# Patient Record
Sex: FEMALE | Race: WHITE | Employment: UNEMPLOYED | URBAN - METROPOLITAN AREA
[De-identification: names, ages, dates, MRNs, and addresses within clinical notes are randomized per-mention and may not be internally consistent; named-entity substitution may affect disease eponyms.]

---

## 2020-05-13 ENCOUNTER — APPOINTMENT (OUTPATIENT)
Dept: GENERAL RADIOLOGY | Age: 60
DRG: 139 | End: 2020-05-13
Payer: MEDICARE

## 2020-05-13 ENCOUNTER — HOSPITAL ENCOUNTER (INPATIENT)
Age: 60
LOS: 1 days | Discharge: ANOTHER HOSPITAL | DRG: 139 | End: 2020-05-14
Attending: EMERGENCY MEDICINE | Admitting: INTERNAL MEDICINE
Payer: MEDICARE

## 2020-05-13 ENCOUNTER — APPOINTMENT (OUTPATIENT)
Dept: CT IMAGING | Age: 60
DRG: 139 | End: 2020-05-13
Payer: MEDICARE

## 2020-05-13 PROBLEM — J18.9 PNEUMONIA: Status: ACTIVE | Noted: 2020-05-13

## 2020-05-13 LAB
ABSOLUTE EOS #: 0 K/UL (ref 0–0.4)
ABSOLUTE IMMATURE GRANULOCYTE: 0 K/UL (ref 0–0.3)
ABSOLUTE LYMPH #: 0.48 K/UL (ref 1–4.8)
ABSOLUTE MONO #: 0.24 K/UL (ref 0.2–0.8)
ALBUMIN SERPL-MCNC: 3.2 G/DL (ref 3.5–5.2)
ALBUMIN/GLOBULIN RATIO: ABNORMAL (ref 1–2.5)
ALP BLD-CCNC: 59 U/L (ref 35–104)
ALT SERPL-CCNC: 43 U/L (ref 5–33)
AMPHETAMINE SCREEN URINE: NEGATIVE
ANION GAP SERPL CALCULATED.3IONS-SCNC: 18 MMOL/L (ref 9–17)
AST SERPL-CCNC: 106 U/L
BARBITURATE SCREEN URINE: NEGATIVE
BASOPHILS # BLD: 0 %
BASOPHILS ABSOLUTE: 0 K/UL (ref 0–0.2)
BENZODIAZEPINE SCREEN, URINE: NEGATIVE
BILIRUB SERPL-MCNC: 0.36 MG/DL (ref 0.3–1.2)
BILIRUBIN DIRECT: 0.15 MG/DL
BILIRUBIN, INDIRECT: 0.21 MG/DL (ref 0–1)
BUN BLDV-MCNC: 45 MG/DL (ref 6–20)
BUN/CREAT BLD: 34 (ref 9–20)
BUPRENORPHINE URINE: NORMAL
CALCIUM SERPL-MCNC: 9.1 MG/DL (ref 8.6–10.4)
CANNABINOID SCREEN URINE: NEGATIVE
CHLORIDE BLD-SCNC: 87 MMOL/L (ref 98–107)
CO2: 18 MMOL/L (ref 20–31)
COCAINE METABOLITE, URINE: NEGATIVE
CREAT SERPL-MCNC: 1.34 MG/DL (ref 0.5–0.9)
DIFFERENTIAL TYPE: ABNORMAL
EOSINOPHILS RELATIVE PERCENT: 0 % (ref 1–4)
ETHANOL PERCENT: <0.01 %
ETHANOL: <10 MG/DL
FERRITIN: 9357 UG/L (ref 13–150)
GFR AFRICAN AMERICAN: 49 ML/MIN
GFR NON-AFRICAN AMERICAN: 40 ML/MIN
GFR SERPL CREATININE-BSD FRML MDRD: ABNORMAL ML/MIN/{1.73_M2}
GFR SERPL CREATININE-BSD FRML MDRD: ABNORMAL ML/MIN/{1.73_M2}
GLOBULIN: ABNORMAL G/DL (ref 1.5–3.8)
GLUCOSE BLD-MCNC: 133 MG/DL (ref 70–99)
HCT VFR BLD CALC: 47.2 % (ref 36.3–47.1)
HEMOGLOBIN: 16.8 G/DL (ref 11.9–15.1)
IMMATURE GRANULOCYTES: 0 %
INR BLD: 1
LACTATE DEHYDROGENASE: 394 U/L (ref 135–214)
LYMPHOCYTES # BLD: 4 % (ref 24–44)
MAGNESIUM: 2.2 MG/DL (ref 1.6–2.6)
MCH RBC QN AUTO: 30.9 PG (ref 25.2–33.5)
MCHC RBC AUTO-ENTMCNC: 35.6 G/DL (ref 28.4–34.8)
MCV RBC AUTO: 86.8 FL (ref 82.6–102.9)
MDMA URINE: NORMAL
METHADONE SCREEN, URINE: NEGATIVE
METHAMPHETAMINE, URINE: NORMAL
MONOCYTES # BLD: 2 % (ref 1–7)
MORPHOLOGY: ABNORMAL
NRBC AUTOMATED: 0 PER 100 WBC
OPIATES, URINE: NEGATIVE
OXYCODONE SCREEN URINE: NEGATIVE
PARTIAL THROMBOPLASTIN TIME: 29.9 SEC (ref 23.9–33.8)
PDW BLD-RTO: 12.7 % (ref 11.8–14.4)
PHENCYCLIDINE, URINE: NEGATIVE
PLATELET # BLD: 77 K/UL (ref 138–453)
PLATELET ESTIMATE: ABNORMAL
PMV BLD AUTO: 12.3 FL (ref 8.1–13.5)
POTASSIUM SERPL-SCNC: 3.8 MMOL/L (ref 3.7–5.3)
PROPOXYPHENE, URINE: NORMAL
PROTHROMBIN TIME: 13.5 SEC (ref 11.5–14.2)
RBC # BLD: 5.44 M/UL (ref 3.95–5.11)
RBC # BLD: ABNORMAL 10*6/UL
SARS-COV-2, PCR: NORMAL
SARS-COV-2, RAPID: NOT DETECTED
SARS-COV-2: NORMAL
SEG NEUTROPHILS: 94 % (ref 36–66)
SEGMENTED NEUTROPHILS ABSOLUTE COUNT: 11.18 K/UL (ref 1.8–7.7)
SODIUM BLD-SCNC: 123 MMOL/L (ref 135–144)
SOURCE: NORMAL
TEST INFORMATION: NORMAL
TOTAL PROTEIN: 6 G/DL (ref 6.4–8.3)
TRICYCLIC ANTIDEPRESSANTS, UR: NORMAL
WBC # BLD: 11.9 K/UL (ref 3.5–11.3)
WBC # BLD: ABNORMAL 10*3/UL

## 2020-05-13 PROCEDURE — 80076 HEPATIC FUNCTION PANEL: CPT

## 2020-05-13 PROCEDURE — 2500000003 HC RX 250 WO HCPCS: Performed by: NURSE PRACTITIONER

## 2020-05-13 PROCEDURE — 80048 BASIC METABOLIC PNL TOTAL CA: CPT

## 2020-05-13 PROCEDURE — 2500000003 HC RX 250 WO HCPCS: Performed by: EMERGENCY MEDICINE

## 2020-05-13 PROCEDURE — 71260 CT THORAX DX C+: CPT

## 2020-05-13 PROCEDURE — 85610 PROTHROMBIN TIME: CPT

## 2020-05-13 PROCEDURE — 2580000003 HC RX 258: Performed by: EMERGENCY MEDICINE

## 2020-05-13 PROCEDURE — 85025 COMPLETE CBC W/AUTO DIFF WBC: CPT

## 2020-05-13 PROCEDURE — 83615 LACTATE (LD) (LDH) ENZYME: CPT

## 2020-05-13 PROCEDURE — 80307 DRUG TEST PRSMV CHEM ANLYZR: CPT

## 2020-05-13 PROCEDURE — 96367 TX/PROPH/DG ADDL SEQ IV INF: CPT

## 2020-05-13 PROCEDURE — 6370000000 HC RX 637 (ALT 250 FOR IP): Performed by: EMERGENCY MEDICINE

## 2020-05-13 PROCEDURE — 2000000000 HC ICU R&B

## 2020-05-13 PROCEDURE — 86140 C-REACTIVE PROTEIN: CPT

## 2020-05-13 PROCEDURE — 96365 THER/PROPH/DIAG IV INF INIT: CPT

## 2020-05-13 PROCEDURE — 84145 PROCALCITONIN (PCT): CPT

## 2020-05-13 PROCEDURE — 83735 ASSAY OF MAGNESIUM: CPT

## 2020-05-13 PROCEDURE — U0002 COVID-19 LAB TEST NON-CDC: HCPCS

## 2020-05-13 PROCEDURE — 6360000002 HC RX W HCPCS: Performed by: EMERGENCY MEDICINE

## 2020-05-13 PROCEDURE — 02HV33Z INSERTION OF INFUSION DEVICE INTO SUPERIOR VENA CAVA, PERCUTANEOUS APPROACH: ICD-10-PCS | Performed by: EMERGENCY MEDICINE

## 2020-05-13 PROCEDURE — 6360000004 HC RX CONTRAST MEDICATION: Performed by: EMERGENCY MEDICINE

## 2020-05-13 PROCEDURE — 99285 EMERGENCY DEPT VISIT HI MDM: CPT

## 2020-05-13 PROCEDURE — G0480 DRUG TEST DEF 1-7 CLASSES: HCPCS

## 2020-05-13 PROCEDURE — 87040 BLOOD CULTURE FOR BACTERIA: CPT

## 2020-05-13 PROCEDURE — 71045 X-RAY EXAM CHEST 1 VIEW: CPT

## 2020-05-13 PROCEDURE — 85730 THROMBOPLASTIN TIME PARTIAL: CPT

## 2020-05-13 PROCEDURE — 96366 THER/PROPH/DIAG IV INF ADDON: CPT

## 2020-05-13 PROCEDURE — 2580000003 HC RX 258: Performed by: NURSE PRACTITIONER

## 2020-05-13 PROCEDURE — 82728 ASSAY OF FERRITIN: CPT

## 2020-05-13 RX ORDER — SODIUM CHLORIDE 9 MG/ML
INJECTION, SOLUTION INTRAVENOUS CONTINUOUS
Status: CANCELLED | OUTPATIENT
Start: 2020-05-13

## 2020-05-13 RX ORDER — MAGNESIUM SULFATE 1 G/100ML
1 INJECTION INTRAVENOUS ONCE
Status: COMPLETED | OUTPATIENT
Start: 2020-05-13 | End: 2020-05-13

## 2020-05-13 RX ORDER — 0.9 % SODIUM CHLORIDE 0.9 %
80 INTRAVENOUS SOLUTION INTRAVENOUS ONCE
Status: COMPLETED | OUTPATIENT
Start: 2020-05-13 | End: 2020-05-13

## 2020-05-13 RX ORDER — SODIUM CHLORIDE 0.9 % (FLUSH) 0.9 %
10 SYRINGE (ML) INJECTION PRN
Status: DISCONTINUED | OUTPATIENT
Start: 2020-05-13 | End: 2020-05-14 | Stop reason: HOSPADM

## 2020-05-13 RX ORDER — ACETAMINOPHEN 325 MG/1
650 TABLET ORAL ONCE
Status: COMPLETED | OUTPATIENT
Start: 2020-05-13 | End: 2020-05-13

## 2020-05-13 RX ORDER — 0.9 % SODIUM CHLORIDE 0.9 %
1000 INTRAVENOUS SOLUTION INTRAVENOUS ONCE
Status: COMPLETED | OUTPATIENT
Start: 2020-05-13 | End: 2020-05-13

## 2020-05-13 RX ORDER — SODIUM CHLORIDE 9 MG/ML
INJECTION, SOLUTION INTRAVENOUS CONTINUOUS
Status: DISCONTINUED | OUTPATIENT
Start: 2020-05-13 | End: 2020-05-14 | Stop reason: SDUPTHER

## 2020-05-13 RX ORDER — ACETAMINOPHEN 650 MG/1
650 SUPPOSITORY RECTAL EVERY 6 HOURS PRN
Status: CANCELLED | OUTPATIENT
Start: 2020-05-13

## 2020-05-13 RX ORDER — ACETAMINOPHEN 325 MG/1
650 TABLET ORAL EVERY 6 HOURS PRN
Status: CANCELLED | OUTPATIENT
Start: 2020-05-13

## 2020-05-13 RX ADMIN — SODIUM CHLORIDE, PRESERVATIVE FREE 10 ML: 5 INJECTION INTRAVENOUS at 21:48

## 2020-05-13 RX ADMIN — CEFTRIAXONE SODIUM 1 G: 1 INJECTION, POWDER, FOR SOLUTION INTRAMUSCULAR; INTRAVENOUS at 21:28

## 2020-05-13 RX ADMIN — FOLIC ACID: 5 INJECTION, SOLUTION INTRAMUSCULAR; INTRAVENOUS; SUBCUTANEOUS at 19:24

## 2020-05-13 RX ADMIN — SODIUM CHLORIDE 1000 ML: 9 INJECTION, SOLUTION INTRAVENOUS at 19:36

## 2020-05-13 RX ADMIN — SODIUM CHLORIDE 1000 ML: 9 INJECTION, SOLUTION INTRAVENOUS at 22:30

## 2020-05-13 RX ADMIN — IOPAMIDOL 75 ML: 755 INJECTION, SOLUTION INTRAVENOUS at 21:48

## 2020-05-13 RX ADMIN — SODIUM CHLORIDE: 9 INJECTION, SOLUTION INTRAVENOUS at 19:24

## 2020-05-13 RX ADMIN — SODIUM CHLORIDE 1000 ML: 9 INJECTION, SOLUTION INTRAVENOUS at 18:41

## 2020-05-13 RX ADMIN — ACETAMINOPHEN 650 MG: 325 TABLET ORAL at 21:28

## 2020-05-13 RX ADMIN — SODIUM CHLORIDE 80 ML: 0.9 INJECTION, SOLUTION INTRAVENOUS at 21:48

## 2020-05-13 RX ADMIN — MAGNESIUM SULFATE HEPTAHYDRATE 1 G: 1 INJECTION, SOLUTION INTRAVENOUS at 22:01

## 2020-05-13 RX ADMIN — DEXTROSE MONOHYDRATE 2 MCG/MIN: 50 INJECTION, SOLUTION INTRAVENOUS at 23:28

## 2020-05-13 ASSESSMENT — ENCOUNTER SYMPTOMS
NAUSEA: 1
FACIAL SWELLING: 0
SHORTNESS OF BREATH: 0
CONSTIPATION: 0
EYE DISCHARGE: 0
EYE REDNESS: 0
ABDOMINAL PAIN: 0
DIARRHEA: 1
COLOR CHANGE: 0
VOMITING: 1
COUGH: 0

## 2020-05-13 ASSESSMENT — PAIN SCALES - GENERAL: PAINLEVEL_OUTOF10: 0

## 2020-05-13 NOTE — ED PROVIDER NOTES
constipation. Genitourinary: Negative for dysuria and hematuria. Musculoskeletal: Negative for arthralgias. Skin: Negative for color change and rash. Neurological: Positive for weakness. Negative for syncope, numbness and headaches. Hematological: Negative for adenopathy. Psychiatric/Behavioral: Negative for confusion. The patient is not nervous/anxious. Except as noted above the remainder of the review of systems was reviewed and negative. PHYSICAL EXAM    (up to 7 for level 4, 8 or more for level 5)     Vitals:    05/13/20 1836   BP: 100/80   Pulse: 142   Resp: 21   Temp: 99.6 °F (37.6 °C)   TempSrc: Oral   SpO2: 96%   Weight: 120 lb (54.4 kg)   Height: 5' 7\" (1.702 m)       Physical Exam  Constitutional:       General: She is not in acute distress. Appearance: She is well-developed. She is not diaphoretic. HENT:      Head: Normocephalic and atraumatic. Eyes:      General: No scleral icterus. Right eye: No discharge. Left eye: No discharge. Neck:      Musculoskeletal: Neck supple. Cardiovascular:      Rate and Rhythm: Regular rhythm. Comments: Tachycardic  Pulmonary:      Effort: Pulmonary effort is normal. No respiratory distress. Breath sounds: Normal breath sounds. No stridor. No wheezing or rales. Abdominal:      General: There is no distension. Palpations: Abdomen is soft. Tenderness: There is no abdominal tenderness. Musculoskeletal: Normal range of motion. Lymphadenopathy:      Cervical: No cervical adenopathy. Skin:     General: Skin is warm and dry. Findings: No erythema or rash. Neurological:      Mental Status: She is alert and oriented to person, place, and time.    Psychiatric:         Behavior: Behavior normal.             DIAGNOSTIC RESULTS     EKG: All EKG's are interpreted by the Emergency Department Physician who either signs or Co-signs this chart in the absence of a cardiologist.    EKG on my interpretation Alb 3.2 (*)     ALT 43 (*)      (*)     Total Protein 6.0 (*)     All other components within normal limits   ETHANOL   APTT   PROTIME-INR   URINE DRUG SCREEN   COVID-19   POCT URINALYSIS DIPSTICK       All other labs were within normal range or not returned as of this dictation. EMERGENCY DEPARTMENT COURSE and DIFFERENTIAL DIAGNOSIS/MDM:   Vitals:    Vitals:    05/13/20 1836   BP: 100/80   Pulse: 142   Resp: 21   Temp: 99.6 °F (37.6 °C)   TempSrc: Oral   SpO2: 96%   Weight: 120 lb (54.4 kg)   Height: 5' 7\" (1.702 m)       Orders Placed This Encounter   Medications    0.9 % sodium chloride bolus    0.9 % sodium chloride infusion    folic acid 1 mg, thiamine (B-1) 100 mg in dextrose 5 % 50 mL IVPB    0.9 % sodium chloride bolus       Medical Decision Making: The patient has questionable patchy infiltrates and a coronavirus test is ordered. She is also found to be hyponatremic and was given IV fluids. Patient is signed out to Dr Uri Torres.       (Please note that portions of this note were completed with a voice recognition program.  Efforts were made to edit the dictations but occasionally words are mis-transcribed.)    Diana Mayberry MD  Attending Emergency Physician           Diana Mayberry MD  05/13/20 2035

## 2020-05-13 NOTE — ED NOTES
Pt presents to ED c/o weakness and \"feeling sick\" for twelve days. She does drink daily but she reports that she has not drank in the whole twelve days. She is febrile at 99.6 F. She is tachy on monitor at 147. She denies CP or shortness of breath. Her PCP was concerned for covid.        Jory Marquez RN  05/13/20 4947

## 2020-05-14 ENCOUNTER — APPOINTMENT (OUTPATIENT)
Dept: ULTRASOUND IMAGING | Age: 60
DRG: 720 | End: 2020-05-14
Attending: INTERNAL MEDICINE
Payer: MEDICARE

## 2020-05-14 ENCOUNTER — APPOINTMENT (OUTPATIENT)
Dept: MRI IMAGING | Age: 60
DRG: 720 | End: 2020-05-14
Attending: INTERNAL MEDICINE
Payer: MEDICARE

## 2020-05-14 ENCOUNTER — APPOINTMENT (OUTPATIENT)
Dept: GENERAL RADIOLOGY | Age: 60
DRG: 139 | End: 2020-05-14
Payer: MEDICARE

## 2020-05-14 ENCOUNTER — HOSPITAL ENCOUNTER (INPATIENT)
Age: 60
LOS: 25 days | Discharge: NURSING, REHAB OR LONG-TERM CARE | DRG: 720 | End: 2020-06-08
Attending: INTERNAL MEDICINE | Admitting: INTERNAL MEDICINE
Payer: MEDICARE

## 2020-05-14 VITALS
OXYGEN SATURATION: 95 % | SYSTOLIC BLOOD PRESSURE: 107 MMHG | HEART RATE: 101 BPM | TEMPERATURE: 100.5 F | HEIGHT: 67 IN | RESPIRATION RATE: 26 BRPM | WEIGHT: 120.6 LBS | BODY MASS INDEX: 18.93 KG/M2 | DIASTOLIC BLOOD PRESSURE: 76 MMHG

## 2020-05-14 PROBLEM — N17.9 AKI (ACUTE KIDNEY INJURY) (HCC): Status: ACTIVE | Noted: 2020-05-14

## 2020-05-14 PROBLEM — F17.200 SMOKER: Status: ACTIVE | Noted: 2020-05-14

## 2020-05-14 PROBLEM — R63.4 WEIGHT LOSS: Status: ACTIVE | Noted: 2018-07-25

## 2020-05-14 PROBLEM — Z78.9 ALCOHOL USE: Status: ACTIVE | Noted: 2020-05-14

## 2020-05-14 PROBLEM — R10.9 CHRONIC ABDOMINAL PAIN: Status: ACTIVE | Noted: 2018-10-30

## 2020-05-14 PROBLEM — F10.20 ALCOHOLISM (HCC): Status: ACTIVE | Noted: 2020-05-14

## 2020-05-14 PROBLEM — U07.1 PNEUMONIA DUE TO COVID-19 VIRUS: Status: ACTIVE | Noted: 2020-05-14

## 2020-05-14 PROBLEM — R51.9 ACUTE INTRACTABLE HEADACHE: Status: ACTIVE | Noted: 2020-05-11

## 2020-05-14 PROBLEM — Z90.49 HX OF CHOLECYSTECTOMY: Status: ACTIVE | Noted: 2020-05-14

## 2020-05-14 PROBLEM — J12.82 PNEUMONIA DUE TO COVID-19 VIRUS: Status: ACTIVE | Noted: 2020-05-14

## 2020-05-14 PROBLEM — E87.1 HYPONATREMIA: Status: ACTIVE | Noted: 2020-05-14

## 2020-05-14 PROBLEM — J41.1 MUCOPURULENT CHRONIC BRONCHITIS (HCC): Status: ACTIVE | Noted: 2018-07-25

## 2020-05-14 PROBLEM — G89.29 CHRONIC ABDOMINAL PAIN: Status: ACTIVE | Noted: 2018-10-30

## 2020-05-14 LAB
-: ABNORMAL
ABSOLUTE EOS #: 0 K/UL (ref 0–0.4)
ABSOLUTE IMMATURE GRANULOCYTE: 0 K/UL (ref 0–0.3)
ABSOLUTE LYMPH #: 0.41 K/UL (ref 1–4.8)
ABSOLUTE MONO #: 0.1 K/UL (ref 0.1–0.8)
ADENOVIRUS PCR: NOT DETECTED
ALBUMIN SERPL-MCNC: 2.6 G/DL (ref 3.5–5.2)
ALBUMIN/GLOBULIN RATIO: ABNORMAL (ref 1–2.5)
ALP BLD-CCNC: 49 U/L (ref 35–104)
ALT SERPL-CCNC: 32 U/L (ref 5–33)
AMORPHOUS: ABNORMAL
ANION GAP SERPL CALCULATED.3IONS-SCNC: 10 MMOL/L (ref 9–17)
ANION GAP SERPL CALCULATED.3IONS-SCNC: 10 MMOL/L (ref 9–17)
AST SERPL-CCNC: 86 U/L
BACTERIA: ABNORMAL
BASOPHILS # BLD: 0 % (ref 0–2)
BASOPHILS ABSOLUTE: 0 K/UL (ref 0–0.2)
BILIRUB SERPL-MCNC: 0.29 MG/DL (ref 0.3–1.2)
BILIRUBIN URINE: NEGATIVE
BNP INTERPRETATION: ABNORMAL
BORDETELLA PARAPERTUSSIS: NOT DETECTED
BORDETELLA PERTUSSIS PCR: NOT DETECTED
BUN BLDV-MCNC: 22 MG/DL (ref 6–20)
BUN BLDV-MCNC: 30 MG/DL (ref 6–20)
BUN/CREAT BLD: 34 (ref 9–20)
BUN/CREAT BLD: ABNORMAL (ref 9–20)
C-REACTIVE PROTEIN: 196.4 MG/L (ref 0–5)
CALCIUM IONIZED: 1.15 MMOL/L (ref 1.13–1.33)
CALCIUM SERPL-MCNC: 7.6 MG/DL (ref 8.6–10.4)
CALCIUM SERPL-MCNC: 8.1 MG/DL (ref 8.6–10.4)
CASTS UA: ABNORMAL /LPF (ref 0–2)
CASTS UA: ABNORMAL /LPF (ref 0–2)
CHLAMYDIA PNEUMONIAE BY PCR: NOT DETECTED
CHLORIDE BLD-SCNC: 100 MMOL/L (ref 98–107)
CHLORIDE BLD-SCNC: 103 MMOL/L (ref 98–107)
CO2: 19 MMOL/L (ref 20–31)
CO2: 20 MMOL/L (ref 20–31)
COLOR: YELLOW
CORONAVIRUS 229E PCR: NOT DETECTED
CORONAVIRUS HKU1 PCR: NOT DETECTED
CORONAVIRUS NL63 PCR: NOT DETECTED
CORONAVIRUS OC43 PCR: NOT DETECTED
CREAT SERPL-MCNC: 0.74 MG/DL (ref 0.5–0.9)
CREAT SERPL-MCNC: 0.88 MG/DL (ref 0.5–0.9)
CRYSTALS, UA: ABNORMAL /HPF
DIFFERENTIAL TYPE: ABNORMAL
DIRECT EXAM: NORMAL
EOSINOPHILS RELATIVE PERCENT: 0 % (ref 1–4)
EPITHELIAL CELLS UA: ABNORMAL /HPF (ref 0–5)
GFR AFRICAN AMERICAN: >60 ML/MIN
GFR AFRICAN AMERICAN: >60 ML/MIN
GFR NON-AFRICAN AMERICAN: >60 ML/MIN
GFR NON-AFRICAN AMERICAN: >60 ML/MIN
GFR SERPL CREATININE-BSD FRML MDRD: ABNORMAL ML/MIN/{1.73_M2}
GLUCOSE BLD-MCNC: 114 MG/DL (ref 70–99)
GLUCOSE BLD-MCNC: 131 MG/DL (ref 70–99)
GLUCOSE URINE: NEGATIVE
HAV IGM SER IA-ACNC: NONREACTIVE
HCT VFR BLD CALC: 38.1 % (ref 36.3–47.1)
HCT VFR BLD CALC: 40 % (ref 36.3–47.1)
HEMOGLOBIN: 13.4 G/DL (ref 11.9–15.1)
HEMOGLOBIN: 13.7 G/DL (ref 11.9–15.1)
HEPATITIS B CORE IGM ANTIBODY: NONREACTIVE
HEPATITIS B SURFACE ANTIGEN: NONREACTIVE
HEPATITIS C ANTIBODY: NONREACTIVE
HUMAN METAPNEUMOVIRUS PCR: NOT DETECTED
IMMATURE GRANULOCYTES: 0 %
INFLUENZA A BY PCR: NOT DETECTED
INFLUENZA A H1 (2009) PCR: NORMAL
INFLUENZA A H1 PCR: NORMAL
INFLUENZA A H3 PCR: NORMAL
INFLUENZA B BY PCR: NOT DETECTED
INR BLD: 1
INR BLD: 1.1
KETONES, URINE: NEGATIVE
LACTIC ACID, SEPSIS WHOLE BLOOD: NORMAL MMOL/L (ref 0.5–1.9)
LACTIC ACID, SEPSIS WHOLE BLOOD: NORMAL MMOL/L (ref 0.5–1.9)
LACTIC ACID, SEPSIS: 1.4 MMOL/L (ref 0.5–1.9)
LACTIC ACID, SEPSIS: 1.5 MMOL/L (ref 0.5–1.9)
LACTIC ACID, WHOLE BLOOD: 1.4 MMOL/L (ref 0.7–2.1)
LACTIC ACID: NORMAL MMOL/L
LACTOFERRIN, QUAL: NORMAL
LEGIONELLA PNEUMOPHILIA AG, URINE: NEGATIVE
LEUKOCYTE ESTERASE, URINE: NEGATIVE
LYMPHOCYTES # BLD: 4 % (ref 24–44)
Lab: NORMAL
MAGNESIUM: 1.9 MG/DL (ref 1.6–2.6)
MAGNESIUM: 2.1 MG/DL (ref 1.6–2.6)
MCH RBC QN AUTO: 30.5 PG (ref 25.2–33.5)
MCH RBC QN AUTO: 31.2 PG (ref 25.2–33.5)
MCHC RBC AUTO-ENTMCNC: 34.3 G/DL (ref 28.4–34.8)
MCHC RBC AUTO-ENTMCNC: 35.2 G/DL (ref 28.4–34.8)
MCV RBC AUTO: 88.6 FL (ref 82.6–102.9)
MCV RBC AUTO: 89.1 FL (ref 82.6–102.9)
MONOCYTES # BLD: 1 % (ref 1–7)
MORPHOLOGY: NORMAL
MUCUS: ABNORMAL
MYCOPLASMA PNEUMONIAE PCR: NOT DETECTED
NITRITE, URINE: NEGATIVE
NRBC AUTOMATED: 0 PER 100 WBC
NRBC AUTOMATED: 0 PER 100 WBC
OSMOLALITY URINE: 388 MOSM/KG (ref 80–1300)
OTHER OBSERVATIONS UA: ABNORMAL
PARAINFLUENZA 1 PCR: NOT DETECTED
PARAINFLUENZA 2 PCR: NOT DETECTED
PARAINFLUENZA 3 PCR: NOT DETECTED
PARAINFLUENZA 4 PCR: NOT DETECTED
PARTIAL THROMBOPLASTIN TIME: 28.5 SEC (ref 20.5–30.5)
PDW BLD-RTO: 13 % (ref 11.8–14.4)
PDW BLD-RTO: 13.2 % (ref 11.8–14.4)
PH UA: 5 (ref 5–8)
PHOSPHORUS: 2.4 MG/DL (ref 2.6–4.5)
PLATELET # BLD: 73 K/UL (ref 138–453)
PLATELET # BLD: 73 K/UL (ref 138–453)
PLATELET ESTIMATE: ABNORMAL
PMV BLD AUTO: 12 FL (ref 8.1–13.5)
PMV BLD AUTO: 12.5 FL (ref 8.1–13.5)
POTASSIUM SERPL-SCNC: 3.2 MMOL/L (ref 3.7–5.3)
POTASSIUM SERPL-SCNC: 3.9 MMOL/L (ref 3.7–5.3)
PRO-BNP: 838 PG/ML
PROCALCITONIN: 13.13 NG/ML
PROTEIN UA: ABNORMAL
PROTHROMBIN TIME: 10.7 SEC (ref 9–12)
PROTHROMBIN TIME: 13.6 SEC (ref 11.5–14.2)
RBC # BLD: 4.3 M/UL (ref 3.95–5.11)
RBC # BLD: 4.49 M/UL (ref 3.95–5.11)
RBC # BLD: ABNORMAL 10*6/UL
RBC UA: ABNORMAL /HPF (ref 0–2)
RENAL EPITHELIAL, UA: ABNORMAL /HPF
RESP SYNCYTIAL VIRUS PCR: NOT DETECTED
RHINO/ENTEROVIRUS PCR: NOT DETECTED
SARS-COV-2, PCR: NORMAL
SARS-COV-2, RAPID: NORMAL
SARS-COV-2: NOT DETECTED
SEG NEUTROPHILS: 95 % (ref 36–66)
SEGMENTED NEUTROPHILS ABSOLUTE COUNT: 9.69 K/UL (ref 1.8–7.7)
SERUM OSMOLALITY: 278 MOSM/KG (ref 275–295)
SODIUM BLD-SCNC: 129 MMOL/L (ref 135–144)
SODIUM BLD-SCNC: 130 MMOL/L (ref 135–144)
SODIUM BLD-SCNC: 132 MMOL/L (ref 135–144)
SODIUM,UR: 33 MMOL/L
SOURCE: NORMAL
SPECIFIC GRAVITY UA: 1.01 (ref 1–1.03)
SPECIMEN DESCRIPTION: NORMAL
SPECIMEN DESCRIPTION: NORMAL
TOTAL PROTEIN: 4.6 G/DL (ref 6.4–8.3)
TRICHOMONAS: ABNORMAL
TROPONIN INTERP: ABNORMAL
TROPONIN T: ABNORMAL NG/ML
TROPONIN, HIGH SENSITIVITY: 15 NG/L (ref 0–14)
TROPONIN, HIGH SENSITIVITY: 16 NG/L (ref 0–14)
TROPONIN, HIGH SENSITIVITY: 17 NG/L (ref 0–14)
TSH SERPL DL<=0.05 MIU/L-ACNC: 0.7 MIU/L (ref 0.3–5)
TURBIDITY: ABNORMAL
URINE HGB: ABNORMAL
UROBILINOGEN, URINE: NORMAL
WBC # BLD: 10 K/UL (ref 3.5–11.3)
WBC # BLD: 10.2 K/UL (ref 3.5–11.3)
WBC # BLD: ABNORMAL 10*3/UL
WBC UA: ABNORMAL /HPF (ref 0–5)
YEAST: ABNORMAL

## 2020-05-14 PROCEDURE — 6370000000 HC RX 637 (ALT 250 FOR IP): Performed by: NURSE PRACTITIONER

## 2020-05-14 PROCEDURE — 84443 ASSAY THYROID STIM HORMONE: CPT

## 2020-05-14 PROCEDURE — 2580000003 HC RX 258: Performed by: EMERGENCY MEDICINE

## 2020-05-14 PROCEDURE — 83880 ASSAY OF NATRIURETIC PEPTIDE: CPT

## 2020-05-14 PROCEDURE — 6360000002 HC RX W HCPCS: Performed by: FAMILY MEDICINE

## 2020-05-14 PROCEDURE — 86038 ANTINUCLEAR ANTIBODIES: CPT

## 2020-05-14 PROCEDURE — 93005 ELECTROCARDIOGRAM TRACING: CPT | Performed by: STUDENT IN AN ORGANIZED HEALTH CARE EDUCATION/TRAINING PROGRAM

## 2020-05-14 PROCEDURE — 2000000000 HC ICU R&B

## 2020-05-14 PROCEDURE — 84295 ASSAY OF SERUM SODIUM: CPT

## 2020-05-14 PROCEDURE — 70553 MRI BRAIN STEM W/O & W/DYE: CPT

## 2020-05-14 PROCEDURE — 2580000003 HC RX 258: Performed by: FAMILY MEDICINE

## 2020-05-14 PROCEDURE — 83605 ASSAY OF LACTIC ACID: CPT

## 2020-05-14 PROCEDURE — 99222 1ST HOSP IP/OBS MODERATE 55: CPT | Performed by: NEUROLOGICAL SURGERY

## 2020-05-14 PROCEDURE — 85025 COMPLETE CBC W/AUTO DIFF WBC: CPT

## 2020-05-14 PROCEDURE — 80048 BASIC METABOLIC PNL TOTAL CA: CPT

## 2020-05-14 PROCEDURE — 99291 CRITICAL CARE FIRST HOUR: CPT | Performed by: INTERNAL MEDICINE

## 2020-05-14 PROCEDURE — 76705 ECHO EXAM OF ABDOMEN: CPT

## 2020-05-14 PROCEDURE — 2580000003 HC RX 258: Performed by: NEUROLOGICAL SURGERY

## 2020-05-14 PROCEDURE — 80074 ACUTE HEPATITIS PANEL: CPT

## 2020-05-14 PROCEDURE — 99254 IP/OBS CNSLTJ NEW/EST MOD 60: CPT | Performed by: INTERNAL MEDICINE

## 2020-05-14 PROCEDURE — 2580000003 HC RX 258: Performed by: INTERNAL MEDICINE

## 2020-05-14 PROCEDURE — 80053 COMPREHEN METABOLIC PANEL: CPT

## 2020-05-14 PROCEDURE — 0100U HC RESPIRPTHGN MULT REV TRANS & AMP PRB TECH 21 TRGT: CPT

## 2020-05-14 PROCEDURE — 87324 CLOSTRIDIUM AG IA: CPT

## 2020-05-14 PROCEDURE — 6360000002 HC RX W HCPCS: Performed by: NURSE PRACTITIONER

## 2020-05-14 PROCEDURE — 6360000002 HC RX W HCPCS: Performed by: STUDENT IN AN ORGANIZED HEALTH CARE EDUCATION/TRAINING PROGRAM

## 2020-05-14 PROCEDURE — 36415 COLL VENOUS BLD VENIPUNCTURE: CPT

## 2020-05-14 PROCEDURE — 87449 NOS EACH ORGANISM AG IA: CPT

## 2020-05-14 PROCEDURE — 2580000003 HC RX 258: Performed by: STUDENT IN AN ORGANIZED HEALTH CARE EDUCATION/TRAINING PROGRAM

## 2020-05-14 PROCEDURE — 2580000003 HC RX 258: Performed by: NURSE PRACTITIONER

## 2020-05-14 PROCEDURE — 71045 X-RAY EXAM CHEST 1 VIEW: CPT

## 2020-05-14 PROCEDURE — 82330 ASSAY OF CALCIUM: CPT

## 2020-05-14 PROCEDURE — 83630 LACTOFERRIN FECAL (QUAL): CPT

## 2020-05-14 PROCEDURE — 85027 COMPLETE CBC AUTOMATED: CPT

## 2020-05-14 PROCEDURE — 2500000003 HC RX 250 WO HCPCS: Performed by: NURSE PRACTITIONER

## 2020-05-14 PROCEDURE — 83520 IMMUNOASSAY QUANT NOS NONAB: CPT

## 2020-05-14 PROCEDURE — U0003 INFECTIOUS AGENT DETECTION BY NUCLEIC ACID (DNA OR RNA); SEVERE ACUTE RESPIRATORY SYNDROME CORONAVIRUS 2 (SARS-COV-2) (CORONAVIRUS DISEASE [COVID-19]), AMPLIFIED PROBE TECHNIQUE, MAKING USE OF HIGH THROUGHPUT TECHNOLOGIES AS DESCRIBED BY CMS-2020-01-R: HCPCS

## 2020-05-14 PROCEDURE — 83735 ASSAY OF MAGNESIUM: CPT

## 2020-05-14 PROCEDURE — 85610 PROTHROMBIN TIME: CPT

## 2020-05-14 PROCEDURE — A9576 INJ PROHANCE MULTIPACK: HCPCS | Performed by: NEUROLOGICAL SURGERY

## 2020-05-14 PROCEDURE — 85730 THROMBOPLASTIN TIME PARTIAL: CPT

## 2020-05-14 PROCEDURE — 72158 MRI LUMBAR SPINE W/O & W/DYE: CPT

## 2020-05-14 PROCEDURE — 6360000002 HC RX W HCPCS: Performed by: INTERNAL MEDICINE

## 2020-05-14 PROCEDURE — APPSS180 APP SPLIT SHARED TIME > 60 MINUTES: Performed by: NURSE PRACTITIONER

## 2020-05-14 PROCEDURE — 81001 URINALYSIS AUTO W/SCOPE: CPT

## 2020-05-14 PROCEDURE — 6370000000 HC RX 637 (ALT 250 FOR IP): Performed by: STUDENT IN AN ORGANIZED HEALTH CARE EDUCATION/TRAINING PROGRAM

## 2020-05-14 PROCEDURE — 84100 ASSAY OF PHOSPHORUS: CPT

## 2020-05-14 PROCEDURE — 84484 ASSAY OF TROPONIN QUANT: CPT

## 2020-05-14 PROCEDURE — 6360000004 HC RX CONTRAST MEDICATION: Performed by: NEUROLOGICAL SURGERY

## 2020-05-14 PROCEDURE — 6360000002 HC RX W HCPCS: Performed by: EMERGENCY MEDICINE

## 2020-05-14 PROCEDURE — 83935 ASSAY OF URINE OSMOLALITY: CPT

## 2020-05-14 PROCEDURE — 87506 IADNA-DNA/RNA PROBE TQ 6-11: CPT

## 2020-05-14 PROCEDURE — 87205 SMEAR GRAM STAIN: CPT

## 2020-05-14 PROCEDURE — 83930 ASSAY OF BLOOD OSMOLALITY: CPT

## 2020-05-14 PROCEDURE — 84300 ASSAY OF URINE SODIUM: CPT

## 2020-05-14 RX ORDER — FOLIC ACID 1 MG/1
1 TABLET ORAL DAILY
Status: DISCONTINUED | OUTPATIENT
Start: 2020-05-14 | End: 2020-05-14 | Stop reason: HOSPADM

## 2020-05-14 RX ORDER — SODIUM CHLORIDE 0.9 % (FLUSH) 0.9 %
10 SYRINGE (ML) INJECTION EVERY 12 HOURS SCHEDULED
Status: DISCONTINUED | OUTPATIENT
Start: 2020-05-14 | End: 2020-05-20

## 2020-05-14 RX ORDER — SODIUM CHLORIDE 9 MG/ML
INJECTION, SOLUTION INTRAVENOUS CONTINUOUS
Status: DISCONTINUED | OUTPATIENT
Start: 2020-05-14 | End: 2020-05-14 | Stop reason: HOSPADM

## 2020-05-14 RX ORDER — PROMETHAZINE HYDROCHLORIDE 25 MG/1
12.5 TABLET ORAL EVERY 6 HOURS PRN
Status: DISCONTINUED | OUTPATIENT
Start: 2020-05-14 | End: 2020-06-08 | Stop reason: HOSPADM

## 2020-05-14 RX ORDER — LORAZEPAM 2 MG/ML
1 INJECTION INTRAMUSCULAR
Status: DISCONTINUED | OUTPATIENT
Start: 2020-05-14 | End: 2020-05-14 | Stop reason: HOSPADM

## 2020-05-14 RX ORDER — ACETAMINOPHEN 325 MG/1
650 TABLET ORAL EVERY 6 HOURS PRN
Status: DISCONTINUED | OUTPATIENT
Start: 2020-05-14 | End: 2020-05-14 | Stop reason: HOSPADM

## 2020-05-14 RX ORDER — NICOTINE 21 MG/24HR
1 PATCH, TRANSDERMAL 24 HOURS TRANSDERMAL DAILY
Status: DISCONTINUED | OUTPATIENT
Start: 2020-05-14 | End: 2020-05-14

## 2020-05-14 RX ORDER — NICOTINE 21 MG/24HR
1 PATCH, TRANSDERMAL 24 HOURS TRANSDERMAL DAILY PRN
Status: DISCONTINUED | OUTPATIENT
Start: 2020-05-14 | End: 2020-05-14 | Stop reason: HOSPADM

## 2020-05-14 RX ORDER — LORAZEPAM 1 MG/1
1 TABLET ORAL
Status: DISCONTINUED | OUTPATIENT
Start: 2020-05-14 | End: 2020-05-14 | Stop reason: HOSPADM

## 2020-05-14 RX ORDER — SODIUM CHLORIDE 0.9 % (FLUSH) 0.9 %
10 SYRINGE (ML) INJECTION PRN
Status: DISCONTINUED | OUTPATIENT
Start: 2020-05-14 | End: 2020-05-14 | Stop reason: HOSPADM

## 2020-05-14 RX ORDER — MAGNESIUM SULFATE 1 G/100ML
1 INJECTION INTRAVENOUS ONCE
Status: COMPLETED | OUTPATIENT
Start: 2020-05-14 | End: 2020-05-15

## 2020-05-14 RX ORDER — MIDODRINE HYDROCHLORIDE 5 MG/1
5 TABLET ORAL 3 TIMES DAILY PRN
Status: DISCONTINUED | OUTPATIENT
Start: 2020-05-14 | End: 2020-06-06

## 2020-05-14 RX ORDER — POTASSIUM CHLORIDE 20 MEQ/1
40 TABLET, EXTENDED RELEASE ORAL PRN
Status: DISCONTINUED | OUTPATIENT
Start: 2020-05-14 | End: 2020-05-14 | Stop reason: HOSPADM

## 2020-05-14 RX ORDER — POTASSIUM CHLORIDE 7.45 MG/ML
10 INJECTION INTRAVENOUS PRN
Status: DISCONTINUED | OUTPATIENT
Start: 2020-05-14 | End: 2020-06-08 | Stop reason: HOSPADM

## 2020-05-14 RX ORDER — PROMETHAZINE HYDROCHLORIDE 12.5 MG/1
12.5 TABLET ORAL EVERY 6 HOURS PRN
Status: DISCONTINUED | OUTPATIENT
Start: 2020-05-14 | End: 2020-05-14 | Stop reason: HOSPADM

## 2020-05-14 RX ORDER — SODIUM CHLORIDE, SODIUM LACTATE, POTASSIUM CHLORIDE, CALCIUM CHLORIDE 600; 310; 30; 20 MG/100ML; MG/100ML; MG/100ML; MG/100ML
INJECTION, SOLUTION INTRAVENOUS CONTINUOUS
Status: DISCONTINUED | OUTPATIENT
Start: 2020-05-14 | End: 2020-05-14

## 2020-05-14 RX ORDER — ACETAMINOPHEN 325 MG/1
650 TABLET ORAL EVERY 6 HOURS PRN
Status: DISCONTINUED | OUTPATIENT
Start: 2020-05-14 | End: 2020-06-08 | Stop reason: HOSPADM

## 2020-05-14 RX ORDER — IPRATROPIUM BROMIDE AND ALBUTEROL SULFATE 2.5; .5 MG/3ML; MG/3ML
1 SOLUTION RESPIRATORY (INHALATION)
Status: DISCONTINUED | OUTPATIENT
Start: 2020-05-14 | End: 2020-05-14 | Stop reason: HOSPADM

## 2020-05-14 RX ORDER — MULTIVITAMIN WITH IRON
1 TABLET ORAL DAILY
Status: DISCONTINUED | OUTPATIENT
Start: 2020-05-14 | End: 2020-05-14 | Stop reason: HOSPADM

## 2020-05-14 RX ORDER — POTASSIUM CHLORIDE 7.45 MG/ML
10 INJECTION INTRAVENOUS PRN
Status: DISCONTINUED | OUTPATIENT
Start: 2020-05-14 | End: 2020-05-14 | Stop reason: HOSPADM

## 2020-05-14 RX ORDER — THIAMINE MONONITRATE (VIT B1) 100 MG
100 TABLET ORAL DAILY
Status: DISCONTINUED | OUTPATIENT
Start: 2020-05-14 | End: 2020-05-14 | Stop reason: HOSPADM

## 2020-05-14 RX ORDER — HEPARIN SODIUM 5000 [USP'U]/ML
5000 INJECTION, SOLUTION INTRAVENOUS; SUBCUTANEOUS EVERY 8 HOURS SCHEDULED
Status: DISCONTINUED | OUTPATIENT
Start: 2020-05-14 | End: 2020-05-18

## 2020-05-14 RX ORDER — POLYETHYLENE GLYCOL 3350 17 G/17G
17 POWDER, FOR SOLUTION ORAL DAILY PRN
Status: DISCONTINUED | OUTPATIENT
Start: 2020-05-14 | End: 2020-06-08 | Stop reason: HOSPADM

## 2020-05-14 RX ORDER — LORAZEPAM 1 MG/1
4 TABLET ORAL
Status: DISCONTINUED | OUTPATIENT
Start: 2020-05-14 | End: 2020-05-14 | Stop reason: HOSPADM

## 2020-05-14 RX ORDER — POTASSIUM CHLORIDE 20 MEQ/1
40 TABLET, EXTENDED RELEASE ORAL PRN
Status: DISCONTINUED | OUTPATIENT
Start: 2020-05-14 | End: 2020-06-08 | Stop reason: HOSPADM

## 2020-05-14 RX ORDER — THIAMINE MONONITRATE (VIT B1) 100 MG
100 TABLET ORAL DAILY
Status: DISCONTINUED | OUTPATIENT
Start: 2020-05-14 | End: 2020-05-17

## 2020-05-14 RX ORDER — SODIUM CHLORIDE 0.9 % (FLUSH) 0.9 %
10 SYRINGE (ML) INJECTION EVERY 12 HOURS SCHEDULED
Status: DISCONTINUED | OUTPATIENT
Start: 2020-05-14 | End: 2020-05-14 | Stop reason: HOSPADM

## 2020-05-14 RX ORDER — LORAZEPAM 2 MG/ML
3 INJECTION INTRAMUSCULAR
Status: DISCONTINUED | OUTPATIENT
Start: 2020-05-14 | End: 2020-05-14 | Stop reason: HOSPADM

## 2020-05-14 RX ORDER — ALBUTEROL SULFATE 2.5 MG/3ML
2.5 SOLUTION RESPIRATORY (INHALATION)
Status: DISCONTINUED | OUTPATIENT
Start: 2020-05-14 | End: 2020-05-14 | Stop reason: HOSPADM

## 2020-05-14 RX ORDER — ONDANSETRON 2 MG/ML
4 INJECTION INTRAMUSCULAR; INTRAVENOUS EVERY 6 HOURS PRN
Status: DISCONTINUED | OUTPATIENT
Start: 2020-05-14 | End: 2020-05-14 | Stop reason: HOSPADM

## 2020-05-14 RX ORDER — LORAZEPAM 2 MG/ML
4 INJECTION INTRAMUSCULAR
Status: DISCONTINUED | OUTPATIENT
Start: 2020-05-14 | End: 2020-05-14 | Stop reason: HOSPADM

## 2020-05-14 RX ORDER — ACETAMINOPHEN 650 MG/1
650 SUPPOSITORY RECTAL EVERY 6 HOURS PRN
Status: DISCONTINUED | OUTPATIENT
Start: 2020-05-14 | End: 2020-05-14 | Stop reason: HOSPADM

## 2020-05-14 RX ORDER — MULTIVITAMIN WITH IRON
1 TABLET ORAL DAILY
Status: DISCONTINUED | OUTPATIENT
Start: 2020-05-14 | End: 2020-05-17

## 2020-05-14 RX ORDER — METHYLPREDNISOLONE SODIUM SUCCINATE 125 MG/2ML
250 INJECTION, POWDER, LYOPHILIZED, FOR SOLUTION INTRAMUSCULAR; INTRAVENOUS ONCE
Status: DISCONTINUED | OUTPATIENT
Start: 2020-05-14 | End: 2020-05-14

## 2020-05-14 RX ORDER — SODIUM CHLORIDE 0.9 % (FLUSH) 0.9 %
10 SYRINGE (ML) INJECTION 2 TIMES DAILY
Status: DISCONTINUED | OUTPATIENT
Start: 2020-05-14 | End: 2020-05-20

## 2020-05-14 RX ORDER — SODIUM CHLORIDE 0.9 % (FLUSH) 0.9 %
10 SYRINGE (ML) INJECTION PRN
Status: DISCONTINUED | OUTPATIENT
Start: 2020-05-14 | End: 2020-05-19

## 2020-05-14 RX ORDER — FOLIC ACID 1 MG/1
1 TABLET ORAL DAILY
Status: DISCONTINUED | OUTPATIENT
Start: 2020-05-14 | End: 2020-05-17

## 2020-05-14 RX ORDER — LORAZEPAM 1 MG/1
2 TABLET ORAL
Status: DISCONTINUED | OUTPATIENT
Start: 2020-05-14 | End: 2020-05-14 | Stop reason: HOSPADM

## 2020-05-14 RX ORDER — MORPHINE SULFATE 2 MG/ML
2 INJECTION, SOLUTION INTRAMUSCULAR; INTRAVENOUS ONCE
Status: COMPLETED | OUTPATIENT
Start: 2020-05-14 | End: 2020-05-14

## 2020-05-14 RX ORDER — ONDANSETRON 2 MG/ML
4 INJECTION INTRAMUSCULAR; INTRAVENOUS EVERY 6 HOURS PRN
Status: DISCONTINUED | OUTPATIENT
Start: 2020-05-14 | End: 2020-06-08 | Stop reason: HOSPADM

## 2020-05-14 RX ORDER — LORAZEPAM 1 MG/1
3 TABLET ORAL
Status: DISCONTINUED | OUTPATIENT
Start: 2020-05-14 | End: 2020-05-14 | Stop reason: HOSPADM

## 2020-05-14 RX ORDER — LORAZEPAM 2 MG/ML
2 INJECTION INTRAMUSCULAR
Status: DISCONTINUED | OUTPATIENT
Start: 2020-05-14 | End: 2020-05-14 | Stop reason: HOSPADM

## 2020-05-14 RX ORDER — ACETAMINOPHEN 650 MG/1
650 SUPPOSITORY RECTAL EVERY 6 HOURS PRN
Status: DISCONTINUED | OUTPATIENT
Start: 2020-05-14 | End: 2020-06-08 | Stop reason: HOSPADM

## 2020-05-14 RX ORDER — DEXTROSE MONOHYDRATE 50 MG/ML
INJECTION, SOLUTION INTRAVENOUS CONTINUOUS
Status: DISCONTINUED | OUTPATIENT
Start: 2020-05-14 | End: 2020-05-15

## 2020-05-14 RX ADMIN — SODIUM CHLORIDE 250 MG: 9 INJECTION, SOLUTION INTRAVENOUS at 21:33

## 2020-05-14 RX ADMIN — Medication 100 MG: at 09:24

## 2020-05-14 RX ADMIN — THERA TABS 1 TABLET: TAB at 15:56

## 2020-05-14 RX ADMIN — MAGNESIUM SULFATE HEPTAHYDRATE 1 G: 1 INJECTION, SOLUTION INTRAVENOUS at 20:24

## 2020-05-14 RX ADMIN — PIPERACILLIN AND TAZOBACTAM 3.38 G: 3; .375 INJECTION, POWDER, LYOPHILIZED, FOR SOLUTION INTRAVENOUS at 09:24

## 2020-05-14 RX ADMIN — CEFTRIAXONE SODIUM 2 G: 2 INJECTION, POWDER, FOR SOLUTION INTRAMUSCULAR; INTRAVENOUS at 21:32

## 2020-05-14 RX ADMIN — SODIUM CHLORIDE, PRESERVATIVE FREE 10 ML: 5 INJECTION INTRAVENOUS at 21:35

## 2020-05-14 RX ADMIN — MULTIVITAMIN TABLET 1 TABLET: TABLET at 09:24

## 2020-05-14 RX ADMIN — PIPERACILLIN SODIUM AND TAZOBACTAM SODIUM 4.5 G: 4; .5 INJECTION, POWDER, LYOPHILIZED, FOR SOLUTION INTRAVENOUS at 02:59

## 2020-05-14 RX ADMIN — POTASSIUM CHLORIDE 40 MEQ: 20 TABLET, EXTENDED RELEASE ORAL at 09:24

## 2020-05-14 RX ADMIN — FOLIC ACID 1 MG: 1 TABLET ORAL at 17:16

## 2020-05-14 RX ADMIN — MORPHINE SULFATE 2 MG: 2 INJECTION, SOLUTION INTRAMUSCULAR; INTRAVENOUS at 20:30

## 2020-05-14 RX ADMIN — Medication 10 ML: at 21:34

## 2020-05-14 RX ADMIN — FOLIC ACID 1 MG: 1 TABLET ORAL at 09:24

## 2020-05-14 RX ADMIN — DEXTROSE MONOHYDRATE: 50 INJECTION, SOLUTION INTRAVENOUS at 15:54

## 2020-05-14 RX ADMIN — ACETAMINOPHEN 650 MG: 325 TABLET ORAL at 05:00

## 2020-05-14 RX ADMIN — GADOTERIDOL 12 ML: 279.3 INJECTION, SOLUTION INTRAVENOUS at 19:38

## 2020-05-14 RX ADMIN — POTASSIUM CHLORIDE 40 MEQ: 20 TABLET, EXTENDED RELEASE ORAL at 06:03

## 2020-05-14 RX ADMIN — DEXTROSE MONOHYDRATE 3 MCG/MIN: 50 INJECTION, SOLUTION INTRAVENOUS at 00:40

## 2020-05-14 RX ADMIN — SODIUM CHLORIDE: 9 INJECTION, SOLUTION INTRAVENOUS at 01:48

## 2020-05-14 RX ADMIN — AZITHROMYCIN MONOHYDRATE 500 MG: 500 INJECTION, POWDER, LYOPHILIZED, FOR SOLUTION INTRAVENOUS at 00:00

## 2020-05-14 RX ADMIN — SODIUM CHLORIDE, POTASSIUM CHLORIDE, SODIUM LACTATE AND CALCIUM CHLORIDE: 600; 310; 30; 20 INJECTION, SOLUTION INTRAVENOUS at 12:34

## 2020-05-14 RX ADMIN — Medication 100 MG: at 15:56

## 2020-05-14 RX ADMIN — VANCOMYCIN HYDROCHLORIDE 1500 MG: 1.5 INJECTION, POWDER, LYOPHILIZED, FOR SOLUTION INTRAVENOUS at 04:03

## 2020-05-14 RX ADMIN — PIPERACILLIN AND TAZOBACTAM 3.38 G: 3; .375 INJECTION, POWDER, FOR SOLUTION INTRAVENOUS at 16:44

## 2020-05-14 SDOH — HEALTH STABILITY: PHYSICAL HEALTH: ON AVERAGE, HOW MANY MINUTES DO YOU ENGAGE IN EXERCISE AT THIS LEVEL?: PATIENT DECLINED

## 2020-05-14 SDOH — SOCIAL STABILITY: SOCIAL INSECURITY: WITHIN THE LAST YEAR, HAVE YOU BEEN HUMILIATED OR EMOTIONALLY ABUSED IN OTHER WAYS BY YOUR PARTNER OR EX-PARTNER?: NO

## 2020-05-14 SDOH — SOCIAL STABILITY: SOCIAL INSECURITY: WITHIN THE LAST YEAR, HAVE YOU BEEN AFRAID OF YOUR PARTNER OR EX-PARTNER?: NO

## 2020-05-14 SDOH — HEALTH STABILITY: PHYSICAL HEALTH
ON AVERAGE, HOW MANY DAYS PER WEEK DO YOU ENGAGE IN MODERATE TO STRENUOUS EXERCISE (LIKE A BRISK WALK)?: PATIENT DECLINED

## 2020-05-14 SDOH — SOCIAL STABILITY: SOCIAL INSECURITY
WITHIN THE LAST YEAR, HAVE TO BEEN RAPED OR FORCED TO HAVE ANY KIND OF SEXUAL ACTIVITY BY YOUR PARTNER OR EX-PARTNER?: NO

## 2020-05-14 SDOH — HEALTH STABILITY: MENTAL HEALTH: HOW MANY STANDARD DRINKS CONTAINING ALCOHOL DO YOU HAVE ON A TYPICAL DAY?: 3 OR 4

## 2020-05-14 SDOH — SOCIAL STABILITY: SOCIAL INSECURITY
WITHIN THE LAST YEAR, HAVE YOU BEEN KICKED, HIT, SLAPPED, OR OTHERWISE PHYSICALLY HURT BY YOUR PARTNER OR EX-PARTNER?: NO

## 2020-05-14 SDOH — HEALTH STABILITY: MENTAL HEALTH: HOW OFTEN DO YOU HAVE A DRINK CONTAINING ALCOHOL?: 4 OR MORE TIMES A WEEK

## 2020-05-14 ASSESSMENT — ENCOUNTER SYMPTOMS
ABDOMINAL PAIN: 0
DIARRHEA: 0
TROUBLE SWALLOWING: 0
BACK PAIN: 1
CHOKING: 0
APNEA: 0
COLOR CHANGE: 0
SHORTNESS OF BREATH: 1
WHEEZING: 0
ANAL BLEEDING: 0
ABDOMINAL DISTENTION: 0
ABDOMINAL DISTENTION: 0
APNEA: 0
SORE THROAT: 0
SINUS PRESSURE: 0
VOICE CHANGE: 0
BLOOD IN STOOL: 0
VOMITING: 0
CONSTIPATION: 0
EYES NEGATIVE: 1
STRIDOR: 0
COUGH: 1
PHOTOPHOBIA: 1
CHEST TIGHTNESS: 0
NAUSEA: 1
RECTAL PAIN: 0
RHINORRHEA: 0
FACIAL SWELLING: 0
SINUS PAIN: 0
ALLERGIC/IMMUNOLOGIC NEGATIVE: 1

## 2020-05-14 ASSESSMENT — PAIN SCALES - GENERAL
PAINLEVEL_OUTOF10: 8
PAINLEVEL_OUTOF10: 10
PAINLEVEL_OUTOF10: 0

## 2020-05-14 ASSESSMENT — PAIN DESCRIPTION - DESCRIPTORS
DESCRIPTORS: SHOOTING;SHARP
DESCRIPTORS: SHARP;SHOOTING

## 2020-05-14 ASSESSMENT — PAIN DESCRIPTION - ONSET
ONSET: ON-GOING
ONSET: ON-GOING

## 2020-05-14 ASSESSMENT — PAIN DESCRIPTION - FREQUENCY
FREQUENCY: CONTINUOUS
FREQUENCY: INTERMITTENT

## 2020-05-14 ASSESSMENT — PAIN DESCRIPTION - PROGRESSION
CLINICAL_PROGRESSION: NOT CHANGED

## 2020-05-14 ASSESSMENT — PAIN DESCRIPTION - PAIN TYPE
TYPE: ACUTE PAIN
TYPE: ACUTE PAIN

## 2020-05-14 ASSESSMENT — PAIN DESCRIPTION - ORIENTATION: ORIENTATION: OTHER (COMMENT)

## 2020-05-14 ASSESSMENT — PAIN DESCRIPTION - LOCATION
LOCATION: HEAD
LOCATION: HEAD

## 2020-05-14 ASSESSMENT — PAIN - FUNCTIONAL ASSESSMENT: PAIN_FUNCTIONAL_ASSESSMENT: PREVENTS OR INTERFERES SOME ACTIVE ACTIVITIES AND ADLS

## 2020-05-14 NOTE — ED PROVIDER NOTES
[address] [place] [address] ED  eMERGENCY dEPARTMENT eNCOUnter      Pt Name: Darinel Fabian  MRN: [id number]  [place] 1960  Date of evaluation: 5/13/2020  Provider: Brooks Carrel, MD    CHIEF COMPLAINT       Chief Complaint   Patient presents with    Extremity Weakness     Care is turned over to me at shift change by Dr. Leandro Alvarenga. I am asked to follow-up on results of remaining investigations, additional care and ultimate disposition      DIAGNOSTIC RESULTS         RADIOLOGY:   Non-plain film images such as CT, Ultrasound and MRI are read by the radiologist. Plain radiographic images are visualized and preliminarily interpreted by the emergency physician with the below findings:      Interpretation per the Radiologist below, if available at the time of this note:    XR CHEST PORTABLE   Final Result   1. Minimal patchy opacities at the medial aspect of the right lower lung   field which may reflect atelectasis versus infiltrate.          CT CHEST W CONTRAST    (Results Pending)       LABS:  Labs Reviewed   CBC WITH AUTO DIFFERENTIAL - Abnormal; Notable for the following components:       Result Value    WBC 11.9 (*)     RBC 5.44 (*)     Hemoglobin 16.8 (*)     Hematocrit 47.2 (*)     MCHC 35.6 (*)     Platelets 77 (*)     Seg Neutrophils 94 (*)     Lymphocytes 4 (*)     Eosinophils % 0 (*)     Segs Absolute 11.18 (*)     Absolute Lymph # 0.48 (*)     All other components within normal limits   BASIC METABOLIC PANEL - Abnormal; Notable for the following components:    Glucose 133 (*)     BUN 45 (*)     CREATININE 1.34 (*)     Bun/Cre Ratio 34 (*)     Sodium 123 (*)     Chloride 87 (*)     CO2 18 (*)     Anion Gap 18 (*)     GFR Non- 40 (*)     GFR  49 (*)     All other components within normal limits   HEPATIC FUNCTION PANEL - Abnormal; Notable for the following components:    Alb 3.2 (*)     ALT 43 (*)      (*)     Total Protein 6.0 (*)     All other components within normal limits LACTATE DEHYDROGENASE - Abnormal; Notable for the following components:     (*)     All other components within normal limits   CULTURE, BLOOD 1   CULTURE, BLOOD 1   ETHANOL   APTT   PROTIME-INR   COVID-19   MAGNESIUM   URINE DRUG SCREEN   C-REACTIVE PROTEIN   FERRITIN   PROCALCITONIN   POCT URINALYSIS DIPSTICK       All other labs were within normal range or not returned as of this dictation. EMERGENCY DEPARTMENT COURSE and DIFFERENTIAL DIAGNOSIS/MDM:   Vitals:    Vitals:    05/13/20 1836 05/13/20 2023 05/13/20 2035 05/13/20 2127   BP: 100/80 97/68  98/64   Pulse: 142 119  121   Resp: 21 16 16 16   Temp: 99.6 °F (37.6 °C) 100.9 °F (38.3 °C)     TempSrc: Oral      SpO2: 96% 90% 97% 95%   Weight: 120 lb (54.4 kg)      Height: 5' 7\" (1.702 m)        Investigations are reviewed. CT chest is also reviewed for further evaluation of infiltrate noted on x-ray blood cultures are obtained patient is covered with Rocephin and azithromycin pending reevaluation. Patient has a history of chronic alcoholism. She is in mild withdrawal.  She does demonstrate significant hyponatremia. No evidence of seizure activity. Mild renal insufficiency also noted. Mild thrombocytopenia also noted. Patient did have elevated LDH and lymphopenia. COVID swab had been obtained by Dr. Mahendra Acosta. The swab does return negative. Care is discussed with internal medicine to facilitate admission for further care of patient's hyponatremia mild withdrawal and pneumonia symptoms    Addendum following patient's call for admission and her transfer to the floor patient had sustained drop in blood pressures to systolics in the 33G. She did receive fluid bolusing: Without return to baseline. Blood cultures had been obtained and initial antibiotics dosed. Central line is placed and she is started on Levophed. Admitting service notified with regard to change in care            CONSULTS:  IP CONSULT TO INTERNAL MEDICINE    PROCEDURES:  1.

## 2020-05-14 NOTE — CONSULTS
file     Inability: Not on file    Transportation needs     Medical: Not on file     Non-medical: Not on file   Tobacco Use    Smoking status: Current Every Day Smoker     Packs/day: 2.00     Types: Cigarettes    Smokeless tobacco: Never Used   Substance and Sexual Activity    Alcohol use: Yes     Frequency: 4 or more times a week     Drinks per session: 3 or 4     Binge frequency: Patient refused     Comment: hasn't drank in 12 days, usually daily drinker (vodka)    Drug use: Never    Sexual activity: Not on file   Lifestyle    Physical activity     Days per week: Patient refused     Minutes per session: Patient refused    Stress: Not on file   Relationships    Social connections     Talks on phone: Not on file     Gets together: Not on file     Attends Yazidi service: Not on file     Active member of club or organization: Not on file     Attends meetings of clubs or organizations: Not on file     Relationship status: Not on file    Intimate partner violence     Fear of current or ex partner: No     Emotionally abused: No     Physically abused: No     Forced sexual activity: No   Other Topics Concern    Not on file   Social History Narrative    Not on file       Family History:     Family History   Problem Relation Age of Onset    Stroke Mother     Cancer Father         Allergies:   Sulfa antibiotics     Review of Systems:     Review of Systems   Constitutional: Positive for appetite change and fatigue. HENT: Negative for congestion. Eyes: Positive for photophobia. Respiratory: Negative for apnea. Cardiovascular: Negative for chest pain. Gastrointestinal: Negative for abdominal distention. Endocrine: Negative for polydipsia. Genitourinary: Negative for dysuria and urgency. Musculoskeletal: Positive for back pain. Skin: Negative for color change. Allergic/Immunologic: Negative for immunocompromised state. Neurological: Positive for weakness and headaches.    Hematological: Negative for adenopathy. Psychiatric/Behavioral: Negative for agitation. Physical Examination :     Patient Vitals for the past 8 hrs:   BP Temp Temp src Pulse Resp SpO2 Height Weight   05/14/20 1230 113/73 -- -- 114 24 93 % 5' 7\" (1.702 m) 132 lb 15 oz (60.3 kg)   05/14/20 1215 -- -- -- 114 26 93 % -- --   05/14/20 1200 -- -- -- 108 23 95 % -- --   05/14/20 1145 -- -- -- 109 26 95 % -- --   05/14/20 1130 -- -- -- 108 26 94 % -- --   05/14/20 1115 -- -- -- 110 20 96 % -- --   05/14/20 1100 -- -- -- 113 17 94 % -- --   05/14/20 1042 115/72 99.1 °F (37.3 °C) Oral 109 24 -- 5' 7\" (1.702 m) 132 lb 15 oz (60.3 kg)       Physical Exam  Constitutional:       Appearance: Normal appearance. She is normal weight. HENT:      Head: Normocephalic and atraumatic. Nose: No congestion or rhinorrhea. Eyes:      General: No scleral icterus. Conjunctiva/sclera: Conjunctivae normal.   Neck:      Musculoskeletal: Neck supple. No neck rigidity. Cardiovascular:      Rate and Rhythm: Normal rate and regular rhythm. Pulmonary:      Effort: Pulmonary effort is normal. No respiratory distress. Abdominal:      General: There is no distension. Tenderness: There is no abdominal tenderness. Genitourinary:     Comments: No redd  Musculoskeletal:         General: No swelling or deformity. Skin:     General: Skin is warm. Coloration: Skin is not jaundiced. Neurological:      Mental Status: She is alert and oriented to person, place, and time. Cranial Nerves: No cranial nerve deficit. Psychiatric:         Mood and Affect: Mood normal.         Thought Content:  Thought content normal.         Judgment: Judgment normal.           Medical Decision Making:   I have independently reviewed/ordered the following labs:    CBC with Differential:   Recent Labs     05/13/20  1840 05/14/20  0410 05/14/20  1250   WBC 11.9* 10.0 10.2   HGB 16.8* 13.4 13.7   HCT 47.2* 38.1 40.0   PLT 77* 73* 73*   LYMPHOPCT 4*  --

## 2020-05-14 NOTE — CONSULTS
mg, 650 mg, Oral, Q6H PRN **OR** acetaminophen (TYLENOL) suppository 650 mg, 650 mg, Rectal, Q6H PRN  polyethylene glycol (GLYCOLAX) packet 17 g, 17 g, Oral, Daily PRN  promethazine (PHENERGAN) tablet 12.5 mg, 12.5 mg, Oral, Q6H PRN **OR** ondansetron (ZOFRAN) injection 4 mg, 4 mg, Intravenous, Q6H PRN  potassium chloride (KLOR-CON M) extended release tablet 40 mEq, 40 mEq, Oral, PRN **OR** potassium bicarb-citric acid (EFFER-K) effervescent tablet 40 mEq, 40 mEq, Oral, PRN **OR** potassium chloride 10 mEq/100 mL IVPB (Peripheral Line), 10 mEq, Intravenous, PRN  magnesium sulfate 2 g in dextrose 5 % 100 mL IVPB, 2 g, Intravenous, PRN  multivitamin 1 tablet, 1 tablet, Oral, Daily  vitamin B-1 (THIAMINE) tablet 100 mg, 100 mg, Oral, Daily  folic acid (FOLVITE) tablet 1 mg, 1 mg, Oral, Daily  dextrose 5 % solution, , Intravenous, Continuous  midodrine (PROAMATINE) tablet 5 mg, 5 mg, Oral, TID PRN  cefTRIAXone (ROCEPHIN) 2 g IVPB in D5W 50ml minibag, 2 g, Intravenous, Q12H  magnesium sulfate 1 g in dextrose 5% 100 mL IVPB, 1 g, Intravenous, Once  methylPREDNISolone sodium (SOLU-MEDROL) 250 mg in sodium chloride 0.9 % 100 mL IVPB, 250 mg, Intravenous, Once    REVIEW OF SYSTEMS:       CONSTITUTIONAL: negative for fatigue and malaise   EYES: negative for double vision and photophobia    HEENT: negative for tinnitus and sore throat   RESPIRATORY: negative for cough, shortness of breath   CARDIOVASCULAR: negative for chest pain, palpitations   GASTROINTESTINAL: negative for nausea, vomiting   GENITOURINARY: negative for incontinence   MUSCULOSKELETAL: negative for neck or back pain   NEUROLOGICAL: negative for seizures   PSYCHIATRIC: negative for fatigue     Review of systems otherwise negative.     PHYSICAL EXAM:       BP 99/68   Pulse 115   Temp 99.1 °F (37.3 °C) (Oral)   Resp 30   Ht 5' 7\" (1.702 m)   Wt 132 lb 15 oz (60.3 kg)   SpO2 92%   BMI 20.82 kg/m²     For careful stewardship of limited PPE during COVID-19

## 2020-05-14 NOTE — PROGRESS NOTES
Pt admitted to unit. All belongings with pt. Pt vitals obtained and charted. All questions answered and pt oriented to room and call light.

## 2020-05-14 NOTE — CONSULTS
telemetry   ABDOMEN: Soft, non-tender, non-distended    NEUROLOGIC:  EYE OPENING     Spontaneous - 4 []       To voice - 3 []       To pain - 2 []       None - 1 []    VERBAL RESPONSE     Appropriate, oriented - 5 []       Dazed or confused - 4 []       Syllables, expletives - 3 []       Grunts - 2 []       None - 1 []    MOTOR RESPONSE     Spontaneous, command - 6 []       Localizes pain - 5 []       Withdraws pain - 4 []       Abnormal flexion - 3 []       Abnormal extension - 2 []       None - 1 []            Total GCS: 15    Mental Status: Oriented x3               Cranial Nerves:    Pupils equal and reactive to light at or millimeters  Extraocular motion intact  No nystagmus  Face symmetric  Shrug symmetric  Tongue and uvula midline   tone symmetric, V1-3 sensation intact/symmetric to light touch  Hearing symmetric    Motor Exam:    Drift:  absent  Tone:  normal    Motor exam is symmetrical 5 out of 5 all extremities bilaterally    Sensory:    Right Upper Extremity:  normal  Left Upper Extremity:  normal  Right Lower Extremity:  normal  Left Lower Extremity:  normal    Deep Tendon Reflexes:    Right Bicep:  2+  Left Bicep:  2+  Right Knee:  2+  Left Knee:  2+    Plantar Response:    Right: downgoing  Left:  downgoing    Clonus:  absent  Londono's:  absent    Gait: Not tested bedbound. No midline tenderness to palpation or percussion from C1 all the way down to sacrum to midline or paraspinal region.    SKIN: no rash       LABS AND IMAGING:     CBC with Differential:    Lab Results   Component Value Date    WBC 10.2 05/14/2020    RBC 4.49 05/14/2020    HGB 13.7 05/14/2020    HCT 40.0 05/14/2020    PLT 73 05/14/2020    MCV 89.1 05/14/2020    MCH 30.5 05/14/2020    MCHC 34.3 05/14/2020    RDW 13.2 05/14/2020    LYMPHOPCT 4 05/14/2020    MONOPCT 1 05/14/2020    BASOPCT 0 05/14/2020    MONOSABS 0.10 05/14/2020    LYMPHSABS 0.41 05/14/2020    EOSABS 0.00 05/14/2020    BASOSABS 0.00 05/14/2020    DIFFTYPE

## 2020-05-14 NOTE — PROGRESS NOTES
Critical care team - Resident sign-out to medicine service      Date and time: 5/14/2020 5:16 PM  Patient's name:  Allison Pool Record Number: [id number]  Patient's account/billing number: [de-identified]  Patient's YOB: 1960  Age: 62 y.o. Date of Admission: 5/14/2020 10:46 AM  Length of stay during current admission: 0    Primary Care Physician: Patsy Lebron    Code Status: Full Code    Mode of physician to physician communication:        [x] Via telephone   [] In person     Date and time of sign-out: 5/14/2020 5:16 PM    Accepting Internal Medicine resident: Dr. Ellie Lui    Accepting Medicine team: MICU    Accepting team's attending: Dr. Joaquina Iraheta    Patient's current ICU Bed:  [phone]     Patient's assigned bed on floor:  117        [] Med-Surg Monitored [] Step-down       [x] MICU       [] Psych floor     Reason for ICU admission:     Suspect COVID PNA with concern for Meningitis    ICU course summary:     Keon Conte is a 62 y.o. with PMH of alcohol use disorder, chronic smoker was transferred from [facility] for evaluation of possible COVID-19.     patient is c/o fever, dry cough, feeling weak from past 2 days. Also c/o loose stools 2-3 episodes/day x 5 days, foul smelling, no abdominal pain, no blood/pus in stools, no outside food. Also c/o headaches since 12 days. Worsened form past 5 days, describes as sharp, all over. Not associated with nausea, blurry vision. States she is chronic smoker 1-2 packs/day x 40 years & chronic alcoholic 3-4 beers/day x 40 years.   Last drink was 12 days ago.      Patient was hypotensive at outlying facility with systolic's in 88'V, received 3 L of IV fluid boluses, R IJ central line placed,  started on levophed, COVID was sent and transferred to [facility].      Significant labs Na 123, WBC 11.9, platelets, lymphopenia present, DIXON 1.34, bicarb 18, anion gap 18, Chloride 87, LFT's AST/ALT ratio  > 2 ( alcoholic liver pattern), was given 3 L NS bolus, thiamine at

## 2020-05-14 NOTE — H&P
133* 131*        PT/INR:    Lab Results   Component Value Date    PROTIME 10.7 05/14/2020    INR 1.0 05/14/2020     PTT:    Lab Results   Component Value Date    APTT 28.5 05/14/2020       Comprehensive Metabolic Profile:   Recent Labs     05/13/20  1840 05/14/20  0410   * 130*   K 3.8 3.2*   CL 87* 100   CO2 18* 20   BUN 45* 30*   CREATININE 1.34* 0.88   GLUCOSE 133* 131*   CALCIUM 9.1 7.6*   PROT 6.0* 4.6*   LABALBU 3.2* 2.6*   BILITOT 0.36 0.29*   ALKPHOS 59 49   * 86*   ALT 43* 32      Magnesium:   Lab Results   Component Value Date    MG 1.9 05/14/2020    MG 2.2 05/13/2020     Phosphorus:   Lab Results   Component Value Date    PHOS 2.4 05/14/2020     Ionized Calcium:   Lab Results   Component Value Date    CAION 1.15 05/14/2020        Urinalysis: No results found for: NITRU, COLORU, PHUR, LABCAST, WBCUA, RBCUA, MUCUS, TRICHOMONAS, YEAST, BACTERIA, CLARITYU, SPECGRAV, LEUKOCYTESUR, UROBILINOGEN, BILIRUBINUR, BLOODU, GLUCOSEU, KETUA, AMORPHOUS    HgBA1c:  No results found for: LABA1C  TSH:  No results found for: TSH    Lactic Acid:   Lab Results   Component Value Date    LACTA NOT REPORTED 05/14/2020      Troponin: No results for input(s): TROPONINI in the last 72 hours. Radiological imaging  Ct Chest W Contrast    Result Date: 5/13/2020  EXAMINATION: CT OF THE CHEST WITH CONTRAST 5/13/2020 9:13 pm TECHNIQUE: CT of the chest was performed with the administration of intravenous contrast. Multiplanar reformatted images are provided for review. Dose modulation, iterative reconstruction, and/or weight based adjustment of the mA/kV was utilized to reduce the radiation dose to as low as reasonably achievable. COMPARISON: None. HISTORY: ORDERING SYSTEM PROVIDED HISTORY: infiltrate, sob, cp TECHNOLOGIST PROVIDED HISTORY: infiltrate, sob, cp Reason for Exam: Pt c/o weakness and \"feeling sick\" for twelve days. She does drink daily but she reports that she has not drank in the whole twelve days. the region of the superior vena cava. There is no evidence of a pneumothorax. Chest x-ray is otherwise unchanged from the earlier study. A right internal jugular central venous catheter has been placed in good position. No other change. Xr Chest Portable    Result Date: 5/13/2020  EXAMINATION: ONE XRAY VIEW OF THE CHEST 5/13/2020 6:40 pm COMPARISON: None. HISTORY: ORDERING SYSTEM PROVIDED HISTORY: Weakness TECHNOLOGIST PROVIDED HISTORY: Weakness Reason for Exam: weakness Acuity: Acute Type of Exam: Initial FINDINGS: Cardiac silhouette is enlarged. Mild opacities identified along the medial aspect of the right lower lung field along the right heart border which may reflect atelectasis versus infiltrate. Chronic interstitial changes of the lungs. No pleural effusion or pneumothorax. Osseous structures appear intact. Postoperative changes of the cervical spine. 1. Minimal patchy opacities at the medial aspect of the right lower lung field which may reflect atelectasis versus infiltrate. ASSESSMENT:     Active Problems:    Pneumonia due to COVID-19 virus  Resolved Problems:    * No resolved hospital problems.  *      PLAN:     ICU PROPHYLAXIS:  Stress ulcer:  None    VTE:   [x] Enoxaparin  [x] EPC Cuffs    NUTRITION:    [x] PO    CONSULTATION NEEDED:  [x] Yes     HOME MEDICATIONS RECONCILED: [x] No      FAMILY UPDATED:    [x] No     ADDITIONAL PLAN:    septic shock/hypovolemic shock  from possible acute diarrhea/dehydration  COVID X 2 negative  Received 3 L IV crystalloids  On levophed  F/U blood cultures x 2, bacterial stool cultures, C diff stool Ag, fecal leucocyte, legionella urine Ag, respiratory virus PCR  Continue zosyn   Hypovolemic hyponatremia    F/U urine sodium, urine osmolality   Will correct to 129  till tomorrow morning ( 0700 AM)  Overcorrected  Will stop LR and Start D5 @ 50 ml/hr  Serum sodium q6h  Nephrology was consulted   DIXON resolved with IV hydration   Possible alcoholic

## 2020-05-14 NOTE — CONSULTS
F    Culture Date / Matt Rule  /  Results  5/13/20   blood x2   in process    Actual Weight:    Wt Readings from Last 1 Encounters:   05/13/20 120 lb (54.4 kg)     CrCl based on IBW\"  39 ml/min        PLAN   Initial loading dose of 25mg/kg (max of 2500 mg) = 1500  mg   2. Vancomycin 1000 mg IV every 24 hours. 3.   Ensured BUN/sCr ordered at baseline and at least every 3rd day. 4.   ONLY for suspected pneumonia or COPD: MRSA nasal swab  not on file, order placed. 5. Trough ordered for: 5/16/20 @0300 . Trough Goals (Non-dialysis patient) Peaks are not routinely recommended. 10-20 mcg/mL for mild skin/soft tissue infections or UTI.  15-20 mcg/mL is the target trough for all other indications that MRSA infection is suspected. TROUGH TIMING: (Additional levels drawn based on renal function and/or clinical response). Dosing interval Timing of Trough   Every 8 hr, 12 hr, or 18 hr regimen Prior to the 4th dose  Twice weekly troughs for every 8 hour dosing   Every 24 hr regimen Prior to the 3rd or 4th dose   Every 36 hr regimen Prior to the 3rd dose           Thank you for the consult. Pharmacy will continue to follow.   Yanna Luque RPh/PharmD  5/14/2020  2:55 AM

## 2020-05-14 NOTE — FLOWSHEET NOTE
05/14/20 1027   Emergency Contacts   Contact 1: Name [address] [place] [address] 1: Number [phone]   Contact 1: Relationship Sister   Healthcare Agent Appointed Adult siblings   Healthcare Agent's Name Hailey Ruth   Patient's sister contacted at this time and given update. Phone: [phone]  Aware we are still awaiting the results of COVID #2 results. Aware patient was transferred to [facility]. V's at this time to Room 3023.

## 2020-05-14 NOTE — PROGRESS NOTES
Discharge --> Trans to [facility]. V's:  Discharge Note:      All discharge instructions given at this time as well as all patient belongings returned to patient and double bagged and sent with EMS (patient's purse enclosed). Pt denies any further questions regarding discharge at this time. Leandra Palacio Pt wheeled out to Squad discharge doors with EMS at this time with mask on her face. No distress noted at time of discharge.

## 2020-05-14 NOTE — PROGRESS NOTES
1201 [facility] called the unit. Patient will be going to Room 3023 and report to be called to [phone]. TIM Del Cid with [employer] set up transport with [facility]. Arrival estimated @ [phone]. Aware we are still awaiting #2 COVID results.

## 2020-05-14 NOTE — H&P
[facility]    HISTORY AND PHYSICAL EXAMINATION            Date:   5/14/2020  Patient name:  Jani Ortiz  Date of admission:  5/13/2020  6:30 PM  MRN:   [id number]  Account:  [de-identified]  YOB: 1960  PCP:    Francee Breeze  Room:   2031/2031-01  Code Status:    Full Code    Chief Complaint:     Chief Complaint   Patient presents with    Extremity Weakness       History Obtained From:     patient, electronic medical record    History of Present Illness:     Jani Ortiz is a 62 y.o. Non-/non  female who presents with Extremity Weakness   and is admitted to the hospital for the management of Pneumonia. Patient presented to the emergency department with a chief complaint of a headache that has persisted for 12 days along with nausea and weakness. She states that she is stayed in bed for the past 5 days. She describes her headache as an allover skin ache on her head. She also endorses sharp pain with any movement. She denies a history of migraine or chronic headaches. She is also a daily drinker and has not had a drink in 12 days while she has had a headache. She denied having a cough or any shortness of breath, but this appeared evident upon assessment. She was also febrile upon assessment with a temperature of 101 °F.  Laboratory findings include sodium 123, creatinine 1.34, .4, , WBC 11.9, ferritin 9357, pro calcitonin 13.13. Chest x-ray shows minimal patchy opacities at the medial aspect of the right lower lung field. She is being admitted for further evaluation and management of pneumonia. Past Medical History:     History reviewed. No pertinent past medical history.      Past Surgical History:     Past Surgical History:   Procedure Laterality Date    CHOLECYSTECTOMY      THYROIDECTOMY      TONSILLECTOMY          Medications Prior to Admission:     Prior to Admission medications    Not on File Alcohol use 5/14/2020 Yes          Plan:     Patient status inpatient in the  Cardiovascular ICU    1. Admit as inpatient to CVICU  2. Consult critical care  3. Antimicrobials: Vancomycin, Zosyn, Zithromax, Rocephin  4. Labs: CMP, phosphorus, magnesium, ionized calcium, PT/INR, CBC and replete electrolytes as needed  5. Continue Levophed drip for hypotension  6. Multivitamin, thiamine and folic acid for alcohol use  7. IV fluids at 150 mL/h  8. DVT prophylaxis: Lovenox  9. CIWA scale and Ativan for alcohol abuse  10. Nausea management: Phenergan or Zofran  11. Obtain blood cultures  12. Repeat chest x-ray in morning  13. MRSA swab  14. Repeat COVID-19 swab  15. C. difficile screening  16. Obtain respiratory cultures  17. Vancomycin trough on Saturday  18. Maintain seizure and fall precautions    Consultations:   IP CONSULT TO INTERNAL MEDICINE  IP CONSULT TO INTERNAL MEDICINE  PHARMACY TO DOSE VANCOMYCIN  IP CONSULT TO CRITICAL CARE    Patient is admitted as inpatient status because of co-morbidities listed above, severity of signs and symptoms as outlined, requirement for current medical therapies and most importantly because of direct risk to patient if care not provided in a hospital setting.     JAVY Don CNP  5/14/2020  6:36 AM    Copy sent to Dr. Renu Newman

## 2020-05-15 ENCOUNTER — APPOINTMENT (OUTPATIENT)
Dept: INTERVENTIONAL RADIOLOGY/VASCULAR | Age: 60
DRG: 720 | End: 2020-05-15
Attending: INTERNAL MEDICINE
Payer: MEDICARE

## 2020-05-15 LAB
ABSOLUTE EOS #: <0.03 K/UL (ref 0–0.44)
ABSOLUTE IMMATURE GRANULOCYTE: 0.06 K/UL (ref 0–0.3)
ABSOLUTE LYMPH #: 1.35 K/UL (ref 1.1–3.7)
ABSOLUTE MONO #: 0.3 K/UL (ref 0.1–1.2)
ABSOLUTE RETIC #: 0.03 M/UL (ref 0.03–0.08)
ALBUMIN CSF: 429 MG/L (ref 70–350)
ALBUMIN INDEX: 171.6
ALBUMIN SERPL-MCNC: 2.5 G/DL (ref 3.5–5.2)
ANION GAP SERPL CALCULATED.3IONS-SCNC: 12 MMOL/L (ref 9–17)
ANTI-NUCLEAR ANTIBODY (ANA): NEGATIVE
APPEARANCE CSF: CLEAR
BASOPHILS # BLD: 0 % (ref 0–2)
BASOPHILS ABSOLUTE: 0.04 K/UL (ref 0–0.2)
BUN BLDV-MCNC: 17 MG/DL (ref 6–20)
BUN/CREAT BLD: ABNORMAL (ref 9–20)
C DIFF AG + TOXIN: NEGATIVE
CALCIUM SERPL-MCNC: 8 MG/DL (ref 8.6–10.4)
CAMPYLOBACTER PCR: NORMAL
CHLORIDE BLD-SCNC: 100 MMOL/L (ref 98–107)
CO2: 20 MMOL/L (ref 20–31)
CREAT SERPL-MCNC: 0.53 MG/DL (ref 0.5–0.9)
CRYPTOCOCCUS NEOFORMANS/GATTI CSF FILM ARR.: NOT DETECTED
CYTOMEGALOVIRUS (CMV) CSF FILM ARRAY: NOT DETECTED
DIFFERENTIAL TYPE: ABNORMAL
E COLI ENTEROTOXIGENIC PCR: NORMAL
EKG ATRIAL RATE: 115 BPM
EKG P AXIS: 40 DEGREES
EKG P-R INTERVAL: 182 MS
EKG Q-T INTERVAL: 306 MS
EKG QRS DURATION: 84 MS
EKG QTC CALCULATION (BAZETT): 423 MS
EKG R AXIS: -33 DEGREES
EKG T AXIS: 25 DEGREES
EKG VENTRICULAR RATE: 115 BPM
ENTEROVIRUS CSF FILM ARRAY: NOT DETECTED
EOSINOPHILS RELATIVE PERCENT: 0 % (ref 1–4)
ESCHERICHIA COLI K1 CSF FILM ARRAY: NOT DETECTED
GFR AFRICAN AMERICAN: >60 ML/MIN
GFR NON-AFRICAN AMERICAN: >60 ML/MIN
GFR SERPL CREATININE-BSD FRML MDRD: ABNORMAL ML/MIN/{1.73_M2}
GFR SERPL CREATININE-BSD FRML MDRD: ABNORMAL ML/MIN/{1.73_M2}
GLUCOSE BLD-MCNC: 160 MG/DL (ref 70–99)
GLUCOSE, CSF: 74 MG/DL (ref 40–70)
HAEMOPHILUS INFLUENZA CSF FILM ARRAY: NOT DETECTED
HCT VFR BLD CALC: 39.6 % (ref 36.3–47.1)
HEMOGLOBIN: 13.5 G/DL (ref 11.9–15.1)
HHV-6 (HERPESVIRUS 6) CSF FILM ARRAY: NOT DETECTED
HIV AG/AB: NONREACTIVE
HSV-1 CSF FILM ARRAY: NOT DETECTED
HSV-2 CSF FILM ARRAY: NOT DETECTED
IGG CSF: 6.8 MG/DL (ref 0.5–7)
IGG INDEX CSF: 0.66
IGG SYNTHESIS RATE CSF: 9.2 MG/24 H
IGG: 604 MG/DL (ref 700–1600)
IMMATURE GRANULOCYTES: 1 %
IMMATURE RETIC FRACT: 5.2 % (ref 2.7–18.3)
INTERVENTION: NORMAL
LISTERIA MONOCYTOGENES CSF FILM ARRAY: NOT DETECTED
LYMPHOCYTES # BLD: 13 % (ref 24–43)
MAGNESIUM: 2.1 MG/DL (ref 1.6–2.6)
MCH RBC QN AUTO: 30.6 PG (ref 25.2–33.5)
MCHC RBC AUTO-ENTMCNC: 34.1 G/DL (ref 28.4–34.8)
MCV RBC AUTO: 89.8 FL (ref 82.6–102.9)
MONOCYTES # BLD: 3 % (ref 3–12)
NEISSERIA MENIGITIDIS CSF FILM ARRAY: NOT DETECTED
NRBC AUTOMATED: 0 PER 100 WBC
OLIGOCLONAL BANDS: ABNORMAL
PARECHOVIRUS CSF FILM ARRAY: NOT DETECTED
PDW BLD-RTO: 13.6 % (ref 11.8–14.4)
PLATELET # BLD: ABNORMAL K/UL (ref 138–453)
PLATELET ESTIMATE: ABNORMAL
PLATELET, FLUORESCENCE: 68 K/UL (ref 138–453)
PLATELET, IMMATURE FRACTION: 9.3 % (ref 1.1–10.3)
PLESIOMONAS SHIGELLOIDES PCR: NORMAL
PMV BLD AUTO: ABNORMAL FL (ref 8.1–13.5)
POTASSIUM SERPL-SCNC: 3.4 MMOL/L (ref 3.7–5.3)
PROTEIN CSF: 77.8 MG/DL (ref 15–45)
RBC # BLD: 4.41 M/UL (ref 3.95–5.11)
RBC # BLD: ABNORMAL 10*6/UL
RBC CSF: 1 /MM3
RETIC %: 0.6 % (ref 0.5–1.9)
RETIC HEMOGLOBIN: 33.6 PG (ref 28.2–35.7)
SALMONELLA PCR: NORMAL
SEG NEUTROPHILS: 83 % (ref 36–65)
SEGMENTED NEUTROPHILS ABSOLUTE COUNT: 8.76 K/UL (ref 1.5–8.1)
SHIGATOXIN GENE PCR: NORMAL
SHIGELLA SP PCR: NORMAL
SODIUM BLD-SCNC: 132 MMOL/L (ref 135–144)
SODIUM BLD-SCNC: 133 MMOL/L (ref 135–144)
SPECIMEN DESCRIPTION: NORMAL
STREPTOCOCCUS AGALACTIAE CSF FILM ARRAY: NOT DETECTED
STREPTOCOCCUS PNEUMONIAE CSF FILM ARRAY: NOT DETECTED
SUPERNAT COLOR CSF: ABNORMAL
TROPONIN INTERP: NORMAL
TROPONIN T: NORMAL NG/ML
TROPONIN, HIGH SENSITIVITY: 10 NG/L (ref 0–14)
TROPONIN, HIGH SENSITIVITY: 11 NG/L (ref 0–14)
TROPONIN, HIGH SENSITIVITY: 9 NG/L (ref 0–14)
TROPONIN, HIGH SENSITIVITY: 9 NG/L (ref 0–14)
TUBE NUMBER CSF: 3
VARICELLA-ZOSTER CSF FILM ARRAY: NOT DETECTED
VDRL CSF SCREEN: NONREACTIVE
VIBRIO PCR: NORMAL
VOLUME CSF: 9
WBC # BLD: 10.5 K/UL (ref 3.5–11.3)
WBC # BLD: ABNORMAL 10*3/UL
WBC CSF: 8 /MM3
XANTHOCHROMIA: ABNORMAL
YERSINIA ENTEROCOLITICA PCR: NORMAL

## 2020-05-15 PROCEDURE — 009U3ZX DRAINAGE OF SPINAL CANAL, PERCUTANEOUS APPROACH, DIAGNOSTIC: ICD-10-PCS | Performed by: RADIOLOGY

## 2020-05-15 PROCEDURE — 2580000003 HC RX 258: Performed by: NEUROLOGICAL SURGERY

## 2020-05-15 PROCEDURE — 2580000003 HC RX 258: Performed by: INTERNAL MEDICINE

## 2020-05-15 PROCEDURE — 86738 MYCOPLASMA ANTIBODY: CPT

## 2020-05-15 PROCEDURE — 93010 ELECTROCARDIOGRAM REPORT: CPT | Performed by: INTERNAL MEDICINE

## 2020-05-15 PROCEDURE — 6360000002 HC RX W HCPCS: Performed by: INTERNAL MEDICINE

## 2020-05-15 PROCEDURE — 82784 ASSAY IGA/IGD/IGG/IGM EACH: CPT

## 2020-05-15 PROCEDURE — 2000000000 HC ICU R&B

## 2020-05-15 PROCEDURE — 87389 HIV-1 AG W/HIV-1&-2 AB AG IA: CPT

## 2020-05-15 PROCEDURE — 84484 ASSAY OF TROPONIN QUANT: CPT

## 2020-05-15 PROCEDURE — 87015 SPECIMEN INFECT AGNT CONCNTJ: CPT

## 2020-05-15 PROCEDURE — 2580000003 HC RX 258: Performed by: FAMILY MEDICINE

## 2020-05-15 PROCEDURE — 86592 SYPHILIS TEST NON-TREP QUAL: CPT

## 2020-05-15 PROCEDURE — 85045 AUTOMATED RETICULOCYTE COUNT: CPT

## 2020-05-15 PROCEDURE — 87040 BLOOD CULTURE FOR BACTERIA: CPT

## 2020-05-15 PROCEDURE — 84157 ASSAY OF PROTEIN OTHER: CPT

## 2020-05-15 PROCEDURE — 6370000000 HC RX 637 (ALT 250 FOR IP): Performed by: STUDENT IN AN ORGANIZED HEALTH CARE EDUCATION/TRAINING PROGRAM

## 2020-05-15 PROCEDURE — 87483 CNS DNA AMP PROBE TYPE 12-25: CPT

## 2020-05-15 PROCEDURE — 87205 SMEAR GRAM STAIN: CPT

## 2020-05-15 PROCEDURE — 6360000002 HC RX W HCPCS: Performed by: FAMILY MEDICINE

## 2020-05-15 PROCEDURE — 36415 COLL VENOUS BLD VENIPUNCTURE: CPT

## 2020-05-15 PROCEDURE — 87798 DETECT AGENT NOS DNA AMP: CPT

## 2020-05-15 PROCEDURE — 2580000003 HC RX 258: Performed by: STUDENT IN AN ORGANIZED HEALTH CARE EDUCATION/TRAINING PROGRAM

## 2020-05-15 PROCEDURE — 87070 CULTURE OTHR SPECIMN AEROBIC: CPT

## 2020-05-15 PROCEDURE — 2709999900 HC NON-CHARGEABLE SUPPLY

## 2020-05-15 PROCEDURE — 99291 CRITICAL CARE FIRST HOUR: CPT | Performed by: INTERNAL MEDICINE

## 2020-05-15 PROCEDURE — 82040 ASSAY OF SERUM ALBUMIN: CPT

## 2020-05-15 PROCEDURE — 84295 ASSAY OF SERUM SODIUM: CPT

## 2020-05-15 PROCEDURE — 85025 COMPLETE CBC W/AUTO DIFF WBC: CPT

## 2020-05-15 PROCEDURE — 99223 1ST HOSP IP/OBS HIGH 75: CPT | Performed by: PSYCHIATRY & NEUROLOGY

## 2020-05-15 PROCEDURE — 82042 OTHER SOURCE ALBUMIN QUAN EA: CPT

## 2020-05-15 PROCEDURE — 89051 BODY FLUID CELL COUNT: CPT

## 2020-05-15 PROCEDURE — 83916 OLIGOCLONAL BANDS: CPT

## 2020-05-15 PROCEDURE — 83735 ASSAY OF MAGNESIUM: CPT

## 2020-05-15 PROCEDURE — 99232 SBSQ HOSP IP/OBS MODERATE 35: CPT | Performed by: INTERNAL MEDICINE

## 2020-05-15 PROCEDURE — 62328 DX LMBR SPI PNXR W/FLUOR/CT: CPT

## 2020-05-15 PROCEDURE — 82945 GLUCOSE OTHER FLUID: CPT

## 2020-05-15 PROCEDURE — 80048 BASIC METABOLIC PNL TOTAL CA: CPT

## 2020-05-15 PROCEDURE — 85055 RETICULATED PLATELET ASSAY: CPT

## 2020-05-15 PROCEDURE — 86618 LYME DISEASE ANTIBODY: CPT

## 2020-05-15 RX ORDER — POTASSIUM CHLORIDE 20 MEQ/1
40 TABLET, EXTENDED RELEASE ORAL 2 TIMES DAILY
Status: COMPLETED | OUTPATIENT
Start: 2020-05-15 | End: 2020-05-15

## 2020-05-15 RX ORDER — METHYLPREDNISOLONE SODIUM SUCCINATE 125 MG/2ML
250 INJECTION, POWDER, LYOPHILIZED, FOR SOLUTION INTRAMUSCULAR; INTRAVENOUS EVERY 8 HOURS
Status: DISCONTINUED | OUTPATIENT
Start: 2020-05-15 | End: 2020-05-15

## 2020-05-15 RX ADMIN — SODIUM CHLORIDE 250 MG: 9 INJECTION, SOLUTION INTRAVENOUS at 07:00

## 2020-05-15 RX ADMIN — FOLIC ACID 1 MG: 1 TABLET ORAL at 08:28

## 2020-05-15 RX ADMIN — SODIUM CHLORIDE 250 MG: 9 INJECTION, SOLUTION INTRAVENOUS at 14:13

## 2020-05-15 RX ADMIN — SODIUM CHLORIDE, PRESERVATIVE FREE 10 ML: 5 INJECTION INTRAVENOUS at 19:59

## 2020-05-15 RX ADMIN — MIDODRINE HYDROCHLORIDE 5 MG: 5 TABLET ORAL at 10:43

## 2020-05-15 RX ADMIN — Medication 10 ML: at 08:27

## 2020-05-15 RX ADMIN — POTASSIUM CHLORIDE 40 MEQ: 1500 TABLET, EXTENDED RELEASE ORAL at 10:43

## 2020-05-15 RX ADMIN — POTASSIUM CHLORIDE 40 MEQ: 1500 TABLET, EXTENDED RELEASE ORAL at 20:00

## 2020-05-15 RX ADMIN — CEFTRIAXONE SODIUM 2 G: 2 INJECTION, POWDER, FOR SOLUTION INTRAMUSCULAR; INTRAVENOUS at 17:02

## 2020-05-15 RX ADMIN — CEFTRIAXONE SODIUM 2 G: 2 INJECTION, POWDER, FOR SOLUTION INTRAMUSCULAR; INTRAVENOUS at 06:25

## 2020-05-15 RX ADMIN — Medication 100 MG: at 08:28

## 2020-05-15 RX ADMIN — SODIUM CHLORIDE, PRESERVATIVE FREE 10 ML: 5 INJECTION INTRAVENOUS at 08:27

## 2020-05-15 RX ADMIN — THERA TABS 1 TABLET: TAB at 08:28

## 2020-05-15 RX ADMIN — MIDODRINE HYDROCHLORIDE 5 MG: 5 TABLET ORAL at 04:00

## 2020-05-15 ASSESSMENT — PAIN SCALES - GENERAL
PAINLEVEL_OUTOF10: 9
PAINLEVEL_OUTOF10: 0
PAINLEVEL_OUTOF10: 5
PAINLEVEL_OUTOF10: 8

## 2020-05-15 ASSESSMENT — PAIN DESCRIPTION - DESCRIPTORS: DESCRIPTORS: HEADACHE;DULL

## 2020-05-15 ASSESSMENT — ENCOUNTER SYMPTOMS
PHOTOPHOBIA: 0
APNEA: 0
ABDOMINAL DISTENTION: 0
COLOR CHANGE: 0
BACK PAIN: 1

## 2020-05-15 ASSESSMENT — PAIN DESCRIPTION - LOCATION: LOCATION: HEAD

## 2020-05-15 ASSESSMENT — PAIN DESCRIPTION - PROGRESSION
CLINICAL_PROGRESSION: NOT CHANGED

## 2020-05-15 ASSESSMENT — PAIN - FUNCTIONAL ASSESSMENT: PAIN_FUNCTIONAL_ASSESSMENT: PREVENTS OR INTERFERES SOME ACTIVE ACTIVITIES AND ADLS

## 2020-05-15 ASSESSMENT — PAIN DESCRIPTION - FREQUENCY: FREQUENCY: CONTINUOUS

## 2020-05-15 ASSESSMENT — PAIN DESCRIPTION - PAIN TYPE: TYPE: ACUTE PAIN

## 2020-05-15 ASSESSMENT — PAIN DESCRIPTION - ONSET: ONSET: ON-GOING

## 2020-05-15 NOTE — CONSULTS
congestion, moist mucosa. Eyes:   Pupils equal, round and reactive to light, EOMI. Neck:   No JVD, no thyromegaly, no lymphadenopathy. Chest:  Bilateral vesicular breath sounds, no rales or wheezes. Cardiac:  S1 S2 RR, no murmurs, gallops or rubs, JVP not raised. Abdomen: Soft, non-tender, no masses or organomegaly, BS audible. :   No suprapubic or flank tenderness. Neuro:  AAO x 3, No FND. SKIN:  No rashes, good skin turgor. Extremities:  No edema, palpable peripheral pulses, no calf tenderness.     Labs:       Recent Labs     05/14/20  0410 05/14/20  1250 05/15/20  0746   WBC 10.0 10.2 10.5   RBC 4.30 4.49 4.41   HGB 13.4 13.7 13.5   HCT 38.1 40.0 39.6   MCV 88.6 89.1 89.8   MCH 31.2 30.5 30.6   MCHC 35.2* 34.3 34.1   RDW 13.0 13.2 13.6   PLT 73* 73* See Reflexed IPF Result   MPV 12.0 12.5 NOT REPORTED      BMP:   Recent Labs     05/14/20  0410 05/14/20  1250 05/14/20  2052 05/15/20  0257 05/15/20  0746   * 132* 129* 133* 132*   K 3.2* 3.9  --   --  3.4*    103  --   --  100   CO2 20 19*  --   --  20   BUN 30* 22*  --   --  17   CREATININE 0.88 0.74  --   --  0.53   GLUCOSE 131* 114*  --   --  160*   CALCIUM 7.6* 8.1*  --   --  8.0*      Phosphorus:     Recent Labs     05/14/20  0410   PHOS 2.4*     Magnesium:    Recent Labs     05/14/20  0410 05/14/20  1250 05/15/20  0746   MG 1.9 2.1 2.1     Albumin:    Recent Labs     05/13/20  1840 05/14/20  0410   LABALBU 3.2* 2.6*     BNP:    No results found for: BNP  QUYEN:    No results found for: QUYEN  SPEP:  Lab Results   Component Value Date    PROT 4.6 05/14/2020     UPEP:   No results found for: LABPE  C3:   No results found for: C3  C4:   No results found for: C4  MPO ANCA:   No results found for: MPO  PR3 ANCA:   No results found for: PR3  Anti-GBM:   No results found for: GBMABIGG  Hep BsAg:         Lab Results   Component Value Date    HEPBSAG NONREACTIVE 05/14/2020     Hep C AB:          Lab Results   Component Value Date    HEPCAB NONREACTIVE

## 2020-05-15 NOTE — PROGRESS NOTES
venous catheter terminates overlying the expected location of the SVC. Lungs are hyperinflated, suggestive of COPD. No focal airspace consolidation, sizeable pleural effusion, or pneumothorax. Osteopenia.      No evidence for acute cardiopulmonary pathology. COPD.      Xr Chest Portable     Result Date: 5/14/2020  EXAMINATION: ONE XRAY VIEW OF THE CHEST 5/13/2020 11:39 pm COMPARISON: 05/13/2020 at 1832 hours HISTORY: ORDERING SYSTEM PROVIDED HISTORY: line placement TECHNOLOGIST PROVIDED HISTORY: line placement Reason for Exam: post central line placement FINDINGS: A right internal jugular central venous catheter has been placed with the tip in good position in the region of the superior vena cava. There is no evidence of a pneumothorax. Chest x-ray is otherwise unchanged from the earlier study.      A right internal jugular central venous catheter has been placed in good position. No other change.      Xr Chest Portable     Result Date: 5/13/2020  EXAMINATION: ONE XRAY VIEW OF THE CHEST 5/13/2020 6:40 pm COMPARISON: None. HISTORY: ORDERING SYSTEM PROVIDED HISTORY: Weakness TECHNOLOGIST PROVIDED HISTORY: Weakness Reason for Exam: weakness Acuity: Acute Type of Exam: Initial FINDINGS: Cardiac silhouette is enlarged. Mild opacities identified along the medial aspect of the right lower lung field along the right heart border which may reflect atelectasis versus infiltrate. Chronic interstitial changes of the lungs. No pleural effusion or pneumothorax. Osseous structures appear intact. Postoperative changes of the cervical spine.      1.  Minimal patchy opacities at the medial aspect of the right lower lung field which may reflect atelectasis versus infiltrate.        Labs:  AST/ALT 2:1 Hepatitis panel negative  C diff negative  Legionella negative  Respiratory viral panel negative   WBC 11.9, lymphopenia  SCr 1.32  Na 129 initial   LP: 8WBC, 1RBC, 74G, 77P, negative meningitis panel, VDRL

## 2020-05-15 NOTE — BRIEF OP NOTE
Brief Postoperative Note Lumbar Puncture    Pete Mcdowell  YOB: 1960  [id number]    Pre-operative Diagnosis: HA    Post-operative Diagnosis: Same    Procedure: Fluoro guided Lumbar Puncture    Anesthesia: 1% Lidocaine    Surgeons/Assistants: Chanel Schmid PA-C    Complications: None    Specimens: Obtained and sent to lab    Fluoro guided lumbar puncture. 20 gauge spinal needle. L2-L3. 9 ml clear CSF obtained. Dressing applied. Instructions given.     Electronically signed by SHARRON Wilson on 5/15/2020 at 9:19 AM

## 2020-05-16 ENCOUNTER — APPOINTMENT (OUTPATIENT)
Dept: MRI IMAGING | Age: 60
DRG: 720 | End: 2020-05-16
Attending: INTERNAL MEDICINE
Payer: MEDICARE

## 2020-05-16 LAB
ABSOLUTE EOS #: <0.03 K/UL (ref 0–0.44)
ABSOLUTE IMMATURE GRANULOCYTE: 0.19 K/UL (ref 0–0.3)
ABSOLUTE LYMPH #: 1.91 K/UL (ref 1.1–3.7)
ABSOLUTE MONO #: 0.91 K/UL (ref 0.1–1.2)
ANION GAP SERPL CALCULATED.3IONS-SCNC: 10 MMOL/L (ref 9–17)
BASOPHILS # BLD: 0 % (ref 0–2)
BASOPHILS ABSOLUTE: 0.06 K/UL (ref 0–0.2)
BUN BLDV-MCNC: 22 MG/DL (ref 6–20)
BUN/CREAT BLD: ABNORMAL (ref 9–20)
C-REACTIVE PROTEIN: 43.9 MG/L (ref 0–5)
CALCIUM SERPL-MCNC: 8.7 MG/DL (ref 8.6–10.4)
CHLORIDE BLD-SCNC: 103 MMOL/L (ref 98–107)
CO2: 22 MMOL/L (ref 20–31)
CREAT SERPL-MCNC: 0.48 MG/DL (ref 0.5–0.9)
DIFFERENTIAL TYPE: ABNORMAL
EOSINOPHILS RELATIVE PERCENT: 0 % (ref 1–4)
GFR AFRICAN AMERICAN: >60 ML/MIN
GFR NON-AFRICAN AMERICAN: >60 ML/MIN
GFR SERPL CREATININE-BSD FRML MDRD: ABNORMAL ML/MIN/{1.73_M2}
GFR SERPL CREATININE-BSD FRML MDRD: ABNORMAL ML/MIN/{1.73_M2}
GLUCOSE BLD-MCNC: 173 MG/DL (ref 70–99)
HCT VFR BLD CALC: 40.1 % (ref 36.3–47.1)
HEMOGLOBIN: 13.5 G/DL (ref 11.9–15.1)
IMMATURE GRANULOCYTES: 1 %
LV EF: 56 %
LVEF MODALITY: NORMAL
LYMPHOCYTES # BLD: 10 % (ref 24–43)
MCH RBC QN AUTO: 29.9 PG (ref 25.2–33.5)
MCHC RBC AUTO-ENTMCNC: 33.7 G/DL (ref 28.4–34.8)
MCV RBC AUTO: 88.9 FL (ref 82.6–102.9)
MONOCYTES # BLD: 5 % (ref 3–12)
NRBC AUTOMATED: 0 PER 100 WBC
PDW BLD-RTO: 13.7 % (ref 11.8–14.4)
PLATELET # BLD: 94 K/UL (ref 138–453)
PLATELET ESTIMATE: ABNORMAL
PMV BLD AUTO: 13 FL (ref 8.1–13.5)
POTASSIUM SERPL-SCNC: 4.1 MMOL/L (ref 3.7–5.3)
RBC # BLD: 4.51 M/UL (ref 3.95–5.11)
RBC # BLD: ABNORMAL 10*6/UL
SEG NEUTROPHILS: 84 % (ref 36–65)
SEGMENTED NEUTROPHILS ABSOLUTE COUNT: 15.87 K/UL (ref 1.5–8.1)
SODIUM BLD-SCNC: 133 MMOL/L (ref 135–144)
SODIUM BLD-SCNC: 135 MMOL/L (ref 135–144)
SODIUM BLD-SCNC: 136 MMOL/L (ref 135–144)
TOTAL CK: 65 U/L (ref 26–192)
TROPONIN INTERP: NORMAL
TROPONIN INTERP: NORMAL
TROPONIN T: NORMAL NG/ML
TROPONIN T: NORMAL NG/ML
TROPONIN, HIGH SENSITIVITY: 10 NG/L (ref 0–14)
TROPONIN, HIGH SENSITIVITY: 10 NG/L (ref 0–14)
WBC # BLD: 18.9 K/UL (ref 3.5–11.3)
WBC # BLD: ABNORMAL 10*3/UL

## 2020-05-16 PROCEDURE — 36415 COLL VENOUS BLD VENIPUNCTURE: CPT

## 2020-05-16 PROCEDURE — 99232 SBSQ HOSP IP/OBS MODERATE 35: CPT | Performed by: PSYCHIATRY & NEUROLOGY

## 2020-05-16 PROCEDURE — 2580000003 HC RX 258: Performed by: NEUROLOGICAL SURGERY

## 2020-05-16 PROCEDURE — 84295 ASSAY OF SERUM SODIUM: CPT

## 2020-05-16 PROCEDURE — 80048 BASIC METABOLIC PNL TOTAL CA: CPT

## 2020-05-16 PROCEDURE — A9579 GAD-BASE MR CONTRAST NOS,1ML: HCPCS | Performed by: STUDENT IN AN ORGANIZED HEALTH CARE EDUCATION/TRAINING PROGRAM

## 2020-05-16 PROCEDURE — 85025 COMPLETE CBC W/AUTO DIFF WBC: CPT

## 2020-05-16 PROCEDURE — 87086 URINE CULTURE/COLONY COUNT: CPT

## 2020-05-16 PROCEDURE — 99232 SBSQ HOSP IP/OBS MODERATE 35: CPT | Performed by: INTERNAL MEDICINE

## 2020-05-16 PROCEDURE — 82550 ASSAY OF CK (CPK): CPT

## 2020-05-16 PROCEDURE — 2060000000 HC ICU INTERMEDIATE R&B

## 2020-05-16 PROCEDURE — 99291 CRITICAL CARE FIRST HOUR: CPT | Performed by: INTERNAL MEDICINE

## 2020-05-16 PROCEDURE — 72157 MRI CHEST SPINE W/O & W/DYE: CPT

## 2020-05-16 PROCEDURE — 86140 C-REACTIVE PROTEIN: CPT

## 2020-05-16 PROCEDURE — 93306 TTE W/DOPPLER COMPLETE: CPT

## 2020-05-16 PROCEDURE — 6360000004 HC RX CONTRAST MEDICATION: Performed by: STUDENT IN AN ORGANIZED HEALTH CARE EDUCATION/TRAINING PROGRAM

## 2020-05-16 PROCEDURE — 2580000003 HC RX 258: Performed by: STUDENT IN AN ORGANIZED HEALTH CARE EDUCATION/TRAINING PROGRAM

## 2020-05-16 PROCEDURE — 72156 MRI NECK SPINE W/O & W/DYE: CPT

## 2020-05-16 PROCEDURE — 84484 ASSAY OF TROPONIN QUANT: CPT

## 2020-05-16 PROCEDURE — 6370000000 HC RX 637 (ALT 250 FOR IP): Performed by: STUDENT IN AN ORGANIZED HEALTH CARE EDUCATION/TRAINING PROGRAM

## 2020-05-16 RX ORDER — SODIUM CHLORIDE 0.9 % (FLUSH) 0.9 %
10 SYRINGE (ML) INJECTION 2 TIMES DAILY
Status: DISCONTINUED | OUTPATIENT
Start: 2020-05-16 | End: 2020-05-20

## 2020-05-16 RX ADMIN — GADOTERIDOL 10 ML: 279.3 INJECTION, SOLUTION INTRAVENOUS at 17:03

## 2020-05-16 RX ADMIN — ACETAMINOPHEN 650 MG: 325 TABLET ORAL at 10:53

## 2020-05-16 RX ADMIN — THERA TABS 1 TABLET: TAB at 10:36

## 2020-05-16 RX ADMIN — Medication 10 ML: at 20:10

## 2020-05-16 RX ADMIN — Medication 10 ML: at 10:36

## 2020-05-16 RX ADMIN — ACETAMINOPHEN 650 MG: 325 TABLET ORAL at 00:09

## 2020-05-16 RX ADMIN — SODIUM CHLORIDE, PRESERVATIVE FREE 10 ML: 5 INJECTION INTRAVENOUS at 10:36

## 2020-05-16 RX ADMIN — FOLIC ACID 1 MG: 1 TABLET ORAL at 10:36

## 2020-05-16 RX ADMIN — Medication 100 MG: at 10:36

## 2020-05-16 ASSESSMENT — PAIN DESCRIPTION - FREQUENCY: FREQUENCY: CONTINUOUS

## 2020-05-16 ASSESSMENT — PAIN SCALES - GENERAL
PAINLEVEL_OUTOF10: 0
PAINLEVEL_OUTOF10: 6
PAINLEVEL_OUTOF10: 0
PAINLEVEL_OUTOF10: 8
PAINLEVEL_OUTOF10: 0

## 2020-05-16 ASSESSMENT — PAIN DESCRIPTION - PROGRESSION
CLINICAL_PROGRESSION: NOT CHANGED

## 2020-05-16 ASSESSMENT — ENCOUNTER SYMPTOMS
ABDOMINAL DISTENTION: 0
BACK PAIN: 1
PHOTOPHOBIA: 0
APNEA: 0
COLOR CHANGE: 0

## 2020-05-16 ASSESSMENT — PAIN DESCRIPTION - PAIN TYPE: TYPE: INTRACTABLE PAIN

## 2020-05-16 ASSESSMENT — PAIN DESCRIPTION - ONSET: ONSET: ON-GOING

## 2020-05-16 ASSESSMENT — PAIN DESCRIPTION - LOCATION: LOCATION: HEAD

## 2020-05-16 ASSESSMENT — PAIN DESCRIPTION - DESCRIPTORS: DESCRIPTORS: ACHING

## 2020-05-16 NOTE — PROGRESS NOTES
Critical Care Team - Daily Progress Note      Date and time: 5/15/2020 8:48 PM  Patient's name:  Chris Summers  Medical Record Number: [id number]  Patient's account/billing number: [de-identified]  Patient's YOB: 1960  Age: 62 y.o. Date of Admission: 5/14/2020 10:46 AM  Length of stay during current admission: 1      Primary Care Physician: Diego Rahman  ICU Attending Physician: Dr. Jose Roberto Evans Status: Full Code    Reason for ICU admission: No chief complaint on file. 71-year-old female with history of alcohol use disorder, chronic smoker came as a transfer from [facility] for evaluation for possible COVID. Patient had history of fever, dry cough, feeling weak for past 2 days. She also had loose stools 2-3 episodes, foul-smelling, no abdominal pain no blood in the stools. She also reported severe headache since last 12 days, which worsened in the last 5 days. She described it as sharp all over the head. No nausea no blurry vision.     Smoker 1 to 2 packs a day for 40 years  Chronic alcoholism 3-4 beers a day for 40 years, last drink was 12 days ago.     At the admission time patient was noted to be febrile, 101 °F hypotensive with systolic blood pressure of 70s, received 2 L of IV fluid bolus, central line right IJ was placed and patient was started on Levophed. Initial labs included sodium of 123, creatinine 1.34, CRP elevated at 196.4, LDH of 394, WBC 11.9, ferritin 9357, procal 13.13  proBNP-838    Subjective:     OVERNIGHT EVENTS/TODAY'S EVENTS:         No acute events overnight. Remained afebrile throughout the day. BP stable with SBP in high 90s. MAP in 70s. Remained off levophed. UOP 2.28L in 24 hours  Creatinine improved to normal limits.        AWAKE & FOLLOWING COMMANDS:  [] No   [x] Yes    CURRENT VENTILATION STATUS:     [] Ventilator  [] BIPAP  [x] Nasal Cannula [] Room Air      SECRETIONS Amount:  [] Small [] Moderate  [] Large  [x] None  Color:     [] White [] Colored  [] Bloody    SEDATION:  RAAS Score:  [] Propofol gtt  [] Versed gtt  [] Ativan gtt   [x] No Sedation    PARALYZED:  [x] No    [] Yes    DIARRHEA:                [x] No                [] Yes  (C. Difficile status: [] positive                                                                                                                       [] negative                                                                                                                     [] pending)    VASOPRESSORS:  [x] No    [] Yes    If yes -   [] Levophed       [] Dopamine     [] Vasopressin       [] Dobutamine  [] Phenylephrine         [] Epinephrine    CENTRAL LINES:     [x] No   [] Yes   (Date of Insertion:   )           If yes -     [] Right IJ     [] Left IJ [] Right Femoral [] Left Femoral                   [] Right Subclavian [] Left Subclavian       MEZA'S CATHETER:   [x] No   [] Yes  (Date of Insertion:   )     URINE OUTPUT:            [x] Good   [] Low              [] Anuric    Review of Systems:  · Constitutional: Negative for Fever, chills  · Eyes: Negative for visual changes, diplopia  · ENT: Negative for mouth sores, sore throat. · Cardiovascular: Negative for lightheadedness ,chest pain, palpitations   · Respiratory:Negative for Shortness of breath,cough or wheezing. · Gastrointestinal: Negative for nausea/vomiting, change in bowel habits, abdominal pain  · Genitourinary:Negative for change in bladder habits, dysuria, hematuria.   · Musculoskeletal: Negative for joint pain   · Neurological: Negative for headache, change in muscle strength numbness/tingling    OBJECTIVE:     VITAL SIGNS:  BP 98/72   Pulse 87   Temp 97.3 °F (36.3 °C) (Axillary)   Resp 19   Ht 5' 7\" (1.702 m)   Wt 128 lb 1.4 oz (58.1 kg)   SpO2 91%   BMI 20.06 kg/m²   Tmax over 24 hours:  Temp (24hrs), Av °F (36.7 °C), Min:97.3 °F (36.3 °C), Max:98.6 °F (37 °C)      Patient Vitals for the past 8 hrs:   BP Temp Temp src Pulse Resp SpO2   05/15/20 []  Positive (Details:  )   CSF culture:           [] None drawn       [x] Negative             []  Positive (Details:  )        ASSESSMENT:     Principal Problem:    Acute intractable headache  Active Problems:    Chronic abdominal pain    Hyponatremia    Mucopurulent chronic bronchitis (HCC)    Hx of cholecystectomy    DIXON (acute kidney injury) ([facility] [address]    Smoker    Suspected COVID-19 virus infection    Chronic obstructive pulmonary disease (HCC)    Pericardial effusion    Thrombocytopenia (HCC)    Hypotension due to hypovolemia  Resolved Problems:    * No resolved hospital problems. *        PLAN:     1. Severe intractable Headache. Improving. Neurology on board. Continue Solu-Medrol to 250 mg every 8 hours for 3 doses. LP negative for meningitis, viral, bacterial or otherwise. F.u CSF culture  2. Septic shock. Resolved. Continued off pressors. Midodrine 10 mg PRN to maintain MAP > 65. Blood cultures negative x 2. Urine with few bacteria. Stool negative. F/u CRP tomorrow. 3. UTI. Continue Rocephin. F/u culture  4. Hyponatremia. Euvolemic hyponatremia 2/2 SIADH, excessive free water intake. Nephrology signed off. Discontinued IV fluids. Encourage oral intake. 5. Alcohol abuse. Continue folic acid, thiamine, MVI. Monitor for withdrawal.  6. Thrombocytopenia 2/2 alcohol abuse. Will monitor. 7. Pericardial effusion on CT chest. No EKG changes. Will follow up on ECHO  8. Acute kidney injury. Resolved. 9. Alcoholic fatty liver disease- stable  10. Copd- not on any home inhalers or oxygen at home. Stable   11. DVT prophylaxis- heparin  12. GI ppx. Not indicated  13. Disposition.  Stable to transfer out of ICU       Mel Baez MD  PGY-2, Internal medicine resident  [place], [place]  5/15/2020 8:48 PM

## 2020-05-16 NOTE — PROGRESS NOTES
LYMPHSABS 1.91 05/16/2020    EOSABS <0.03 05/16/2020    BASOSABS 0.06 05/16/2020    DIFFTYPE NOT REPORTED 05/16/2020     CMP:    Lab Results   Component Value Date     05/16/2020    K 4.1 05/16/2020     05/16/2020    CO2 22 05/16/2020    BUN 22 05/16/2020    CREATININE 0.48 05/16/2020    GFRAA >60 05/16/2020    LABGLOM >60 05/16/2020    GLUCOSE 173 05/16/2020    PROT 4.6 05/14/2020    LABALBU 2.5 05/15/2020    CALCIUM 8.7 05/16/2020    BILITOT 0.29 05/14/2020    ALKPHOS 49 05/14/2020    AST 86 05/14/2020    ALT 32 05/14/2020         Radiology/Imaging:  [email or web address]@    ASSESSMENT:     Patient Active Problem List    Diagnosis Date Noted    Chronic obstructive pulmonary disease (HCC)     Pericardial effusion     Thrombocytopenia (HCC)     Hypotension due to hypovolemia     Alcohol use 05/14/2020    Pneumonia due to COVID-19 virus 05/14/2020    Alcoholism ([facility] [address] 05/14/2020    Hyponatremia 05/14/2020    Hx of cholecystectomy 05/14/2020    DIXON (acute kidney injury) ([facility] [address] 05/14/2020    Smoker 05/14/2020    Suspected COVID-19 virus infection     Pneumonia 05/13/2020    Acute intractable headache 05/11/2020    Chronic abdominal pain 10/30/2018    Mucopurulent chronic bronchitis ([facility] [address] 07/25/2018    Weight loss 07/25/2018          PLAN:       NEURO:   1. Severe intractable headache   a. Evaluated by neurology  b. MRI brain negative  c. LP does not show xanthochromia  2. Meningitis  a. MRI brain negative  b. CSF cultures negative  c. Glucose 74, protein 78, white blood cell count 8  d. Solu-Medrol  250 every 8 hours for 3 doses complete  e. Patient currently on Rocephin  3. New onset lumbar back pain   a. Neurosurgery evaluated patient  b. MRI lumbar spine negative for acute pathology  c. Neurosurgery signed off    CARDIO:   1.  Pericardial effusion  a. CT showing pericardial effusion  b. Echo pending to confirm pericardial effusion  c. Cardiology will be consulted pending echo results    PULM: vomiting she is tolerating oral.  Her WBC count is increased although she is afebrile increasing WBC count is secondary to Solu-Medrol given for headache by neurology. Urine output is not accurate. Sodium is 135 CRP is coming down to 43 and she still having diarrhea her stool studies so far is negative. She has weakness of the lower extremities and MRI brain cervical and thoracic spine ordered by neurology. Her macular rash on the extremities and trunk is the same without much change. We will continue to follow the cultures data. Continue with Rocephin and follow-up with infectious disease. She will need follow-up with encephalitis panel West Nile virus panel from CSF and HSV from CSF. She will need follow-up with neurology. Echocardiogram results are pending done this morning will need follow-up by cardiology for pericardial effusion. She is stable to transfer out of ICU and will transfer to medicine team and critical care team will sign off once on the floor. Discussed with nursing staff, treatment plan discussed with    Total critical care time caring for this patient with life threatening, unstable organ failure, including direct patient contact, management of life support systems, review of data including imaging and labs, discussions with other team members and physicians at least 27  Min so far today, excluding procedures. Please note that this chart was generated using voice recognition Dragon dictation software. Although every effort was made to ensure the accuracy of this automated transcription, some errors in transcription may have occurred.       Erica Winters MD  5/16/2020 12:17 PM

## 2020-05-16 NOTE — PROGRESS NOTES
Critical care team - Resident sign-out to medicine service      Date and time: 5/15/2020 10:14 PM  Patient's name:  Carlos Mcburney Record Number: [id number]  Patient's account/billing number: [de-identified]  Patient's YOB: 1960  Age: 62 y.o. Date of Admission: 5/14/2020 10:46 AM  Length of stay during current admission: 1    Primary Care Physician: Kristen Perez    Code Status: Full Code    Mode of physician to physician communication:        [x] Via telephone   [] In person     Date and time of sign-out: 5/15/2020 10:14 PM    Accepting Medicine team: IM Team Intermed    Accepting team's attending: Dr. Masha Arredondo    Patient's current ICU Bed:  103     Patient's assigned bed on floor:  426        [] Med-Surg Monitored [x] Step-down       [] Psychiatry ICU       [] Psych floor     Reason for ICU admission:     Hypotension requiring Pressor support in Covid ICU    ICU course summary:     67-year-old female with history of alcohol use disorder, chronic smoker came as a transfer from [facility] for evaluation for possible COVID. Patient had history of fever, dry cough, feeling weak for past 2 days. She also had loose stools 2-3 episodes, foul-smelling, no abdominal pain no blood in the stools. She also reported severe headache since last 12 days, which worsened in the last 5 days. She described it as sharp all over the head. No nausea no blurry vision. Neurology consulted. Underwent lumbar puncture. Meningitis panel, Lyme, VRDL negative. Stool studies negative thus far. CSF culture pending. Remained afebrile since transfer from [facility]. Patient was moved out initially from ICU. However patient on the floor got altered again, 5/19. She was obtunded. Blood gases showed pH of 7.4, CO2 41.8, O2 49.4, bicarb 29.8. Decision was taken to intubate the patient. Patient has had severe neurological deficit. She is only alert and wakes up and tracks in the room but does not move any extremities.   He was  Suspected COVID-19 virus infection     Pneumonia 05/13/2020    Acute intractable headache 05/11/2020    Chronic abdominal pain 10/30/2018    Mucopurulent chronic bronchitis ([facility] [address] 07/25/2018    Weight loss 07/25/2018       Recommended Follow-up:     Cryptococcal left-sided pneumonia-improving  Cryptococcal antigen-4 in the blood, repeat level is 2  Continue amphotericin B-day 6  Chest x-ray shows improvement in the left-sided effusion. 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 Test for whipple disease in spinal fluid  Continue amphotericin B likely 6-week treatment  Continue to monitor LFTs and kidney function being on amphotericin  MRI shows possible micro infarct involving the splenic corpus callosum  EEG shows diffuse slowing     Hypotension- off pressors  Continues to be on normal saline at 75 ml per hour     Acute kidney injury-resolved since admission  Good urine output  On maintenance fluids     Hypokalemia- replace per protocol  Patient on amphotericin, continue to replace potassium     Right gastrocnemius DVT  Switch to eliquis from today     Lansoprazole for GI prophylaxis  Tube feeds for nutrition at 45       Above mentioned assessment and plan was discussed by me with the admitting medicine resident. The medicine team assigned to the patient by medicine admitting resident will be following up the patient from now onwards on the floor.         Bobo Yeboah MD  PGY-2, Internal medicine resident  [address] [place] [address]  5/28/2020 2:25 PM

## 2020-05-16 NOTE — PROGRESS NOTES
Neurology Resident Progress Note      SUBJECTIVE:  This is a 62 y.o.  female admitted 5/14/2020 for Pneumonia due to COVID-19 virus [U07.1, J12.89]  This is a follow-up neurology progress note. The patient was seen and examined and the chart was reviewed. There were no acute events overnight. CC: fever, cough, weakness/generalized, headache  Dx: possible  Meningitis, hyponatremia/DIXON, febrile illness with cough    Today, she is oriented x 3, able to spell world backwards, and following commands. Rash on her arms, abdomen, and legs appears to have worsened. Is off steroid. Will evaluate mri cervical and thoracic w/w/o due to her persisting weakness    LP: 8WBC, 1RBC, 74G, 77P, negative meningitis panel, VDRL nonresponsive   Pending culture, bands, Lyme, cytology. Pending mycoplasma. ROS  Constitutional: + fever, fatigue  HENT: No change in vision or hearing   Respiratory: +cough, SOB,  Cardiovascular:  No chest pain, palpitations, leg swelling. Gastrointestinal: No nausea, vomiting, diarrhea. Genitourinary: No increased frequency, urgency. Musculoskeletal: diffusely feels weak  Skin: No rashes or scarring or bruises. Neurological: + headache. No paresthesia, or focal weakness. Psychiatric/Behavioral: No anxiety or depressed mood. HPI  72 f h/o tobacco and alcohol use, HTN, chronic bronchitis, pancreatitis, C5-6 cervical spinal fusion, presented with headache since 5/1  Fever, dry cough, and fatigue  And loose stools for the past 2 days. Not eating well past 2 days, says she can't taste food well. She presented to [facility] and tx here for possible COVID infection. Covid negative x2. On presentation, unable to walk to bathroom 2/2 leg weakness. Upon arriving here, hypotensive requiring IVF and norepinephrine. She was found to have dehydration and DIXON and hyponatremia. Started IV rocephin 2g q12 and azithromycin nand solumedrol  mg q8.  MRI B and L w/w/o completed, no areas concerning

## 2020-05-16 NOTE — CONSULTS
[facility]    Consult / H&P               Today's Date: 5/16/2020  Patient Name: Aga Dang  Date of admission: 5/14/2020 10:46 AM  Patient's age: 62 y.o., 1960  Admission Dx: Pneumonia due to COVID-19 virus [U07.1, J12.89]    Reason for Admission / Consult:      Requesting Physician: Sourav Velazquez MD    CHIEF COMPLAINT: Pericardial effusion seen on CT    History Obtained From:  patient, electronic medical record    HISTORY OF PRESENT ILLNESS:      The patient is a 62 y.o.  female with alcohol use disorder and chronic smoking who was initially transferred from [facility] for evaluation for possible cord. Patient presented [facility] with the chief complaint of fever, dry cough and weakness. She was hypotensive on presentation and required several liters of IV fluid boluses and pressor support with Levophed. On presentation, her sodium was low at 123. BMP was suggestive of metabolic acidosis. LFTs were elevated in a pattern suggestive of alcoholic liver disease. CXR was unremarkable, except for possible COPD. Patient had severe intractable headache which required neurology evaluation and LP which was unremarkable. CT chest showed moderate pericardial effusion. Cardiology was consulted due to the pericardial effusion seen on CT. Upon evaluation, patient has been weaned off Levophed and has had stable blood pressure. Sodium has improved since admission and is currently at 135. Troponins x5 were negative. EKG done on admission showed sinus tachycardia. Past Medical History:   has no past medical history on file. Past Surgical History:   has a past surgical history that includes Cholecystectomy; Thyroidectomy; and Tonsillectomy.      Home Medications:    Prior to Admission medications    Not on File        Current Facility-Administered Medications: cefTRIAXone (ROCEPHIN) 1 g IVPB in 50 mL D5W minibag, 1 g, Intravenous, Q24H  sodium chloride flush 0.9 % injection 10 mL, 10 mL, Intravenous, 2 times per day  sodium chloride flush 0.9 % injection 10 mL, 10 mL, Intravenous, PRN  acetaminophen (TYLENOL) tablet 650 mg, 650 mg, Oral, Q6H PRN **OR** acetaminophen (TYLENOL) suppository 650 mg, 650 mg, Rectal, Q6H PRN  polyethylene glycol (GLYCOLAX) packet 17 g, 17 g, Oral, Daily PRN  promethazine (PHENERGAN) tablet 12.5 mg, 12.5 mg, Oral, Q6H PRN **OR** ondansetron (ZOFRAN) injection 4 mg, 4 mg, Intravenous, Q6H PRN  potassium chloride (KLOR-CON M) extended release tablet 40 mEq, 40 mEq, Oral, PRN **OR** potassium bicarb-citric acid (EFFER-K) effervescent tablet 40 mEq, 40 mEq, Oral, PRN **OR** potassium chloride 10 mEq/100 mL IVPB (Peripheral Line), 10 mEq, Intravenous, PRN  magnesium sulfate 2 g in dextrose 5 % 100 mL IVPB, 2 g, Intravenous, PRN  multivitamin 1 tablet, 1 tablet, Oral, Daily  vitamin B-1 (THIAMINE) tablet 100 mg, 100 mg, Oral, Daily  folic acid (FOLVITE) tablet 1 mg, 1 mg, Oral, Daily  midodrine (PROAMATINE) tablet 5 mg, 5 mg, Oral, TID PRN  sodium chloride flush 0.9 % injection 10 mL, 10 mL, Intravenous, BID  [Held by provider] heparin (porcine) injection 5,000 Units, 5,000 Units, Subcutaneous, 3 times per day    Allergies:  Sulfa antibiotics    Social History:   reports that she has been smoking cigarettes. She has been smoking about 2.00 packs per day. She has never used smokeless tobacco. She reports current alcohol use. She reports that she does not use drugs. Family History: family history includes Cancer in her father; Stroke in her mother. No h/o sudden cardiac death. No for premature CAD    REVIEW OF SYSTEMS:    · Constitutional: there has been no unanticipated weight loss. There's been No change in energy level, No change in activity level. · Eyes: No visual changes or diplopia. No scleral icterus.   · ENT: No Headaches  · Cardiovascular: Remaining as above  · Respiratory: No previous pulmonary problems, No cough  · Gastrointestinal: No

## 2020-05-16 NOTE — PROGRESS NOTES
spine unremarkable, neurosurgery signed off. Evaluated by neurology meningitis rule out. Lumbar puncture negative for bacterial causes of meningitis, patient remains on Rocephin, ID following. On onset, followed by neurology, sodium 135-resolved. Tomlin out, creatinine stable. IV fluids decreased discontinued, encouraged oral intake. Patient also has a pericardial effusion seen on CT, echo pending. Cardiology consulted    Patient does have a macular rash and generalized weakness. Also has diarrhea. Likely viral cause, CSF protein and glucose ratio does not account for viral aseptic meningitis. Patient stable, was originally on Levophed, BP stable at this time. Patient does have a small UTI on urinalysis, urine culture pending. Neurology ordering MRI of the thoracic and cervical spine, patient continues to have generalized weakness this is the primary cause of disorder. Neurology will continue to follow. Procedures during patient's ICU stay:     Lumbar puncture, successful no immediate complications. Current Vitals:     /69   Pulse 79   Temp 97.3 °F (36.3 °C) (Oral)   Resp 13   Ht 5' 7\" (1.702 m)   Wt 127 lb 13.9 oz (58 kg)   SpO2 96%   BMI 20.03 kg/m²       Cultures:     Blood cultures:                 [] None drawn      [x] Negative             []  Positive (Details:  )  Urine Culture:                   [] None drawn      [] Negative             []  Positive (Details:  )  Sputum Culture:               [x] None drawn       [] Negative             []  Positive (Details:  )   Endotracheal aspirate:     [x] None drawn       [] Negative             []  Positive (Details:  )       Consults:     1. Neurology   2. Nephrology  3. Infectious disease  4.  Cardiology      Assessment:     Patient Active Problem List    Diagnosis Date Noted    [facility] obstructive [facility] disease ([facility]     Pericardial effusion     Thrombocytopenia (HCC)     Hypotension due to hypovolemia     Alcohol use 05/14/2020    Pneumonia due to COVID-19 virus 05/14/2020    Alcoholism ([facility] [address] 05/14/2020    Hyponatremia 05/14/2020    Hx of cholecystectomy 05/14/2020    DIXON (acute kidney injury) ([facility] 05/14/2020    Smoker 05/14/2020    Suspected COVID-19 virus infection     Pneumonia 05/13/2020    Acute intractable headache 05/11/2020    Chronic abdominal pain 10/30/2018    Mucopurulent chronic bronchitis ([facility] [address] 07/25/2018    Weight loss 07/25/2018       Additional assessment:    1. Aseptic meningitis versus systemic viral response  2. Sepsis  3. Hyponatremia  4. Urinary tract infection  5. Pericardial effusion    Recommended Follow-up:     1. Echo pending/follow-up  1. Resulted  - Pericardial Effusion  1. Small circumferential pericardial effusion. The effusion is larger  2. anteriorly. No obvious echocardiographic tamponade. 2. MRI thoracic and cervical spine pending  3. Follow-up neurology recs  4. Follow-up urine culture        Above mentioned assessment and plan was discussed by me with the admitting medicine resident. The medicine team assigned to the patient by medicine admitting resident will be following up the patient from now onwards on the floor. Rolando Moscoso M.D.   Critical care resident  Department of Internal Medicine/ Critical care  [facility])             5/16/2020, 3:47 PM

## 2020-05-16 NOTE — PROGRESS NOTES
Negative for abdominal distention. Endocrine: Negative for polydipsia. Genitourinary: Negative for dysuria and urgency. Musculoskeletal: Positive for back pain. Skin: Negative for color change. Allergic/Immunologic: Negative for food allergies and immunocompromised state. Neurological: Positive for weakness. Negative for headaches. Hematological: Negative for adenopathy. Psychiatric/Behavioral: Negative for agitation. Physical Examination :     Patient Vitals for the past 8 hrs:   BP Temp Temp src Pulse Resp SpO2   05/16/20 1500 116/69 -- -- 79 13 96 %   05/16/20 1400 101/70 -- -- 78 24 93 %   05/16/20 1300 126/85 -- -- 85 23 98 %   05/16/20 1200 104/77 -- -- 78 19 97 %   05/16/20 1100 107/72 97.3 °F (36.3 °C) Oral 75 16 98 %   05/16/20 1000 -- -- -- 76 23 98 %       Physical Exam  Constitutional:       Appearance: Normal appearance. She is normal weight. HENT:      Head: Normocephalic and atraumatic. Nose: No congestion or rhinorrhea. Eyes:      General: No scleral icterus. Conjunctiva/sclera: Conjunctivae normal.   Neck:      Musculoskeletal: Neck supple. No neck rigidity. Cardiovascular:      Rate and Rhythm: Normal rate and regular rhythm. Pulmonary:      Effort: Pulmonary effort is normal. No respiratory distress. Abdominal:      General: There is no distension. Tenderness: There is no abdominal tenderness. Genitourinary:     Comments: No redd  Musculoskeletal:         General: No swelling or deformity. Skin:     General: Skin is warm. Coloration: Skin is not jaundiced or pale. Findings: No bruising or erythema. Neurological:      Mental Status: She is alert and oriented to person, place, and time. Cranial Nerves: No cranial nerve deficit. Psychiatric:         Mood and Affect: Mood normal.         Thought Content:  Thought content normal.         Judgment: Judgment normal.           Medical Decision Making:   I have independently reviewed/ordered the following labs:    CBC with Differential:   Recent Labs     05/15/20  0746 05/16/20  0453   WBC 10.5 18.9*   HGB 13.5 13.5   HCT 39.6 40.1   PLT See Reflexed IPF Result 94*   LYMPHOPCT 13* 10*   MONOPCT 3 5     BMP:  Recent Labs     05/14/20  1250  05/15/20  0746  05/16/20  0201 05/16/20  0453   *   < > 132*   < > 133* 135   K 3.9  --  3.4*  --   --  4.1     --  100  --   --  103   CO2 19*  --  20  --   --  22   BUN 22*  --  17  --   --  22*   CREATININE 0.74  --  0.53  --   --  0.48*   MG 2.1  --  2.1  --   --   --     < > = values in this interval not displayed. Hepatic Function Panel:   Recent Labs     05/13/20  1840 05/14/20  0410 05/15/20  0935   PROT 6.0* 4.6*  --    LABALBU 3.2* 2.6* 2.5*   BILIDIR 0.15  --   --    IBILI 0.21  --   --    BILITOT 0.36 0.29*  --    ALKPHOS 59 49  --    ALT 43* 32  --    * 86*  --      No results for input(s): RPR in the last 72 hours. No results for input(s): HIV in the last 72 hours. No results for input(s): BC in the last 72 hours. Lab Results   Component Value Date    CREATININE 0.48 05/16/2020    GLUCOSE 173 05/16/2020       Detailed results: Thank you for allowing us to participate in the care of this patient. Please call with questions. This note is created with the assistance of a speech recognition program.  While intending to generate adocument that actually reflects the content of the visit, the document can still have some errors including those of syntax and sound a like substitutions which may escape proof reading. It such instances, actual meaningcan be extrapolated by contextual diversion.     Isidro Hernandze MD  Office: [phone]  Perfect serve / office [phone]

## 2020-05-17 ENCOUNTER — APPOINTMENT (OUTPATIENT)
Dept: CT IMAGING | Age: 60
DRG: 720 | End: 2020-05-17
Attending: INTERNAL MEDICINE
Payer: MEDICARE

## 2020-05-17 ENCOUNTER — APPOINTMENT (OUTPATIENT)
Dept: GENERAL RADIOLOGY | Age: 60
DRG: 720 | End: 2020-05-17
Attending: INTERNAL MEDICINE
Payer: MEDICARE

## 2020-05-17 ENCOUNTER — APPOINTMENT (OUTPATIENT)
Dept: MRI IMAGING | Age: 60
DRG: 720 | End: 2020-05-17
Attending: INTERNAL MEDICINE
Payer: MEDICARE

## 2020-05-17 PROBLEM — R21 SKIN RASH: Status: ACTIVE | Noted: 2020-05-17

## 2020-05-17 PROBLEM — R50.9 FEVER: Status: ACTIVE | Noted: 2020-05-17

## 2020-05-17 LAB
ABSOLUTE EOS #: 0 K/UL (ref 0–0.44)
ABSOLUTE IMMATURE GRANULOCYTE: 0.13 K/UL (ref 0–0.3)
ABSOLUTE LYMPH #: 1.58 K/UL (ref 1.1–3.7)
ABSOLUTE MONO #: 1.06 K/UL (ref 0.1–1.2)
ALLEN TEST: POSITIVE
ANION GAP SERPL CALCULATED.3IONS-SCNC: 11 MMOL/L (ref 9–17)
BASOPHILS # BLD: 0 % (ref 0–2)
BASOPHILS ABSOLUTE: 0 K/UL (ref 0–0.2)
BNP INTERPRETATION: ABNORMAL
BUN BLDV-MCNC: 24 MG/DL (ref 6–20)
BUN/CREAT BLD: ABNORMAL (ref 9–20)
C-REACTIVE PROTEIN: 23.5 MG/L (ref 0–5)
CALCIUM SERPL-MCNC: 9.3 MG/DL (ref 8.6–10.4)
CHLORIDE BLD-SCNC: 108 MMOL/L (ref 98–107)
CO2: 21 MMOL/L (ref 20–31)
CREAT SERPL-MCNC: 0.66 MG/DL (ref 0.5–0.9)
DIFFERENTIAL TYPE: ABNORMAL
EOSINOPHILS RELATIVE PERCENT: 0 % (ref 1–4)
FIO2: 5
FOLATE: 19 NG/ML
GFR AFRICAN AMERICAN: >60 ML/MIN
GFR NON-AFRICAN AMERICAN: >60 ML/MIN
GFR SERPL CREATININE-BSD FRML MDRD: ABNORMAL ML/MIN/{1.73_M2}
GFR SERPL CREATININE-BSD FRML MDRD: ABNORMAL ML/MIN/{1.73_M2}
GLUCOSE BLD-MCNC: 114 MG/DL (ref 70–99)
HCT VFR BLD CALC: 41.7 % (ref 36.3–47.1)
HEMOGLOBIN: 13.7 G/DL (ref 11.9–15.1)
IMMATURE GRANULOCYTES: 1 %
LACTIC ACID, SEPSIS WHOLE BLOOD: 1.7 MMOL/L (ref 0.5–1.9)
LACTIC ACID, SEPSIS: NORMAL MMOL/L (ref 0.5–1.9)
LYMPHOCYTES # BLD: 12 % (ref 24–43)
MCH RBC QN AUTO: 30.4 PG (ref 25.2–33.5)
MCHC RBC AUTO-ENTMCNC: 32.9 G/DL (ref 28.4–34.8)
MCV RBC AUTO: 92.5 FL (ref 82.6–102.9)
MODE: ABNORMAL
MONOCYTES # BLD: 8 % (ref 3–12)
MORPHOLOGY: NORMAL
NEGATIVE BASE EXCESS, ART: ABNORMAL (ref 0–2)
NRBC AUTOMATED: 0 PER 100 WBC
O2 DEVICE/FLOW/%: ABNORMAL
PATIENT TEMP: ABNORMAL
PDW BLD-RTO: 14.1 % (ref 11.8–14.4)
PLATELET # BLD: ABNORMAL K/UL (ref 138–453)
PLATELET ESTIMATE: ABNORMAL
PLATELET, FLUORESCENCE: 127 K/UL (ref 138–453)
PLATELET, IMMATURE FRACTION: 5.1 % (ref 1.1–10.3)
PMV BLD AUTO: ABNORMAL FL (ref 8.1–13.5)
POC HCO3: 26.6 MMOL/L (ref 21–28)
POC O2 SATURATION: 96 % (ref 94–98)
POC PCO2 TEMP: ABNORMAL MM HG
POC PCO2: 32.8 MM HG (ref 35–48)
POC PH TEMP: ABNORMAL
POC PH: 7.52 (ref 7.35–7.45)
POC PO2 TEMP: ABNORMAL MM HG
POC PO2: 70.1 MM HG (ref 83–108)
POSITIVE BASE EXCESS, ART: 4 (ref 0–3)
POTASSIUM SERPL-SCNC: 3.8 MMOL/L (ref 3.7–5.3)
PRO-BNP: 2740 PG/ML
RBC # BLD: 4.51 M/UL (ref 3.95–5.11)
RBC # BLD: ABNORMAL 10*6/UL
SAMPLE SITE: ABNORMAL
SEG NEUTROPHILS: 79 % (ref 36–65)
SEGMENTED NEUTROPHILS ABSOLUTE COUNT: 10.43 K/UL (ref 1.5–8.1)
SODIUM BLD-SCNC: 137 MMOL/L (ref 135–144)
SODIUM BLD-SCNC: 138 MMOL/L (ref 135–144)
SODIUM BLD-SCNC: 140 MMOL/L (ref 135–144)
SODIUM BLD-SCNC: 140 MMOL/L (ref 135–144)
SODIUM BLD-SCNC: 141 MMOL/L (ref 135–144)
TCO2 (CALC), ART: 28 MMOL/L (ref 22–29)
TROPONIN INTERP: ABNORMAL
TROPONIN T: ABNORMAL NG/ML
TROPONIN, HIGH SENSITIVITY: 19 NG/L (ref 0–14)
VITAMIN B-12: 1358 PG/ML (ref 232–1245)
WBC # BLD: 13.2 K/UL (ref 3.5–11.3)
WBC # BLD: ABNORMAL 10*3/UL

## 2020-05-17 PROCEDURE — 6360000004 HC RX CONTRAST MEDICATION: Performed by: STUDENT IN AN ORGANIZED HEALTH CARE EDUCATION/TRAINING PROGRAM

## 2020-05-17 PROCEDURE — 6360000002 HC RX W HCPCS: Performed by: PSYCHIATRY & NEUROLOGY

## 2020-05-17 PROCEDURE — 87040 BLOOD CULTURE FOR BACTERIA: CPT

## 2020-05-17 PROCEDURE — 82803 BLOOD GASES ANY COMBINATION: CPT

## 2020-05-17 PROCEDURE — 99232 SBSQ HOSP IP/OBS MODERATE 35: CPT | Performed by: INTERNAL MEDICINE

## 2020-05-17 PROCEDURE — 71045 X-RAY EXAM CHEST 1 VIEW: CPT

## 2020-05-17 PROCEDURE — 83605 ASSAY OF LACTIC ACID: CPT

## 2020-05-17 PROCEDURE — 84484 ASSAY OF TROPONIN QUANT: CPT

## 2020-05-17 PROCEDURE — 6370000000 HC RX 637 (ALT 250 FOR IP): Performed by: STUDENT IN AN ORGANIZED HEALTH CARE EDUCATION/TRAINING PROGRAM

## 2020-05-17 PROCEDURE — 71260 CT THORAX DX C+: CPT

## 2020-05-17 PROCEDURE — 2580000003 HC RX 258: Performed by: STUDENT IN AN ORGANIZED HEALTH CARE EDUCATION/TRAINING PROGRAM

## 2020-05-17 PROCEDURE — 86140 C-REACTIVE PROTEIN: CPT

## 2020-05-17 PROCEDURE — 70544 MR ANGIOGRAPHY HEAD W/O DYE: CPT

## 2020-05-17 PROCEDURE — 82607 VITAMIN B-12: CPT

## 2020-05-17 PROCEDURE — 2580000003 HC RX 258: Performed by: NEUROLOGICAL SURGERY

## 2020-05-17 PROCEDURE — 6360000002 HC RX W HCPCS: Performed by: INTERNAL MEDICINE

## 2020-05-17 PROCEDURE — 36415 COLL VENOUS BLD VENIPUNCTURE: CPT

## 2020-05-17 PROCEDURE — 83880 ASSAY OF NATRIURETIC PEPTIDE: CPT

## 2020-05-17 PROCEDURE — 99233 SBSQ HOSP IP/OBS HIGH 50: CPT | Performed by: PSYCHIATRY & NEUROLOGY

## 2020-05-17 PROCEDURE — 85055 RETICULATED PLATELET ASSAY: CPT

## 2020-05-17 PROCEDURE — 2580000003 HC RX 258: Performed by: PSYCHIATRY & NEUROLOGY

## 2020-05-17 PROCEDURE — 80048 BASIC METABOLIC PNL TOTAL CA: CPT

## 2020-05-17 PROCEDURE — 70553 MRI BRAIN STEM W/O & W/DYE: CPT

## 2020-05-17 PROCEDURE — 2700000000 HC OXYGEN THERAPY PER DAY

## 2020-05-17 PROCEDURE — 2060000000 HC ICU INTERMEDIATE R&B

## 2020-05-17 PROCEDURE — 6360000002 HC RX W HCPCS: Performed by: NURSE PRACTITIONER

## 2020-05-17 PROCEDURE — 6360000002 HC RX W HCPCS: Performed by: STUDENT IN AN ORGANIZED HEALTH CARE EDUCATION/TRAINING PROGRAM

## 2020-05-17 PROCEDURE — 70546 MR ANGIOGRAPH HEAD W/O&W/DYE: CPT

## 2020-05-17 PROCEDURE — 36600 WITHDRAWAL OF ARTERIAL BLOOD: CPT

## 2020-05-17 PROCEDURE — A9579 GAD-BASE MR CONTRAST NOS,1ML: HCPCS | Performed by: STUDENT IN AN ORGANIZED HEALTH CARE EDUCATION/TRAINING PROGRAM

## 2020-05-17 PROCEDURE — 82746 ASSAY OF FOLIC ACID SERUM: CPT

## 2020-05-17 PROCEDURE — 6360000004 HC RX CONTRAST MEDICATION: Performed by: INTERNAL MEDICINE

## 2020-05-17 PROCEDURE — 85025 COMPLETE CBC W/AUTO DIFF WBC: CPT

## 2020-05-17 PROCEDURE — 2580000003 HC RX 258: Performed by: INTERNAL MEDICINE

## 2020-05-17 PROCEDURE — 84295 ASSAY OF SERUM SODIUM: CPT

## 2020-05-17 PROCEDURE — 93005 ELECTROCARDIOGRAM TRACING: CPT | Performed by: NURSE PRACTITIONER

## 2020-05-17 PROCEDURE — 99223 1ST HOSP IP/OBS HIGH 75: CPT | Performed by: INTERNAL MEDICINE

## 2020-05-17 PROCEDURE — 2500000003 HC RX 250 WO HCPCS: Performed by: INTERNAL MEDICINE

## 2020-05-17 RX ORDER — UBIDECARENONE 75 MG
50 CAPSULE ORAL DAILY
Status: DISCONTINUED | OUTPATIENT
Start: 2020-05-17 | End: 2020-05-27

## 2020-05-17 RX ORDER — LORAZEPAM 2 MG/1
4 TABLET ORAL
Status: DISCONTINUED | OUTPATIENT
Start: 2020-05-17 | End: 2020-05-19

## 2020-05-17 RX ORDER — SODIUM CHLORIDE 0.9 % (FLUSH) 0.9 %
10 SYRINGE (ML) INJECTION PRN
Status: DISCONTINUED | OUTPATIENT
Start: 2020-05-17 | End: 2020-05-19

## 2020-05-17 RX ORDER — SODIUM CHLORIDE 0.9 % (FLUSH) 0.9 %
10 SYRINGE (ML) INJECTION PRN
Status: DISCONTINUED | OUTPATIENT
Start: 2020-05-17 | End: 2020-06-08 | Stop reason: HOSPADM

## 2020-05-17 RX ORDER — LORAZEPAM 1 MG/1
1 TABLET ORAL
Status: DISCONTINUED | OUTPATIENT
Start: 2020-05-17 | End: 2020-05-19

## 2020-05-17 RX ORDER — MORPHINE SULFATE 2 MG/ML
0.5 INJECTION, SOLUTION INTRAMUSCULAR; INTRAVENOUS ONCE
Status: COMPLETED | OUTPATIENT
Start: 2020-05-17 | End: 2020-05-17

## 2020-05-17 RX ORDER — THIAMINE HYDROCHLORIDE 100 MG/ML
250 INJECTION, SOLUTION INTRAMUSCULAR; INTRAVENOUS DAILY
Status: DISCONTINUED | OUTPATIENT
Start: 2020-05-18 | End: 2020-05-17

## 2020-05-17 RX ORDER — FUROSEMIDE 10 MG/ML
20 INJECTION INTRAMUSCULAR; INTRAVENOUS ONCE
Status: COMPLETED | OUTPATIENT
Start: 2020-05-17 | End: 2020-05-17

## 2020-05-17 RX ORDER — LORAZEPAM 2 MG/ML
4 INJECTION INTRAMUSCULAR
Status: DISCONTINUED | OUTPATIENT
Start: 2020-05-17 | End: 2020-05-19

## 2020-05-17 RX ORDER — LORAZEPAM 2 MG/ML
1 INJECTION INTRAMUSCULAR
Status: DISCONTINUED | OUTPATIENT
Start: 2020-05-17 | End: 2020-05-19

## 2020-05-17 RX ORDER — SODIUM CHLORIDE 0.9 % (FLUSH) 0.9 %
10 SYRINGE (ML) INJECTION EVERY 12 HOURS SCHEDULED
Status: DISCONTINUED | OUTPATIENT
Start: 2020-05-17 | End: 2020-06-08 | Stop reason: HOSPADM

## 2020-05-17 RX ORDER — LORAZEPAM 2 MG/ML
2 INJECTION INTRAMUSCULAR
Status: DISCONTINUED | OUTPATIENT
Start: 2020-05-17 | End: 2020-05-19

## 2020-05-17 RX ORDER — LORAZEPAM 2 MG/ML
3 INJECTION INTRAMUSCULAR
Status: DISCONTINUED | OUTPATIENT
Start: 2020-05-17 | End: 2020-05-19

## 2020-05-17 RX ORDER — FOLIC ACID 1 MG/1
1 TABLET ORAL DAILY
Status: DISCONTINUED | OUTPATIENT
Start: 2020-05-18 | End: 2020-06-08 | Stop reason: HOSPADM

## 2020-05-17 RX ORDER — LORAZEPAM 2 MG/1
2 TABLET ORAL
Status: DISCONTINUED | OUTPATIENT
Start: 2020-05-17 | End: 2020-05-19

## 2020-05-17 RX ORDER — SODIUM CHLORIDE 0.9 % (FLUSH) 0.9 %
10 SYRINGE (ML) INJECTION EVERY 12 HOURS SCHEDULED
Status: DISCONTINUED | OUTPATIENT
Start: 2020-05-17 | End: 2020-05-20

## 2020-05-17 RX ORDER — METHYLPREDNISOLONE SODIUM SUCCINATE 125 MG/2ML
250 INJECTION, POWDER, LYOPHILIZED, FOR SOLUTION INTRAMUSCULAR; INTRAVENOUS EVERY 6 HOURS
Status: DISCONTINUED | OUTPATIENT
Start: 2020-05-17 | End: 2020-05-18

## 2020-05-17 RX ADMIN — HEPARIN SODIUM 5000 UNITS: 5000 INJECTION INTRAVENOUS; SUBCUTANEOUS at 20:32

## 2020-05-17 RX ADMIN — THERA TABS 1 TABLET: TAB at 09:12

## 2020-05-17 RX ADMIN — FOLIC ACID 1 MG: 1 TABLET ORAL at 09:12

## 2020-05-17 RX ADMIN — GADOTERIDOL 12 ML: 279.3 INJECTION, SOLUTION INTRAVENOUS at 16:12

## 2020-05-17 RX ADMIN — METHYLPREDNISOLONE SODIUM SUCCINATE 250 MG: 125 INJECTION, POWDER, FOR SOLUTION INTRAMUSCULAR; INTRAVENOUS at 19:53

## 2020-05-17 RX ADMIN — FUROSEMIDE 20 MG: 10 INJECTION, SOLUTION INTRAVENOUS at 05:51

## 2020-05-17 RX ADMIN — THIAMINE HYDROCHLORIDE 250 MG: 100 INJECTION, SOLUTION INTRAMUSCULAR; INTRAVENOUS at 18:07

## 2020-05-17 RX ADMIN — METHYLPREDNISOLONE SODIUM SUCCINATE 250 MG: 125 INJECTION, POWDER, FOR SOLUTION INTRAMUSCULAR; INTRAVENOUS at 15:05

## 2020-05-17 RX ADMIN — ACETAMINOPHEN 650 MG: 325 TABLET ORAL at 03:26

## 2020-05-17 RX ADMIN — LORAZEPAM 4 MG: 2 INJECTION INTRAMUSCULAR; INTRAVENOUS at 13:28

## 2020-05-17 RX ADMIN — HEPARIN SODIUM 5000 UNITS: 5000 INJECTION INTRAVENOUS; SUBCUTANEOUS at 13:35

## 2020-05-17 RX ADMIN — IOHEXOL 75 ML: 350 INJECTION, SOLUTION INTRAVENOUS at 03:56

## 2020-05-17 RX ADMIN — SODIUM CHLORIDE, PRESERVATIVE FREE 10 ML: 5 INJECTION INTRAVENOUS at 10:30

## 2020-05-17 RX ADMIN — SODIUM CHLORIDE, PRESERVATIVE FREE 10 ML: 5 INJECTION INTRAVENOUS at 19:54

## 2020-05-17 RX ADMIN — Medication 100 MG: at 09:12

## 2020-05-17 RX ADMIN — MORPHINE SULFATE 0.5 MG: 2 INJECTION, SOLUTION INTRAMUSCULAR; INTRAVENOUS at 04:43

## 2020-05-17 RX ADMIN — THIAMINE HYDROCHLORIDE: 100 INJECTION, SOLUTION INTRAMUSCULAR; INTRAVENOUS at 11:24

## 2020-05-17 RX ADMIN — SODIUM CHLORIDE, PRESERVATIVE FREE 10 ML: 5 INJECTION INTRAVENOUS at 09:13

## 2020-05-17 RX ADMIN — Medication 10 ML: at 09:13

## 2020-05-17 ASSESSMENT — PAIN DESCRIPTION - PROGRESSION

## 2020-05-17 ASSESSMENT — PAIN SCALES - GENERAL
PAINLEVEL_OUTOF10: 6
PAINLEVEL_OUTOF10: 4

## 2020-05-17 ASSESSMENT — ENCOUNTER SYMPTOMS
PHOTOPHOBIA: 0
APNEA: 0
BACK PAIN: 1
ABDOMINAL DISTENTION: 0
COLOR CHANGE: 0

## 2020-05-17 NOTE — PROGRESS NOTES
NEUROLOGY INPATIENT PROGRESS NOTE    2020         Subjective: Katie Engel is a  62 y.o. female admitted on 2020 with Pneumonia due to COVID-19 virus [U07.1, J12.89]    Briefly, this is a  62 y.o. female admitted on 2020 with new onset headache and generalized weakness has been treated for septic shock, possible meningeal encephalitis that is post lumbar puncture with negative meningitis and encephalitis panel. No current facility-administered medications on file prior to encounter. No current outpatient medications on file prior to encounter. Allergies: Katie Engel is allergic to sulfa antibiotics.     Past Medical History:   Diagnosis Date    COPD (chronic obstructive pulmonary disease) ([facility] [address] [facility]     ETOH abuse        Past Surgical History:   Procedure Laterality Date    CHOLECYSTECTOMY      THYROIDECTOMY      TONSILLECTOMY         Medications:     sodium chloride flush  10 mL Intravenous 2 times per day    folic acid, thiamine, multi-vitamin with vitamin K infusion   Intravenous Daily    sodium chloride flush  10 mL Intravenous BID    sodium chloride flush  10 mL Intravenous 2 times per day    sodium chloride flush  10 mL Intravenous BID    heparin (porcine)  5,000 Units Subcutaneous 3 times per day     PRN Meds include: sodium chloride flush, LORazepam **OR** LORazepam **OR** LORazepam **OR** LORazepam **OR** LORazepam **OR** LORazepam **OR** LORazepam **OR** LORazepam, sodium chloride flush, acetaminophen **OR** acetaminophen, polyethylene glycol, promethazine **OR** ondansetron, potassium chloride **OR** potassium alternative oral replacement **OR** potassium chloride, magnesium sulfate, midodrine    Objective:   /64   Pulse 84   Temp 98.5 °F (36.9 °C) (Oral)   Resp 20   Ht 5' 7\" (1.702 m)   Wt 125 lb 6.4 oz (56.9 kg)   SpO2 96%   BMI 19.64 kg/m²     Blood pressure range: Systolic (78KOL), YO , Min:101 , XVR:507   ; Diastolic (59VCC), WRL:72, Min:64, Max:85      ROS:  Review of Systems   Unable to perform ROS: Patient nonverbal         NEUROLOGIC EXAMINATION  Physical Exam  HENT:      Head: Normocephalic and atraumatic. Cardiovascular:      Rate and Rhythm: Normal rate and regular rhythm. Pulmonary:      Effort: Pulmonary effort is normal.   Abdominal:      General: Abdomen is flat. Skin:     General: Skin is warm. Findings: Rash (Generalized skin rash, reticular) present. Neurological:      GCS: GCS eye subscore is 3. GCS verbal subscore is 1. GCS motor subscore is 5. Cranial Nerves: No cranial nerve deficit or facial asymmetry. Deep Tendon Reflexes: Babinski sign absent on the right side. Babinski sign absent on the left side. Comments:   Brain Stem Assessment  Normal pupillary response  Normal corneal Reflex  Normal blink Reflex  Eye movement:  -Spontaneous: present     motor responses  Movements. to noxious stimuli  Posturing: Decorticate (Flexor) Decerebrate (Extensor) postures are absent  Normal tone   Negative morrow  Absent clonus   Present and symmetrical deep tendon reflexes (+2)  planatar relexes are down going bilaterally   No involuntary movements such as subtle signs of seizures and myoclonus  No evidence of asymmetry                . Neurologic Exam     Lab Results:   CBC:   Recent Labs     05/15/20  0746 05/16/20  0453 05/17/20  0328   WBC 10.5 18.9* 13.2*   HGB 13.5 13.5 13.7   PLT See Reflexed IPF Result 94* See Reflexed IPF Result     BMP:    Recent Labs     05/15/20  0746  05/16/20  0453  05/17/20  0155 05/17/20  0328 05/17/20  0758   *   < > 135   < > 137 140 138   K 3.4*  --  4.1  --   --  3.8  --      --  103  --   --  108*  --    CO2 20  --  22  --   --  21  --    BUN 17  --  22*  --   --  24*  --    CREATININE 0.53  --  0.48*  --   --  0.66  --    GLUCOSE 160*  --  173*  --   --  114*  --     < > = values in this interval not displayed.          Lab Results   Component Value Date    ALT 32

## 2020-05-17 NOTE — PROGRESS NOTES
Physician      [facility]      Fellow                   Referring Nurse                            Practitioner      Interpreting             Referring Physician         [employer] *   Fellow     Type of Study      TTE procedure:2D Echocardiogram, M-Mode, Doppler, Color Doppler. Procedure Date  Date: 05/16/2020 Start: 09:38 AM    Study Location: [facility]    Cira / E[place]oko Naomi. Comments:  Weakness , Back Pain, R/O Endocarditis    Patient Status: Inpatient    Height: 67 inches Weight: 132 pounds BSA: 1.69 m^2 BMI: 20.67 kg/m^2    CONCLUSIONS    Summary  Left ventricle is normal in size with normal systolic function globally. Calculated ejection fraction is 56%. Mildly dilated right atrium. Mild buckling of the right atrial wall. Aortic sclerosis without stenosis. Mild tricuspid regurgitation. Estimated right ventricular systolic pressure is 25 mmHg. Signature  ----------------------------------------------------------------------------   Electronically signed by Kian Velázquez(Sonographer) on 05/16/2020 12:59   PM  ----------------------------------------------------------------------------    ----------------------------------------------------------------------------   Electronically signed by Ernst Schwartz(Interpreting physician) on 05/16/2020   02:00 PM  ----------------------------------------------------------------------------  FINDINGS  Left Atrium  Left atrium is normal in size. Left Ventricle  Left ventricle is normal in size with normal systolic function globally. Calculated ejection fraction is 56%. Right Atrium  Mildly dilated right atrium. Mild buckling of the right atrial wall. Right Ventricle  Normal right ventricular size and function. TAPSE is 2.0 cm. Mitral Valve  Mitral valve structure is normal.  Mild mitral regurgitation. Aortic Valve  Aortic valve is trileaflet. Aortic sclerosis without stenosis.   Tricuspid Valve  Tricuspid valve structure is normal.  Mild echocardiographic tamponade  2. Alcohol use disorder  3. Hyponatremia: Resolved  4. DIXON: Resolved  5. Thrombocytopenia: Likely secondary to alcohol use     RECOMMENDATIONS:  1. Follow up in 2-4 weeks for consideration for repeat to ensure effusion is stable / improving. 2. Remainder of care per primary    Thank you for allowing me to participate in the care of this patient, please do not hesitate to call if you have any questions. Clemente Díaz DO, [facility] [place], [address] [facility]  [facility]. [facility]  [phone]

## 2020-05-17 NOTE — H&P
discontinued, encouraged oral intake. Patient also has a pericardial effusion seen on CT, echo pending. Cardiology consulted     Patient does have a macular rash and generalized weakness. Also has diarrhea. Likely viral cause, CSF protein and glucose ratio does not account for viral aseptic meningitis.       Patient stable, was originally on Levophed, BP stable at this time. Patient does have a small UTI on urinalysis, urine culture pending.     Neurology ordering MRI of the thoracic and cervical spine, patient continues to have generalized weakness this is the primary cause of disorder. Neurology will continue to follow      Past Medical History:     Past Medical History:   Diagnosis Date    COPD (chronic obstructive pulmonary disease) ([facility] [address]     ETOH abuse         Past Surgical History:     Past Surgical History:   Procedure Laterality Date    CHOLECYSTECTOMY      THYROIDECTOMY      TONSILLECTOMY          Medications Prior to Admission:     Prior to Admission medications    Not on File        Allergies:     Sulfa antibiotics    Social History:     Tobacco:    reports that she has been smoking cigarettes. She has been smoking about 2.00 packs per day. She has never used smokeless tobacco.  Alcohol:      reports current alcohol use. Drug Use:  reports no history of drug use. Family History:     Family History   Problem Relation Age of Onset    Stroke Mother     Cancer Father        Review of Systems:   Limited information as patient is not adding much information  Positive and Negative as described in HPI.     CONSTITUTIONAL:  + for fevers, chills, no sweats,   HEENT:  negative for vision, hearing changes, runny nose, throat pain  RESPIRATORY: + for shortness of breath, cough, congestion, wheezing  CARDIOVASCULAR:  negative for chest pain, palpitations  GASTROINTESTINAL:  negative for nausea, vomiting, diarrhea, constipation, change in bowel habits, abdominal pain   GENITOURINARY:  negative for palpable, no pedal edema or calf pain with palpation  Psych: Unable to evaluate    Investigations:      Laboratory Testing:  Recent Results (from the past 24 hour(s))   SODIUM    Collection Time: 05/16/20  8:23 PM   Result Value Ref Range    Sodium 136 135 - 144 mmol/L   SODIUM    Collection Time: 05/17/20  1:55 AM   Result Value Ref Range    Sodium 137 135 - 144 mmol/L   Arterial Blood Gas, POC    Collection Time: 05/17/20  3:04 AM   Result Value Ref Range    POC pH 7.517 (H) 7.350 - 7.450    POC pCO2 32.8 (L) 35.0 - 48.0 mm Hg    POC PO2 70.1 (L) 83.0 - 108.0 mm Hg    POC HCO3 26.6 21.0 - 28.0 mmol/L    TCO2 (calc), Art 28 22.0 - 29.0 mmol/L    Negative Base Excess, Art NOT REPORTED 0.0 - 2.0    Positive Base Excess, Art 4 (H) 0.0 - 3.0    POC O2 SAT 96 94.0 - 98.0 %    O2 Device/Flow/% Cannula     Sebastian Test POSITIVE     Sample Site Left Radial Artery     Mode NOT REPORTED     FIO2 5.0     Pt Temp NOT REPORTED     POC pH Temp NOT REPORTED     POC pCO2 Temp NOT REPORTED mm Hg    POC pO2 Temp NOT REPORTED mm Hg   Basic Metabolic Panel w/ Reflex to MG    Collection Time: 05/17/20  3:28 AM   Result Value Ref Range    Glucose 114 (H) 70 - 99 mg/dL    BUN 24 (H) 6 - 20 mg/dL    CREATININE 0.66 0.50 - 0.90 mg/dL    Bun/Cre Ratio NOT REPORTED 9 - 20    Calcium 9.3 8.6 - 10.4 mg/dL    Sodium 140 135 - 144 mmol/L    Potassium 3.8 3.7 - 5.3 mmol/L    Chloride 108 (H) 98 - 107 mmol/L    CO2 21 20 - 31 mmol/L    Anion Gap 11 9 - 17 mmol/L    GFR Non-African American >60 >60 mL/min    GFR African American >60 >60 mL/min    GFR Comment          GFR Staging NOT REPORTED    CBC WITH AUTO DIFFERENTIAL    Collection Time: 05/17/20  3:28 AM   Result Value Ref Range    WBC 13.2 (H) 3.5 - 11.3 k/uL    RBC 4.51 3.95 - 5.11 m/uL    Hemoglobin 13.7 11.9 - 15.1 g/dL    Hematocrit 41.7 36.3 - 47.1 %    MCV 92.5 82.6 - 102.9 fL    MCH 30.4 25.2 - 33.5 pg    MCHC 32.9 28.4 - 34.8 g/dL    RDW 14.1 11.8 - 14.4 %    Platelets See Reflexed IPF thoracic spine. Degenerative changes as detailed above. Incidentally noted left lower lobe atelectasis. Mri Lumbar Spine W Wo Contrast    Result Date: 5/14/2020  Lumbar spine degenerative changes most significant at L5-S1. No findings to suggest osteomyelitis-discitis. Us Liver    Result Date: 5/14/2020  Hepatomegaly with coarse hepatic echotexture consistent with fatty infiltration without focal mass. Normal hepatopetal flow in the portal vein. Small amount of ascites fluid about the right kidney. Status post cholecystectomy. No ductal dilatation. Xr Chest Portable    Result Date: 5/17/2020  Left lower lobe atelectasis. RECOMMENDATION: Consider chest CT for further evaluation     Xr Chest Portable    Result Date: 5/14/2020  No evidence for acute cardiopulmonary pathology. COPD. Xr Chest Portable    Result Date: 5/14/2020  A right internal jugular central venous catheter has been placed in good position. No other change. Xr Chest Portable    Result Date: 5/13/2020  1. Minimal patchy opacities at the medial aspect of the right lower lung field which may reflect atelectasis versus infiltrate. Ct Chest Pulmonary Embolism W Contrast    Result Date: 5/17/2020  No evidence of pulmonary embolism. Left lower lobar atelectasis. RECOMMENDATIONS: Pulmonary consultation for potential bronchoscopy. Ir Lumbar Puncture For Diagnosis    Result Date: 5/15/2020  Successful fluoroscopic-guided lumbar puncture. Mri Brain W Wo Contrast    Result Date: 5/14/2020  No acute disease. No evidence of meningeal enhancement or empyema. Echocardiogram 5/16/2020  Summary  Left ventricle is normal in size with normal systolic function globally. Calculated ejection fraction is 56%. Mildly dilated right atrium. Mild buckling of the right atrial wall. Aortic sclerosis without stenosis. Mild tricuspid regurgitation. Estimated right ventricular systolic pressure is 25 mmHg.     Assessment :      Hospital Problems           Last Modified POA    * (Principal) Fever 5/17/2020 Yes    Acute intractable headache 5/17/2020 Yes    Chronic abdominal pain 5/14/2020 Yes    Hyponatremia 5/14/2020 Yes    Mucopurulent chronic bronchitis ([address] 5/14/2020 Yes    Hx of cholecystectomy 5/14/2020 Yes    DIXON (acute kidney injury) ([address] 5/14/2020 Yes    Smoker 5/14/2020 Yes    Suspected COVID-19 virus infection 5/14/2020 Yes    Chronic obstructive pulmonary disease ([address] 5/15/2020 Yes    Pericardial effusion 5/15/2020 Yes    Thrombocytopenia ([address] 5/15/2020 Yes    Hypotension due to hypovolemia 5/15/2020 Yes    Skin rash 5/17/2020 Yes      Possible sepsis    Plan:     Patient status inpatient in the Progressive Unit/Step down    1. Antibiotics have been stopped per ID  2. Follow urine and blood culture results  3. Monitor body rash  4. DVT prophylaxis  5. GI prophylaxis  6. CIWA protocol as patient appears to be in withdrawal although as per history she did not take alcohol for 19 days but information is not reliable  7. ID and neurology on board    Time spent more than 45 minutes on reviewing the chart evaluating the patient and making a further treatment plan in consultation with other subspecialties    Consultations:   IP CONSULT TO INFECTIOUS DISEASES  IP CONSULT TO NEUROSURGERY  IP CONSULT TO NEPHROLOGY  IP CONSULT TO NEUROLOGY  IP CONSULT TO SOCIAL WORK  IP CONSULT TO CARDIOLOGY     Patient is admitted as inpatient status because of co-morbidities listed above, severity of signs and symptoms as outlined, requirement for current medical therapies and most importantly because of direct risk to patient if care not provided in a hospital setting.     Adiel Bhatia MD  5/17/2020  12:34 PM    Copy sent to Dr. Ivan Tobar

## 2020-05-17 NOTE — PROGRESS NOTES
transferred for possible COVID to [facility] and admitted to the [facility] floor ICU    I discussed with critical care, we can move her out of the [facility] ICU to a regular floor, get an MRI of the spine, consult neurosurgery, get a 2D echo,  The patient was started on Zosyn today. We will defer further antibiotic doses before we have the MRI finding    The patient denies any history of IV drug use    CURRENT EVALUATION : 5/17/2020     Afebrile  VS stable    Has residual mild headache  Has residual weakness of lower extremities which makes ambulation difficult. Her MRI of the lumbar spine showed no active infection but osteoarthritis, evaluated by neurosurgery who felt it is an old finding. Her MRI of the brain was negative, she had a lumbar puncture due to the headache and photophobia that showed 8 WBCs. All in all her work-up is negative. She continues to have a little cough. She feels much better. She has diffuse macular rash suggestive of drug allergy. She is allergic to sulfa and has received Lasix. Rash could also be from Zosyn or Rocephin. Summary of relevant labs: 5/17/2020    Labs:  WBC 10.2-->13.2  ALC 0.14  Hb 13.7  Plat 127    BUN 24  Cr 0.66      Ferritin 9357  Micro:  BC  X 2 5/13  LP WBC 8- meningitis PCR neg    Imaging:  CXR neg                I have personally reviewed the past medical history, past surgical history, medications, social history, and family history, and I haveupdated the database accordingly.   Past Medical History:     Past Medical History:   Diagnosis Date    COPD [facility]chronic obstructive pulmonary disease) ([facility] [address]     ETOH abuse        Past Surgical  History:     Past Surgical History:   Procedure Laterality Date    CHOLECYSTECTOMY      THYROIDECTOMY      TONSILLECTOMY         Medications:      sodium chloride flush  10 mL Intravenous 2 times per day    folic acid, thiamine, multi-vitamin with vitamin K infusion   Intravenous Daily    sodium chloride flush  10 Mother     Cancer Father         Allergies:   Sulfa antibiotics     Review of Systems:     Review of Systems   Constitutional: Positive for appetite change and fatigue. HENT: Negative for congestion. Eyes: Negative for photophobia. Respiratory: Negative for apnea. Cardiovascular: Negative for chest pain. Gastrointestinal: Negative for abdominal distention. Endocrine: Negative for polydipsia. Genitourinary: Negative for dysuria and urgency. Musculoskeletal: Positive for back pain. Skin: Negative for color change. Allergic/Immunologic: Negative for food allergies and immunocompromised state. Neurological: Positive for weakness. Negative for headaches. Hematological: Negative for adenopathy. Psychiatric/Behavioral: Negative for agitation. Physical Examination :     Patient Vitals for the past 8 hrs:   BP Temp Temp src Pulse Resp SpO2 Weight   05/17/20 1200 -- -- -- 100 -- -- --   05/17/20 1114 137/79 101.8 °F (38.8 °C) Axillary 106 18 92 % --   05/17/20 0800 -- -- -- 84 -- -- --   05/17/20 0752 111/64 98.5 °F (36.9 °C) Oral 84 20 -- --   05/17/20 0600 -- -- -- -- -- -- 125 lb 6.4 oz (56.9 kg)       Physical Exam  Constitutional:       Appearance: Normal appearance. She is normal weight. HENT:      Head: Normocephalic and atraumatic. Nose: No congestion or rhinorrhea. Eyes:      General: No scleral icterus. Conjunctiva/sclera: Conjunctivae normal.   Neck:      Musculoskeletal: Neck supple. No neck rigidity. Cardiovascular:      Rate and Rhythm: Normal rate and regular rhythm. Pulmonary:      Effort: Pulmonary effort is normal. No respiratory distress. Abdominal:      General: There is no distension. Tenderness: There is no abdominal tenderness. Genitourinary:     Comments: No redd  Musculoskeletal:         General: No swelling or deformity. Skin:     General: Skin is warm. Coloration: Skin is not jaundiced or pale.       Findings: No bruising or

## 2020-05-18 PROBLEM — G93.40 ENCEPHALOPATHY: Status: ACTIVE | Noted: 2020-05-18

## 2020-05-18 PROBLEM — I82.409 DVT (DEEP VENOUS THROMBOSIS) (HCC): Status: ACTIVE | Noted: 2020-05-18

## 2020-05-18 LAB
ABSOLUTE EOS #: 0 K/UL (ref 0–0.44)
ABSOLUTE IMMATURE GRANULOCYTE: 0.1 K/UL (ref 0–0.3)
ABSOLUTE LYMPH #: 2.1 K/UL (ref 1.1–3.7)
ABSOLUTE MONO #: 0.2 K/UL (ref 0.1–1.2)
ANION GAP SERPL CALCULATED.3IONS-SCNC: 10 MMOL/L (ref 9–17)
ANION GAP SERPL CALCULATED.3IONS-SCNC: 9 MMOL/L (ref 9–17)
BANDS, CSF: NORMAL %
BASO CSF: NORMAL %
BASOPHILS # BLD: 1 % (ref 0–2)
BASOPHILS ABSOLUTE: 0.1 K/UL (ref 0–0.2)
BLAST CSF: NORMAL %
BUN BLDV-MCNC: 22 MG/DL (ref 6–20)
BUN BLDV-MCNC: 25 MG/DL (ref 6–20)
BUN/CREAT BLD: ABNORMAL (ref 9–20)
BUN/CREAT BLD: ABNORMAL (ref 9–20)
CALCIUM SERPL-MCNC: 8.1 MG/DL (ref 8.6–10.4)
CALCIUM SERPL-MCNC: 8.3 MG/DL (ref 8.6–10.4)
CHLORIDE BLD-SCNC: 105 MMOL/L (ref 98–107)
CHLORIDE BLD-SCNC: 108 MMOL/L (ref 98–107)
CO2: 26 MMOL/L (ref 20–31)
CO2: 29 MMOL/L (ref 20–31)
CREAT SERPL-MCNC: 0.5 MG/DL (ref 0.5–0.9)
CREAT SERPL-MCNC: 0.58 MG/DL (ref 0.5–0.9)
CSF ISOELECTRIC FOCUSING INTERPRETATION: NORMAL
CULTURE: ABNORMAL
CULTURE: NORMAL
DIFFERENTIAL TYPE: ABNORMAL
DIRECT EXAM: ABNORMAL
EOS CSF: NORMAL %
EOSINOPHILS RELATIVE PERCENT: 0 % (ref 1–4)
FLUID DIFF COMMENT: NORMAL
GFR AFRICAN AMERICAN: >60 ML/MIN
GFR AFRICAN AMERICAN: >60 ML/MIN
GFR NON-AFRICAN AMERICAN: >60 ML/MIN
GFR NON-AFRICAN AMERICAN: >60 ML/MIN
GFR SERPL CREATININE-BSD FRML MDRD: ABNORMAL ML/MIN/{1.73_M2}
GLUCOSE BLD-MCNC: 173 MG/DL (ref 70–99)
GLUCOSE BLD-MCNC: 187 MG/DL (ref 70–99)
HCT VFR BLD CALC: 40.3 % (ref 36.3–47.1)
HEMOGLOBIN: 13.1 G/DL (ref 11.9–15.1)
IMMATURE GRANULOCYTES: 1 %
INTERVENTION: NORMAL
LYMPHOCYTES # BLD: 21 % (ref 24–43)
LYMPHS CSF: 21 %
Lab: ABNORMAL
Lab: NORMAL
MCH RBC QN AUTO: 30.3 PG (ref 25.2–33.5)
MCHC RBC AUTO-ENTMCNC: 32.5 G/DL (ref 28.4–34.8)
MCV RBC AUTO: 93.1 FL (ref 82.6–102.9)
METAYELO CSF: NORMAL %
MONO/MACROPHAGE CSF (MANUAL): NORMAL %
MONOCYTES # BLD: 2 % (ref 3–12)
MORPHOLOGY: NORMAL
MYCOPLASMA PNEUMONIAE IGM: 0.24
MYELOCYTE CSF: NORMAL %
NEUTROPHILS, CSF: 48 %
NRBC AUTOMATED: 0 PER 100 WBC
OLIGOCLONAL BANDS NUMBER: 0 BANDS (ref 0–1)
OLIGOCLONAL BANDS: NEGATIVE
OTHER CELLS FLUID: NORMAL %
PATHOLOGIST REVIEW: NORMAL
PDW BLD-RTO: 14 % (ref 11.8–14.4)
PLATELET # BLD: 127 K/UL (ref 138–453)
PLATELET ESTIMATE: ABNORMAL
PMV BLD AUTO: 11.1 FL (ref 8.1–13.5)
POTASSIUM SERPL-SCNC: 3.6 MMOL/L (ref 3.7–5.3)
POTASSIUM SERPL-SCNC: 3.8 MMOL/L (ref 3.7–5.3)
RBC # BLD: 4.33 M/UL (ref 3.95–5.11)
RBC # BLD: ABNORMAL 10*6/UL
SEG NEUTROPHILS: 75 % (ref 36–65)
SEGMENTED NEUTROPHILS ABSOLUTE COUNT: 7.5 K/UL (ref 1.5–8.1)
SODIUM BLD-SCNC: 141 MMOL/L (ref 135–144)
SODIUM BLD-SCNC: 142 MMOL/L (ref 135–144)
SODIUM BLD-SCNC: 146 MMOL/L (ref 135–144)
SODIUM BLD-SCNC: 147 MMOL/L (ref 135–144)
SODIUM BLD-SCNC: 148 MMOL/L (ref 135–144)
SPECIMEN DESCRIPTION: ABNORMAL
SPECIMEN DESCRIPTION: NORMAL
SURGICAL PATHOLOGY REPORT: NORMAL
WBC # BLD: 10 K/UL (ref 3.5–11.3)
WBC # BLD: ABNORMAL 10*3/UL

## 2020-05-18 PROCEDURE — 85025 COMPLETE CBC W/AUTO DIFF WBC: CPT

## 2020-05-18 PROCEDURE — 2500000003 HC RX 250 WO HCPCS: Performed by: INTERNAL MEDICINE

## 2020-05-18 PROCEDURE — 2580000003 HC RX 258: Performed by: INTERNAL MEDICINE

## 2020-05-18 PROCEDURE — 6360000002 HC RX W HCPCS: Performed by: INTERNAL MEDICINE

## 2020-05-18 PROCEDURE — 2060000000 HC ICU INTERMEDIATE R&B

## 2020-05-18 PROCEDURE — 6360000002 HC RX W HCPCS: Performed by: STUDENT IN AN ORGANIZED HEALTH CARE EDUCATION/TRAINING PROGRAM

## 2020-05-18 PROCEDURE — 80048 BASIC METABOLIC PNL TOTAL CA: CPT

## 2020-05-18 PROCEDURE — 36415 COLL VENOUS BLD VENIPUNCTURE: CPT

## 2020-05-18 PROCEDURE — 84295 ASSAY OF SERUM SODIUM: CPT

## 2020-05-18 PROCEDURE — 99254 IP/OBS CNSLTJ NEW/EST MOD 60: CPT | Performed by: SURGERY

## 2020-05-18 PROCEDURE — 2580000003 HC RX 258: Performed by: PSYCHIATRY & NEUROLOGY

## 2020-05-18 PROCEDURE — 6360000002 HC RX W HCPCS: Performed by: PSYCHIATRY & NEUROLOGY

## 2020-05-18 PROCEDURE — 99232 SBSQ HOSP IP/OBS MODERATE 35: CPT | Performed by: INTERNAL MEDICINE

## 2020-05-18 PROCEDURE — 6370000000 HC RX 637 (ALT 250 FOR IP): Performed by: INTERNAL MEDICINE

## 2020-05-18 PROCEDURE — 6370000000 HC RX 637 (ALT 250 FOR IP): Performed by: NURSE PRACTITIONER

## 2020-05-18 PROCEDURE — 86769 SARS-COV-2 COVID-19 ANTIBODY: CPT

## 2020-05-18 PROCEDURE — 99232 SBSQ HOSP IP/OBS MODERATE 35: CPT | Performed by: PSYCHIATRY & NEUROLOGY

## 2020-05-18 PROCEDURE — U0003 INFECTIOUS AGENT DETECTION BY NUCLEIC ACID (DNA OR RNA); SEVERE ACUTE RESPIRATORY SYNDROME CORONAVIRUS 2 (SARS-COV-2) (CORONAVIRUS DISEASE [COVID-19]), AMPLIFIED PROBE TECHNIQUE, MAKING USE OF HIGH THROUGHPUT TECHNOLOGIES AS DESCRIBED BY CMS-2020-01-R: HCPCS

## 2020-05-18 PROCEDURE — 6370000000 HC RX 637 (ALT 250 FOR IP): Performed by: PSYCHIATRY & NEUROLOGY

## 2020-05-18 PROCEDURE — 93970 EXTREMITY STUDY: CPT

## 2020-05-18 RX ORDER — AMITRIPTYLINE HYDROCHLORIDE 25 MG/1
25 TABLET, FILM COATED ORAL NIGHTLY
Status: DISCONTINUED | OUTPATIENT
Start: 2020-05-18 | End: 2020-05-25

## 2020-05-18 RX ADMIN — METHYLPREDNISOLONE SODIUM SUCCINATE 250 MG: 125 INJECTION, POWDER, FOR SOLUTION INTRAMUSCULAR; INTRAVENOUS at 00:13

## 2020-05-18 RX ADMIN — AMITRIPTYLINE HYDROCHLORIDE 25 MG: 25 TABLET, FILM COATED ORAL at 20:38

## 2020-05-18 RX ADMIN — METHYLPREDNISOLONE SODIUM SUCCINATE 250 MG: 125 INJECTION, POWDER, FOR SOLUTION INTRAMUSCULAR; INTRAVENOUS at 12:44

## 2020-05-18 RX ADMIN — THIAMINE HYDROCHLORIDE 250 MG: 100 INJECTION, SOLUTION INTRAMUSCULAR; INTRAVENOUS at 16:59

## 2020-05-18 RX ADMIN — APIXABAN 10 MG: 5 TABLET, FILM COATED ORAL at 20:38

## 2020-05-18 RX ADMIN — METHYLPREDNISOLONE SODIUM SUCCINATE 250 MG: 125 INJECTION, POWDER, FOR SOLUTION INTRAMUSCULAR; INTRAVENOUS at 09:04

## 2020-05-18 RX ADMIN — APIXABAN 10 MG: 5 TABLET, FILM COATED ORAL at 12:43

## 2020-05-18 RX ADMIN — SODIUM CHLORIDE, PRESERVATIVE FREE 10 ML: 5 INJECTION INTRAVENOUS at 09:04

## 2020-05-18 RX ADMIN — HEPARIN SODIUM 5000 UNITS: 5000 INJECTION INTRAVENOUS; SUBCUTANEOUS at 05:22

## 2020-05-18 RX ADMIN — SODIUM CHLORIDE, PRESERVATIVE FREE 10 ML: 5 INJECTION INTRAVENOUS at 20:38

## 2020-05-18 RX ADMIN — THIAMINE HYDROCHLORIDE: 100 INJECTION, SOLUTION INTRAMUSCULAR; INTRAVENOUS at 09:04

## 2020-05-18 ASSESSMENT — PAIN DESCRIPTION - PROGRESSION
CLINICAL_PROGRESSION: NOT CHANGED

## 2020-05-18 NOTE — PLAN OF CARE
Problem: Falls - Risk of:  Goal: Will remain free from falls  Description: Will remain free from falls  Outcome: Ongoing   Pt remains free of falls at this time. Bed locked in lowest position, siderails padded, call light in reach. Non-skid footwear applied. Bed alarm on. Telesitter continued. Encouraged pt to call for assistance as needed for safety. Falling star posted outside of room. Will continue to monitor.   Electronically signed by Lety Lea RN on 5/18/2020 at 4:18 PM

## 2020-05-18 NOTE — PROGRESS NOTES
Pt preliminary DVT results back, right deep muscle calf vein shows no compressibility. Dr Gallo Patella notified.   Electronically signed by Kelsie Lopez RN on 5/18/2020 at 12:59 PM

## 2020-05-18 NOTE — PLAN OF CARE
[facility]    Second Visit Note  For more detailed information please refer to the progress note of the day      5/18/2020    4:39 PM    Name:   Haresh Friend  MRN:     [id number]     [place]:      [de-identified]   Room:   4199/7390-93   Day:  4  Admit Date:  5/14/2020 10:46 AM    PCP:   Jimi Golden  Code Status:  Full Code      Pt vitals were reviewed   New labs were reviewed   Patient was seen    Updated plan :     1. Patient still very sleepy but able to answer some questions  2.  Per nurse she could take Eliquis by mouth  3. COVID antibody test has been ordered which was a choice in [facility] today      [address] Grewal MD  5/18/2020  4:39 PM

## 2020-05-18 NOTE — PROGRESS NOTES
[facility]    Progress Note    5/18/2020    12:36 PM    Name:   Gisel Devlin  MRN:     [id number]     [place]:      [de-identified]   Room:   [address] [place] [address] Day:  4  Admit Date:  5/14/2020 10:46 AM    PCP:   Gerardo Traylor  Code Status:  Full Code    Subjective:     C/C: Fever  Interval History Status:   Patient was started on high dose of IV steroids by neurology yesterday due to worsening toxic metabolic encephalopathy, CSF was showing studies suspicious for autoimmune encephalitis/vasculitis with negative meningitis panel  MRI/MRV was done which apparently is unremarkable  Venous Doppler lower extremities was ordered which just now has been reported to be having right DVT   The rash on the body appears to be less of today after starting steroids  Patient is little more responsive compared to yesterday and she was able to answer some questions for me today  Brief History:   Patient transferred out from medical ICU with following information:  History much  Gisel Devlin is a 50-year-old female with history of alcohol use disorder, chronic smoker came as a transfer from [facility] for evaluation for possible [place] had history of fever, dry cough, feeling weak for past 2 days. Robi Perea also had loose stools 2-3 episodes, foul-smelling, no abdominal pain no blood in the stools.  She also reported severe headache since last 12 days, which worsened in the last 5 days. Robi Perea described it as sharp all over the head.  No nausea no blurry vision.     Smoker 1 to 2 packs a day for 40 years  Chronic alcoholism 3-4 beers a day for 40 years, last drink was 12 days ago.     At the admission time patient was noted to be febrile, 101 °F hypotensive with systolic blood pressure of 70s, received 2 L of IV fluid bolus, central line right IJ was placed and patient was started on Levophed.  Initial labs included sodium of 123, creatinine 1.34, CRP elevated at that she does not use drugs. Family History:   Family History   Problem Relation Age of Onset    Stroke Mother     Cancer Father        Vitals:  BP 91/73   Pulse 95   Temp 96.8 °F (36 °C) (Temporal)   Resp 23   Ht 5' 7\" (1.702 m)   Wt 117 lb 11.2 oz (53.4 kg)   SpO2 91%   BMI 18.43 kg/m²   Temp (24hrs), Av.1 °F (37.3 °C), Min:96.8 °F (36 °C), Max:100.7 °F (38.2 °C)    No results for input(s): POCGLU in the last 72 hours. I/O (24Hr): Intake/Output Summary (Last 24 hours) at 2020 1236  Last data filed at 2020 1900  Gross per 24 hour   Intake 565 ml   Output 750 ml   Net -185 ml       Labs:  Hematology:  Recent Labs     20  12320  0531   WBC 18.9* 13.2*  --  10.0   RBC 4.51 4.51  --  4.33   HGB 13.5 13.7  --  13.1   HCT 40.1 41.7  --  40.3   MCV 88.9 92.5  --  93.1   MCH 29.9 30.4  --  30.3   MCHC 33.7 32.9  --  32.5   RDW 13.7 14.1  --  14.0   PLT 94* See Reflexed IPF Result  --  127*   MPV 13.0 NOT REPORTED  --  11.1   CRP 43.9*  --  23.5*  --      Chemistry:  Recent Labs     20  020207 20  0216 20  0531   * 135   < > 140   < > 141 142 141   K  --  4.1  --  3.8  --   --   --  3.6*   CL  --  103  --  108*  --   --   --  105   CO2  --  22  --  21  --   --   --  26   GLUCOSE  --  173*  --  114*  --   --   --  187*   BUN  --  22*  --  24*  --   --   --  22*   CREATININE  --  0.48*  --  0.66  --   --   --  0.58   ANIONGAP  --  10  --  11  --   --   --  10   LABGLOM  --  >60  --  >60  --   --   --  >60   GFRAA  --  >60  --  >60  --   --   --  >60   CALCIUM  --  8.7  --  9.3  --   --   --  8.1*   PROBNP  --   --   --  2,740*  --   --   --   --    TROPHS 10 10  --  19*  --   --   --   --    CKTOTAL  --  65  --   --   --   --   --   --     < > = values in this interval not displayed.      No results for input(s): PROT, LABALBU, LABA1C, N0LVRXD, Z6TMITF, FT4, TSH, AST, ALT, LDH, GGT, ALKPHOS, LABGGT, BILITOT, BILIDIR, AMMONIA, AMYLASE, LIPASE, LACTATE, CHOL, HDL, LDLCHOLESTEROL, CHOLHDLRATIO, TRIG, VLDL, SKR50DA, PHENYTOIN, PHENYF, URICACID, POCGLU in the last 72 hours. ABG:  Lab Results   Component Value Date    POCPH 7.517 05/17/2020    POCPCO2 32.8 05/17/2020    POCPO2 70.1 05/17/2020    POCHCO3 26.6 05/17/2020    NBEA NOT REPORTED 05/17/2020    PBEA 4 05/17/2020    DZQ4MGY 28 05/17/2020    YGIO6XHJ 96 05/17/2020    FIO2 5.0 05/17/2020     Lab Results   Component Value Date/Time    SPECIAL R HAND 9 ML 05/17/2020 12:45 PM     Lab Results   Component Value Date/Time    CULTURE NO GROWTH 22 HOURS 05/17/2020 12:45 PM       Radiology:  Ct Chest W Contrast    Result Date: 5/13/2020  Moderate pericardial effusion. Recommend clinical correlation. No evidence of lung consolidation. Mra Head Wo Contrast    Result Date: 5/17/2020  Motion limited exam.  No major branch occlusion. CTA would be more sensitive in excluding vasculitis. Mri Cervical Spine W Wo Contrast    Result Date: 5/16/2020  No cord abnormality identified. No MRI evidence of discitis-osteomyelitis of the cervical or thoracic spine. Degenerative changes as detailed above. Incidentally noted left lower lobe atelectasis. Mri Thoracic Spine W Wo Contrast    Result Date: 5/16/2020  No cord abnormality identified. No MRI evidence of discitis-osteomyelitis of the cervical or thoracic spine. Degenerative changes as detailed above. Incidentally noted left lower lobe atelectasis. Mri Lumbar Spine W Wo Contrast    Result Date: 5/14/2020  Lumbar spine degenerative changes most significant at L5-S1. No findings to suggest osteomyelitis-discitis. Us Liver    Result Date: 5/14/2020  Hepatomegaly with coarse hepatic echotexture consistent with fatty infiltration without focal mass. Normal hepatopetal flow in the portal vein. Small amount of ascites fluid about the right kidney. Status post cholecystectomy. No ductal dilatation.

## 2020-05-18 NOTE — CONSULTS
[address]      Patient's Name/ Date of Birth/ Gender: Amanda Ortega / 1960 (62 y.o.) / female     Referring Physician: Nelly Patton MD    Consulting Physician: Dr. Ian Olson    History of present Illness: Pt is a 62 y.o. female, who presented to the hospital yesteday with fevers, cough and feeling weak. She was ruled out Covid. She was having some diarrhea as well as headaches for 2 weeks now. She was admitted to medicine for a work up for covid or meningitis. She is currently altered on my exam so PMH is from chart review. She has no anticoagulation medications on chart review as outpatient medications. She was started on eliquis today for a right gastroc DVT that was found on LE duplex. Past Medical History:  has a past medical history of COPD (chronic obstructive pulmonary disease) ([address] and ETOH abuse. Past Surgical History:   Past Surgical History:   Procedure Laterality Date    CHOLECYSTECTOMY      THYROIDECTOMY      TONSILLECTOMY         Social History:  reports that she has been smoking cigarettes. She has been smoking about 2.00 packs per day. She has never used smokeless tobacco. She reports current alcohol use. She reports that she does not use drugs. Family History: family history includes Cancer in her father; Stroke in her mother.     Allergies: Sulfa antibiotics    Current Meds:  Current Facility-Administered Medications:     apixaban (ELIQUIS) tablet 10 mg, 10 mg, Oral, BID, Nelly Patton MD, 10 mg at 05/18/20 1243    sodium chloride flush 0.9 % injection 10 mL, 10 mL, Intravenous, 2 times per day, Nelly Patton MD, 10 mL at 05/17/20 1030    sodium chloride flush 0.9 % injection 10 mL, 10 mL, Intravenous, PRN, Lori Boo MD    LORazepam (ATIVAN) tablet 1 mg, 1 mg, Oral, Q1H PRN **OR** LORazepam (ATIVAN) injection 1 mg, 1 mg, Intravenous, Q1H PRN **OR** LORazepam (ATIVAN) tablet 2 mg, 2 mg, Oral, Q1H PRN **OR** LORazepam 05/18/2020    CREATININE 0.58 05/18/2020    GLUCOSE 187 05/18/2020    CALCIUM 8.1 05/18/2020     Lab Results   Component Value Date    INR 1.0 05/14/2020       Imaging:  Mra Head Wo Contrast    Result Date: 5/17/2020  EXAMINATION: MRA OF THE HEAD WITHOUT CONTRAST 5/17/2020 3:23 pm TECHNIQUE: MRA of the head was performed utilizing time-of-flight imaging with MIP images. No intravenous contrast was administered. Motion limited exam. COMPARISON: May 14, 2020 MR brain HISTORY: ORDERING SYSTEM PROVIDED HISTORY: R/O CNS vasculitis TECHNOLOGIST PROVIDED HISTORY: R/O CNS vasculitis FINDINGS: ANTERIOR CIRCULATION: No significant stenosis of the intracranial internal carotid, anterior cerebral, or middle cerebral arteries. POSTERIOR CIRCULATION: No significant stenosis of the vertebral, basilar, or posterior cerebral arteries. Persistent fetal origin right posterior communicating artery. Normal left posterior communicating artery. Right vertebral artery appears to terminate as the posterior inferior cerebellar artery. Motion limited exam.  No major branch occlusion. CTA would be more sensitive in excluding vasculitis. Mri Cervical Spine W Wo Contrast    Result Date: 5/16/2020  EXAMINATION: MRI OF THE THORACIC SPINE WITHOUT AND WITH CONTRAST; MRI OF THE CERVICAL SPINE WITHOUT AND WITH CONTRAST  5/16/2020 3:19 pm TECHNIQUE: Multiplanar multisequence MRI of the thoracic spine was performed without and with the administration of intravenous contrast.; Multiplanar multisequence MRI of the cervical spine was performed without and with the administration of intravenous contrast. COMPARISON: None HISTORY: ORDERING SYSTEM PROVIDED HISTORY: myelitis TECHNOLOGIST PROVIDED HISTORY: myelitis FINDINGS: MRI cervical spine: The study is degraded by motion artifact. There is a normal cervical lordosis. No acute fracture or traumatic subluxation is identified.   The patient is status post anterior cervical discectomy and fusion from C5-C6. Normal expected signal voids are present within the vertebral arteries. The cervical cord is grossly normal in size and signal intensity. No abnormal enhancement of the C-spine is visualized after contrast administration. A disc osteophyte complex and facet hypertrophy result in moderate to severe left-sided neural foraminal stenosis at C6-C7. There is also mild narrowing of the thecal sac. MRI thoracic spine: The study is degraded by motion artifact. There is a normal thoracic curvature. No acute fracture or traumatic subluxation is identified. The thoracic cord is normal in size and signal intensity. No abnormal enhancement of the thoracic spine is visualized after contrast administration. There are a couple of perineural cysts. No significant canal or neural foraminal narrowing is visualized. There appears to be atelectasis of the left lower lobe. No cord abnormality identified. No MRI evidence of discitis-osteomyelitis of the cervical or thoracic spine. Degenerative changes as detailed above. Incidentally noted left lower lobe atelectasis. Mri Thoracic Spine W Wo Contrast    Result Date: 5/16/2020  EXAMINATION: MRI OF THE THORACIC SPINE WITHOUT AND WITH CONTRAST; MRI OF THE CERVICAL SPINE WITHOUT AND WITH CONTRAST  5/16/2020 3:19 pm TECHNIQUE: Multiplanar multisequence MRI of the thoracic spine was performed without and with the administration of intravenous contrast.; Multiplanar multisequence MRI of the cervical spine was performed without and with the administration of intravenous contrast. COMPARISON: None HISTORY: ORDERING SYSTEM PROVIDED HISTORY: myelitis TECHNOLOGIST PROVIDED HISTORY: myelitis FINDINGS: MRI cervical spine: The study is degraded by motion artifact. There is a normal cervical lordosis. No acute fracture or traumatic subluxation is identified. The patient is status post anterior cervical discectomy and fusion from C5-C6.   Normal expected signal voids are present Interpreting Physician  Paul Bello   Referring                  Referring Physician     Cheli Graham  Nurse  Practitioner  Procedure Type of Study:   Veins: Lower Extremities DVT Study, Venous Scan Lower Bilateral.  Indications for Study:Pain, leg. Patient Status: In Patient. Technical Quality:Adequate visualization.   - Critical Result:Findings reported to Minerva Rivas RN on 5/18/2020 at 9:11a     by Vonda Crump RVT. Conclusions   Summary   Simultaneous real time imaging utilizing B-Mode, color doppler and  spectral waveform analysis was performed on the bilateral lower  extremities for venous examination of the deep and superficial systems. Findings are:   Right:  Acute deep venous thrombosis identified in the gastrocnemius vein. Left:  No evidence of deep or superficial venous thrombosis. Signature   ----------------------------------------------------------------  Electronically signed by Abel Wesley RVT(Sonographer) on  05/18/2020 10:02 AM  ----------------------------------------------------------------   ----------------------------------------------------------------  Electronically signed by Lizeth Deal(Interpreting  physician) on 05/18/2020 12:57 PM  ----------------------------------------------------------------  Findings:   Right Impression:                    Left Impression:  The gastrocnemius vein demonstrates  The common femoral, femoral,  no compressibility. popliteal and tibial veins  Thrombus appears acute based on      demonstrate normal compressibility  B-Mode image evaluation. and augmentation. The common femoral, femoral,         Normal compressibility of the great  popliteal and tibial veins           saphenous vein. demonstrate normal compressibility  and augmentation. Normal compressibility of the small                                       saphenous vein. Normal compressibility of the great  saphenous vein.    Normal !GSV Thigh                           ! Yes       ! Yes            ! None      ! +------------------------------------+----------+---------------+----------+ ! GSV Knee                            ! Yes       ! Yes            ! None      ! +------------------------------------+----------+---------------+----------+ ! GSV Ankle                           ! Yes       ! Yes            ! None      ! +------------------------------------+----------+---------------+----------+ ! SSV                                 ! Yes       ! Yes            ! None      ! +------------------------------------+----------+---------------+----------+ Right Doppler Measurements +---------------------------+------+------+--------------------------------+ ! Location                   ! Signal!Reflux! Reflux (msec)                   ! +---------------------------+------+------+--------------------------------+ ! Common Femoral             !Phasic!      !                                ! +---------------------------+------+------+--------------------------------+ ! Prox Femoral               !Phasic!      !                                ! +---------------------------+------+------+--------------------------------+ ! Popliteal                  !Phasic!      !                                ! +---------------------------+------+------+--------------------------------+ Left Lower Extremities DVT Study Measurements Left 2D Measurements +------------------------------------+----------+---------------+----------+ ! Location                            ! Visualized! Compressibility! Thrombosis! +------------------------------------+----------+---------------+----------+ ! Common Femoral                      !Yes       ! Yes            ! None      ! +------------------------------------+----------+---------------+----------+ ! Prox Femoral                        !Yes       ! Yes            ! None      ! +------------------------------------+----------+---------------+----------+ ! Mid Femoral                         !Yes       ! Yes            ! None      ! +------------------------------------+----------+---------------+----------+ ! Dist Femoral                        !Yes       ! Yes            ! None      ! +------------------------------------+----------+---------------+----------+ ! Deep Femoral                        !Yes       ! Yes            ! None      ! +------------------------------------+----------+---------------+----------+ ! Popliteal                           !Yes       ! Yes            ! None      ! +------------------------------------+----------+---------------+----------+ ! Sapheno Femoral Junction            ! Yes       ! Yes            ! None      ! +------------------------------------+----------+---------------+----------+ ! PTV                                 ! Yes       ! Yes            ! None      ! +------------------------------------+----------+---------------+----------+ ! Peroneal                            !Yes       ! Yes            ! None      ! +------------------------------------+----------+---------------+----------+ ! Gastroc                             ! Yes       ! Yes            ! None      ! +------------------------------------+----------+---------------+----------+ ! GSV Thigh                           ! Yes       ! Yes            ! None      ! +------------------------------------+----------+---------------+----------+ ! GSV Knee                            ! Yes       ! Yes            ! None      ! +------------------------------------+----------+---------------+----------+ ! GSV Ankle                           ! Yes       ! Yes            ! None      ! +------------------------------------+----------+---------------+----------+ ! SSV                                 ! Yes       ! Yes            ! None      ! +------------------------------------+----------+---------------+----------+ Left Doppler Measurements +---------------------------+------+------+--------------------------------+

## 2020-05-18 NOTE — PROGRESS NOTES
Covid 19 swab taken from left nare, labeled, placed in red dot bag, and handed off to second healthcare worker outside of room for transport to laboratory per hospital policy and procedure. Patient tolerated procedure well.

## 2020-05-19 ENCOUNTER — APPOINTMENT (OUTPATIENT)
Dept: GENERAL RADIOLOGY | Age: 60
DRG: 720 | End: 2020-05-19
Attending: INTERNAL MEDICINE
Payer: MEDICARE

## 2020-05-19 PROBLEM — Z90.49 HX OF CHOLECYSTECTOMY: Status: RESOLVED | Noted: 2020-05-14 | Resolved: 2020-05-19

## 2020-05-19 PROBLEM — G93.40 ENCEPHALOPATHY: Status: RESOLVED | Noted: 2020-05-18 | Resolved: 2020-05-19

## 2020-05-19 PROBLEM — J18.9 PNEUMONIA: Status: RESOLVED | Noted: 2020-05-13 | Resolved: 2020-05-19

## 2020-05-19 LAB
ABSOLUTE EOS #: <0.03 K/UL (ref 0–0.44)
ABSOLUTE IMMATURE GRANULOCYTE: 0.12 K/UL (ref 0–0.3)
ABSOLUTE LYMPH #: 1.58 K/UL (ref 1.1–3.7)
ABSOLUTE MONO #: 0.86 K/UL (ref 0.1–1.2)
ACTION: NORMAL
ALLEN TEST: ABNORMAL
ALLEN TEST: POSITIVE
ANION GAP SERPL CALCULATED.3IONS-SCNC: 14 MMOL/L (ref 9–17)
B BURGDORFERI AB,CSF: 0.07 LIV
BASOPHILS # BLD: 0 % (ref 0–2)
BASOPHILS ABSOLUTE: <0.03 K/UL (ref 0–0.2)
BUN BLDV-MCNC: 27 MG/DL (ref 6–20)
BUN/CREAT BLD: ABNORMAL (ref 9–20)
CALCIUM SERPL-MCNC: 8.8 MG/DL (ref 8.6–10.4)
CHLORIDE BLD-SCNC: 109 MMOL/L (ref 98–107)
CO2: 28 MMOL/L (ref 20–31)
CREAT SERPL-MCNC: 0.51 MG/DL (ref 0.5–0.9)
CULTURE: NORMAL
CULTURE: NORMAL
DATE AND TIME: NORMAL
DIFFERENTIAL TYPE: ABNORMAL
EKG ATRIAL RATE: 108 BPM
EKG P AXIS: 23 DEGREES
EKG P-R INTERVAL: 160 MS
EKG Q-T INTERVAL: 286 MS
EKG QRS DURATION: 84 MS
EKG QTC CALCULATION (BAZETT): 383 MS
EKG T AXIS: 53 DEGREES
EKG VENTRICULAR RATE: 108 BPM
EOSINOPHILS RELATIVE PERCENT: 0 % (ref 1–4)
FIO2: 100
FIO2: 6
GFR AFRICAN AMERICAN: >60 ML/MIN
GFR NON-AFRICAN AMERICAN: >60 ML/MIN
GFR SERPL CREATININE-BSD FRML MDRD: ABNORMAL ML/MIN/{1.73_M2}
GFR SERPL CREATININE-BSD FRML MDRD: ABNORMAL ML/MIN/{1.73_M2}
GLUCOSE BLD-MCNC: 137 MG/DL (ref 70–99)
HCT VFR BLD CALC: 40.8 % (ref 36.3–47.1)
HCT VFR BLD CALC: 44.1 % (ref 36.3–47.1)
HEMOGLOBIN: 13.2 G/DL (ref 11.9–15.1)
HEMOGLOBIN: 13.8 G/DL (ref 11.9–15.1)
IMMATURE GRANULOCYTES: 1 %
LACTIC ACID, WHOLE BLOOD: 1.3 MMOL/L (ref 0.7–2.1)
LYMPHOCYTES # BLD: 12 % (ref 24–43)
Lab: NORMAL
Lab: NORMAL
MCH RBC QN AUTO: 29.8 PG (ref 25.2–33.5)
MCH RBC QN AUTO: 30.7 PG (ref 25.2–33.5)
MCHC RBC AUTO-ENTMCNC: 31.3 G/DL (ref 28.4–34.8)
MCHC RBC AUTO-ENTMCNC: 32.4 G/DL (ref 28.4–34.8)
MCV RBC AUTO: 94.9 FL (ref 82.6–102.9)
MCV RBC AUTO: 95.2 FL (ref 82.6–102.9)
MODE: ABNORMAL
MODE: ABNORMAL
MONOCYTES # BLD: 6 % (ref 3–12)
NEGATIVE BASE EXCESS, ART: ABNORMAL (ref 0–2)
NEGATIVE BASE EXCESS, ART: ABNORMAL (ref 0–2)
NOTIFY: NORMAL
NRBC AUTOMATED: 0 PER 100 WBC
NRBC AUTOMATED: 0 PER 100 WBC
O2 DEVICE/FLOW/%: ABNORMAL
O2 DEVICE/FLOW/%: ABNORMAL
PARTIAL THROMBOPLASTIN TIME: 23.2 SEC (ref 20.5–30.5)
PATIENT TEMP: ABNORMAL
PATIENT TEMP: ABNORMAL
PDW BLD-RTO: 14 % (ref 11.8–14.4)
PDW BLD-RTO: 14.1 % (ref 11.8–14.4)
PLATELET # BLD: 184 K/UL (ref 138–453)
PLATELET # BLD: 191 K/UL (ref 138–453)
PLATELET ESTIMATE: ABNORMAL
PMV BLD AUTO: 10.5 FL (ref 8.1–13.5)
PMV BLD AUTO: 11 FL (ref 8.1–13.5)
POC HCO3: 31.2 MMOL/L (ref 21–28)
POC HCO3: 33.6 MMOL/L (ref 21–28)
POC O2 SATURATION: 87 % (ref 94–98)
POC O2 SATURATION: 96 % (ref 94–98)
POC PCO2 TEMP: ABNORMAL MM HG
POC PCO2 TEMP: ABNORMAL MM HG
POC PCO2: 37.9 MM HG (ref 35–48)
POC PCO2: 43 MM HG (ref 35–48)
POC PH TEMP: ABNORMAL
POC PH TEMP: ABNORMAL
POC PH: 7.47 (ref 7.35–7.45)
POC PH: 7.56 (ref 7.35–7.45)
POC PO2 TEMP: ABNORMAL MM HG
POC PO2 TEMP: ABNORMAL MM HG
POC PO2: 45.2 MM HG (ref 83–108)
POC PO2: 79.2 MM HG (ref 83–108)
POSITIVE BASE EXCESS, ART: 10 (ref 0–3)
POSITIVE BASE EXCESS, ART: 7 (ref 0–3)
POTASSIUM SERPL-SCNC: 3.8 MMOL/L (ref 3.7–5.3)
RBC # BLD: 4.3 M/UL (ref 3.95–5.11)
RBC # BLD: 4.63 M/UL (ref 3.95–5.11)
RBC # BLD: ABNORMAL 10*6/UL
READ BACK: YES
SAMPLE SITE: ABNORMAL
SAMPLE SITE: ABNORMAL
SARS-COV-2, PCR: NORMAL
SARS-COV-2, RAPID: NORMAL
SARS-COV-2: NOT DETECTED
SEG NEUTROPHILS: 81 % (ref 36–65)
SEGMENTED NEUTROPHILS ABSOLUTE COUNT: 10.87 K/UL (ref 1.5–8.1)
SODIUM BLD-SCNC: 147 MMOL/L (ref 135–144)
SODIUM BLD-SCNC: 148 MMOL/L (ref 135–144)
SODIUM BLD-SCNC: 151 MMOL/L (ref 135–144)
SOURCE: NORMAL
SPECIMEN DESCRIPTION: NORMAL
SPECIMEN DESCRIPTION: NORMAL
TCO2 (CALC), ART: 33 MMOL/L (ref 22–29)
TCO2 (CALC), ART: 35 MMOL/L (ref 22–29)
WBC # BLD: 10.1 K/UL (ref 3.5–11.3)
WBC # BLD: 13.5 K/UL (ref 3.5–11.3)
WBC # BLD: ABNORMAL 10*3/UL

## 2020-05-19 PROCEDURE — 95819 EEG AWAKE AND ASLEEP: CPT | Performed by: PSYCHIATRY & NEUROLOGY

## 2020-05-19 PROCEDURE — 95816 EEG AWAKE AND DROWSY: CPT

## 2020-05-19 PROCEDURE — 84295 ASSAY OF SERUM SODIUM: CPT

## 2020-05-19 PROCEDURE — 85027 COMPLETE CBC AUTOMATED: CPT

## 2020-05-19 PROCEDURE — 94640 AIRWAY INHALATION TREATMENT: CPT

## 2020-05-19 PROCEDURE — 83605 ASSAY OF LACTIC ACID: CPT

## 2020-05-19 PROCEDURE — 6360000002 HC RX W HCPCS: Performed by: INTERNAL MEDICINE

## 2020-05-19 PROCEDURE — 97530 THERAPEUTIC ACTIVITIES: CPT

## 2020-05-19 PROCEDURE — 36600 WITHDRAWAL OF ARTERIAL BLOOD: CPT

## 2020-05-19 PROCEDURE — 2580000003 HC RX 258: Performed by: INTERNAL MEDICINE

## 2020-05-19 PROCEDURE — 2580000003 HC RX 258: Performed by: PSYCHIATRY & NEUROLOGY

## 2020-05-19 PROCEDURE — 99232 SBSQ HOSP IP/OBS MODERATE 35: CPT | Performed by: INTERNAL MEDICINE

## 2020-05-19 PROCEDURE — 93010 ELECTROCARDIOGRAM REPORT: CPT | Performed by: INTERNAL MEDICINE

## 2020-05-19 PROCEDURE — 6370000000 HC RX 637 (ALT 250 FOR IP): Performed by: INTERNAL MEDICINE

## 2020-05-19 PROCEDURE — 6370000000 HC RX 637 (ALT 250 FOR IP): Performed by: STUDENT IN AN ORGANIZED HEALTH CARE EDUCATION/TRAINING PROGRAM

## 2020-05-19 PROCEDURE — 6370000000 HC RX 637 (ALT 250 FOR IP): Performed by: PSYCHIATRY & NEUROLOGY

## 2020-05-19 PROCEDURE — 99233 SBSQ HOSP IP/OBS HIGH 50: CPT | Performed by: INTERNAL MEDICINE

## 2020-05-19 PROCEDURE — 2580000003 HC RX 258: Performed by: STUDENT IN AN ORGANIZED HEALTH CARE EDUCATION/TRAINING PROGRAM

## 2020-05-19 PROCEDURE — 99232 SBSQ HOSP IP/OBS MODERATE 35: CPT | Performed by: PSYCHIATRY & NEUROLOGY

## 2020-05-19 PROCEDURE — 36415 COLL VENOUS BLD VENIPUNCTURE: CPT

## 2020-05-19 PROCEDURE — 2580000003 HC RX 258: Performed by: NEUROLOGICAL SURGERY

## 2020-05-19 PROCEDURE — 2060000000 HC ICU INTERMEDIATE R&B

## 2020-05-19 PROCEDURE — 97162 PT EVAL MOD COMPLEX 30 MIN: CPT

## 2020-05-19 PROCEDURE — 82803 BLOOD GASES ANY COMBINATION: CPT

## 2020-05-19 PROCEDURE — 97535 SELF CARE MNGMENT TRAINING: CPT

## 2020-05-19 PROCEDURE — 6360000002 HC RX W HCPCS: Performed by: PSYCHIATRY & NEUROLOGY

## 2020-05-19 PROCEDURE — 6360000002 HC RX W HCPCS: Performed by: NURSE PRACTITIONER

## 2020-05-19 PROCEDURE — 94660 CPAP INITIATION&MGMT: CPT

## 2020-05-19 PROCEDURE — 2500000003 HC RX 250 WO HCPCS: Performed by: INTERNAL MEDICINE

## 2020-05-19 PROCEDURE — 85730 THROMBOPLASTIN TIME PARTIAL: CPT

## 2020-05-19 PROCEDURE — 80048 BASIC METABOLIC PNL TOTAL CA: CPT

## 2020-05-19 PROCEDURE — 71045 X-RAY EXAM CHEST 1 VIEW: CPT

## 2020-05-19 PROCEDURE — 97167 OT EVAL HIGH COMPLEX 60 MIN: CPT

## 2020-05-19 PROCEDURE — 97110 THERAPEUTIC EXERCISES: CPT

## 2020-05-19 PROCEDURE — 85025 COMPLETE CBC W/AUTO DIFF WBC: CPT

## 2020-05-19 RX ORDER — LEVOFLOXACIN 500 MG/1
500 TABLET, FILM COATED ORAL DAILY
Status: DISCONTINUED | OUTPATIENT
Start: 2020-05-19 | End: 2020-05-19

## 2020-05-19 RX ORDER — HEPARIN SODIUM 1000 [USP'U]/ML
60 INJECTION, SOLUTION INTRAVENOUS; SUBCUTANEOUS ONCE
Status: COMPLETED | OUTPATIENT
Start: 2020-05-19 | End: 2020-05-19

## 2020-05-19 RX ORDER — HEPARIN SODIUM 1000 [USP'U]/ML
60 INJECTION, SOLUTION INTRAVENOUS; SUBCUTANEOUS PRN
Status: DISCONTINUED | OUTPATIENT
Start: 2020-05-19 | End: 2020-05-26

## 2020-05-19 RX ORDER — HEPARIN SODIUM 10000 [USP'U]/100ML
12 INJECTION, SOLUTION INTRAVENOUS CONTINUOUS
Status: DISCONTINUED | OUTPATIENT
Start: 2020-05-19 | End: 2020-05-26

## 2020-05-19 RX ORDER — HEPARIN SODIUM 1000 [USP'U]/ML
30 INJECTION, SOLUTION INTRAVENOUS; SUBCUTANEOUS PRN
Status: DISCONTINUED | OUTPATIENT
Start: 2020-05-19 | End: 2020-05-26

## 2020-05-19 RX ORDER — SODIUM CHLORIDE 450 MG/100ML
INJECTION, SOLUTION INTRAVENOUS CONTINUOUS
Status: DISCONTINUED | OUTPATIENT
Start: 2020-05-19 | End: 2020-05-22

## 2020-05-19 RX ORDER — IPRATROPIUM BROMIDE AND ALBUTEROL SULFATE 2.5; .5 MG/3ML; MG/3ML
1 SOLUTION RESPIRATORY (INHALATION) 4 TIMES DAILY
Status: DISCONTINUED | OUTPATIENT
Start: 2020-05-19 | End: 2020-05-22

## 2020-05-19 RX ORDER — LEVOFLOXACIN 5 MG/ML
500 INJECTION, SOLUTION INTRAVENOUS EVERY 24 HOURS
Status: DISCONTINUED | OUTPATIENT
Start: 2020-05-19 | End: 2020-05-20

## 2020-05-19 RX ADMIN — Medication 10 ML: at 08:26

## 2020-05-19 RX ADMIN — IPRATROPIUM BROMIDE AND ALBUTEROL SULFATE 1 AMPULE: .5; 3 SOLUTION RESPIRATORY (INHALATION) at 16:09

## 2020-05-19 RX ADMIN — VITAM B12 50 MCG: 100 TAB at 09:00

## 2020-05-19 RX ADMIN — HEPARIN SODIUM 3030 UNITS: 1000 INJECTION INTRAVENOUS; SUBCUTANEOUS at 21:13

## 2020-05-19 RX ADMIN — SODIUM CHLORIDE, PRESERVATIVE FREE 10 ML: 5 INJECTION INTRAVENOUS at 08:22

## 2020-05-19 RX ADMIN — FOLIC ACID 1 MG: 1 TABLET ORAL at 08:10

## 2020-05-19 RX ADMIN — LEVOFLOXACIN 500 MG: 5 INJECTION, SOLUTION INTRAVENOUS at 20:10

## 2020-05-19 RX ADMIN — THIAMINE HYDROCHLORIDE 250 MG: 100 INJECTION, SOLUTION INTRAMUSCULAR; INTRAVENOUS at 17:12

## 2020-05-19 RX ADMIN — HEPARIN SODIUM AND DEXTROSE 12 UNITS/KG/HR: 10000; 5 INJECTION INTRAVENOUS at 21:13

## 2020-05-19 RX ADMIN — THIAMINE HYDROCHLORIDE: 100 INJECTION, SOLUTION INTRAMUSCULAR; INTRAVENOUS at 09:51

## 2020-05-19 RX ADMIN — IPRATROPIUM BROMIDE AND ALBUTEROL SULFATE 1 AMPULE: .5; 3 SOLUTION RESPIRATORY (INHALATION) at 20:19

## 2020-05-19 RX ADMIN — SODIUM CHLORIDE, PRESERVATIVE FREE 10 ML: 5 INJECTION INTRAVENOUS at 10:00

## 2020-05-19 RX ADMIN — APIXABAN 10 MG: 5 TABLET, FILM COATED ORAL at 09:00

## 2020-05-19 RX ADMIN — SODIUM CHLORIDE: 4.5 INJECTION, SOLUTION INTRAVENOUS at 09:51

## 2020-05-19 RX ADMIN — ACETAMINOPHEN 650 MG: 650 SUPPOSITORY RECTAL at 21:05

## 2020-05-19 ASSESSMENT — ENCOUNTER SYMPTOMS
VOMITING: 0
BLOOD IN STOOL: 0
SHORTNESS OF BREATH: 0
WHEEZING: 0
EYE DISCHARGE: 0
APNEA: 0
COLOR CHANGE: 0
ABDOMINAL PAIN: 0
BACK PAIN: 1
ABDOMINAL DISTENTION: 0
PHOTOPHOBIA: 0
BACK PAIN: 0
NAUSEA: 0

## 2020-05-19 ASSESSMENT — PAIN DESCRIPTION - PAIN TYPE
TYPE: ACUTE PAIN
TYPE: ACUTE PAIN

## 2020-05-19 ASSESSMENT — PAIN DESCRIPTION - LOCATION
LOCATION: GENERALIZED
LOCATION: GENERALIZED

## 2020-05-19 ASSESSMENT — PAIN SCALES - WONG BAKER: WONGBAKER_NUMERICALRESPONSE: 6

## 2020-05-19 ASSESSMENT — PAIN SCALES - GENERAL
PAINLEVEL_OUTOF10: 0
PAINLEVEL_OUTOF10: 5

## 2020-05-19 NOTE — PROGRESS NOTES
atelectasis and slightly decreased volume left lung as compared to previous chest x-ray  05/13/2020 no infiltrate or consolidation seen      CT Scans    5/17/2020  No pulmonary embolism seen, left lower lobe basal atelectasis less likely to be consolidation or infiltrate. No mediastinal neuropathy previously seen pericardial effusion not much apparent on the CT scan.    05/13/2020    No consolidation or infiltrate, mild to moderate pericardial effusion seen, no mediastinal adenopathy. [facility] ECHO:  05/14/2020  Left ventricle is normal in size with normal systolic function globally. Calculated ejection fraction is 56%. Mildly dilated right atrium. Mild buckling of the right atrial wall. Aortic sclerosis without stenosis. Mild tricuspid regurgitation. Estimated right ventricular systolic pressure is 25 mmHg. Assessment and Plan:    Encephalopathy no apparent metabolic cause on admission possible toxic encephalopathy or viral encephalitis/encephalopathy history of headache fever associated with macular skin rash, elevated CRP, elevated protein in CSF mild elevation of WBC in CSF but work-up so far negative. Left lower lobe atelectasis initially was not present on CT scan likely secondary to altered mental status inability to cough likely mucus and mucoid secretions. Low-grade fever. Initial thrombocytopenia improving. Mild hyponatremia initially now hyper natremia  Pericardial effusion seen on initial CT scan not seen on repeat CT scan or echocardiogram.  Possible COPD with history of smoking. Hypotension secondary to hypovolemia resolved. Right gastrocnemius vein DVT. Plan and recommendation: We will get ABG, venous ABG was obtained and reviewed consistent with metabolic alkalosis with PCO2 of 36 and pH of 7.55 no hypercapnia was seen to account for altered mental status. Will start her on BiPAP at night as few apneas were noted and will use as needed during the daytime. DuoNeb aerosol.   We

## 2020-05-19 NOTE — PROGRESS NOTES
[address]    Progress Note    5/19/2020    8:41 AM    Name:   Darinel Fabian  MRN:     [id number]     Acct:      [de-identified]   Room:   [address] [place] [address] Day:  5  Admit Date:  5/14/2020 10:46 AM    PCP:   Sebastian Butcher  Code Status:  Full Code    Subjective:     C/C: Fever, confusion    Interval History Status: not changed. Patient is still drowsy but follows commands. She has generalized weakness. She continues to require 6 L of oxygen nasal cannula. She is saturating at 90%. She spiked fever of 101.2 this morning over the past 24 hours. Brief History:   Cheryn Lundborg a 41-year-old female with history of alcohol use disorder, chronic smoker came as a transfer from [facility] for evaluation for possible Grzegorz Hammer had history of fever, dry cough, feeling weak for past 2 days. Maryann Sy also had loose stools 2-3 episodes, foul-smelling, no abdominal pain no blood in the stools.  She also reported severe headache since last 12 days, which worsened in the last 5 days. Maryann Sy described it as sharp all over the head.  No nausea no blurry vision.     Smoker 1 to 2 packs a day for 40 years  Chronic alcoholism 3-4 beers a day for 40 years, last drink was 12 days ago.     At the admission time patient was noted to be febrile, 101 °F hypotensive with systolic blood pressure of 70s, received 2 L of IV fluid bolus, central line right IJ was placed and patient was started on Levophed.  Initial labs included sodium of 123, creatinine 1.34, CRP elevated at 196.4, LDH of 394, WBC 11.9, ferritin 9357, procal 13.13  proBNP-838. Also hypoxic requiring oxygen. Review of Systems:     Review of Systems   Constitutional: Positive for fever. Negative for appetite change. HENT: Negative for congestion and ear discharge. Eyes: Negative for discharge and visual disturbance. Respiratory: Negative for shortness of breath and wheezing.     Cardiovascular: Exam  Vitals signs reviewed. Constitutional:       General: She is not in acute distress. Appearance: Normal appearance. She is not ill-appearing or diaphoretic. HENT:      Head: Normocephalic and atraumatic. Nose: No congestion or rhinorrhea. Eyes:      Extraocular Movements: Extraocular movements intact. Conjunctiva/sclera: Conjunctivae normal.      Pupils: Pupils are equal, round, and reactive to light. Neck:      Musculoskeletal: Normal range of motion and neck supple. Cardiovascular:      Rate and Rhythm: Normal rate and regular rhythm. Pulses: Normal pulses. Heart sounds: Normal heart sounds. No murmur. Pulmonary:      Effort: Pulmonary effort is normal.      Breath sounds: Normal breath sounds. No wheezing or rales. Abdominal:      General: Bowel sounds are normal. There is no distension. Palpations: Abdomen is soft. Tenderness: There is no abdominal tenderness. Musculoskeletal: Normal range of motion. General: No swelling or tenderness. Skin:     General: Skin is warm and dry. Findings: No rash. Neurological:      General: No focal deficit present. Mental Status: She is alert and oriented to person, place, and time. Motor: Weakness (all ext with difficulty elevating against gravity) present. Psychiatric:         Mood and Affect: Mood normal.         Thought Content:  Thought content normal.         Judgment: Judgment normal.         Assessment:        Hospital Problems           Last Modified POA    * (Principal) Fever 5/17/2020 Yes    Acute intractable headache 5/17/2020 Yes    Chronic abdominal pain 5/14/2020 Yes    Hyponatremia 5/14/2020 Yes    Mucopurulent chronic bronchitis ([address] 5/14/2020 Yes    DIXON (acute kidney injury) ([address] 5/14/2020 Yes    Smoker 5/14/2020 Yes    Suspected COVID-19 virus infection 5/14/2020 Yes    Chronic obstructive pulmonary disease ([address] 5/15/2020 Yes    Pericardial effusion 5/15/2020 Yes

## 2020-05-19 NOTE — PROGRESS NOTES
Cognition  Overall Cognitive Status: Exceptions  Arousal/Alertness: Delayed responses to stimuli;Inconsistent responses to stimuli  Following Commands: Inconsistently follows commands  Attention Span: Difficulty attending to directions  Memory: Decreased recall of recent events;Decreased recall of biographical Information  Safety Judgement: Decreased awareness of need for assistance;Decreased awareness of need for safety  Insights: Decreased awareness of deficits  Initiation: Requires cues for all  Sequencing: Requires cues for all     Sensation  Overall Sensation Status: WFL(pt denies numbness/tingling B hands)      LUE PROM (degrees)  LUE PROM: WFL  LUE AROM (degrees)  LUE General AROM: pt unable to hold extremity anti gravity, unable to complete AROM on command. Minimal purposeful AROM noted  Left Hand PROM (degrees)  Left Hand PROM: WFL  Left Hand AROM (degrees)  Left Hand General AROM: pt able to squeeze hand on command 2/2 trials with mod verbal cuing  RUE PROM (degrees)  RUE PROM: WFL  RUE AROM (degrees)  RUE General AROM: pt unable to hold extremity anti gravity, unable to complete AROM on command.  Minimal purposeful AROM noted  Right Hand PROM (degrees)  Right Hand PROM: WFL  Right Hand AROM (degrees)  Right Hand General AROM: pt able to squeeze hand on command 2/2 trials with mod verbal cuing  LUE Strength  L Hand General: 3+/5  LUE Strength Comment: Grossly 2+/5 throughout  RUE Strength  R Hand General: 3+/5  RUE Strength Comment: Grossly 2+/5 throughout      Plan   Plan  Times per week: 3-4 x week    AM-PAC Score  AM-[facility] Inpatient Daily Activity Raw Score: 6 (05/19/20 1456)  AM-PAC Inpatient ADL T-Scale Score : 17.07 (05/19/20 1456)  ADL Inpatient CMS 0-100% Score: 100 (05/19/20 1456)  ADL Inpatient CMS G-Code Modifier : CN (05/19/20 1456)    Goals  Short term goals  Time Frame for Short term goals: Pt will, by discharge:   Short term goal 1: Dem min a for a simple adl task  Short term goal 2: Dem tracking of any object 3/4 tx sessions  Short term goal 3: Be alert and oriented x 2 with verbal choices 2.4 sessions  Short term goal 4: Follow commands 75% of the time  Short term goal 5: Dem mod a for bed mobility  Short term goal 6: Sit on EOB 10 min with min a       Therapy Time   Individual Concurrent Group Co-treatment   Time In 1345     1400   Time Out 1405      1405   Minutes 20         Timed Code Treatment Minutes: 10 Minutes   PT entered during eval for bed mob    TOMMY RIVERO OTR/L

## 2020-05-19 NOTE — PROGRESS NOTES
[facility] -   Infectious diseases evaluation  admission date 5/14/2020    reason for consultation:   covid    Impression :   Current:  · Headache photophobia poor appetite x 10 days  · Lower back pain with weakness x 10 days  · COVID negative x2, CT chest negative  · Elevated CRP and ferritin  · Left lower lobe atelectasis versus pneumonia, progressive with effusion  ·   · Fever 5/17 and 5/19      Other:  ·   Discussion / summary of stay / plan of care   Post short course of antibiotics, stopped over the weekend  Fever on and off, for the last 3 days  Left lower lobe atelectasis seems to be progressing, possible effusion versus pneumonia 5/19  Recommendations     Start Levaquin x7 days till 5/26 and watch temperature response  get 2 blood cultures  crp in am  DC planning on Levaquin if fever resolves  Infection Control Recommendations   · [place] Precautions  · Contact Isolation   Antimicrobial Stewardship Recommendations   · Simplification of therapy  · Targeted therapy  ·     Coordination ofOutpatient Care:   · Estimated Length of IV antimicrobials:  · Patient will need Midline / picc Catheter Insertion:   · Patient will need SNF:  · Patient will need outpatient wound care:     History of Present Illness:   Initial history:  Maria Isabel Nascimento is a 62y.o.-year-old female who presented to the emergency room at [facility] with a 10 days history of a headache photophobia, a lower back pain with weakness. She denies being short of breath, she does have a smoker's cough that has improved recently. She has no aches other than the lower back pain, she has no sweats or fevers. She does not have a good appetite and has not eaten in 3 days, but does not think she has a taste bud problem  She was tested twice for COVID and came back negative  Unable to go to the bathroom at this point because of the weakness of her legs and the back pain.   White count is normal but CRP Father         Allergies:   Sulfa antibiotics     Review of Systems:     Review of Systems   Constitutional: Positive for appetite change and fatigue. HENT: Negative for congestion. Eyes: Negative for photophobia. Respiratory: Negative for apnea. Cardiovascular: Negative for chest pain. Gastrointestinal: Negative for abdominal distention. Endocrine: Negative for polydipsia. Genitourinary: Negative for dysuria and urgency. Musculoskeletal: Positive for back pain. Skin: Negative for color change. Allergic/Immunologic: Negative for food allergies and immunocompromised state. Neurological: Positive for weakness. Negative for headaches. Hematological: Negative for adenopathy. Psychiatric/Behavioral: Negative for agitation. Physical Examination :     Patient Vitals for the past 8 hrs:   BP Temp Temp src Pulse Resp SpO2   05/19/20 1611 -- -- -- -- 28 --   05/19/20 1505 (!) 146/94 97.9 °F (36.6 °C) Temporal 82 25 90 %   05/19/20 1137 119/84 99.1 °F (37.3 °C) Oral 76 24 98 %       Physical Exam  Constitutional:       Appearance: Normal appearance. She is normal weight. HENT:      Head: Normocephalic and atraumatic. Nose: No congestion or rhinorrhea. Eyes:      General: No scleral icterus. Conjunctiva/sclera: Conjunctivae normal.   Neck:      Musculoskeletal: Neck supple. No neck rigidity. Cardiovascular:      Rate and Rhythm: Normal rate and regular rhythm. Pulmonary:      Effort: Pulmonary effort is normal. No respiratory distress. Abdominal:      General: There is no distension. Tenderness: There is no abdominal tenderness. Genitourinary:     Comments: No redd  Musculoskeletal:         General: No swelling or deformity. Skin:     General: Skin is warm. Coloration: Skin is not jaundiced or pale. Findings: No bruising or erythema. Neurological:      Mental Status: She is alert and oriented to person, place, and time.       Cranial Nerves: No cranial

## 2020-05-19 NOTE — PLAN OF CARE
improve  Outcome: Ongoing  Goal: Will remain free from infection  Description: Will remain free from infection  Outcome: Ongoing  Goal: Ability to maintain vital signs within normal range will improve  Description: Ability to maintain vital signs within normal range will improve  Outcome: Ongoing     Problem: Discharge Planning:  Goal: Discharged to appropriate level of care  Description: Discharged to appropriate level of care  Outcome: Ongoing     Problem: Fluid Volume - Deficit:  Goal: Absence of fluid volume deficit signs and symptoms  Description: Absence of fluid volume deficit signs and symptoms  Outcome: Ongoing     Problem: Nutrition Deficit:  Goal: Ability to achieve adequate nutritional intake will improve  Description: Ability to achieve adequate nutritional intake will improve  Outcome: Ongoing     Problem: Sleep Pattern Disturbance:  Goal: Appears well-rested  Description: Appears well-rested  Outcome: Ongoing     Problem: Violence - Risk of, Self/Other-Directed:  Goal: Knowledge of developmental care interventions  Description: Absence of violence  Outcome: Ongoing

## 2020-05-19 NOTE — PROGRESS NOTES
NEUROLOGY INPATIENT PROGRESS NOTE    5/19/2020         Subjective: Briefly, this is a  62 y.o. female admitted on 5/14/2020 with new onset headache and generalized weakness has been treated for septic shock, and is being worked up for severe toxic metabolic encephalopathy. Today, she looks relatively better than yesterday. Able to follow up simple commands but she still looks very sick. No current facility-administered medications on file prior to encounter. No current outpatient medications on file prior to encounter. Allergies: Carla Gonzalez is allergic to sulfa antibiotics.     Past Medical History:   Diagnosis Date    COPD (chronic obstructive pulmonary disease) ([facility] [address]     ETOH abuse        Past Surgical History:   Procedure Laterality Date    CHOLECYSTECTOMY      THYROIDECTOMY      TONSILLECTOMY         Medications:     apixaban  10 mg Oral BID    amitriptyline  25 mg Oral Nightly    sodium chloride flush  10 mL Intravenous 2 times per day    folic acid, thiamine, multi-vitamin with vitamin K infusion   Intravenous Daily    sodium chloride flush  10 mL Intravenous 2 times per day    folic acid  1 mg Oral Daily    thiamine (VITAMIN B1) IVPB  250 mg Intravenous Q24H    vitamin B-12  50 mcg Oral Daily    sodium chloride flush  10 mL Intravenous BID    sodium chloride flush  10 mL Intravenous 2 times per day    sodium chloride flush  10 mL Intravenous BID     PRN Meds include: sodium chloride flush, LORazepam **OR** LORazepam **OR** LORazepam **OR** LORazepam **OR** LORazepam **OR** LORazepam **OR** LORazepam **OR** LORazepam, sodium chloride flush, sodium chloride flush, sodium chloride flush, acetaminophen **OR** acetaminophen, polyethylene glycol, promethazine **OR** ondansetron, potassium chloride **OR** potassium alternative oral replacement **OR** potassium chloride, magnesium sulfate, midodrine    Objective:   /72   Pulse 73   Temp 98.8 °F (37.1 °C) (Axillary)   Resp 19 Ht 5' 7\" (1.702 m)   Wt 111 lb 5.3 oz (50.5 kg)   SpO2 94%   BMI 17.44 kg/m²     Blood pressure range: Systolic (14MWX), CBE:268 , Min:91 , FXR:428   ; Diastolic (41NLY), LARA:90, Min:72, Max:88      ROS:  Review of Systems   Unable to perform ROS: Mental status change         NEUROLOGIC EXAMINATION  Physical Exam  Constitutional:       General: She is not in acute distress. Appearance: She is ill-appearing and toxic-appearing. She is not diaphoretic. Comments: She is more alert today as opposed to yesterday. She is still not following commands   HENT:      Head: Normocephalic and atraumatic. Eyes:      Pupils: Pupils are equal, round, and reactive to light. Neck:      Musculoskeletal: Decreased range of motion. No neck rigidity. Cardiovascular:      Rate and Rhythm: Normal rate and regular rhythm. Pulmonary:      Effort: Pulmonary effort is normal.   Abdominal:      General: Abdomen is flat. Skin:     General: Skin is warm. Findings: Rash (Generalized skin rash, moribilliform) present. Neurological:      Mental Status: She is lethargic. GCS: GCS eye subscore is 3. GCS verbal subscore is 1. GCS motor subscore is 5. Cranial Nerves: No cranial nerve deficit or facial asymmetry. Deep Tendon Reflexes: Babinski sign absent on the right side. Babinski sign absent on the left side. Comments:                Psychiatric:         Speech: Speech is slurred. Neurologic Exam     Mental Status   Speech: slurred   Level of consciousness: arousable by tactile stimuli  Unable to name object. Unable to read. Unable to repeat. Unable to write. Cranial Nerves     CN III, IV, VI   Pupils are equal, round, and reactive to light. Nystagmus: none     CN VII   Facial expression full, symmetric. Motor Exam Withdraws to pain on all extremities.    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 Estela Lloyd MD  PGY-3 Neurology Resident

## 2020-05-20 ENCOUNTER — ANESTHESIA EVENT (OUTPATIENT)
Dept: ICU | Age: 60
DRG: 720 | End: 2020-05-20
Payer: MEDICARE

## 2020-05-20 ENCOUNTER — APPOINTMENT (OUTPATIENT)
Dept: GENERAL RADIOLOGY | Age: 60
DRG: 720 | End: 2020-05-20
Attending: INTERNAL MEDICINE
Payer: MEDICARE

## 2020-05-20 ENCOUNTER — APPOINTMENT (OUTPATIENT)
Dept: CT IMAGING | Age: 60
DRG: 720 | End: 2020-05-20
Attending: INTERNAL MEDICINE
Payer: MEDICARE

## 2020-05-20 ENCOUNTER — ANESTHESIA (OUTPATIENT)
Dept: ICU | Age: 60
DRG: 720 | End: 2020-05-20
Payer: MEDICARE

## 2020-05-20 LAB
ABSOLUTE EOS #: <0.03 K/UL (ref 0–0.44)
ABSOLUTE IMMATURE GRANULOCYTE: 0.1 K/UL (ref 0–0.3)
ABSOLUTE LYMPH #: 2.44 K/UL (ref 1.1–3.7)
ABSOLUTE MONO #: 1.06 K/UL (ref 0.1–1.2)
ALLEN TEST: ABNORMAL
ALLEN TEST: ABNORMAL
ALLEN TEST: POSITIVE
AMMONIA: 27 UMOL/L (ref 11–51)
ANION GAP SERPL CALCULATED.3IONS-SCNC: 10 MMOL/L (ref 9–17)
ANION GAP SERPL CALCULATED.3IONS-SCNC: 11 MMOL/L (ref 9–17)
ANION GAP SERPL CALCULATED.3IONS-SCNC: 15 MMOL/L (ref 9–17)
ANION GAP: 12 MMOL/L (ref 7–16)
BASOPHILS # BLD: 0 % (ref 0–2)
BASOPHILS ABSOLUTE: 0.03 K/UL (ref 0–0.2)
BNP INTERPRETATION: ABNORMAL
BUN BLDV-MCNC: 26 MG/DL (ref 6–20)
BUN BLDV-MCNC: 26 MG/DL (ref 6–20)
BUN/CREAT BLD: ABNORMAL (ref 9–20)
BUN/CREAT BLD: ABNORMAL (ref 9–20)
C-REACTIVE PROTEIN: 10.5 MG/L (ref 0–5)
CALCIUM SERPL-MCNC: 8.2 MG/DL (ref 8.6–10.4)
CALCIUM SERPL-MCNC: 8.4 MG/DL (ref 8.6–10.4)
CHLORIDE BLD-SCNC: 108 MMOL/L (ref 98–107)
CHLORIDE BLD-SCNC: 112 MMOL/L (ref 98–107)
CHLORIDE BLD-SCNC: 112 MMOL/L (ref 98–107)
CO2: 25 MMOL/L (ref 20–31)
CO2: 26 MMOL/L (ref 20–31)
CO2: 28 MMOL/L (ref 20–31)
CREAT SERPL-MCNC: 0.71 MG/DL (ref 0.5–0.9)
CREAT SERPL-MCNC: 0.76 MG/DL (ref 0.5–0.9)
DIFFERENTIAL TYPE: ABNORMAL
EOSINOPHILS RELATIVE PERCENT: 0 % (ref 1–4)
FIO2: 100
FIO2: 100
FIO2: 60
GFR AFRICAN AMERICAN: >60 ML/MIN
GFR AFRICAN AMERICAN: >60 ML/MIN
GFR NON-AFRICAN AMERICAN: >60 ML/MIN
GFR SERPL CREATININE-BSD FRML MDRD: >60 ML/MIN
GFR SERPL CREATININE-BSD FRML MDRD: ABNORMAL ML/MIN/{1.73_M2}
GFR SERPL CREATININE-BSD FRML MDRD: NORMAL ML/MIN/{1.73_M2}
GLUCOSE BLD-MCNC: 127 MG/DL (ref 70–99)
GLUCOSE BLD-MCNC: 129 MG/DL (ref 70–99)
GLUCOSE BLD-MCNC: 145 MG/DL (ref 74–100)
GLUCOSE BLD-MCNC: 86 MG/DL (ref 65–105)
HCT VFR BLD CALC: 45.3 % (ref 36.3–47.1)
HEMOGLOBIN: 14.2 G/DL (ref 11.9–15.1)
IMMATURE GRANULOCYTES: 1 %
INTERVENTION: NORMAL
LYMPHOCYTES # BLD: 20 % (ref 24–43)
MCH RBC QN AUTO: 30 PG (ref 25.2–33.5)
MCHC RBC AUTO-ENTMCNC: 31.3 G/DL (ref 28.4–34.8)
MCV RBC AUTO: 95.6 FL (ref 82.6–102.9)
MODE: ABNORMAL
MONOCYTES # BLD: 9 % (ref 3–12)
NEGATIVE BASE EXCESS, ART: ABNORMAL (ref 0–2)
NRBC AUTOMATED: 0 PER 100 WBC
O2 DEVICE/FLOW/%: ABNORMAL
PARTIAL THROMBOPLASTIN TIME: 22.3 SEC (ref 20.5–30.5)
PARTIAL THROMBOPLASTIN TIME: 23.1 SEC (ref 20.5–30.5)
PARTIAL THROMBOPLASTIN TIME: 33.4 SEC (ref 20.5–30.5)
PATIENT TEMP: 39.4
PATIENT TEMP: ABNORMAL
PATIENT TEMP: ABNORMAL
PDW BLD-RTO: 13.9 % (ref 11.8–14.4)
PLATELET # BLD: 200 K/UL (ref 138–453)
PLATELET ESTIMATE: ABNORMAL
PMV BLD AUTO: 10.6 FL (ref 8.1–13.5)
POC CHLORIDE: 111 MMOL/L (ref 98–107)
POC CREATININE: 0.88 MG/DL (ref 0.51–1.19)
POC HCO3: 27.9 MMOL/L (ref 21–28)
POC HCO3: 29.8 MMOL/L (ref 21–28)
POC HCO3: 30.3 MMOL/L (ref 21–28)
POC HEMATOCRIT: 43 % (ref 36–46)
POC HEMOGLOBIN: 14.5 G/DL (ref 12–16)
POC IONIZED CALCIUM: 1.23 MMOL/L (ref 1.15–1.33)
POC LACTIC ACID: 0.61 MMOL/L (ref 0.56–1.39)
POC O2 SATURATION: 86 % (ref 94–98)
POC O2 SATURATION: 86 % (ref 94–98)
POC O2 SATURATION: 99 % (ref 94–98)
POC PCO2 TEMP: 46 MM HG
POC PCO2 TEMP: ABNORMAL MM HG
POC PCO2 TEMP: ABNORMAL MM HG
POC PCO2: 32.5 MM HG (ref 35–48)
POC PCO2: 41.8 MM HG (ref 35–48)
POC PCO2: 46.8 MM HG (ref 35–48)
POC PH TEMP: 7.43
POC PH TEMP: ABNORMAL
POC PH TEMP: ABNORMAL
POC PH: 7.42 (ref 7.35–7.45)
POC PH: 7.46 (ref 7.35–7.45)
POC PH: 7.54 (ref 7.35–7.45)
POC PO2 TEMP: 59 MM HG
POC PO2 TEMP: ABNORMAL MM HG
POC PO2 TEMP: ABNORMAL MM HG
POC PO2: 134 MM HG (ref 83–108)
POC PO2: 43.9 MM HG (ref 83–108)
POC PO2: 49.4 MM HG (ref 83–108)
POC POTASSIUM: 2.9 MMOL/L (ref 3.5–4.5)
POC SODIUM: 153 MMOL/L (ref 138–146)
POSITIVE BASE EXCESS, ART: 5 (ref 0–3)
POSITIVE BASE EXCESS, ART: 5 (ref 0–3)
POSITIVE BASE EXCESS, ART: 6 (ref 0–3)
POTASSIUM SERPL-SCNC: 3.5 MMOL/L (ref 3.7–5.3)
POTASSIUM SERPL-SCNC: 3.6 MMOL/L (ref 3.7–5.3)
POTASSIUM SERPL-SCNC: 3.7 MMOL/L (ref 3.7–5.3)
PRO-BNP: 3539 PG/ML
RBC # BLD: 4.74 M/UL (ref 3.95–5.11)
RBC # BLD: ABNORMAL 10*6/UL
SAMPLE SITE: ABNORMAL
SARS-COV-2, IGG: NEGATIVE
SEG NEUTROPHILS: 70 % (ref 36–65)
SEGMENTED NEUTROPHILS ABSOLUTE COUNT: 8.67 K/UL (ref 1.5–8.1)
SODIUM BLD-SCNC: 146 MMOL/L (ref 135–144)
SODIUM BLD-SCNC: 149 MMOL/L (ref 135–144)
SODIUM BLD-SCNC: 152 MMOL/L (ref 135–144)
TCO2 (CALC), ART: 29 MMOL/L (ref 22–29)
TCO2 (CALC), ART: 31 MMOL/L (ref 22–29)
TCO2 (CALC), ART: 32 MMOL/L (ref 22–29)
WBC # BLD: 12.3 K/UL (ref 3.5–11.3)
WBC # BLD: ABNORMAL 10*3/UL

## 2020-05-20 PROCEDURE — APPNB180 APP NON BILLABLE TIME > 60 MINS: Performed by: NURSE PRACTITIONER

## 2020-05-20 PROCEDURE — 94640 AIRWAY INHALATION TREATMENT: CPT

## 2020-05-20 PROCEDURE — 82803 BLOOD GASES ANY COMBINATION: CPT

## 2020-05-20 PROCEDURE — 94660 CPAP INITIATION&MGMT: CPT

## 2020-05-20 PROCEDURE — 36620 INSERTION CATHETER ARTERY: CPT

## 2020-05-20 PROCEDURE — 6360000002 HC RX W HCPCS: Performed by: STUDENT IN AN ORGANIZED HEALTH CARE EDUCATION/TRAINING PROGRAM

## 2020-05-20 PROCEDURE — 82435 ASSAY OF BLOOD CHLORIDE: CPT

## 2020-05-20 PROCEDURE — 84132 ASSAY OF SERUM POTASSIUM: CPT

## 2020-05-20 PROCEDURE — 31500 INSERT EMERGENCY AIRWAY: CPT | Performed by: NURSE ANESTHETIST, CERTIFIED REGISTERED

## 2020-05-20 PROCEDURE — 82140 ASSAY OF AMMONIA: CPT

## 2020-05-20 PROCEDURE — 0BH18EZ INSERTION OF ENDOTRACHEAL AIRWAY INTO TRACHEA, VIA NATURAL OR ARTIFICIAL OPENING ENDOSCOPIC: ICD-10-PCS | Performed by: ANESTHESIOLOGY

## 2020-05-20 PROCEDURE — 37799 UNLISTED PX VASCULAR SURGERY: CPT

## 2020-05-20 PROCEDURE — 6370000000 HC RX 637 (ALT 250 FOR IP): Performed by: STUDENT IN AN ORGANIZED HEALTH CARE EDUCATION/TRAINING PROGRAM

## 2020-05-20 PROCEDURE — 80051 ELECTROLYTE PANEL: CPT

## 2020-05-20 PROCEDURE — 85014 HEMATOCRIT: CPT

## 2020-05-20 PROCEDURE — 6370000000 HC RX 637 (ALT 250 FOR IP): Performed by: INTERNAL MEDICINE

## 2020-05-20 PROCEDURE — 87040 BLOOD CULTURE FOR BACTERIA: CPT

## 2020-05-20 PROCEDURE — 99233 SBSQ HOSP IP/OBS HIGH 50: CPT | Performed by: PSYCHIATRY & NEUROLOGY

## 2020-05-20 PROCEDURE — 2060000000 HC ICU INTERMEDIATE R&B

## 2020-05-20 PROCEDURE — 87205 SMEAR GRAM STAIN: CPT

## 2020-05-20 PROCEDURE — 5A1955Z RESPIRATORY VENTILATION, GREATER THAN 96 CONSECUTIVE HOURS: ICD-10-PCS | Performed by: ANESTHESIOLOGY

## 2020-05-20 PROCEDURE — 6360000002 HC RX W HCPCS: Performed by: PSYCHIATRY & NEUROLOGY

## 2020-05-20 PROCEDURE — 97110 THERAPEUTIC EXERCISES: CPT

## 2020-05-20 PROCEDURE — 82330 ASSAY OF CALCIUM: CPT

## 2020-05-20 PROCEDURE — 85025 COMPLETE CBC W/AUTO DIFF WBC: CPT

## 2020-05-20 PROCEDURE — 36415 COLL VENOUS BLD VENIPUNCTURE: CPT

## 2020-05-20 PROCEDURE — 86140 C-REACTIVE PROTEIN: CPT

## 2020-05-20 PROCEDURE — 94002 VENT MGMT INPAT INIT DAY: CPT

## 2020-05-20 PROCEDURE — 36600 WITHDRAWAL OF ARTERIAL BLOOD: CPT

## 2020-05-20 PROCEDURE — 2580000003 HC RX 258: Performed by: STUDENT IN AN ORGANIZED HEALTH CARE EDUCATION/TRAINING PROGRAM

## 2020-05-20 PROCEDURE — 2580000003 HC RX 258: Performed by: PSYCHIATRY & NEUROLOGY

## 2020-05-20 PROCEDURE — 85730 THROMBOPLASTIN TIME PARTIAL: CPT

## 2020-05-20 PROCEDURE — 2580000003 HC RX 258: Performed by: INTERNAL MEDICINE

## 2020-05-20 PROCEDURE — 6360000002 HC RX W HCPCS: Performed by: INTERNAL MEDICINE

## 2020-05-20 PROCEDURE — 87070 CULTURE OTHR SPECIMN AEROBIC: CPT

## 2020-05-20 PROCEDURE — 99291 CRITICAL CARE FIRST HOUR: CPT | Performed by: INTERNAL MEDICINE

## 2020-05-20 PROCEDURE — 82565 ASSAY OF CREATININE: CPT

## 2020-05-20 PROCEDURE — 94770 HC ETCO2 MONITOR DAILY: CPT

## 2020-05-20 PROCEDURE — 80048 BASIC METABOLIC PNL TOTAL CA: CPT

## 2020-05-20 PROCEDURE — 71045 X-RAY EXAM CHEST 1 VIEW: CPT

## 2020-05-20 PROCEDURE — 84295 ASSAY OF SERUM SODIUM: CPT

## 2020-05-20 PROCEDURE — 2700000000 HC OXYGEN THERAPY PER DAY

## 2020-05-20 PROCEDURE — 82947 ASSAY GLUCOSE BLOOD QUANT: CPT

## 2020-05-20 PROCEDURE — 99233 SBSQ HOSP IP/OBS HIGH 50: CPT | Performed by: INTERNAL MEDICINE

## 2020-05-20 PROCEDURE — 94761 N-INVAS EAR/PLS OXIMETRY MLT: CPT

## 2020-05-20 PROCEDURE — 70450 CT HEAD/BRAIN W/O DYE: CPT

## 2020-05-20 PROCEDURE — 2000000000 HC ICU R&B

## 2020-05-20 PROCEDURE — 83880 ASSAY OF NATRIURETIC PEPTIDE: CPT

## 2020-05-20 PROCEDURE — 6370000000 HC RX 637 (ALT 250 FOR IP): Performed by: PSYCHIATRY & NEUROLOGY

## 2020-05-20 PROCEDURE — 83605 ASSAY OF LACTIC ACID: CPT

## 2020-05-20 PROCEDURE — 87086 URINE CULTURE/COLONY COUNT: CPT

## 2020-05-20 PROCEDURE — 6360000002 HC RX W HCPCS

## 2020-05-20 RX ORDER — POTASSIUM CHLORIDE 7.45 MG/ML
10 INJECTION INTRAVENOUS PRN
Status: DISCONTINUED | OUTPATIENT
Start: 2020-05-20 | End: 2020-05-21 | Stop reason: SDUPTHER

## 2020-05-20 RX ORDER — FENTANYL CITRATE 50 UG/ML
25 INJECTION, SOLUTION INTRAMUSCULAR; INTRAVENOUS
Status: DISCONTINUED | OUTPATIENT
Start: 2020-05-20 | End: 2020-06-06

## 2020-05-20 RX ORDER — LORAZEPAM 2 MG/ML
2 INJECTION INTRAMUSCULAR EVERY 4 HOURS PRN
Status: DISCONTINUED | OUTPATIENT
Start: 2020-05-20 | End: 2020-05-26

## 2020-05-20 RX ORDER — LEVOFLOXACIN 5 MG/ML
750 INJECTION, SOLUTION INTRAVENOUS EVERY 24 HOURS
Status: DISCONTINUED | OUTPATIENT
Start: 2020-05-20 | End: 2020-05-22

## 2020-05-20 RX ORDER — SODIUM CHLORIDE, SODIUM LACTATE, POTASSIUM CHLORIDE, AND CALCIUM CHLORIDE .6; .31; .03; .02 G/100ML; G/100ML; G/100ML; G/100ML
1000 INJECTION, SOLUTION INTRAVENOUS ONCE
Status: COMPLETED | OUTPATIENT
Start: 2020-05-20 | End: 2020-05-20

## 2020-05-20 RX ORDER — LANSOPRAZOLE
15 KIT
Status: DISCONTINUED | OUTPATIENT
Start: 2020-05-20 | End: 2020-05-28

## 2020-05-20 RX ORDER — VANCOMYCIN HYDROCHLORIDE 1 G/200ML
1000 INJECTION, SOLUTION INTRAVENOUS EVERY 12 HOURS
Status: DISCONTINUED | OUTPATIENT
Start: 2020-05-20 | End: 2020-05-22 | Stop reason: DRUGHIGH

## 2020-05-20 RX ORDER — PROPOFOL 10 MG/ML
10 INJECTION, EMULSION INTRAVENOUS
Status: DISCONTINUED | OUTPATIENT
Start: 2020-05-20 | End: 2020-05-22

## 2020-05-20 RX ORDER — FENTANYL CITRATE 50 UG/ML
50 INJECTION, SOLUTION INTRAMUSCULAR; INTRAVENOUS
Status: DISCONTINUED | OUTPATIENT
Start: 2020-05-20 | End: 2020-06-06

## 2020-05-20 RX ORDER — POTASSIUM CHLORIDE 20 MEQ/1
40 TABLET, EXTENDED RELEASE ORAL PRN
Status: DISCONTINUED | OUTPATIENT
Start: 2020-05-20 | End: 2020-05-21 | Stop reason: SDUPTHER

## 2020-05-20 RX ORDER — PROPOFOL 10 MG/ML
INJECTION, EMULSION INTRAVENOUS
Status: COMPLETED
Start: 2020-05-20 | End: 2020-05-20

## 2020-05-20 RX ADMIN — IPRATROPIUM BROMIDE AND ALBUTEROL SULFATE 1 AMPULE: .5; 3 SOLUTION RESPIRATORY (INHALATION) at 15:43

## 2020-05-20 RX ADMIN — PROPOFOL INJECTABLE EMULSION 10 MCG/KG/MIN: 10 INJECTION, EMULSION INTRAVENOUS at 05:34

## 2020-05-20 RX ADMIN — VANCOMYCIN HYDROCHLORIDE 1000 MG: 1 INJECTION, SOLUTION INTRAVENOUS at 18:08

## 2020-05-20 RX ADMIN — HEPARIN SODIUM 1520 UNITS: 1000 INJECTION INTRAVENOUS; SUBCUTANEOUS at 04:32

## 2020-05-20 RX ADMIN — SODIUM CHLORIDE, PRESERVATIVE FREE 10 ML: 5 INJECTION INTRAVENOUS at 08:20

## 2020-05-20 RX ADMIN — IPRATROPIUM BROMIDE AND ALBUTEROL SULFATE 1 AMPULE: .5; 3 SOLUTION RESPIRATORY (INHALATION) at 07:43

## 2020-05-20 RX ADMIN — ACETAMINOPHEN 650 MG: 325 TABLET ORAL at 14:58

## 2020-05-20 RX ADMIN — POTASSIUM CHLORIDE 10 MEQ: 7.46 INJECTION, SOLUTION INTRAVENOUS at 07:08

## 2020-05-20 RX ADMIN — Medication 15 MG: at 09:43

## 2020-05-20 RX ADMIN — LEVOFLOXACIN 750 MG: 5 INJECTION, SOLUTION INTRAVENOUS at 19:55

## 2020-05-20 RX ADMIN — LORAZEPAM 2 MG: 2 INJECTION INTRAMUSCULAR; INTRAVENOUS at 19:55

## 2020-05-20 RX ADMIN — SODIUM CHLORIDE, PRESERVATIVE FREE 10 ML: 5 INJECTION INTRAVENOUS at 20:04

## 2020-05-20 RX ADMIN — VITAM B12 50 MCG: 100 TAB at 09:43

## 2020-05-20 RX ADMIN — HEPARIN SODIUM 3030 UNITS: 1000 INJECTION, SOLUTION INTRAVENOUS; SUBCUTANEOUS at 17:19

## 2020-05-20 RX ADMIN — ACETAMINOPHEN 650 MG: 325 TABLET ORAL at 06:05

## 2020-05-20 RX ADMIN — POTASSIUM BICARBONATE 40 MEQ: 782 TABLET, EFFERVESCENT ORAL at 07:08

## 2020-05-20 RX ADMIN — FENTANYL CITRATE 25 MCG: 50 INJECTION, SOLUTION INTRAMUSCULAR; INTRAVENOUS at 17:58

## 2020-05-20 RX ADMIN — IPRATROPIUM BROMIDE AND ALBUTEROL SULFATE 1 AMPULE: .5; 3 SOLUTION RESPIRATORY (INHALATION) at 12:38

## 2020-05-20 RX ADMIN — IPRATROPIUM BROMIDE AND ALBUTEROL SULFATE 1 AMPULE: .5; 3 SOLUTION RESPIRATORY (INHALATION) at 20:16

## 2020-05-20 RX ADMIN — SODIUM CHLORIDE, POTASSIUM CHLORIDE, SODIUM LACTATE AND CALCIUM CHLORIDE 1000 ML: 600; 310; 30; 20 INJECTION, SOLUTION INTRAVENOUS at 16:00

## 2020-05-20 RX ADMIN — PROPOFOL INJECTABLE EMULSION 15 MCG/KG/MIN: 10 INJECTION, EMULSION INTRAVENOUS at 17:23

## 2020-05-20 RX ADMIN — THIAMINE HYDROCHLORIDE 250 MG: 100 INJECTION, SOLUTION INTRAMUSCULAR; INTRAVENOUS at 16:59

## 2020-05-20 RX ADMIN — POTASSIUM CHLORIDE 10 MEQ: 7.46 INJECTION, SOLUTION INTRAVENOUS at 07:12

## 2020-05-20 RX ADMIN — ACETAMINOPHEN 650 MG: 325 TABLET ORAL at 22:48

## 2020-05-20 RX ADMIN — PROPOFOL INJECTABLE EMULSION 15 MCG/KG/MIN: 10 INJECTION, EMULSION INTRAVENOUS at 22:48

## 2020-05-20 RX ADMIN — FOLIC ACID 1 MG: 1 TABLET ORAL at 09:43

## 2020-05-20 ASSESSMENT — PULMONARY FUNCTION TESTS
PIF_VALUE: 25
PIF_VALUE: 22
PIF_VALUE: 27
PIF_VALUE: 24
PIF_VALUE: 23
PIF_VALUE: 24
PIF_VALUE: 21
PIF_VALUE: 30
PIF_VALUE: 23
PIF_VALUE: 21
PIF_VALUE: 23
PIF_VALUE: 21
PIF_VALUE: 24
PIF_VALUE: 23
PIF_VALUE: 22
PIF_VALUE: 22
PIF_VALUE: 24
PIF_VALUE: 23
PIF_VALUE: 23
PIF_VALUE: 24
PIF_VALUE: 25
PIF_VALUE: 22
PIF_VALUE: 29
PIF_VALUE: 17
PIF_VALUE: 22
PIF_VALUE: 25
PIF_VALUE: 27
PIF_VALUE: 24
PIF_VALUE: 24
PIF_VALUE: 26
PIF_VALUE: 22
PIF_VALUE: 30
PIF_VALUE: 24
PIF_VALUE: 22

## 2020-05-20 ASSESSMENT — PAIN SCALES - GENERAL: PAINLEVEL_OUTOF10: 6

## 2020-05-20 NOTE — PROGRESS NOTES
Occupational Therapy Not Seen Note    DATE: 2020  Name: Gisel Devlin  : 1960  MRN: [id number]    Patient not available for Occupational Therapy due to: Other: pt moved to ICU early this am and intubated.  pt not appropriate for therapy at this time    Next Scheduled Treatment: check back 2020    Electronically signed by MADDIE Herrera on 2020 at 10:27 AM

## 2020-05-20 NOTE — H&P
Critical Care - History and Physical Examination    Patient's name:  Diaz Vaughan  Medical Record Number: [id number]  Patient's account/billing number: [de-identified]  Patient's YOB: 1960  Age: 62 y.o. Date of Admission: 5/14/2020 10:46 AM  Date of History and Physical Examination: 5/20/2020      Primary Care Physician: Jan Gonzales  Attending Physician: Dr. January Faria    Code Status: Full Code    Chief complaint: Respiratory distress      HISTORY OF PRESENT ILLNESS:      History was obtained from chart review and unobtainable from patient due to intubated and sedated. Diaz Vaughan is a 62 y.o. with PMH of alcohol abuse       She was a transfer back to ICU from the floors. She initially presented to Covid IC with severe headache, generalized weakness, fever, photophobia and macular rash (extremities and trunk). She was also hypotensive requiring Levophed for a short duration. When her Covid test was negative, she was transferred to MICU. She had not required pressor support since coming to MICU that first time. CT head, MRI, MRA brain were done, which showed a tiny acute splenium DWI but no other acute findings. LP was negative for infectious, viral, autoimmune process. She was given a short course of steroids. She was initially on nasal canula but was taken off oxygen and transferred out of ICU. At the time, she was oriented to herself, place and able to communicate and follow commands. Repeat CXR on 5/17 showed new left lower lobe atelectasis, which was confirmed on CTA of the chest. She was on 4-6L oxygen at this time. Overnight on 5/19/2020, she was obtunded. Blood gases showed pH 7.461, CO2 41.8, O2 49.4, HCO3 29.8. the decision was made to intubate her. After intubation, her vent settings were PRVC, 16/10/500/100%. Her O2 saturations were 92%. She was hypernatremic at 152, potassium 3.6.  replaced with 40 mEq oral and 20 mEq IV.     ________________        PAST MEDICAL Or  ondansetron, 4 mg, Q6H PRN  potassium chloride, 40 mEq, PRN    Or  potassium alternative oral replacement, 40 mEq, PRN    Or  potassium chloride, 10 mEq, PRN  magnesium sulfate, 2 g, PRN  midodrine, 5 mg, TID PRN          ABGs:   Lab Results   Component Value Date    JWQ4MKA 31 05/20/2020    FIO2 100.0 05/20/2020       DATA:  Complete Blood Count:   Recent Labs     05/19/20  0559 05/19/20 1956 05/20/20  0344   WBC 13.5* 10.1 12.3*   RBC 4.63 4.30 4.74   HGB 13.8 13.2 14.2   HCT 44.1 40.8 45.3   MCV 95.2 94.9 95.6   MCH 29.8 30.7 30.0   MCHC 31.3 32.4 31.3   RDW 14.1 14.0 13.9    191 200   MPV 11.0 10.5 10.6        Last 3 Blood Glucose:   Recent Labs     05/18/20  0531 05/18/20  1233 05/19/20  0559 05/20/20  0344   GLUCOSE 187* 173* 137* 129*        PT/INR:    Lab Results   Component Value Date    PROTIME 10.7 05/14/2020    INR 1.0 05/14/2020     PTT:    Lab Results   Component Value Date    APTT 33.4 05/20/2020       Comprehensive Metabolic Profile:   Recent Labs     05/18/20  1233  05/19/20  0559 05/19/20 1956 05/20/20  0344 05/20/20  0625   *   < > 151* 148* 152*  --    K 3.8  --  3.8  --  3.6*  --    *  --  109*  --  112*  --    CO2 29  --  28  --  25  --    BUN 25*  --  27*  --  26*  --    CREATININE 0.50  --  0.51  --  0.76 0.88   GLUCOSE 173*  --  137*  --  129*  --    CALCIUM 8.3*  --  8.8  --  8.4*  --     < > = values in this interval not displayed.       Magnesium:   Lab Results   Component Value Date    MG 2.1 05/15/2020    MG 2.1 05/14/2020    MG 1.9 05/14/2020     Phosphorus:   Lab Results   Component Value Date    PHOS 2.4 05/14/2020     Ionized Calcium:   Lab Results   Component Value Date    CAION 1.15 05/14/2020        Urinalysis:   Lab Results   Component Value Date    NITRU NEGATIVE 05/14/2020    COLORU YELLOW 05/14/2020    PHUR 5.0 05/14/2020    WBCUA None 05/14/2020    RBCUA 2 TO 5 05/14/2020    MUCUS NOT REPORTED 05/14/2020    TRICHOMONAS NOT REPORTED 05/14/2020    YEAST NOT REPORTED 05/14/2020    BACTERIA FEW 05/14/2020    SPECGRAV 1.015 05/14/2020    LEUKOCYTESUR NEGATIVE 05/14/2020    UROBILINOGEN Normal 05/14/2020    BILIRUBINUR NEGATIVE 05/14/2020    GLUCOSEU NEGATIVE 05/14/2020    KETUA NEGATIVE 05/14/2020    AMORPHOUS 3+ 05/14/2020       HgBA1c:  No results found for: LABA1C  TSH:    Lab Results   Component Value Date    TSH 0.70 05/14/2020       Lactic Acid:   Lab Results   Component Value Date    LACTA NOT REPORTED 05/14/2020      Troponin: No results for input(s): TROPONINI in the last 72 hours. ASSESSMENT:     Principal Problem:    Fever  Active Problems:    Acute intractable headache    Chronic abdominal pain    Hyponatremia    Mucopurulent chronic bronchitis (HCC)    DIXON (acute kidney injury) ([facility] [address]    Smoker    Suspected COVID-19 virus infection    Chronic obstructive pulmonary disease (HCC)    Pericardial effusion    Thrombocytopenia (HCC)    Hypotension due to hypovolemia    Skin rash    Metabolic encephalopathy    DVT (deep venous thrombosis) ([facility])    Hypoxia  Resolved Problems:    Hx of cholecystectomy    Encephalopathy      PLAN:     ICU PROPHYLAXIS:  Stress ulcer:  [x] PPI Agent  [] O0Ccelb [] Sucralfate  [] Other:  VTE:   [] Enoxaparin  [] Unfract. Heparin Subcut  [] EPC Cuffs    NUTRITION:  [] NPO  [] Tube Feeding (Specify: ) [] TPN  [] PO    CONSULTATION NEEDED:  [] No   [] Yes     HOME MEDICATIONS RECONCILED: [] No  [] Yes    FAMILY UPDATED:    [] No   [] Yes     ADDITIONAL PLAN:    1. IVF 1/4 NS @ 75/h. Monitor urine output and O2 saturations. Monitor sodium every 6 hours to prevent overcorrection   2. Sedation with Propofol. 3. Continue on Heparin infusion for lower extremity DVT. 4. Will increase Levaquin to 750, PNA dosage. 5. Duoneb as needed  6. SBT/SAT as tolerated. 7. Thiamine, folate MVI  8. Heparin for DVT ppx  9.  Lansoprazole for Gi ppx       Izzy Rey MD  PGY-2, Internal medicine resident  [facility], [facility], [place]  5/20/2020 6:45 AM      The critical care team assigned to the patient will be following up the patient in the intensive care unit. I have discussed the current plan with the critical care attending. The above mentioned assessment and plan will be reviewed again in detail by the critical care attending at bedside, and can be further changed or modified accordingly by the attending physician.

## 2020-05-20 NOTE — PROGRESS NOTES
Progression      Electronically signed by Charley Sandy RD, LD on 5/20/20 at 10:34 AM EDT    Contact Number: [phone]

## 2020-05-20 NOTE — PROGRESS NOTES
response. Thank you for the consult. Will continue to follow. [id number] [address], [facility]  5/20/2020  5:36 PM

## 2020-05-20 NOTE — PROGRESS NOTES
NEUROLOGY INPATIENT PROGRESS NOTE    5/20/2020         Subjective: Briefly, this is a  62 y.o. female admitted on 5/14/2020 with new onset headache and generalized weakness has been treated for septic shock, transferred to the floor. Had worsening of mental status since 5/16 and has been worked for possible encephalitis. CSF was negative for viral and bacterial meningitis. Was started on steroids initially for 2 days. EEG showed diffused encephalopathic picture. Overnight, she rapid response was called and the patient was found to be in respiratory distress with hypoxia PO2 49, PCO2 45. Was taken to MICU and intubated. No current facility-administered medications on file prior to encounter. No current outpatient medications on file prior to encounter. Allergies: Xavier Bustos is allergic to sulfa antibiotics.     Past Medical History:   Diagnosis Date    [facility]ronic obstructive pulmonary disease) ([facility] [address]     ETOH abuse        Past Surgical History:   Procedure Laterality Date    CHOLECYSTECTOMY      THYROIDECTOMY      TONSILLECTOMY         Medications:     lansoprazole  15 mg Oral QAM AC    ipratropium-albuterol  1 ampule Inhalation 4x daily    levofloxacin  500 mg Intravenous Q24H    [Held by provider] amitriptyline  25 mg Oral Nightly    sodium chloride flush  10 mL Intravenous 2 times per day    folic acid  1 mg Oral Daily    thiamine (VITAMIN B1) IVPB  250 mg Intravenous Q24H    vitamin B-12  50 mcg Oral Daily     PRN Meds include: fentanNYL **OR** fentanNYL, potassium chloride **OR** potassium alternative oral replacement **OR** potassium chloride, heparin (porcine), heparin (porcine), sodium chloride flush, acetaminophen **OR** acetaminophen, polyethylene glycol, promethazine **OR** ondansetron, potassium chloride **OR** potassium alternative oral replacement **OR** potassium chloride, magnesium sulfate, midodrine    Objective:   BP 99/74   Pulse 97   Temp 104 °F (40 °C) (Core)

## 2020-05-20 NOTE — PROGRESS NOTES
Holly RN called report for transfer to ICU from stepdown. Pt experiencing declining respiratory status and unable to reach [address] Debra Alcazar. Called mobile phone x3, called ED and 4B, attempted to locate on unit, called x3 no answer. Charge RN notified. Anesthesia paged due to possible need for intubation. Reached Dr. Debra Alcazar at [phone], notified of arrival of new patient.

## 2020-05-20 NOTE — PROGRESS NOTES
[facility] -   Infectious diseases evaluation  admission date 5/14/2020    reason for consultation:   covid    Impression :   Current:  · Headache photophobia poor appetite x 10 days  · Lower back pain with weakness x 10 days  · COVID negative x2, CT chest negative  · Elevated CRP and ferritin  · Left lower lobe atelectasis versus pneumonia, progressive with effusion  · Generalized rash 5/14QTC 383  · Fever 5/17 and 5/19      Other:  ·   Discussion / summary of stay / plan of care   · Admitted 5/14 for LBP, and weakness, HA fever  · BC neg, echo neg 5/17. · 5/16 MRI LB old osteoarthritis and no cord compression  · 5/16 MRI brain neg - 5/15 LP 8 WBC and normal glucose  · 5/17 CT chest LLL consolidation, ? Atelectasis. · Treated ws ceftriaxone 5/14 - 5/15 , fever seems controlled  · Fever started 5/17 then 5/19 higher grade  · 5/19 NC 4-6 l oxygen 5/19, LLL consolidation progressing, lethargic,  possible unable to clear her secretions. · 5/19 levaquin started   · 5/20  intubated - fever 104. - CXR worse LLL  · Generalized Body rash noticed at admission by primary, up to the abd on 5/20, less likely from AB.   · CRP improved since admission, 196 (5/13) and 10 ( 5/20)    Recommendations   Presumed LLL pneumonia and fever -   Intubated 5/19 late pm  · Day 2 Levaquin x7 days till 5/26  · Add vanco iv for possible MRSA and get pancx  · Echo has been done and is neg 5/17 for vegetation  · Legionella AG  Disc w Dr Shahana Hogan  [facility] resident    Infection Control Recommendations   · [place] Precautions  · Contact Isolation   Antimicrobial Stewardship Recommendations   · Simplification of therapy  · Targeted therapy    Coordination ofOutpatient Care:   · Estimated Length of IV antimicrobials:  · Patient will need Midline / picc Catheter Insertion:   · Patient will need SNF:  · Patient will need outpatient wound care:     History of Present Illness:   Initial history:  Radhikada Emma Nuria Herrmann is a 62y.o.-year-old female who [facility] with a 10 days history of a headache photophobia, a lower back pain with weakness. She denies being short of breath, she does have a smoker's cough that has improved recently. She has no aches other than the lower back pain, she has no sweats or fevers. She does not have a good appetite and has not eaten in 3 days, but does not think she has a taste bud problem  She was tested twice for COVID and came back negative  Unable to go to the bathroom at this point because of the weakness of her legs and the back pain. White count is normal but CRP is elevated as well as ferritin  CT scan of the chest is showing no infiltrates at all    The patient was transferred for possible COVID to [facility] and admitted to the [facility] ICU    I discussed with critical care, we can move him out of the [facility] ICU to a regular floor, get an MRI of the spine, consult neurosurgery, get a 2D echo,  The patient was started on Zosyn today. We will defer further antibiotic doses before we have the MRI finding    The patient denies any history of IV drug use    Interval changes  5/20/2020   101 F on 5/17  102 on 5/19  104 on  5/20    intubated over night due to resp distress  Today still has the rash from feet to the abd and color is rather brown as before. FIO2 70 % and peep 10  tachycardic 99  Secretions ++ and a little bloody, and will be sent for cx  [facility] sent as well  CRP down to 10    Left foot remains deviated in extension    cxr 5/19 shows LLL infiltrate worse versus effusion  Left infiltrate looks worse 5/20      lumbar puncture due to the headache and photophobia that showed 8 WBCs.   Lyme neg    WBC 13  All cx are neg    Summary of relevant labs:  Labs:  WBC 10.2  ALC 0.14    -43-23 10  Ferritin 9357  Micro:  Sp cx 5/20    BC x2  5/20  BC  x2  5/13    5/15 LP WBC 8- glucose 74- 48% PMN-meningitis PCR neg  COVID neg 5/14    Cardiology 0.88  --  0.71     Hepatic Function Panel:   No results for input(s): PROT, LABALBU, BILIDIR, IBILI, BILITOT, ALKPHOS, ALT, AST in the last 72 hours. No results for input(s): RPR in the last 72 hours. No results for input(s): HIV in the last 72 hours. No results for input(s): BC in the last 72 hours. Lab Results   Component Value Date    CREATININE 0.71 05/20/2020    GLUCOSE 127 05/20/2020       Detailed results: Thank you for allowing us to participate in the care of this patient. Please call with questions. This note is created with the assistance of a speech recognition program.  While intending to generate adocument that actually reflects the content of the visit, the document can still have some errors including those of syntax and sound a like substitutions which may escape proof reading. It such instances, actual meaningcan be extrapolated by contextual diversion.     Marielena Ochoa MD  Office: [phone]  Perfect serve / office [phone]

## 2020-05-20 NOTE — PROCEDURES
[address]                  [facility]                          ELECTROENCEPHALOGRAM REPORT    PATIENT NAME: Denise Sandy                       :        1960  MED REC NO:   [id number]                             ROOM:       0103  ACCOUNT NO:   [de-identified]                           ADMIT DATE: 2020  PROVIDER:     Jamie Carr    DATE OF EE2020    ATTENDING OF RECORD:  Cruz Lay MD    REASON FOR STUDY:  This is a 71-year-old patient with intractable  headache, fever. MEDICATIONS:  Include Elavil, Tylenol, ProAmatine. A 16-channel EEG with one EKG channel recording performed on a patient  described to be awake, drowsy, and asleep. The patient shows delayed  waking rhythms. Background activity consists of poorly regulated 9 Hz  activity in a 40-60 microvolt range, more prominent over the posterior  head area, showing some reactivity to eye opening and closing. Over the  anterior head regions, there are 15-20 Hz activity in a 20-30 microvolt  range. The record is prominent for mild degree of medium-amplitude 4-7  Hz activity seen throughout all head areas during waking state. With  drowsiness and sleep, there is further intrusion of slower frequencies  in a theta and lesser degree delta band. This record is not lateralized  or epileptiform. Hyperventilation is not performed. Photic stimulation shows no change  of record. IMPRESSION:  This EEG shows the presence of mild diffuse slowing. This  EEG is concordant with diffuse encephalopathy. No clear lateralized or  epileptiform disturbance is seen.         Jamari Baires    D: 2020 17:19:10       T: 2020 20:05:20     [email or web address]  Job#: [id number]     Doc#: [id number]    CC:  Debbie Nicholson

## 2020-05-20 NOTE — PROGRESS NOTES
Patient admitted on Mechanical Ventilator Protocol. Patients height measured at 67 \" for an IBW 61.6    Patient placed on the ventilator on settings as charted on flowsheeet. Ventilator Bronchodilator assessment    Breath sounds: clear. Diminished   Inspiratory Pressure:21  Plateau Pressure: 20    Patient assessed at level 1         [x]    Bronchodilator Assessment    BRONCHODILATOR ASSESSMENT SCORE  Score 0 (Home) 1 2 3 4   Breath Sounds   []  Chronic Ventilator: Patient at baseline [x]  Mild Wheezes/ Clear []  Intermittent wheezes with good air entry []  Bilateral/unilateral wheezing with diminished air entry []  Insp/Exp wheeze and/or poor aeration   Ventilator Pressures   []  Chronic Ventilator [x]  Insp. Pressure less than 25 cm H20 []  Insp. Pressure less than 25 cm H20 []  Insp. Pressure exceeds 25 cm H20 []  Insp.  Pressure exceeds 30 cm H20   Plateau Pressure []  NA   [x]  Plateau Pressure less than 4  []  Plateau Pressure less than or equal to 5 []  Plateau Pressure greater than or equal to 6 []  Plateau Pressure greater than or equal to 8       Catalina Isabel  5:37 AM

## 2020-05-21 ENCOUNTER — APPOINTMENT (OUTPATIENT)
Dept: CT IMAGING | Age: 60
DRG: 720 | End: 2020-05-21
Attending: INTERNAL MEDICINE
Payer: MEDICARE

## 2020-05-21 ENCOUNTER — APPOINTMENT (OUTPATIENT)
Dept: GENERAL RADIOLOGY | Age: 60
DRG: 720 | End: 2020-05-21
Attending: INTERNAL MEDICINE
Payer: MEDICARE

## 2020-05-21 LAB
ABSOLUTE EOS #: <0.03 K/UL (ref 0–0.44)
ABSOLUTE IMMATURE GRANULOCYTE: 0.09 K/UL (ref 0–0.3)
ABSOLUTE LYMPH #: 2.65 K/UL (ref 1.1–3.7)
ABSOLUTE MONO #: 0.72 K/UL (ref 0.1–1.2)
ALLEN TEST: ABNORMAL
ANION GAP SERPL CALCULATED.3IONS-SCNC: 10 MMOL/L (ref 9–17)
ANION GAP SERPL CALCULATED.3IONS-SCNC: 8 MMOL/L (ref 9–17)
BASOPHILS # BLD: 0 % (ref 0–2)
BASOPHILS ABSOLUTE: 0.03 K/UL (ref 0–0.2)
BUN BLDV-MCNC: 16 MG/DL (ref 6–20)
BUN BLDV-MCNC: 18 MG/DL (ref 6–20)
BUN/CREAT BLD: ABNORMAL (ref 9–20)
BUN/CREAT BLD: ABNORMAL (ref 9–20)
CALCIUM SERPL-MCNC: 7.8 MG/DL (ref 8.6–10.4)
CALCIUM SERPL-MCNC: 8 MG/DL (ref 8.6–10.4)
CHLORIDE BLD-SCNC: 108 MMOL/L (ref 98–107)
CHLORIDE BLD-SCNC: 110 MMOL/L (ref 98–107)
CO2: 26 MMOL/L (ref 20–31)
CO2: 28 MMOL/L (ref 20–31)
CREAT SERPL-MCNC: 0.25 MG/DL (ref 0.5–0.9)
CREAT SERPL-MCNC: 0.32 MG/DL (ref 0.5–0.9)
CULTURE: NO GROWTH
CULTURE: NORMAL
DIFFERENTIAL TYPE: ABNORMAL
EOSINOPHILS RELATIVE PERCENT: 0 % (ref 1–4)
FIO2: 60
GFR AFRICAN AMERICAN: >60 ML/MIN
GFR AFRICAN AMERICAN: >60 ML/MIN
GFR NON-AFRICAN AMERICAN: >60 ML/MIN
GFR NON-AFRICAN AMERICAN: >60 ML/MIN
GFR SERPL CREATININE-BSD FRML MDRD: ABNORMAL ML/MIN/{1.73_M2}
GLUCOSE BLD-MCNC: 120 MG/DL (ref 70–99)
GLUCOSE BLD-MCNC: 127 MG/DL (ref 70–99)
HCT VFR BLD CALC: 36.9 % (ref 36.3–47.1)
HEMOGLOBIN: 12 G/DL (ref 11.9–15.1)
IMMATURE GRANULOCYTES: 1 %
LACTIC ACID, WHOLE BLOOD: 0.8 MMOL/L (ref 0.7–2.1)
LYMPHOCYTES # BLD: 16 % (ref 24–43)
Lab: NORMAL
Lab: NORMAL
MCH RBC QN AUTO: 30.4 PG (ref 25.2–33.5)
MCHC RBC AUTO-ENTMCNC: 32.5 G/DL (ref 28.4–34.8)
MCV RBC AUTO: 93.4 FL (ref 82.6–102.9)
MISCELLANEOUS LAB TEST RESULT: NORMAL
MODE: ABNORMAL
MONOCYTES # BLD: 4 % (ref 3–12)
NEGATIVE BASE EXCESS, ART: ABNORMAL (ref 0–2)
NRBC AUTOMATED: 0 PER 100 WBC
O2 DEVICE/FLOW/%: ABNORMAL
PARTIAL THROMBOPLASTIN TIME: 35.5 SEC (ref 20.5–30.5)
PARTIAL THROMBOPLASTIN TIME: 36.1 SEC (ref 20.5–30.5)
PARTIAL THROMBOPLASTIN TIME: 38.8 SEC (ref 20.5–30.5)
PARTIAL THROMBOPLASTIN TIME: 44.3 SEC (ref 20.5–30.5)
PATIENT TEMP: ABNORMAL
PDW BLD-RTO: 14.2 % (ref 11.8–14.4)
PLATELET # BLD: 184 K/UL (ref 138–453)
PLATELET # BLD: 192 K/UL (ref 138–453)
PLATELET ESTIMATE: ABNORMAL
PMV BLD AUTO: 10.4 FL (ref 8.1–13.5)
POC HCO3: 28.1 MMOL/L (ref 21–28)
POC O2 SATURATION: 99 % (ref 94–98)
POC PCO2 TEMP: ABNORMAL MM HG
POC PCO2: 42.4 MM HG (ref 35–48)
POC PH TEMP: ABNORMAL
POC PH: 7.43 (ref 7.35–7.45)
POC PO2 TEMP: ABNORMAL MM HG
POC PO2: 127.7 MM HG (ref 83–108)
POSITIVE BASE EXCESS, ART: 3 (ref 0–3)
POTASSIUM SERPL-SCNC: 3.7 MMOL/L (ref 3.7–5.3)
POTASSIUM SERPL-SCNC: 3.7 MMOL/L (ref 3.7–5.3)
RBC # BLD: 3.95 M/UL (ref 3.95–5.11)
RBC # BLD: ABNORMAL 10*6/UL
SAMPLE SITE: ABNORMAL
SEG NEUTROPHILS: 79 % (ref 36–65)
SEGMENTED NEUTROPHILS ABSOLUTE COUNT: 13.3 K/UL (ref 1.5–8.1)
SODIUM BLD-SCNC: 144 MMOL/L (ref 135–144)
SODIUM BLD-SCNC: 146 MMOL/L (ref 135–144)
SPECIMEN DESCRIPTION: NORMAL
SPECIMEN DESCRIPTION: NORMAL
TCO2 (CALC), ART: 29 MMOL/L (ref 22–29)
TEST NAME: NORMAL
WBC # BLD: 16.8 K/UL (ref 3.5–11.3)
WBC # BLD: ABNORMAL 10*3/UL

## 2020-05-21 PROCEDURE — 97760 ORTHOTIC MGMT&TRAING 1ST ENC: CPT

## 2020-05-21 PROCEDURE — 2700000000 HC OXYGEN THERAPY PER DAY

## 2020-05-21 PROCEDURE — 2000000000 HC ICU R&B

## 2020-05-21 PROCEDURE — 94640 AIRWAY INHALATION TREATMENT: CPT

## 2020-05-21 PROCEDURE — 83605 ASSAY OF LACTIC ACID: CPT

## 2020-05-21 PROCEDURE — 94761 N-INVAS EAR/PLS OXIMETRY MLT: CPT

## 2020-05-21 PROCEDURE — 6360000002 HC RX W HCPCS: Performed by: STUDENT IN AN ORGANIZED HEALTH CARE EDUCATION/TRAINING PROGRAM

## 2020-05-21 PROCEDURE — 2580000003 HC RX 258: Performed by: PSYCHIATRY & NEUROLOGY

## 2020-05-21 PROCEDURE — 6360000002 HC RX W HCPCS: Performed by: INTERNAL MEDICINE

## 2020-05-21 PROCEDURE — 71045 X-RAY EXAM CHEST 1 VIEW: CPT

## 2020-05-21 PROCEDURE — 99233 SBSQ HOSP IP/OBS HIGH 50: CPT | Performed by: PSYCHIATRY & NEUROLOGY

## 2020-05-21 PROCEDURE — 36620 INSERTION CATHETER ARTERY: CPT

## 2020-05-21 PROCEDURE — 85025 COMPLETE CBC W/AUTO DIFF WBC: CPT

## 2020-05-21 PROCEDURE — 94003 VENT MGMT INPAT SUBQ DAY: CPT

## 2020-05-21 PROCEDURE — 86698 HISTOPLASMA ANTIBODY: CPT

## 2020-05-21 PROCEDURE — 2580000003 HC RX 258: Performed by: STUDENT IN AN ORGANIZED HEALTH CARE EDUCATION/TRAINING PROGRAM

## 2020-05-21 PROCEDURE — 94770 HC ETCO2 MONITOR DAILY: CPT

## 2020-05-21 PROCEDURE — 6370000000 HC RX 637 (ALT 250 FOR IP): Performed by: STUDENT IN AN ORGANIZED HEALTH CARE EDUCATION/TRAINING PROGRAM

## 2020-05-21 PROCEDURE — 6370000000 HC RX 637 (ALT 250 FOR IP): Performed by: PSYCHIATRY & NEUROLOGY

## 2020-05-21 PROCEDURE — 87327 CRYPTOCOCCUS NEOFORM AG IA: CPT

## 2020-05-21 PROCEDURE — 6370000000 HC RX 637 (ALT 250 FOR IP): Performed by: INTERNAL MEDICINE

## 2020-05-21 PROCEDURE — 74176 CT ABD & PELVIS W/O CONTRAST: CPT

## 2020-05-21 PROCEDURE — 2060000000 HC ICU INTERMEDIATE R&B

## 2020-05-21 PROCEDURE — 82803 BLOOD GASES ANY COMBINATION: CPT

## 2020-05-21 PROCEDURE — 37799 UNLISTED PX VASCULAR SURGERY: CPT

## 2020-05-21 PROCEDURE — 87385 HISTOPLASMA CAPSUL AG IA: CPT

## 2020-05-21 PROCEDURE — 80048 BASIC METABOLIC PNL TOTAL CA: CPT

## 2020-05-21 PROCEDURE — 2580000003 HC RX 258: Performed by: INTERNAL MEDICINE

## 2020-05-21 PROCEDURE — 85049 AUTOMATED PLATELET COUNT: CPT

## 2020-05-21 PROCEDURE — 6360000002 HC RX W HCPCS: Performed by: PSYCHIATRY & NEUROLOGY

## 2020-05-21 PROCEDURE — 99233 SBSQ HOSP IP/OBS HIGH 50: CPT | Performed by: INTERNAL MEDICINE

## 2020-05-21 PROCEDURE — 99291 CRITICAL CARE FIRST HOUR: CPT | Performed by: INTERNAL MEDICINE

## 2020-05-21 PROCEDURE — 85730 THROMBOPLASTIN TIME PARTIAL: CPT

## 2020-05-21 RX ORDER — SODIUM CHLORIDE, SODIUM LACTATE, POTASSIUM CHLORIDE, AND CALCIUM CHLORIDE .6; .31; .03; .02 G/100ML; G/100ML; G/100ML; G/100ML
1000 INJECTION, SOLUTION INTRAVENOUS ONCE
Status: DISCONTINUED | OUTPATIENT
Start: 2020-05-21 | End: 2020-05-21

## 2020-05-21 RX ADMIN — ACETAMINOPHEN 650 MG: 325 TABLET ORAL at 08:05

## 2020-05-21 RX ADMIN — HEPARIN SODIUM 1520 UNITS: 1000 INJECTION INTRAVENOUS; SUBCUTANEOUS at 01:53

## 2020-05-21 RX ADMIN — LORAZEPAM 2 MG: 2 INJECTION INTRAMUSCULAR; INTRAVENOUS at 08:05

## 2020-05-21 RX ADMIN — HEPARIN SODIUM AND DEXTROSE 18 UNITS/KG/HR: 10000; 5 INJECTION INTRAVENOUS at 16:30

## 2020-05-21 RX ADMIN — HEPARIN SODIUM 1520 UNITS: 1000 INJECTION INTRAVENOUS; SUBCUTANEOUS at 22:53

## 2020-05-21 RX ADMIN — SODIUM CHLORIDE 1000 ML: 4.5 INJECTION, SOLUTION INTRAVENOUS at 01:56

## 2020-05-21 RX ADMIN — LEVOFLOXACIN 750 MG: 5 INJECTION, SOLUTION INTRAVENOUS at 19:47

## 2020-05-21 RX ADMIN — VANCOMYCIN HYDROCHLORIDE 1000 MG: 1 INJECTION, SOLUTION INTRAVENOUS at 17:45

## 2020-05-21 RX ADMIN — HEPARIN SODIUM 1520 UNITS: 1000 INJECTION INTRAVENOUS; SUBCUTANEOUS at 16:27

## 2020-05-21 RX ADMIN — FOLIC ACID 1 MG: 1 TABLET ORAL at 09:44

## 2020-05-21 RX ADMIN — SODIUM CHLORIDE, PRESERVATIVE FREE 10 ML: 5 INJECTION INTRAVENOUS at 09:45

## 2020-05-21 RX ADMIN — VANCOMYCIN HYDROCHLORIDE 1000 MG: 1 INJECTION, SOLUTION INTRAVENOUS at 06:04

## 2020-05-21 RX ADMIN — MIDODRINE HYDROCHLORIDE 5 MG: 5 TABLET ORAL at 16:05

## 2020-05-21 RX ADMIN — LORAZEPAM 2 MG: 2 INJECTION INTRAMUSCULAR; INTRAVENOUS at 21:39

## 2020-05-21 RX ADMIN — SODIUM CHLORIDE, PRESERVATIVE FREE 10 ML: 5 INJECTION INTRAVENOUS at 21:36

## 2020-05-21 RX ADMIN — IPRATROPIUM BROMIDE AND ALBUTEROL SULFATE 1 AMPULE: .5; 3 SOLUTION RESPIRATORY (INHALATION) at 15:16

## 2020-05-21 RX ADMIN — IPRATROPIUM BROMIDE AND ALBUTEROL SULFATE 1 AMPULE: .5; 3 SOLUTION RESPIRATORY (INHALATION) at 07:24

## 2020-05-21 RX ADMIN — SODIUM CHLORIDE: 4.5 INJECTION, SOLUTION INTRAVENOUS at 01:57

## 2020-05-21 RX ADMIN — IPRATROPIUM BROMIDE AND ALBUTEROL SULFATE 1 AMPULE: .5; 3 SOLUTION RESPIRATORY (INHALATION) at 11:07

## 2020-05-21 RX ADMIN — SODIUM CHLORIDE 1000 ML: 4.5 INJECTION, SOLUTION INTRAVENOUS at 16:25

## 2020-05-21 RX ADMIN — VITAM B12 50 MCG: 100 TAB at 09:44

## 2020-05-21 RX ADMIN — SODIUM CHLORIDE: 4.5 INJECTION, SOLUTION INTRAVENOUS at 17:44

## 2020-05-21 RX ADMIN — LORAZEPAM 2 MG: 2 INJECTION INTRAMUSCULAR; INTRAVENOUS at 02:46

## 2020-05-21 RX ADMIN — HEPARIN SODIUM 1520 UNITS: 1000 INJECTION INTRAVENOUS; SUBCUTANEOUS at 09:44

## 2020-05-21 RX ADMIN — ACETAMINOPHEN 650 MG: 325 TABLET ORAL at 15:10

## 2020-05-21 RX ADMIN — THIAMINE HYDROCHLORIDE 250 MG: 100 INJECTION, SOLUTION INTRAMUSCULAR; INTRAVENOUS at 17:30

## 2020-05-21 RX ADMIN — Medication 15 MG: at 05:05

## 2020-05-21 ASSESSMENT — PULMONARY FUNCTION TESTS
PIF_VALUE: 20
PIF_VALUE: 21
PIF_VALUE: 23
PIF_VALUE: 14
PIF_VALUE: 19
PIF_VALUE: 20
PIF_VALUE: 21
PIF_VALUE: 18
PIF_VALUE: 23
PIF_VALUE: 20

## 2020-05-21 ASSESSMENT — PAIN SCALES - GENERAL: PAINLEVEL_OUTOF10: 1

## 2020-05-21 NOTE — PROGRESS NOTES
of education: Not on file    Highest education level: Not on file   Occupational History    Not on file   Social Needs    Financial resource strain: Not on file    Food insecurity     Worry: Not on file     Inability: Not on file    Transportation needs     Medical: Not on file     Non-medical: Not on file   Tobacco Use    Smoking status: Current Every Day Smoker     Packs/day: 2.00     Types: Cigarettes    Smokeless tobacco: Never Used   Substance and Sexual Activity    Alcohol use: Yes     Frequency: 4 or more times a week     Drinks per session: 3 or 4     Binge frequency: Patient refused     Comment: hasn't drank in 12 days, usually daily drinker (vodka)    Drug use: Never    Sexual activity: Not on file   Lifestyle    Physical activity     Days per week: Patient refused     Minutes per session: Patient refused    Stress: Not on file   Relationships    Social connections     Talks on phone: Not on file     Gets together: Not on file     Attends Buddhism service: Not on file     Active member of club or organization: Not on file     Attends meetings of clubs or organizations: Not on file     Relationship status: Not on file    Intimate partner violence     Fear of current or ex partner: No     Emotionally abused: No     Physically abused: No     Forced sexual activity: No   Other Topics Concern    Not on file   Social History Narrative    Not on file       Family History:     Family History   Problem Relation Age of Onset    Stroke Mother     Cancer Father         Allergies:   Sulfa antibiotics     Review of Systems:     Review of Systems   Unable to perform ROS: Intubated       Physical Examination :     Patient Vitals for the past 8 hrs:   BP Temp Temp src Pulse Resp SpO2   05/21/20 1859 -- -- -- 99 20 --   05/21/20 1830 -- -- -- 83 17 100 %   05/21/20 1815 -- -- -- 83 16 100 %   05/21/20 1800 -- -- -- 89 19 98 %   05/21/20 1745 -- -- -- 85 16 100 %   05/21/20 1730 -- -- -- 84 16 100 %

## 2020-05-21 NOTE — PLAN OF CARE
Problem: Falls - Risk of:  Goal: Will remain free from falls  Description: Will remain free from falls  5/21/2020 1842 by Earl Stovall RN  Outcome: Ongoing  5/21/2020 0520 by William Steele RN  Outcome: Ongoing  Goal: Absence of physical injury  Description: Absence of physical injury  5/21/2020 1842 by Earl Stovall RN  Outcome: Ongoing  5/21/2020 0520 by William Steele RN  Outcome: Ongoing     Problem: Pain:  Goal: Pain level will decrease  Description: Pain level will decrease  5/21/2020 1842 by Earl Stovall RN  Outcome: Ongoing  5/21/2020 0520 by William Steele RN  Outcome: Ongoing  Goal: Control of acute pain  Description: Control of acute pain  5/21/2020 1842 by Earl Stovall RN  Outcome: Ongoing  5/21/2020 0520 by William Steele RN  Outcome: Ongoing  Goal: Control of chronic pain  Description: Control of chronic pain  5/21/2020 1842 by Earl Stovall RN  Outcome: Ongoing  5/21/2020 0520 by William Steele RN  Outcome: Ongoing     Problem: Skin Integrity - Impaired:  Goal: Will show no infection signs and symptoms  Description: Will show no infection signs and symptoms  5/21/2020 1842 by Earl Stovall RN  Outcome: Ongoing  5/21/2020 0520 by William Steele RN  Outcome: Ongoing  Goal: Absence of new skin breakdown  Description: Absence of new skin breakdown  5/21/2020 1842 by Earl Stovall RN  Outcome: Ongoing  5/21/2020 0520 by William Steele RN  Outcome: Ongoing     Problem: Physical Regulation:  Goal: Diagnostic test results will improve  Description: Diagnostic test results will improve  5/21/2020 1842 by Earl Stovall RN  Outcome: Ongoing  5/21/2020 0520 by William Steele RN  Outcome: Ongoing  Goal: Will remain free from infection  Description: Will remain free from infection  5/21/2020 1842 by Earl Stovall RN  Outcome: Ongoing  5/21/2020 0520 by William Steele RN  Outcome: Ongoing  Goal: Ability to maintain vital signs within normal range will improve  Description: Compromised:  Goal: Absence of pulmonary infection  Description: Absence of pulmonary infection  5/21/2020 1842 by Vu Chand RN  Outcome: Ongoing  5/21/2020 0520 by Esther Lombard, RN  Outcome: Ongoing  Goal: Levels of oxygenation will improve  Description: Levels of oxygenation will improve  5/21/2020 1842 by Vu Chand RN  Outcome: Ongoing  5/21/2020 0520 by Esther Lombard, RN  Outcome: Ongoing  Goal: Ability to maintain a clear airway will improve  Description: Ability to maintain a clear airway will improve  5/21/2020 1842 by Vu Chand RN  Outcome: Ongoing  5/21/2020 0520 by Esther Lombard, RN  Outcome: Ongoing     Problem: Confusion - Acute:  Goal: Absence of continued neurological deterioration signs and symptoms  Description: Absence of continued neurological deterioration signs and symptoms  5/21/2020 1842 by Vu Chand RN  Outcome: Ongoing  5/21/2020 0520 by Esther Lombard, RN  Outcome: Ongoing  Goal: Mental status will be restored to baseline  Description: Mental status will be restored to baseline  5/21/2020 1842 by Vu Chand RN  Outcome: Ongoing  5/21/2020 0520 by Esther Lombard, RN  Outcome: Ongoing     Problem: Injury - Risk of, Physical Injury:  Goal: Will remain free from falls  Description: Will remain free from falls  5/21/2020 1842 by Vu Chand RN  Outcome: Ongoing  5/21/2020 0520 by Esther Lombard, RN  Outcome: Ongoing  Goal: Absence of physical injury  Description: Absence of physical injury  5/21/2020 1842 by Vu Chand RN  Outcome: Ongoing  5/21/2020 0520 by Esther Lombard, RN  Outcome: Ongoing     Problem: Mood - Altered:  Goal: Mood stable  Description: Mood stable  5/21/2020 1842 by Vu Chand RN  Outcome: Ongoing  5/21/2020 0520 by Esther Lombard, RN  Outcome: Ongoing  Goal: Absence of abusive behavior  Description: Absence of abusive behavior  5/21/2020 1842 by Vu Chand RN  Outcome: Ongoing  5/21/2020 0520 by Esther Lombard,

## 2020-05-21 NOTE — PROGRESS NOTES
midodrine    Objective:   BP (!) 94/48   Pulse 109   Temp 101.3 °F (38.5 °C) (Core)   Resp 16   Ht 5' 7\" (1.702 m)   Wt 125 lb 7.1 oz (56.9 kg)   SpO2 100%   BMI 19.65 kg/m²     Blood pressure range: Systolic (36TIY), UMR:406 , Min:86 , HUGO:165   ; Diastolic (70OJE), EDQ:27, Min:48, Max:76      ROS:  Review of Systems   Unable to perform ROS: Mental status change         NEUROLOGIC EXAMINATION  Physical Exam  Constitutional:       General: She is not in acute distress. Appearance: She is ill-appearing and toxic-appearing. She is not diaphoretic. Interventions: She is intubated. Comments: She is more alert today as opposed to yesterday. She is still not following commands   HENT:      Head: Normocephalic and atraumatic. Eyes:      Pupils: Pupils are equal, round, and reactive to light. Neck:      Musculoskeletal: Decreased range of motion. No neck rigidity. Cardiovascular:      Rate and Rhythm: Normal rate and regular rhythm. Pulmonary:      Effort: Pulmonary effort is normal. She is intubated. Abdominal:      General: Abdomen is flat. Skin:     General: Skin is warm. Findings: Rash (Generalized skin rash, moribilliform) present. Neurological:      GCS: GCS eye subscore is 2. GCS verbal subscore is 1. GCS motor subscore is 5. Cranial Nerves: No cranial nerve deficit or facial asymmetry. Deep Tendon Reflexes: Babinski sign absent on the right side. Babinski sign absent on the left side. Comments: Stuporous          Psychiatric:         Speech: Speech is slurred. Neurologic Exam     Mental Status   Speech: slurred   Level of consciousness: arousable by tactile stimuli  Unable to name object. Unable to read. Unable to repeat. Unable to write. Cranial Nerves     CN III, IV, VI   Pupils are equal, round, and reactive to light. Nystagmus: none     CN VII   Facial expression full, symmetric. Motor Exam Withdraws to pain on all extremities.    No following commands         Lab Results:   CBC:   Recent Labs     05/19/20  1956 05/20/20  0344 05/21/20  0453 05/21/20  0807   WBC 10.1 12.3* 16.8*  --    HGB 13.2 14.2 12.0  --     200 184 192     BMP:    Recent Labs     05/20/20  1011 05/20/20  2100 05/21/20  0453   * 146* 144   K 3.7 3.7 3.7   * 110* 108*   CO2 28 28 26   BUN 26* 18 16   CREATININE 0.71 0.32* 0.25*   GLUCOSE 127* 127* 120*         Lab Results   Component Value Date    ALT 32 05/14/2020    AST 86 (H) 05/14/2020    TSH 0.70 05/14/2020    INR 1.0 05/14/2020    [id number] 1358 (H) 05/17/2020     CSF studies  White blood cells 8  Neutrophils 48%  Lymphocytes 21%  RBCs 1  Glucose 74  Protein 77.8  Increased IgG synthesis rate  No results found for: PHENYTOIN, PHENYTOIN, VALPROATE, CBMZ    IMAGING    CTH repeat 5/20/2020  No acute findings     CTPE  No evidence of pulmonary embolism. Left lower lobar atelectasis. MRI thoracic/ lumbar spine   No cord abnormality identified. No MRI evidence of discitis-osteomyelitis of the cervical or thoracic spine. Degenerative changes as detailed above. Incidentally noted left lower lobe atelectasis. MRI brain w/wo contrast 5/14/2020  No acute disease. No evidence of meningeal enhancement or empyema. MRI brain w/wo contrast 5/17/2020  Possible acute microinfarct involving within the splenium of corpus callosum. Motion degraded exam        ASSESSMENT AND RECOMMENDATIONS   62year old female with history of alcohol abuse, who presented with new onset severe 8-9/10 generalized headache associated with photophobia, phonophobia, fever, cough, and diarrhea. COVID neg x2. CSF studies ruled out infectious encephalitis.  She is being     Toxic metabolic encephalopathy due to sepsis   Lobar pneumonia with para pneumonic effusion   Sepsis due to above  Hypoxic respiratory failure due to above  Hypernatremia  Dehydration   Alcohol abuse   \  Sepsis management as per ID, 1ry team   Continue thiamine

## 2020-05-21 NOTE — PROGRESS NOTES
DATE: 2020  NAME: Gisel Devlin  MRN: [id number]   : 1960    Discharge Recommendations: Continue to Assess (pending progress)     Assessment   Body structures, Functions, Activity limitations: Decreased functional mobility ; Decreased strength;Decreased endurance;Decreased cognition;Decreased balance  Prognosis: Good  Decision Making: Medium Complexity  Patient Education: Unable to state. REQUIRES PT FOLLOW UP: Yes  Activity Tolerance  Activity Tolerance: Patient limited by fatigue;Patient limited by endurance; Patient limited by cognitive status     Objective    RN requested Foot drop splint for pt. FDS was issued to pt and a splint schedule was posted above pt's headboard. No ROM today. Pt is going to CT. Plan   Plan  Times per week: 5x/week  Current Treatment Recommendations: Strengthening, Functional Mobility Training, Transfer Training, Gait Training, Safety Education & Training, Balance Training, Endurance Training, Neuromuscular Re-education  Safety Devices  Type of devices: Call light within reach, Left in bed, Nurse notified        Goals  Short Term Goals  Short term goal 1: Pt to perform Bed mobility min A  Short term goal 2: Pt to improve sitting balance to Good  Short term goal 3: Pt to transfer Min A  Short term goal 4: Pt to ambulate with appropriate device 100 ft Min A  Short term goal 5: Pt to tolerate 30 minutes of activity to improve strength and endurance. Plan: Progress functional mobility as medically appropriate.    Time In:     800  Time Out: 820  Time Coded Minutes (treatment minutes): 20      RAVIN SOSA, PTA

## 2020-05-21 NOTE — PLAN OF CARE
Problem: MECHANICAL VENTILATION  Goal: Patient will maintain patent airway  Outcome: Ongoing     Problem: MECHANICAL VENTILATION  Goal: Oral health is maintained or improved  Outcome: Ongoing     Problem: MECHANICAL VENTILATION  Goal: ET tube will be managed safely  Outcome: Ongoing     Problem: MECHANICAL VENTILATION  Goal: Ability to express needs and understand communication  Outcome: Ongoing     Problem: MECHANICAL VENTILATION  Goal: Mobility/activity is maintained at optimum level for patient  Outcome: Ongoing     Problem: OXYGENATION/RESPIRATORY FUNCTION  Goal: Patient will maintain patent airway  Outcome: Ongoing     Problem: OXYGENATION/RESPIRATORY FUNCTION  Goal: Patient will achieve/maintain normal respiratory rate/effort  Description: Respiratory rate and effort will be within normal limits for the patient  Outcome: Ongoing

## 2020-05-21 NOTE — PROGRESS NOTES
[address]  Resident Physician Progress Note    Patient - Gisel Hill  Date of Admission -  2020 10:46 AM  Date of Evaluation -  2020  Room and Bed Number -  0103/0103-01   Hospital Day - 7      SUBJECTIVE:     OVERNIGHT EVENTS:      No acute events overnight, mildly febrile 100.4  ID started vancomycin IV  Pancultures collected, blood work/cultures negative so far  Hypotension improved, does not require pressors  Patient still remains to be tachycardic overnight    TODAY:    Afebrile this morning  Started on IV vancomycin  Blood pressure is 217 systolic  Tachycardic at 506  CT chest abdomen pelvis pending    AWAKE & FOLLOWING COMMANDS:  [] No   [x] Yes    SECRETIONS Amount:  [x] Small [] Moderate  [] Large  [] None  Color:     [] White [] Colored  [] Bloody    SEDATION:  RAAS Score:  [x] Propofol gtt  [] Versed gtt  [] Ativan gtt   [] No Sedation    PARALYZED:  [x] No    [] Yes    VASOPRESSORS:  [x] No    [] Yes  [] Levophed [] Dopamine [] Vasopressin  [] Dobutamine [] Phenylephrine [] Epinephrine      OBJECTIVE:     VITAL SIGNS:  /73   Pulse 115   Temp 99.5 °F (37.5 °C) (Core)   Resp 16   Ht 5' 7\" (1.702 m)   Wt 125 lb 7.1 oz (56.9 kg)   SpO2 100%   BMI 19.65 kg/m²   Tmax over 24 hours:  Temp (24hrs), Av.2 °F (37.9 °C), Min:99.5 °F (37.5 °C), Max:100.6 °F (38.1 °C)      Patient Vitals for the past 8 hrs:   BP Temp Temp src Pulse Resp SpO2   20 0707 -- -- -- 115 16 --   20 0652 -- -- -- 114 17 --   20 0637 -- -- -- 114 18 --   20 06 -- -- -- 112 18 --   20 0607 -- -- -- 109 16 --   20 0552 -- -- -- 108 16 --   20 0537 -- -- -- 107 16 --   20 -- -- -- 108 16 --   20 0507 -- -- -- 109 16 --   20 0452 -- -- -- 109 16 --   20 0444 -- -- -- 109 16 --   20 0443 -- -- -- 109 16 --   20 0437 -- -- -- 108 16 --   20 0422 -- -- -- 157 18 --   20 0407 120/73 99.5 °F (37.5 °C) CORE 107 16 100 % Mouth suctioned, Teeth brushed, Suction toothette  Subglottic Suction Done?: Yes    ABGs:     Lab Results   Component Value Date    NYR9FXY 29 05/21/2020    FIO2 60.0 05/21/2020         DATA:  Complete Blood Count:   Recent Labs     05/19/20  1956 05/20/20  0344 05/21/20  0453   WBC 10.1 12.3* 16.8*   RBC 4.30 4.74 3.95   HGB 13.2 14.2 12.0   HCT 40.8 45.3 36.9   MCV 94.9 95.6 93.4   MCH 30.7 30.0 30.4   MCHC 32.4 31.3 32.5   RDW 14.0 13.9 14.2    200 184   MPV 10.5 10.6 10.4        Last 3 Blood Glucose:   Recent Labs     05/18/20  1233 05/19/20  0559 05/20/20  0344 05/20/20  1011 05/20/20  2100 05/21/20  0453   GLUCOSE 173* 137* 129* 127* 127* 120*        PT/INR:    Lab Results   Component Value Date    PROTIME 10.7 05/14/2020    INR 1.0 05/14/2020     PTT:    Lab Results   Component Value Date    APTT 36.1 05/20/2020       Comprehensive Metabolic Profile:   Recent Labs     05/20/20  1011 05/20/20 2100 05/21/20  0453   * 146* 144   K 3.7 3.7 3.7   * 110* 108*   CO2 28 28 26   BUN 26* 18 16   CREATININE 0.71 0.32* 0.25*   GLUCOSE 127* 127* 120*   CALCIUM 8.2* 8.0* 7.8*      Magnesium:   Lab Results   Component Value Date    MG 2.1 05/15/2020    MG 2.1 05/14/2020    MG 1.9 05/14/2020     Phosphorus:   Lab Results   Component Value Date    PHOS 2.4 05/14/2020     Ionized Calcium:   Lab Results   Component Value Date    CAION 1.15 05/14/2020        Urinalysis:   Lab Results   Component Value Date    NITRU NEGATIVE 05/14/2020    COLORU YELLOW 05/14/2020    PHUR 5.0 05/14/2020    WBCUA None 05/14/2020    RBCUA 2 TO 5 05/14/2020    MUCUS NOT REPORTED 05/14/2020    TRICHOMONAS NOT REPORTED 05/14/2020    YEAST NOT REPORTED 05/14/2020    BACTERIA FEW 05/14/2020    SPECGRAV 1.015 05/14/2020    LEUKOCYTESUR NEGATIVE 05/14/2020    UROBILINOGEN Normal 05/14/2020    BILIRUBINUR NEGATIVE 05/14/2020    GLUCOSEU NEGATIVE 05/14/2020    KETUA NEGATIVE 05/14/2020    AMORPHOUS 3+ 05/14/2020       HgBA1c:  No results found for: LABA1C  TSH:    Lab Results   Component Value Date    TSH 0.70 05/14/2020     Lactic Acid:   Lab Results   Component Value Date    LACTA NOT REPORTED 05/14/2020      Troponin: No results for input(s): TROPONINI in the last 72 hours. Microbiology:  Urine Culture:  No components found for: CURINE  Blood Culture:  No components found for: CBLOOD, CFUNGUSBL  Sputum Culture:  No components found for: CSPUTUM      Other Labs:  CBC with Differential:    Lab Results   Component Value Date    WBC 16.8 05/21/2020    RBC 3.95 05/21/2020    HGB 12.0 05/21/2020    HCT 36.9 05/21/2020     05/21/2020    MCV 93.4 05/21/2020    MCH 30.4 05/21/2020    MCHC 32.5 05/21/2020    RDW 14.2 05/21/2020    LYMPHOPCT 16 05/21/2020    MONOPCT 4 05/21/2020    BASOPCT 0 05/21/2020    MONOSABS 0.72 05/21/2020    LYMPHSABS 2.65 05/21/2020    EOSABS <0.03 05/21/2020    BASOSABS 0.03 05/21/2020    DIFFTYPE NOT REPORTED 05/21/2020     CMP:    Lab Results   Component Value Date     05/21/2020    K 3.7 05/21/2020     05/21/2020    CO2 26 05/21/2020    BUN 16 05/21/2020    CREATININE 0.25 05/21/2020    GFRAA >60 05/21/2020    LABGLOM >60 05/21/2020    GLUCOSE 120 05/21/2020    PROT 4.6 05/14/2020    LABALBU 2.5 05/15/2020    CALCIUM 7.8 05/21/2020    BILITOT 0.29 05/14/2020    ALKPHOS 49 05/14/2020    AST 86 05/14/2020    ALT 32 05/14/2020         Radiology/Imaging:  Xr Chest (single View Frontal)    Result Date: 5/20/2020  1. Stable volume loss of the left hemithorax. Worsening airspace disease projecting over the left upper and left mid lung zone. Stable retrocardiac airspace consolidation. Small to moderate left-sided pleural effusion, stable. Follow-up is recommended to document resolution. 2. Stable right to left mediastinal shift. 3. Stable mild cardiomegaly. 4. Tubes as above. Xr Chest (single View Frontal)    Result Date: 5/19/2020  Moderate left lung base opacity which has increased.      Ct Head Wo Contrast    Result Date: 5/20/2020  No acute intracranial abnormality is evident. Partially imaged NG tube. Ct Chest W Contrast    Result Date: 5/13/2020  Moderate pericardial effusion. Recommend clinical correlation. No evidence of lung consolidation. Mra Head Wo Contrast    Result Date: 5/17/2020  Motion limited exam.  No major branch occlusion. CTA would be more sensitive in excluding vasculitis. Mri Cervical Spine W Wo Contrast    Result Date: 5/16/2020  No cord abnormality identified. No MRI evidence of discitis-osteomyelitis of the cervical or thoracic spine. Degenerative changes as detailed above. Incidentally noted left lower lobe atelectasis. Mri Thoracic Spine W Wo Contrast    Result Date: 5/16/2020  No cord abnormality identified. No MRI evidence of discitis-osteomyelitis of the cervical or thoracic spine. Degenerative changes as detailed above. Incidentally noted left lower lobe atelectasis. Mri Lumbar Spine W Wo Contrast    Result Date: 5/14/2020  Lumbar spine degenerative changes most significant at L5-S1. No findings to suggest osteomyelitis-discitis. Us Liver    Result Date: 5/14/2020  Hepatomegaly with coarse hepatic echotexture consistent with fatty infiltration without focal mass. Normal hepatopetal flow in the portal vein. Small amount of ascites fluid about the right kidney. Status post cholecystectomy. No ductal dilatation. Xr Chest Portable    Result Date: 5/17/2020  Left lower lobe atelectasis. RECOMMENDATION: Consider chest CT for further evaluation     Xr Chest Portable    Result Date: 5/14/2020  No evidence for acute cardiopulmonary pathology. COPD. Xr Chest Portable    Result Date: 5/14/2020  A right internal jugular central venous catheter has been placed in good position. No other change. Xr Chest Portable    Result Date: 5/13/2020  1.  Minimal patchy opacities at the medial aspect of the right lower lung field which may reflect atelectasis recommendations    CARDIO:   1. Acute episode of hypotension  a. Did not require pressors  b. 1 L LR, pressures at 224 systolic now    PULM:   1. Respiratory failure  a. Intubated  i. In setting 16, 500, 10, 60  ii. Recent blood gas: 7.42, 42, 127,-bicarbonate reported  b. Left lobe atelectasis versus pneumonia  i. Patient is currently on Levaquin  ii. ID following    RENAL:   1. Patient creatinine stable, 0.25  2. No DIXON reported at this time    FLUIDS/ELECTROLYTES/NUTRITION:   1. Patient is currently on normal saline 75 cc/h  2. We will monitor electrolytes  3. Did present initially with hypernatremia  a. Resolved, sodium 144    INFECTION:   1. Left lobe atelectasis versus pneumonia  a. Levaquin, ID following  2. Acute febrile reaction  a. Elevated white count at 16  b. Patient had a fever of 104, trended down with Tylenol, febrile overnight 100.4  c. Ordered panculture, blood cultures negative so far  d. Patient started on vancomycin for MRSA coverage by ID  e. CT chest abdomen pelvis ordered, results pending    HEMATOLOGY:   1. Stable at this time, hemoglobin 12    GASTRO:   1. Patient n.p.o. ENDOCRINE:   1. Stable at this time, glucose 120    PSYCH:   1. Stable at this time    Disp0:  1. ICU    WEAN PER PROTOCOL:  [x] No   [] Yes  [] N/A    ICU PROPHYLAXIS:  Stress ulcer:  [x] PPI Agent  [] A1Qyaea [] Sucralfate  [] Other:  VTE:   [] Enoxaparin  [x] Unfract.  Heparin Subcut  [] EPC Cuffs    NUTRITION:  [x] NPO [] Tube Feeding (Specify: ) [] TPN  [] PO (Diet: Diet NPO Effective Now)    INSULIN DRIP:   [x] No   [] Yes  -------------------------------------------------------------------------------------------------------------------------------------    CONSULTATION NEEDED:  [x] No  [] Yes    FAMILY UPDATED:    [x] No  [] Yes    TRANSFER OUT OF ICU:   [x] No  [] Yes        Rolando Moscoso MD  Critical Care Resident   Emergency Medicine   5/21/2020 7:16 AM

## 2020-05-22 ENCOUNTER — APPOINTMENT (OUTPATIENT)
Dept: INTERVENTIONAL RADIOLOGY/VASCULAR | Age: 60
DRG: 720 | End: 2020-05-22
Attending: INTERNAL MEDICINE
Payer: MEDICARE

## 2020-05-22 LAB
ABSOLUTE EOS #: 0.07 K/UL (ref 0–0.44)
ABSOLUTE IMMATURE GRANULOCYTE: 0.09 K/UL (ref 0–0.3)
ABSOLUTE LYMPH #: 2.56 K/UL (ref 1.1–3.7)
ABSOLUTE MONO #: 0.56 K/UL (ref 0.1–1.2)
ALLEN TEST: ABNORMAL
ANION GAP SERPL CALCULATED.3IONS-SCNC: 10 MMOL/L (ref 9–17)
APPEARANCE CSF: CLEAR
BASOPHILS # BLD: 0 % (ref 0–2)
BASOPHILS ABSOLUTE: <0.03 K/UL (ref 0–0.2)
BUN BLDV-MCNC: 11 MG/DL (ref 6–20)
BUN/CREAT BLD: ABNORMAL (ref 9–20)
CALCIUM SERPL-MCNC: 8 MG/DL (ref 8.6–10.4)
CHLORIDE BLD-SCNC: 101 MMOL/L (ref 98–107)
CO2: 25 MMOL/L (ref 20–31)
CREAT SERPL-MCNC: 0.26 MG/DL (ref 0.5–0.9)
CRYPTOCOCCAL ANTIGEN TITER: 4 TITER
CRYPTOCOCCAL ANTIGEN: POSITIVE
CRYPTOCOCCUS NEOFORMANS/GATTI CSF FILM ARR.: NOT DETECTED
CULTURE: NORMAL
CYTOMEGALOVIRUS (CMV) CSF FILM ARRAY: NOT DETECTED
DIFFERENTIAL TYPE: ABNORMAL
DIRECT EXAM: NORMAL
ENTEROVIRUS CSF FILM ARRAY: NOT DETECTED
EOSINOPHILS RELATIVE PERCENT: 1 % (ref 1–4)
ESCHERICHIA COLI K1 CSF FILM ARRAY: NOT DETECTED
FIO2: 40
GFR AFRICAN AMERICAN: >60 ML/MIN
GFR NON-AFRICAN AMERICAN: >60 ML/MIN
GFR SERPL CREATININE-BSD FRML MDRD: ABNORMAL ML/MIN/{1.73_M2}
GFR SERPL CREATININE-BSD FRML MDRD: ABNORMAL ML/MIN/{1.73_M2}
GLUCOSE BLD-MCNC: 144 MG/DL (ref 70–99)
GLUCOSE, CSF: 53 MG/DL (ref 40–70)
HAEMOPHILUS INFLUENZA CSF FILM ARRAY: NOT DETECTED
HCT VFR BLD CALC: 31.7 % (ref 36.3–47.1)
HEMOGLOBIN: 10.4 G/DL (ref 11.9–15.1)
HHV-6 (HERPESVIRUS 6) CSF FILM ARRAY: NOT DETECTED
HSV-1 CSF FILM ARRAY: NOT DETECTED
HSV-2 CSF FILM ARRAY: NOT DETECTED
IMMATURE GRANULOCYTES: 1 %
LISTERIA MONOCYTOGENES CSF FILM ARRAY: NOT DETECTED
LYMPHOCYTES # BLD: 18 % (ref 24–43)
Lab: NORMAL
MCH RBC QN AUTO: 30.5 PG (ref 25.2–33.5)
MCHC RBC AUTO-ENTMCNC: 32.8 G/DL (ref 28.4–34.8)
MCV RBC AUTO: 93 FL (ref 82.6–102.9)
MODE: ABNORMAL
MONOCYTES # BLD: 4 % (ref 3–12)
NEGATIVE BASE EXCESS, ART: ABNORMAL (ref 0–2)
NEISSERIA MENIGITIDIS CSF FILM ARRAY: NOT DETECTED
NRBC AUTOMATED: 0 PER 100 WBC
O2 DEVICE/FLOW/%: ABNORMAL
PARECHOVIRUS CSF FILM ARRAY: NOT DETECTED
PARTIAL THROMBOPLASTIN TIME: 45.2 SEC (ref 20.5–30.5)
PATIENT TEMP: ABNORMAL
PDW BLD-RTO: 14.1 % (ref 11.8–14.4)
PLATELET # BLD: 166 K/UL (ref 138–453)
PLATELET ESTIMATE: ABNORMAL
PMV BLD AUTO: 10.1 FL (ref 8.1–13.5)
POC HCO3: 27.5 MMOL/L (ref 21–28)
POC O2 SATURATION: 98 % (ref 94–98)
POC PCO2 TEMP: ABNORMAL MM HG
POC PCO2: 37.5 MM HG (ref 35–48)
POC PH TEMP: ABNORMAL
POC PH: 7.47 (ref 7.35–7.45)
POC PO2 TEMP: ABNORMAL MM HG
POC PO2: 103.8 MM HG (ref 83–108)
POSITIVE BASE EXCESS, ART: 4 (ref 0–3)
POTASSIUM SERPL-SCNC: 3.3 MMOL/L (ref 3.7–5.3)
PROTEIN CSF: 142.3 MG/DL (ref 15–45)
RBC # BLD: 3.41 M/UL (ref 3.95–5.11)
RBC # BLD: ABNORMAL 10*6/UL
RBC CSF: 0 /MM3
SAMPLE SITE: ABNORMAL
SEG NEUTROPHILS: 76 % (ref 36–65)
SEGMENTED NEUTROPHILS ABSOLUTE COUNT: 11.22 K/UL (ref 1.5–8.1)
SODIUM BLD-SCNC: 136 MMOL/L (ref 135–144)
SPECIMEN DESCRIPTION: NORMAL
STREPTOCOCCUS AGALACTIAE CSF FILM ARRAY: NOT DETECTED
STREPTOCOCCUS PNEUMONIAE CSF FILM ARRAY: NOT DETECTED
SUPERNAT COLOR CSF: ABNORMAL
TCO2 (CALC), ART: 29 MMOL/L (ref 22–29)
TUBE NUMBER CSF: 3
VANCOMYCIN TROUGH DATE LAST DOSE: ABNORMAL
VANCOMYCIN TROUGH DOSE AMOUNT: ABNORMAL
VANCOMYCIN TROUGH TIME LAST DOSE: ABNORMAL
VANCOMYCIN TROUGH: 7.4 UG/ML (ref 10–20)
VARICELLA-ZOSTER CSF FILM ARRAY: NOT DETECTED
VOLUME CSF: 7
WBC # BLD: 14.5 K/UL (ref 3.5–11.3)
WBC # BLD: ABNORMAL 10*3/UL
WBC CSF: 63 /MM3
XANTHOCHROMIA: ABNORMAL

## 2020-05-22 PROCEDURE — 2580000003 HC RX 258: Performed by: INTERNAL MEDICINE

## 2020-05-22 PROCEDURE — 82803 BLOOD GASES ANY COMBINATION: CPT

## 2020-05-22 PROCEDURE — 99233 SBSQ HOSP IP/OBS HIGH 50: CPT | Performed by: PSYCHIATRY & NEUROLOGY

## 2020-05-22 PROCEDURE — 99291 CRITICAL CARE FIRST HOUR: CPT | Performed by: INTERNAL MEDICINE

## 2020-05-22 PROCEDURE — 6360000002 HC RX W HCPCS: Performed by: INTERNAL MEDICINE

## 2020-05-22 PROCEDURE — 87483 CNS DNA AMP PROBE TYPE 12-25: CPT

## 2020-05-22 PROCEDURE — 36415 COLL VENOUS BLD VENIPUNCTURE: CPT

## 2020-05-22 PROCEDURE — 6370000000 HC RX 637 (ALT 250 FOR IP): Performed by: STUDENT IN AN ORGANIZED HEALTH CARE EDUCATION/TRAINING PROGRAM

## 2020-05-22 PROCEDURE — 86592 SYPHILIS TEST NON-TREP QUAL: CPT

## 2020-05-22 PROCEDURE — 89051 BODY FLUID CELL COUNT: CPT

## 2020-05-22 PROCEDURE — 37799 UNLISTED PX VASCULAR SURGERY: CPT

## 2020-05-22 PROCEDURE — 80048 BASIC METABOLIC PNL TOTAL CA: CPT

## 2020-05-22 PROCEDURE — 84157 ASSAY OF PROTEIN OTHER: CPT

## 2020-05-22 PROCEDURE — 87070 CULTURE OTHR SPECIMN AEROBIC: CPT

## 2020-05-22 PROCEDURE — 009U3ZX DRAINAGE OF SPINAL CANAL, PERCUTANEOUS APPROACH, DIAGNOSTIC: ICD-10-PCS | Performed by: RADIOLOGY

## 2020-05-22 PROCEDURE — 62328 DX LMBR SPI PNXR W/FLUOR/CT: CPT | Performed by: RADIOLOGY

## 2020-05-22 PROCEDURE — 82945 GLUCOSE OTHER FLUID: CPT

## 2020-05-22 PROCEDURE — 94761 N-INVAS EAR/PLS OXIMETRY MLT: CPT

## 2020-05-22 PROCEDURE — 2000000000 HC ICU R&B

## 2020-05-22 PROCEDURE — 2580000003 HC RX 258: Performed by: STUDENT IN AN ORGANIZED HEALTH CARE EDUCATION/TRAINING PROGRAM

## 2020-05-22 PROCEDURE — 80202 ASSAY OF VANCOMYCIN: CPT

## 2020-05-22 PROCEDURE — 87015 SPECIMEN INFECT AGNT CONCNTJ: CPT

## 2020-05-22 PROCEDURE — 99233 SBSQ HOSP IP/OBS HIGH 50: CPT | Performed by: INTERNAL MEDICINE

## 2020-05-22 PROCEDURE — 2700000000 HC OXYGEN THERAPY PER DAY

## 2020-05-22 PROCEDURE — 97530 THERAPEUTIC ACTIVITIES: CPT

## 2020-05-22 PROCEDURE — 2709999900 HC NON-CHARGEABLE SUPPLY

## 2020-05-22 PROCEDURE — 97110 THERAPEUTIC EXERCISES: CPT

## 2020-05-22 PROCEDURE — 85025 COMPLETE CBC W/AUTO DIFF WBC: CPT

## 2020-05-22 PROCEDURE — 94640 AIRWAY INHALATION TREATMENT: CPT

## 2020-05-22 PROCEDURE — 6370000000 HC RX 637 (ALT 250 FOR IP): Performed by: INTERNAL MEDICINE

## 2020-05-22 PROCEDURE — 85730 THROMBOPLASTIN TIME PARTIAL: CPT

## 2020-05-22 PROCEDURE — 88312 SPECIAL STAINS GROUP 1: CPT

## 2020-05-22 PROCEDURE — 94770 HC ETCO2 MONITOR DAILY: CPT

## 2020-05-22 PROCEDURE — 94003 VENT MGMT INPAT SUBQ DAY: CPT

## 2020-05-22 PROCEDURE — 2060000000 HC ICU INTERMEDIATE R&B

## 2020-05-22 PROCEDURE — 87327 CRYPTOCOCCUS NEOFORM AG IA: CPT

## 2020-05-22 PROCEDURE — 6370000000 HC RX 637 (ALT 250 FOR IP): Performed by: PSYCHIATRY & NEUROLOGY

## 2020-05-22 PROCEDURE — 88112 CYTOPATH CELL ENHANCE TECH: CPT

## 2020-05-22 PROCEDURE — 87205 SMEAR GRAM STAIN: CPT

## 2020-05-22 RX ORDER — IPRATROPIUM BROMIDE AND ALBUTEROL SULFATE 2.5; .5 MG/3ML; MG/3ML
1 SOLUTION RESPIRATORY (INHALATION) EVERY 6 HOURS PRN
Status: DISCONTINUED | OUTPATIENT
Start: 2020-05-22 | End: 2020-05-26

## 2020-05-22 RX ORDER — SODIUM CHLORIDE 9 MG/ML
INJECTION, SOLUTION INTRAVENOUS CONTINUOUS
Status: DISCONTINUED | OUTPATIENT
Start: 2020-05-22 | End: 2020-05-30

## 2020-05-22 RX ORDER — VANCOMYCIN HYDROCHLORIDE 1 G/200ML
1000 INJECTION, SOLUTION INTRAVENOUS EVERY 8 HOURS
Status: DISCONTINUED | OUTPATIENT
Start: 2020-05-22 | End: 2020-05-22

## 2020-05-22 RX ADMIN — VANCOMYCIN HYDROCHLORIDE 1000 MG: 1 INJECTION, SOLUTION INTRAVENOUS at 06:33

## 2020-05-22 RX ADMIN — IPRATROPIUM BROMIDE AND ALBUTEROL SULFATE 1 AMPULE: .5; 3 SOLUTION RESPIRATORY (INHALATION) at 07:33

## 2020-05-22 RX ADMIN — ACETAMINOPHEN 650 MG: 325 TABLET ORAL at 01:51

## 2020-05-22 RX ADMIN — VANCOMYCIN HYDROCHLORIDE 1000 MG: 1 INJECTION, SOLUTION INTRAVENOUS at 14:46

## 2020-05-22 RX ADMIN — HEPARIN SODIUM 1520 UNITS: 1000 INJECTION INTRAVENOUS; SUBCUTANEOUS at 05:56

## 2020-05-22 RX ADMIN — Medication 15 MG: at 08:09

## 2020-05-22 RX ADMIN — SODIUM CHLORIDE: 9 INJECTION, SOLUTION INTRAVENOUS at 22:38

## 2020-05-22 RX ADMIN — AMPHOTERICIN B 230 MG: 50 INJECTION, POWDER, LYOPHILIZED, FOR SOLUTION INTRAVENOUS at 16:22

## 2020-05-22 RX ADMIN — SODIUM CHLORIDE: 4.5 INJECTION, SOLUTION INTRAVENOUS at 14:47

## 2020-05-22 RX ADMIN — POTASSIUM BICARBONATE 40 MEQ: 782 TABLET, EFFERVESCENT ORAL at 09:19

## 2020-05-22 RX ADMIN — VITAM B12 50 MCG: 100 TAB at 08:10

## 2020-05-22 RX ADMIN — SODIUM CHLORIDE, PRESERVATIVE FREE 10 ML: 5 INJECTION INTRAVENOUS at 21:42

## 2020-05-22 RX ADMIN — FOLIC ACID 1 MG: 1 TABLET ORAL at 08:10

## 2020-05-22 ASSESSMENT — PAIN SCALES - WONG BAKER

## 2020-05-22 ASSESSMENT — PULMONARY FUNCTION TESTS
PIF_VALUE: 18
PIF_VALUE: 19
PIF_VALUE: 18
PIF_VALUE: 18
PIF_VALUE: 17
PIF_VALUE: 26
PIF_VALUE: 20
PIF_VALUE: 23
PIF_VALUE: 19
PIF_VALUE: 19
PIF_VALUE: 17
PIF_VALUE: 20
PIF_VALUE: 17
PIF_VALUE: 19
PIF_VALUE: 20
PIF_VALUE: 18
PIF_VALUE: 16
PIF_VALUE: 1
PIF_VALUE: 17
PIF_VALUE: 17
PIF_VALUE: 18
PIF_VALUE: 21
PIF_VALUE: 20
PIF_VALUE: 17
PIF_VALUE: 19
PIF_VALUE: 20
PIF_VALUE: 12
PIF_VALUE: 17
PIF_VALUE: 19
PIF_VALUE: 18
PIF_VALUE: 20
PIF_VALUE: 16
PIF_VALUE: 18
PIF_VALUE: 19
PIF_VALUE: 22
PIF_VALUE: 23
PIF_VALUE: 19

## 2020-05-22 NOTE — PROGRESS NOTES
Vancomycin dose adjustment   Will change dose to vancomycin 1000 mg every 8 hours and check levels after 3 doses.

## 2020-05-22 NOTE — PLAN OF CARE
Problem: MECHANICAL VENTILATION  Goal: Patient will maintain patent airway  5/22/2020 0820 by Fadi Sy RCP  Outcome: Ongoing  5/22/2020 0818 by Fadi Sy RCP  Outcome: Ongoing  5/21/2020 1842 by Vangie Yeh RN  Outcome: Ongoing     Problem: MECHANICAL VENTILATION  Goal: Oral health is maintained or improved  5/22/2020 0820 by Fadi Sy RCP  Outcome: Ongoing  5/22/2020 0818 by Fadi Sy RCP  Outcome: Ongoing  5/21/2020 1842 by Vangie Yeh RN  Outcome: Ongoing     Problem: MECHANICAL VENTILATION  Goal: ET tube will be managed safely  5/22/2020 0820 by Fadi Sy RCP  Outcome: Ongoing  5/22/2020 0818 by Fadi Sy RCP  Outcome: Ongoing  5/21/2020 1842 by Vangie Yeh RN  Outcome: Ongoing     Problem: MECHANICAL VENTILATION  Goal: Ability to express needs and understand communication  5/22/2020 0820 by Fadi Sy RCP  Outcome: Ongoing  5/22/2020 0818 by Fadi Sy RCP  Outcome: Ongoing  5/21/2020 1842 by Vangie Yeh RN  Outcome: Ongoing     Problem: OXYGENATION/RESPIRATORY FUNCTION  Goal: Patient will achieve/maintain normal respiratory rate/effort  Description: Respiratory rate and effort will be within normal limits for the patient  5/22/2020 0820 by Fadi Sy RCP  Outcome: Ongoing  5/21/2020 1842 by Vangie Yeh RN  Outcome: Ongoing

## 2020-05-22 NOTE — PROGRESS NOTES
Goal achieved   · Goals: provide more than 75% of nutrition needs   · Monitoring: TF Intake, TF Tolerance, Monitor Bowel Function, Pertinent Labs      Electronically signed by Miriam Whitaker RD, LD on 5/22/20 at 12:27 PM EDT    Contact Number: [phone]

## 2020-05-22 NOTE — PROGRESS NOTES
Occupational Therapy Not Seen Note    DATE: 2020  Name: Lars Guerra  : 1960  MRN: [id number]    Patient not available for Occupational Therapy due to:    Pt off the unit for a LP.      Next Scheduled Treatment: 2020    Electronically signed by AMANDA Hewitt on 2020 at 2:21 PM

## 2020-05-22 NOTE — PROGRESS NOTES
therapy  · Targeted therapy    Coordination ofOutpatient Care:   · Estimated Length of IV antimicrobials:  · Patient will need Midline / picc Catheter Insertion:   · Patient will need SNF:  · Patient will need outpatient wound care:     History of Present Illness:   Initial history:  Zola Boas is a 62y.o.-year-old female who presented to the emergency room at [facility] with a 10 days history of a headache photophobia, a lower back pain with weakness. She denies being short of breath, she does have a smoker's cough that has improved recently. She has no aches other than the lower back pain, she has no sweats or fevers. She does not have a good appetite and has not eaten in 3 days, but does not think she has a taste bud problem  She was tested twice for COVID and came back negative  Unable to go to the bathroom at this point because of the weakness of her legs and the back pain. White count is normal but CRP is elevated as well as ferritin  CT scan of the chest is showing no infiltrates at all    The patient was transferred for possible COVID to [facility] and admitted to the [facility] floor ICU    I discussed with critical care, we can move him out of the [facility] ICU to a regular floor, get an MRI of the spine, consult neurosurgery, get a 2D echo,  The patient was started on Zosyn today. We will defer further antibiotic doses before we have the MRI finding    The patient denies any history of IV drug use    Interval changes  5/22/2020   Fever since 5/19 again, today seems better.   Intubated and FIO2 50 and PEEP 10  Rash is he same  Cryptococcus serum AG + 4  LP repeated 5/22 CSF WBC 63, neutrophils 39%, 54 lymp, Protein 142, glucose 53    Sp cx still neg  HIV neg  CT chest repeat LLL pneumonia larger than before -see below  WBC up 16 and no eosinophilia      Summary of relevant labs:  Labs:  WBC 10.2 - 16 - 14  ALC 0.14    -43-23 10  Ferritin 9357    Micro:  Sp cx 5/20  BC x2  5/20  BC  x2  5/13    HIV neg 5/15/20  Crypto serum AG + 4 ( 5/21)    5/22 LP  CSF WBC 63, neutrophils 39%, 54 lymp, Protein 142, glucose 53  5/15 LP WBC 8- glucose 74- 48% PMN-meningitis PCR neg - lyme neg - EBV neg - VZV neg    COVID neg 5/14  Legionella AG neg    Cardiology   Echo 5/17 no vegetation  Left ventricle is normal in size with normal systolic function globally. Calculated ejection fraction is 56%. Mildly dilated right atrium. Mild buckling of the right atrial wall. Aortic sclerosis without stenosis. Mild tricuspid regurgitation. Estimated right ventricular systolic pressure is 25 mmHg. Imaging:  CT chest 5/21 - progression of the RLL infilt/ pneumonia looks larger than on the first CT        EEG  Slowing 5/19  CXR 5/20 progression of the left infiltrate/ consolidation  CXR 5/19 shows worsening left lower lobe infiltrate with possible effusion      CT chest 5/17    CXR 5/16 5/17 MRI of the lumbar spine showed no active infection but osteoarthritis  5/17  MRI of the brain was negative       I have personally reviewed the past medical history, past surgical history, medications, social history, and family history, and I haveupdated the database accordingly.   Past Medical History:     Past Medical History:   Diagnosis Date    COPD (chronic obstructive pulmonary disease) ([facility] [address]     ETOH abuse        Past Surgical  History:     Past Surgical History:   Procedure Laterality Date    CHOLECYSTECTOMY      THYROIDECTOMY      TONSILLECTOMY         Medications:      amphotericin B liposome (AMBISOME) IVPB  4 mg/kg Intravenous Q24H    potassium bicarb-citric acid  40 mEq Oral Once    lansoprazole  15 mg Oral QAM AC    levofloxacin  750 mg Intravenous Q24H    [Held by provider] amitriptyline  25 mg Oral Nightly    sodium chloride flush  10 mL Intravenous 2 times per day    folic acid  1 mg Oral Daily    vitamin B-12  50 mcg Oral Daily       Social History:     Social History     Socioeconomic History    Marital status: Single     Spouse name: Not on file    Number of children: Not on file    Years of education: Not on file    Highest education level: Not on file   Occupational History    Not on file   Social Needs    Financial resource strain: Not on file    Food insecurity     Worry: Not on file     Inability: Not on file    Transportation needs     Medical: Not on file     Non-medical: Not on file   Tobacco Use    Smoking status: Current Every Day Smoker     Packs/day: 2.00     Types: Cigarettes    Smokeless tobacco: Never Used   Substance and Sexual Activity    Alcohol use: Yes     Frequency: 4 or more times a week     Drinks per session: 3 or 4     Binge frequency: Patient refused     Comment: hasn't drank in 12 days, usually daily drinker (vodka)    Drug use: Never    Sexual activity: Not on file   Lifestyle    Physical activity     Days per week: Patient refused     Minutes per session: Patient refused    Stress: Not on file   Relationships    Social connections     Talks on phone: Not on file     Gets together: Not on file     Attends Restorationism service: Not on file     Active member of club or organization: Not on file     Attends meetings of clubs or organizations: Not on file     Relationship status: Not on file    Intimate partner violence     Fear of current or ex partner: No     Emotionally abused: No     Physically abused: No     Forced sexual activity: No   Other Topics Concern    Not on file   Social History Narrative    Not on file       Family History:     Family History   Problem Relation Age of Onset    Stroke Mother     Cancer Father         Allergies:   Sulfa antibiotics     Review of Systems:     Review of Systems   Unable to perform ROS: Mental status change       Physical Examination :     Patient Vitals for the past 8 hrs:   BP Temp Temp src Pulse Resp SpO2   05/22/20 1715 -- -- -- 90 23 96 %   05/22/20 1710 101/67 -- -- 89 23 96 %   05/22/20 1600 -- -- -- 89 20 99 %   05/22/20 1545 -- -- -- 85 18 97 %   05/22/20 1540 100/63 99 °F (37.2 °C) Oral 85 19 97 %   05/22/20 1530 -- -- -- 90 19 97 %   05/22/20 1515 -- -- -- 91 18 97 %   05/22/20 1512 -- -- -- 88 18 97 %   05/22/20 1500 -- -- -- 86 20 97 %   05/22/20 1455 101/66 -- -- 94 19 97 %   05/22/20 1445 -- -- -- 90 19 98 %   05/22/20 1438 -- -- -- 96 -- --   05/22/20 1426 112/79 -- -- 100 18 98 %   05/22/20 1412 109/78 -- -- 97 20 92 %   05/22/20 1245 -- -- -- 97 21 100 %   05/22/20 1230 -- -- -- 105 25 100 %   05/22/20 1215 -- -- -- 96 23 100 %   05/22/20 1200 -- -- -- 95 25 100 %   05/22/20 1155 119/75 -- -- 94 26 100 %   05/22/20 1145 -- -- -- 93 25 100 %   05/22/20 1130 -- -- -- 94 26 100 %   05/22/20 1118 -- -- -- 94 18 100 %   05/22/20 1115 -- -- -- 93 23 100 %   05/22/20 1100 -- -- -- 101 22 100 %   05/22/20 1045 -- -- -- 91 22 100 %   05/22/20 1030 -- -- -- 92 22 100 %   05/22/20 1015 -- -- -- 90 23 100 %   05/22/20 1000 -- -- -- 92 24 100 %   05/22/20 0955 111/67 -- -- 91 24 100 %   05/22/20 0945 -- -- -- 90 21 100 %   05/22/20 0930 -- -- -- 90 21 100 %       Physical Exam  Constitutional:       Appearance: Normal appearance. She is normal weight. HENT:      Head: Normocephalic and atraumatic. Nose: No congestion or rhinorrhea. Eyes:      General: No scleral icterus. Conjunctiva/sclera: Conjunctivae normal.   Neck:      Musculoskeletal: Neck supple. No neck rigidity. Cardiovascular:      Rate and Rhythm: Normal rate and regular rhythm. Pulmonary:      Effort: Pulmonary effort is normal. No respiratory distress. Abdominal:      General: There is no distension. Tenderness: There is no abdominal tenderness. Genitourinary:     Comments: No redd  Musculoskeletal:         General: No swelling or deformity. Skin:     General: Skin is warm. Coloration: Skin is not jaundiced or pale. Findings: No bruising or erythema. Neurological:      Mental Status: She is alert.       Cranial Nerves: No cranial nerve

## 2020-05-22 NOTE — PROGRESS NOTES
Spoke with sister, Brian Parsons, explained the reason for LP, the risk and benefits, answered concerns and question. She is agreeable for LP. IR informed about updated decision, reached out and told to call her again for consent. Please call Brian Parsons when results of the LP are available.      Guerda Valdivia MD  11:08 AM  05/22/20

## 2020-05-22 NOTE — PROGRESS NOTES
0000 - 05/22/20 2359   Shift 8033-5916 9214-4384 0707-6842 24 Hour Total   INTAKE   I.V.(mL/kg) 718(12.6)   718(12.6)   NG/GT(mL/kg) 417(7.3)   417(7.3)   Shift Total(mL/kg) 1135(20)   1135(20)   OUTPUT   Urine(mL/kg/hr) 435   435   Stool(mL/kg) 215(3.8)   215(3.8)   Shift Total(mL/kg) 650(11.4)   650(11.4)   Weight (kg) 56.8 56.8 56.8 56.8     Wt Readings from Last 3 Encounters:   05/22/20 125 lb 3.5 oz (56.8 kg)   05/14/20 120 lb 9.6 oz (54.7 kg)     Body mass index is 19.61 kg/m².         PHYSICAL EXAM:  GEN:  appears stated age  EYES:  pupils equal, round, and reactive to light  HEENT:  Normocepalic, without obvious abnormality  LUNGS:  good air exchange  CV:    tachycardic with regular rhythm  ABDOMEN:   soft and non-distended  MSK:    There is a macular/papular rash throughout the extremities, seen worse at lower extremity  there is no redness, warmth, or swelling of the joints  NEURO[de-identified]   Patient's status sedated during evaluation  SKIN:   Erythema generalized  EXTREMITIES:  No erythema, distal pulses intact       MEDICATIONS:  Scheduled Meds:   vancomycin  1,000 mg Intravenous Q8H    vancomycin (VANCOCIN) intermittent dosing (placeholder)   Other RX Placeholder    lansoprazole  15 mg Oral QAM AC    levofloxacin  750 mg Intravenous Q24H    ipratropium-albuterol  1 ampule Inhalation 4x daily    [Held by provider] amitriptyline  25 mg Oral Nightly    sodium chloride flush  10 mL Intravenous 2 times per day    folic acid  1 mg Oral Daily    vitamin B-12  50 mcg Oral Daily     Continuous Infusions:   propofol Stopped (05/21/20 0853)    sodium chloride 75 mL/hr at 05/21/20 1744    heparin (porcine) 22 Units/kg/hr (05/22/20 0556)     PRN Meds:   fentanNYL, 25 mcg, Q1H PRN    Or  fentanNYL, 50 mcg, Q1H PRN  LORazepam, 2 mg, Q4H PRN  heparin (porcine), 60 Units/kg, PRN  heparin (porcine), 30 Units/kg, PRN  sodium chloride flush, 10 mL, PRN  acetaminophen, 650 mg, Q6H PRN    Or  acetaminophen, 650 mg, Q6H PRN  polyethylene glycol, 17 g, Daily PRN  promethazine, 12.5 mg, Q6H PRN    Or  ondansetron, 4 mg, Q6H PRN  potassium chloride, 40 mEq, PRN    Or  potassium alternative oral replacement, 40 mEq, PRN    Or  potassium chloride, 10 mEq, PRN  magnesium sulfate, 2 g, PRN  midodrine, 5 mg, TID PRN        SUPPORT DEVICES: [] Ventilator [] BIPAP  [] Nasal Cannula [] Room Air    VENT SETTINGS (Comprehensive) (if applicable):    Vent Information  $Ventilation: $Subsequent Day  Skin Assessment: Clean, dry, & intact  Suction Catheter Diameter: 14  Equipment Changed: HME  Vent Type: Servo i  Vent Mode: PRVC  Vt Ordered: 500 mL  Rate Set: 16 bmp  FiO2 : 30 %  SpO2: 100 %  SpO2/FiO2 ratio: 333.33  Sensitivity: 5  PEEP/CPAP: 5  I Time/ I Time %: 0.9 s  Humidification Source: HME  Nitric Oxide/Epoprostenol In Use?: No  Additional Respiratory  Assessments  Pulse: 78  Resp: 18  SpO2: 100 %  End Tidal CO2: 29 (%)  Position: Semi-Sherman's  Humidification Source: HME  Oral Care Completed?: Yes  Oral Care: Mouth swabbed, Mouth moisturizer, Mouth suctioned  Subglottic Suction Done?: No  Cuff Pressure (cm H2O): 30 cm H2O    ABGs:     Lab Results   Component Value Date    WUF4YAD 29 05/22/2020    FIO2 40.0 05/22/2020         DATA:  Complete Blood Count:   Recent Labs     05/20/20  0344 05/21/20  0453 05/21/20  0807 05/22/20  0529   WBC 12.3* 16.8*  --  14.5*   RBC 4.74 3.95  --  3.41*   HGB 14.2 12.0  --  10.4*   HCT 45.3 36.9  --  31.7*   MCV 95.6 93.4  --  93.0   MCH 30.0 30.4  --  30.5   MCHC 31.3 32.5  --  32.8   RDW 13.9 14.2  --  14.1    184 192 166   MPV 10.6 10.4  --  10.1        Last 3 Blood Glucose:   Recent Labs     05/20/20  0344 05/20/20  1011 05/20/20  2100 05/21/20  0453   GLUCOSE 129* 127* 127* 120*        PT/INR:    Lab Results   Component Value Date    PROTIME 10.7 05/14/2020    INR 1.0 05/14/2020     PTT:    Lab Results   Component Value Date    APTT 45.2 05/22/2020       Comprehensive Metabolic Profile:   Recent Labs     05/20/20  1011 05/20/20  2100 05/21/20  0453   * 146* 144   K 3.7 3.7 3.7   * 110* 108*   CO2 28 28 26   BUN 26* 18 16   CREATININE 0.71 0.32* 0.25*   GLUCOSE 127* 127* 120*   CALCIUM 8.2* 8.0* 7.8*      Magnesium:   Lab Results   Component Value Date    MG 2.1 05/15/2020    MG 2.1 05/14/2020    MG 1.9 05/14/2020     Phosphorus:   Lab Results   Component Value Date    PHOS 2.4 05/14/2020     Ionized Calcium:   Lab Results   Component Value Date    CAION 1.15 05/14/2020        Urinalysis:   Lab Results   Component Value Date    NITRU NEGATIVE 05/14/2020    COLORU YELLOW 05/14/2020    PHUR 5.0 05/14/2020    WBCUA None 05/14/2020    RBCUA 2 TO 5 05/14/2020    MUCUS NOT REPORTED 05/14/2020    TRICHOMONAS NOT REPORTED 05/14/2020    YEAST NOT REPORTED 05/14/2020    BACTERIA FEW 05/14/2020    SPECGRAV 1.015 05/14/2020    LEUKOCYTESUR NEGATIVE 05/14/2020    UROBILINOGEN Normal 05/14/2020    BILIRUBINUR NEGATIVE 05/14/2020    GLUCOSEU NEGATIVE 05/14/2020    KETUA NEGATIVE 05/14/2020    AMORPHOUS 3+ 05/14/2020       HgBA1c:  No results found for: LABA1C  TSH:    Lab Results   Component Value Date    TSH 0.70 05/14/2020     Lactic Acid:   Lab Results   Component Value Date    LACTA NOT REPORTED 05/14/2020      Troponin: No results for input(s): TROPONINI in the last 72 hours.     Microbiology:  Urine Culture:  No components found for: CURINE  Blood Culture:  No components found for: CBLOOD, CFUNGUSBL  Sputum Culture:  No components found for: CSPUTUM      Other Labs:  CBC with Differential:    Lab Results   Component Value Date    WBC 14.5 05/22/2020    RBC 3.41 05/22/2020    HGB 10.4 05/22/2020    HCT 31.7 05/22/2020     05/22/2020    MCV 93.0 05/22/2020    MCH 30.5 05/22/2020    MCHC 32.8 05/22/2020    RDW 14.1 05/22/2020    LYMPHOPCT 18 05/22/2020    MONOPCT 4 05/22/2020    BASOPCT 0 05/22/2020    MONOSABS 0.56 05/22/2020    LYMPHSABS 2.56 05/22/2020    EOSABS 0.07 05/22/2020    BASOSABS <0.03 05/22/2020    DIFFTYPE NOT REPORTED 05/22/2020     CMP:    Lab Results   Component Value Date     05/21/2020    K 3.7 05/21/2020     05/21/2020    CO2 26 05/21/2020    BUN 16 05/21/2020    CREATININE 0.25 05/21/2020    GFRAA >60 05/21/2020    LABGLOM >60 05/21/2020    GLUCOSE 120 05/21/2020    PROT 4.6 05/14/2020    LABALBU 2.5 05/15/2020    CALCIUM 7.8 05/21/2020    BILITOT 0.29 05/14/2020    ALKPHOS 49 05/14/2020    AST 86 05/14/2020    ALT 32 05/14/2020         Radiology/Imaging:  Xr Chest (single View Frontal)    Result Date: 5/20/2020  1. Stable volume loss of the left hemithorax. Worsening airspace disease projecting over the left upper and left mid lung zone. Stable retrocardiac airspace consolidation. Small to moderate left-sided pleural effusion, stable. Follow-up is recommended to document resolution. 2. Stable right to left mediastinal shift. 3. Stable mild cardiomegaly. 4. Tubes as above. Xr Chest (single View Frontal)    Result Date: 5/19/2020  Moderate left lung base opacity which has increased. Ct Head Wo Contrast    Result Date: 5/20/2020  No acute intracranial abnormality is evident. Partially imaged NG tube. Ct Chest W Contrast    Result Date: 5/13/2020  Moderate pericardial effusion. Recommend clinical correlation. No evidence of lung consolidation. Mra Head Wo Contrast    Result Date: 5/17/2020  Motion limited exam.  No major branch occlusion. CTA would be more sensitive in excluding vasculitis. Mri Cervical Spine W Wo Contrast    Result Date: 5/16/2020  No cord abnormality identified. No MRI evidence of discitis-osteomyelitis of the cervical or thoracic spine. Degenerative changes as detailed above. Incidentally noted left lower lobe atelectasis. Mri Thoracic Spine W Wo Contrast    Result Date: 5/16/2020  No cord abnormality identified. No MRI evidence of discitis-osteomyelitis of the cervical or thoracic spine. Degenerative changes as detailed above. lobes. No associated abnormal enhancement. Correlate with LP findings. No evidence of dural venous sinus thrombosis. Mri Brain W Wo Contrast    Result Date: 5/14/2020  No acute disease. No evidence of meningeal enhancement or empyema. ASSESSMENT:     Patient Active Problem List    Diagnosis Date Noted    Hypoxia     DVT (deep venous thrombosis) ([facility] 05/18/2020    Fever 05/17/2020    Skin rash 03/61/7320    Metabolic encephalopathy     Chronic obstructive pulmonary disease (HCC)     Pericardial effusion     Thrombocytopenia (HCC)     Hypotension due to hypovolemia     Alcoholism ([facility] 05/14/2020    Hyponatremia 05/14/2020    DIXON (acute kidney injury) ([facility] 05/14/2020    Smoker 05/14/2020    Suspected COVID-19 virus infection     Acute intractable headache 05/11/2020    Chronic abdominal pain 10/30/2018    Mucopurulent chronic bronchitis ([facility] 07/25/2018    Weight loss 07/25/2018          PLAN:       NEURO:   1. Sedation  a. Propofol --OFF 5/22  2. Metabolic encephalopathy  a. Neurology following  i. Follow-up recommendations    CARDIO:   1. Acute episode of hypotension  a. Did not require pressors  b. 1 L LR, pressures at 092 systolic now  c. Remain soft    PULM:   1. Respiratory failure  a. Intubated  i. In setting 16, 500, 5, 30  ii. Recent blood gas: 7.47/37/103,-bicarbonate not reported  b. Left lobe atelectasis versus pneumonia  i. Patient is currently on Levaquin  ii. ID following    RENAL:   1. Patient creatinine stable, 0.25  2. No DIXON reported at this time    FLUIDS/ELECTROLYTES/NUTRITION:   1. Patient is currently on normal saline 75 cc/h  2. We will monitor electrolytes  3. Did present initially with hypernatremia  a. Resolved, sodium 144    INFECTION:   1. Left lobe atelectasis versus pneumonia  a. Levaquin, ID following  b. ID wants repeat LP  i. Assessment for crypto and histo AG  2. Acute febrile reaction  a. Elevated white count at 16  b.  Patient had a fever of 104,

## 2020-05-22 NOTE — PROGRESS NOTES
DATE: 2020  NAME: Andrea Martinez  MRN: [id number]   : 1960    Discharge Recommendations: Continue to Assess (pending progress)     Assessment   Body structures, Functions, Activity limitations: Decreased functional mobility ; Decreased strength;Decreased endurance;Decreased cognition;Decreased balance  Prognosis: Good  Decision Making: Medium Complexity  Patient Education: Unable to state. REQUIRES PT FOLLOW UP: Yes  Activity Tolerance  Activity Tolerance: Patient limited by fatigue;Patient limited by endurance; Patient limited by cognitive status     Objective  Subjective:Pt supine in bed. Pt did open her eye's occasionally during her tx. Pain: JERMAIN  Patient follows: No Commands  Is patient on ventilator: Yes  Is patient on sedation: No  Precautions: General; Fall  Therapeutic exercises:  UE/LE(s)  Bilateral Passive range of motion all planes x 10 reps  Used Maxi  chari lift to transfer pt from bed to [employer] boy chair. 1 assist for line mgmt. Plan   Plan  Times per week: 5x/week  Current Treatment Recommendations: Strengthening, Functional Mobility Training, Transfer Training, Gait Training, Safety Education & Training, Balance Training, Endurance Training, Neuromuscular Re-education  Safety Devices  Type of devices: Call light within reach, Left in bed, Telesitter in use, Nurse notified       Goals  Short Term Goals  Short term goal 1: Pt to perform Bed mobility min A  Short term goal 2: Pt to improve sitting balance to Good  Short term goal 3: Pt to transfer Min A  Short term goal 4: Pt to ambulate with appropriate device 100 ft Min A  Short term goal 5: Pt to tolerate 30 minutes of activity to improve strength and endurance. Plan: Progress functional mobility as medically appropriate.    Time In: 730  Time Out: 755  Time Coded Minutes (treatment minutes): 25  Rehab Potential: Good  Treatments/week: 5x/wk    Deborah Cochran PTA

## 2020-05-22 NOTE — PROGRESS NOTES
(Core)   Resp 24   Ht 5' 7\" (1.702 m)   Wt 125 lb 3.5 oz (56.8 kg)   SpO2 100%   BMI 19.61 kg/m²     Blood pressure range: Systolic (02PKE), LGB:948 , Min:93 , LUR:550   ; Diastolic (06ABQ), BEW:04, Min:48, Max:67      ROS:  Review of Systems   Unable to perform ROS: Mental status change         NEUROLOGIC EXAMINATION  Physical Exam  Constitutional:       General: She is not in acute distress. Appearance: She is ill-appearing and toxic-appearing. She is not diaphoretic. Interventions: She is intubated. Comments: She is more alert today as opposed to yesterday. She is still not following commands   HENT:      Head: Normocephalic and atraumatic. Eyes:      Pupils: Pupils are equal, round, and reactive to light. Neck:      Musculoskeletal: Decreased range of motion. No neck rigidity. Cardiovascular:      Rate and Rhythm: Normal rate and regular rhythm. Pulmonary:      Effort: Pulmonary effort is normal. She is intubated. Abdominal:      General: Abdomen is flat. Skin:     General: Skin is warm. Findings: Rash (Generalized skin rash, moribilliform) present. Neurological:      GCS: GCS eye subscore is 2. GCS verbal subscore is 1. GCS motor subscore is 5. Cranial Nerves: No cranial nerve deficit or facial asymmetry. Deep Tendon Reflexes: Babinski sign absent on the right side. Babinski sign absent on the left side. Comments: Stuporous          Psychiatric:         Speech: Speech is slurred. Neurologic Exam     Mental Status   Speech: slurred   Level of consciousness: arousable by tactile stimuli  Unable to name object. Unable to read. Unable to repeat. Unable to write. Cranial Nerves     CN III, IV, VI   Pupils are equal, round, and reactive to light. Nystagmus: none     CN VII   Facial expression full, symmetric. Motor Exam Withdraws to pain on all extremities.    No following commands         Lab Results:   CBC:   Recent Labs     05/20/20  0344 05/21/20  0453 05/21/20  0807 05/22/20  0529   WBC 12.3* 16.8*  --  14.5*   HGB 14.2 12.0  --  10.4*    184 192 166     BMP:    Recent Labs     05/20/20  2100 05/21/20  0453 05/22/20  0810   * 144 136   K 3.7 3.7 3.3*   * 108* 101   CO2 28 26 25   BUN 18 16 11   CREATININE 0.32* 0.25* 0.26*   GLUCOSE 127* 120* 144*         Lab Results   Component Value Date    ALT 32 05/14/2020    AST 86 (H) 05/14/2020    TSH 0.70 05/14/2020    INR 1.0 05/14/2020    [id number] 1358 (H) 05/17/2020     CSF studies  White blood cells 8  Neutrophils 48%  Lymphocytes 21%  RBCs 1  Glucose 74  Protein 77.8  Increased IgG synthesis rate  No results found for: PHENYTOIN, PHENYTOIN, VALPROATE, CBMZ    IMAGING    CTH repeat 5/20/2020  No acute findings     CTPE  No evidence of pulmonary embolism. Left lower lobar atelectasis. MRI thoracic/ lumbar spine   No cord abnormality identified. No MRI evidence of discitis-osteomyelitis of the cervical or thoracic spine. Degenerative changes as detailed above. Incidentally noted left lower lobe atelectasis. MRI brain w/wo contrast 5/14/2020  No acute disease. No evidence of meningeal enhancement or empyema. MRI brain w/wo contrast 5/17/2020  Possible acute microinfarct involving within the splenium of corpus callosum. Motion degraded exam        ASSESSMENT AND RECOMMENDATIONS   62year old female with history of alcohol abuse, who presented with new onset severe 8-9/10 generalized headache associated with photophobia, phonophobia, fever, cough, and diarrhea. COVID neg x2. CSF studies ruled out infectious encephalitis.  She is being     Meningoencephalitis, cryptococcal    Lobar pneumonia with para pneumonic effusion     Sepsis due to above  Hypoxic respiratory failure due to above  Hypernatremia  Dehydration   Alcohol abuse     Cryptococcal meningitis per ID team   Sepsis management as per ID, 1ry team   Continue thiamine 250 mg IV q24h    Amitripytline 25 mg daily for headache if able to take orally   Hypernatremia, dehydration management by primary       Discussed with Dr. Vito Doll MD  PGY-3 Neurology Resident

## 2020-05-23 LAB
ABSOLUTE EOS #: 0.07 K/UL (ref 0–0.44)
ABSOLUTE IMMATURE GRANULOCYTE: 0.11 K/UL (ref 0–0.3)
ABSOLUTE LYMPH #: 1.85 K/UL (ref 1.1–3.7)
ABSOLUTE MONO #: 0.49 K/UL (ref 0.1–1.2)
ALLEN TEST: ABNORMAL
BASOPHILS # BLD: 0 % (ref 0–2)
BASOPHILS ABSOLUTE: <0.03 K/UL (ref 0–0.2)
CERULOPLASMIN: 17 MG/DL (ref 16–45)
CRYPTOCOCCAL ANTIGEN CSF: NEGATIVE
CULTURE: NORMAL
DIFFERENTIAL TYPE: ABNORMAL
EOSINOPHILS RELATIVE PERCENT: 1 % (ref 1–4)
FIO2: 30
HCT VFR BLD CALC: 30.5 % (ref 36.3–47.1)
HEMOGLOBIN: 10.1 G/DL (ref 11.9–15.1)
IGG: 451 MG/DL (ref 700–1600)
IMMATURE GRANULOCYTES: 1 %
LYMPHOCYTES # BLD: 17 % (ref 24–43)
Lab: NORMAL
MCH RBC QN AUTO: 30.3 PG (ref 25.2–33.5)
MCHC RBC AUTO-ENTMCNC: 33.1 G/DL (ref 28.4–34.8)
MCV RBC AUTO: 91.6 FL (ref 82.6–102.9)
MODE: ABNORMAL
MONOCYTES # BLD: 4 % (ref 3–12)
NEGATIVE BASE EXCESS, ART: ABNORMAL (ref 0–2)
NRBC AUTOMATED: 0 PER 100 WBC
O2 DEVICE/FLOW/%: ABNORMAL
PARTIAL THROMBOPLASTIN TIME: 35.1 SEC (ref 20.5–30.5)
PARTIAL THROMBOPLASTIN TIME: 41.2 SEC (ref 20.5–30.5)
PARTIAL THROMBOPLASTIN TIME: 45.1 SEC (ref 20.5–30.5)
PARTIAL THROMBOPLASTIN TIME: 45.2 SEC (ref 20.5–30.5)
PATIENT TEMP: ABNORMAL
PDW BLD-RTO: 13.6 % (ref 11.8–14.4)
PLATELET # BLD: 181 K/UL (ref 138–453)
PLATELET ESTIMATE: ABNORMAL
PMV BLD AUTO: 10.6 FL (ref 8.1–13.5)
POC HCO3: 30.5 MMOL/L (ref 21–28)
POC O2 SATURATION: 97 % (ref 94–98)
POC PCO2 TEMP: ABNORMAL MM HG
POC PCO2: 36.7 MM HG (ref 35–48)
POC PH TEMP: ABNORMAL
POC PH: 7.53 (ref 7.35–7.45)
POC PO2 TEMP: ABNORMAL MM HG
POC PO2: 83 MM HG (ref 83–108)
POSITIVE BASE EXCESS, ART: 7 (ref 0–3)
RBC # BLD: 3.33 M/UL (ref 3.95–5.11)
RBC # BLD: ABNORMAL 10*6/UL
RHEUMATOID FACTOR: 13.9 IU/ML
SAMPLE SITE: ABNORMAL
SEDIMENTATION RATE, ERYTHROCYTE: 51 MM (ref 0–20)
SEG NEUTROPHILS: 77 % (ref 36–65)
SEGMENTED NEUTROPHILS ABSOLUTE COUNT: 8.68 K/UL (ref 1.5–8.1)
SPECIMEN DESCRIPTION: NORMAL
TCO2 (CALC), ART: 32 MMOL/L (ref 22–29)
WBC # BLD: 11.2 K/UL (ref 3.5–11.3)
WBC # BLD: ABNORMAL 10*3/UL

## 2020-05-23 PROCEDURE — 82390 ASSAY OF CERULOPLASMIN: CPT

## 2020-05-23 PROCEDURE — 2580000003 HC RX 258: Performed by: INTERNAL MEDICINE

## 2020-05-23 PROCEDURE — 2700000000 HC OXYGEN THERAPY PER DAY

## 2020-05-23 PROCEDURE — 85025 COMPLETE CBC W/AUTO DIFF WBC: CPT

## 2020-05-23 PROCEDURE — 86334 IMMUNOFIX E-PHORESIS SERUM: CPT

## 2020-05-23 PROCEDURE — 82784 ASSAY IGA/IGD/IGG/IGM EACH: CPT

## 2020-05-23 PROCEDURE — 86147 CARDIOLIPIN ANTIBODY EA IG: CPT

## 2020-05-23 PROCEDURE — 86431 RHEUMATOID FACTOR QUANT: CPT

## 2020-05-23 PROCEDURE — 6360000002 HC RX W HCPCS: Performed by: INTERNAL MEDICINE

## 2020-05-23 PROCEDURE — 51701 INSERT BLADDER CATHETER: CPT

## 2020-05-23 PROCEDURE — 99291 CRITICAL CARE FIRST HOUR: CPT | Performed by: INTERNAL MEDICINE

## 2020-05-23 PROCEDURE — 87327 CRYPTOCOCCUS NEOFORM AG IA: CPT

## 2020-05-23 PROCEDURE — 82232 ASSAY OF BETA-2 PROTEIN: CPT

## 2020-05-23 PROCEDURE — 51798 US URINE CAPACITY MEASURE: CPT

## 2020-05-23 PROCEDURE — 94770 HC ETCO2 MONITOR DAILY: CPT

## 2020-05-23 PROCEDURE — 99233 SBSQ HOSP IP/OBS HIGH 50: CPT | Performed by: INTERNAL MEDICINE

## 2020-05-23 PROCEDURE — 6370000000 HC RX 637 (ALT 250 FOR IP): Performed by: PSYCHIATRY & NEUROLOGY

## 2020-05-23 PROCEDURE — 2060000000 HC ICU INTERMEDIATE R&B

## 2020-05-23 PROCEDURE — 6370000000 HC RX 637 (ALT 250 FOR IP): Performed by: STUDENT IN AN ORGANIZED HEALTH CARE EDUCATION/TRAINING PROGRAM

## 2020-05-23 PROCEDURE — 84165 PROTEIN E-PHORESIS SERUM: CPT

## 2020-05-23 PROCEDURE — 85730 THROMBOPLASTIN TIME PARTIAL: CPT

## 2020-05-23 PROCEDURE — 85651 RBC SED RATE NONAUTOMATED: CPT

## 2020-05-23 PROCEDURE — 2000000000 HC ICU R&B

## 2020-05-23 PROCEDURE — 94761 N-INVAS EAR/PLS OXIMETRY MLT: CPT

## 2020-05-23 PROCEDURE — 83516 IMMUNOASSAY NONANTIBODY: CPT

## 2020-05-23 PROCEDURE — 99233 SBSQ HOSP IP/OBS HIGH 50: CPT | Performed by: PSYCHIATRY & NEUROLOGY

## 2020-05-23 PROCEDURE — 84155 ASSAY OF PROTEIN SERUM: CPT

## 2020-05-23 PROCEDURE — 82803 BLOOD GASES ANY COMBINATION: CPT

## 2020-05-23 PROCEDURE — 99254 IP/OBS CNSLTJ NEW/EST MOD 60: CPT | Performed by: PSYCHIATRY & NEUROLOGY

## 2020-05-23 PROCEDURE — 37799 UNLISTED PX VASCULAR SURGERY: CPT

## 2020-05-23 PROCEDURE — 94003 VENT MGMT INPAT SUBQ DAY: CPT

## 2020-05-23 RX ADMIN — HEPARIN SODIUM AND DEXTROSE 28 UNITS/KG/HR: 10000; 5 INJECTION INTRAVENOUS at 19:55

## 2020-05-23 RX ADMIN — VITAM B12 50 MCG: 100 TAB at 09:37

## 2020-05-23 RX ADMIN — HEPARIN SODIUM 1520 UNITS: 1000 INJECTION INTRAVENOUS; SUBCUTANEOUS at 14:54

## 2020-05-23 RX ADMIN — HEPARIN SODIUM 1520 UNITS: 1000 INJECTION INTRAVENOUS; SUBCUTANEOUS at 00:30

## 2020-05-23 RX ADMIN — AMPHOTERICIN B 230 MG: 50 INJECTION, POWDER, LYOPHILIZED, FOR SOLUTION INTRAVENOUS at 15:07

## 2020-05-23 RX ADMIN — Medication 15 MG: at 09:37

## 2020-05-23 RX ADMIN — HEPARIN SODIUM 1520 UNITS: 1000 INJECTION INTRAVENOUS; SUBCUTANEOUS at 06:58

## 2020-05-23 RX ADMIN — SODIUM CHLORIDE, PRESERVATIVE FREE 10 ML: 5 INJECTION INTRAVENOUS at 09:37

## 2020-05-23 RX ADMIN — FOLIC ACID 1 MG: 1 TABLET ORAL at 09:36

## 2020-05-23 RX ADMIN — HEPARIN SODIUM AND DEXTROSE 24 UNITS/KG/HR: 10000; 5 INJECTION INTRAVENOUS at 00:30

## 2020-05-23 RX ADMIN — SODIUM CHLORIDE, PRESERVATIVE FREE 10 ML: 5 INJECTION INTRAVENOUS at 21:59

## 2020-05-23 RX ADMIN — HEPARIN SODIUM 1520 UNITS: 1000 INJECTION INTRAVENOUS; SUBCUTANEOUS at 22:51

## 2020-05-23 ASSESSMENT — PAIN SCALES - WONG BAKER
WONGBAKER_NUMERICALRESPONSE: 0

## 2020-05-23 ASSESSMENT — PULMONARY FUNCTION TESTS
PIF_VALUE: 19
PIF_VALUE: 16
PIF_VALUE: 17
PIF_VALUE: 18
PIF_VALUE: 17
PIF_VALUE: 18
PIF_VALUE: 18

## 2020-05-23 NOTE — PROGRESS NOTES
ENZYMES: No results for input(s): CKMB, CKMBINDEX, TROPONINI in the last 72 hours. Invalid input(s): CKTOTAL;3  BNP: No results for input(s): BNP in the last 72 hours. LFTS  No results for input(s): ALKPHOS, ALT, AST, BILITOT, BILIDIR, LABALBU in the last 72 hours. AMYLASE/LIPASE/AMMONIA  No results for input(s): AMYLASE, LIPASE, AMMONIA in the last 72 hours. Last 3 Blood Glucose:   Recent Labs     05/20/20  2100 05/21/20  0453 05/22/20  0810   GLUCOSE 127* 120* 144*      HgBA1c:  No results found for: LABA1C      TSH:    Lab Results   Component Value Date    TSH 0.70 05/14/2020     ANEMIA STUDIES  No results for input(s): LABIRON, TIBC, FERRITIN, CANWIAET03, FOLATE, OCCULTBLD in the last 72 hours. Cultures during this admission:     Blood cultures:                 [] None drawn      [x] Negative             []  Positive (Details:  )  Urine Culture:                   [x] None drawn      [] Negative             []  Positive (Details:  )  Sputum Culture:               [] None drawn       [x] Negative             []  Positive (Details:  )   CSF aspirate:      [] None drawn       [x] Negative             []  Positive (Details:  )        ASSESSMENT:     Principal Problem:    Fever  Active Problems:    Acute intractable headache    Chronic abdominal pain    Hyponatremia    Mucopurulent chronic bronchitis (HCC)    DIXON (acute kidney injury) ([facility]    Smoker    Suspected COVID-19 virus infection    Chronic obstructive pulmonary disease (HCC)    Pericardial effusion    Thrombocytopenia (HCC)    Hypotension due to hypovolemia    Skin rash    Metabolic encephalopathy    DVT (deep venous thrombosis) ([facility]    Hypoxia  Resolved Problems:    Hx of cholecystectomy    Encephalopathy        PLAN:     1. Acute encephalopathy, presumptively toxic secondary to Cryptococcus. Cryptococcal blood antigen positive. CSF fluid culture still pending. ID following, started on amphotericin B yesterday.   Unsure whether the patient has any cause to be immunosuppressed so will run a full autoimmune work-up. Neurology also following. 2. Hypoxic respiratory failure status post intubation. Continued of sedation. Will hold off on weaning trials as neurologically she would most likely not be able to protect her airway. 3. Cryptococcal pneumonia. Vancomycin and Levaquin discontinued. 4. Hypertension. Resolved. Maintained off pressors. Maintain map greater than 65  5. DIXON resolved. 6. Normocytic anemia. Hemoglobin stable  7. History of right gastrocnemius DVT. On Eliquis at home. Will continue with heparin infusion  8. Diet. Tube feeds  9. GI prophylaxis. Lansoprazole  10. DVT prophylaxis.   Heparin infusion         Andi Conway MD  PGY-2, Internal medicine resident  [facility]al [place], [place]  5/23/2020 12:10 PM

## 2020-05-23 NOTE — PLAN OF CARE
RCP  Outcome: Ongoing  5/23/2020 0634 by Nura Colon RN  Outcome: Ongoing     Problem: OXYGENATION/RESPIRATORY FUNCTION  Goal: Patient will achieve/maintain normal respiratory rate/effort  Description: Respiratory rate and effort will be within normal limits for the patient  5/23/2020 0737 by Martha Bernardo RCP  Outcome: Ongoing  5/23/2020 0634 by Nura Colon RN  Outcome: Ongoing

## 2020-05-23 NOTE — PROGRESS NOTES
Oral Once    lansoprazole  15 mg Oral QAM AC    [Held by provider] amitriptyline  25 mg Oral Nightly    sodium chloride flush  10 mL Intravenous 2 times per day    folic acid  1 mg Oral Daily    vitamin B-12  50 mcg Oral Daily       Social History:     Social History     Socioeconomic History    Marital status: Single     Spouse name: Not on file    Number of children: Not on file    Years of education: Not on file    Highest education level: Not on file   Occupational History    Not on file   Social Needs    Financial resource strain: Not on file    Food insecurity     Worry: Not on file     Inability: Not on file    Transportation needs     Medical: Not on file     Non-medical: Not on file   Tobacco Use    Smoking status: Current Every Day Smoker     Packs/day: 2.00     Types: Cigarettes    Smokeless tobacco: Never Used   Substance and Sexual Activity    Alcohol use: Yes     Frequency: 4 or more times a week     Drinks per session: 3 or 4     Binge frequency: Patient refused     Comment: hasn't drank in 12 days, usually daily drinker (vodka)    Drug use: Never    Sexual activity: Not on file   Lifestyle    Physical activity     Days per week: Patient refused     Minutes per session: Patient refused    Stress: Not on file   Relationships    Social connections     Talks on phone: Not on file     Gets together: Not on file     Attends [employer] service: Not on file     Active member of club or organization: Not on file     Attends meetings of clubs or organizations: Not on file     Relationship status: Not on file    Intimate partner violence     Fear of current or ex partner: No     Emotionally abused: No     Physically abused: No     Forced sexual activity: No   Other Topics Concern    Not on file   Social History Narrative    Not on file       Family History:     Family History   Problem Relation Age of Onset    Stroke Mother     Cancer Father         Allergies:   Sulfa antibiotics 14.5* 11.2   HGB 10.4* 10.1*   HCT 31.7* 30.5*    181   LYMPHOPCT 18* 17*   MONOPCT 4 4     BMP:  Recent Labs     05/21/20  0453 05/22/20  0810    136   K 3.7 3.3*   * 101   CO2 26 25   BUN 16 11   CREATININE 0.25* 0.26*     Hepatic Function Panel:   Recent Labs     05/23/20  1241   PROT 4.3*     No results for input(s): RPR in the last 72 hours. No results for input(s): HIV in the last 72 hours. No results for input(s): BC in the last 72 hours. Lab Results   Component Value Date    CREATININE 0.26 05/22/2020    GLUCOSE 144 05/22/2020       Detailed results: Thank you for allowing us to participate in the care of this patient. Please call with questions. This note is created with the assistance of a speech recognition program.  While intending to generate adocument that actually reflects the content of the visit, the document can still have some errors including those of syntax and sound a like substitutions which may escape proof reading. It such instances, actual meaningcan be extrapolated by contextual diversion.     Victor Hugo Toussaint MD  Office: [phone]  Perfect serve / office [phone]

## 2020-05-23 NOTE — CONSULTS
Intravenous, Continuous  fentaNYL (SUBLIMAZE) injection 25 mcg, 25 mcg, Intravenous, Q1H PRN **OR** fentaNYL (SUBLIMAZE) injection 50 mcg, 50 mcg, Intravenous, Q1H PRN  lansoprazole suspension SUSP 15 mg, 15 mg, Oral, QAM AC  LORazepam (ATIVAN) injection 2 mg, 2 mg, Intravenous, Q4H PRN  heparin (porcine) injection 3,030 Units, 60 Units/kg, Intravenous, PRN  heparin (porcine) injection 1,520 Units, 30 Units/kg, Intravenous, PRN  heparin 25,000 units in dextrose 5% 250 mL infusion, 12 Units/kg/hr, Intravenous, Continuous  [Held by provider] amitriptyline (ELAVIL) tablet 25 mg, 25 mg, Oral, Nightly  sodium chloride flush 0.9 % injection 10 mL, 10 mL, Intravenous, 2 times per day  sodium chloride flush 0.9 % injection 10 mL, 10 mL, Intravenous, PRN  folic acid (FOLVITE) tablet 1 mg, 1 mg, Oral, Daily  vitamin B-12 (CYANOCOBALAMIN) tablet 50 mcg, 50 mcg, Oral, Daily  acetaminophen (TYLENOL) tablet 650 mg, 650 mg, Oral, Q6H PRN **OR** acetaminophen (TYLENOL) suppository 650 mg, 650 mg, Rectal, Q6H PRN  polyethylene glycol (GLYCOLAX) packet 17 g, 17 g, Oral, Daily PRN  promethazine (PHENERGAN) tablet 12.5 mg, 12.5 mg, Oral, Q6H PRN **OR** ondansetron (ZOFRAN) injection 4 mg, 4 mg, Intravenous, Q6H PRN  potassium chloride (KLOR-CON M) extended release tablet 40 mEq, 40 mEq, Oral, PRN **OR** potassium bicarb-citric acid (EFFER-K) effervescent tablet 40 mEq, 40 mEq, Oral, PRN **OR** potassium chloride 10 mEq/100 mL IVPB (Peripheral Line), 10 mEq, Intravenous, PRN  magnesium sulfate 2 g in dextrose 5 % 100 mL IVPB, 2 g, Intravenous, PRN  midodrine (PROAMATINE) tablet 5 mg, 5 mg, Oral, TID PRN    REVIEW OF SYSTEMS     Unable to obtain  PHYSICAL EXAM:     BP (!) 94/49   Pulse 69   Temp 97.5 °F (36.4 °C) (Oral)   Resp 16   Ht 5' 7\" (1.702 m)   Wt 125 lb 10.6 oz (57 kg)   SpO2 100%   BMI 19.68 kg/m²     PHYSICAL EXAM:  CONSTITUTIONAL:  GCS 4, intubated   HEAD:  normocephalic, atraumatic    EYES:  PERRLA, EOMI.   ENT:

## 2020-05-23 NOTE — PLAN OF CARE
Ongoing     Problem: Sleep Pattern Disturbance:  Goal: Appears well-rested  Description: Appears well-rested  Outcome: Ongoing     Problem: Violence - Risk of, Self/Other-Directed:  Goal: Knowledge of developmental care interventions  Description: Absence of violence  Outcome: Ongoing     Problem: Nutrition  Goal: Optimal nutrition therapy  Outcome: Ongoing     Problem: Respiratory Function - Compromised:  Goal: Absence of pulmonary infection  Description: Absence of pulmonary infection  Outcome: Ongoing  Goal: Levels of oxygenation will improve  Description: Levels of oxygenation will improve  Outcome: Ongoing  Goal: Ability to maintain a clear airway will improve  Description: Ability to maintain a clear airway will improve  Outcome: Ongoing     Problem: Confusion - Acute:  Goal: Absence of continued neurological deterioration signs and symptoms  Description: Absence of continued neurological deterioration signs and symptoms  Outcome: Ongoing  Goal: Mental status will be restored to baseline  Description: Mental status will be restored to baseline  Outcome: Ongoing     Problem: Injury - Risk of, Physical Injury:  Goal: Will remain free from falls  Description: Will remain free from falls  Outcome: Ongoing  Goal: Absence of physical injury  Description: Absence of physical injury  Outcome: Ongoing     Problem: Mood - Altered:  Goal: Mood stable  Description: Mood stable  Outcome: Ongoing  Goal: Absence of abusive behavior  Description: Absence of abusive behavior  Outcome: Ongoing  Goal: Verbalizations of feeling emotionally comfortable while being cared for will increase  Description: Verbalizations of feeling emotionally comfortable while being cared for will increase  Outcome: Ongoing     Problem: Psychomotor Activity - Altered:  Goal: Absence of psychomotor disturbance signs and symptoms  Description: Absence of psychomotor disturbance signs and symptoms  Outcome: Ongoing     Problem: Sensory Perception -

## 2020-05-23 NOTE — PROGRESS NOTES
7\" (1.702 m)   Wt 125 lb 10.6 oz (57 kg)   SpO2 100%   BMI 19.68 kg/m²     Blood pressure range: Systolic (35SBS), AOT:49 , Min:87 , YFZ:236   ; Diastolic (50OQQ), JTS:44, Min:49, Max:67      ROS:  Review of Systems   Unable to perform ROS: Mental status change         NEUROLOGIC EXAMINATION  Physical Exam  Constitutional:       General: She is not in acute distress. Appearance: She is ill-appearing and toxic-appearing. She is not diaphoretic. Interventions: She is intubated. Comments: She is more alert today as opposed to yesterday. She is still not following commands   HENT:      Head: Normocephalic and atraumatic. Eyes:      Pupils: Pupils are equal, round, and reactive to light. Neck:      Musculoskeletal: Decreased range of motion. No neck rigidity. Cardiovascular:      Rate and Rhythm: Normal rate and regular rhythm. Pulmonary:      Effort: Pulmonary effort is normal. She is intubated. Abdominal:      General: Abdomen is flat. Skin:     General: Skin is warm. Findings: Rash (Generalized skin rash, moribilliform) present. Neurological:      GCS: GCS eye subscore is 2. GCS verbal subscore is 1. GCS motor subscore is 5. Cranial Nerves: No cranial nerve deficit or facial asymmetry. Deep Tendon Reflexes: Babinski sign absent on the right side. Babinski sign absent on the left side. Comments: Stuporous          Psychiatric:         Speech: Speech is slurred. Neurologic Exam     Mental Status   Speech: slurred   Level of consciousness: arousable by tactile stimuli  Unable to name object. Unable to read. Unable to repeat. Unable to write. Cranial Nerves     CN III, IV, VI   Pupils are equal, round, and reactive to light. Nystagmus: none     CN VII   Facial expression full, symmetric. Motor Exam Withdraws to pain on all extremities.    No following commands         Lab Results:   CBC:   Recent Labs     05/21/20  0453 05/21/20  0807 05/22/20  0529 05/23/20  0631   WBC 16.8*  --  14.5* 11.2   HGB 12.0  --  10.4* 10.1*    192 166 181     BMP:    Recent Labs     05/20/20  2100 05/21/20  0453 05/22/20  0810   * 144 136   K 3.7 3.7 3.3*   * 108* 101   CO2 28 26 25   BUN 18 16 11   CREATININE 0.32* 0.25* 0.26*   GLUCOSE 127* 120* 144*         Lab Results   Component Value Date    ALT 32 05/14/2020    AST 86 (H) 05/14/2020    TSH 0.70 05/14/2020    INR 1.0 05/14/2020    TWERKCNI81 1358 (H) 05/17/2020     CSF studies  White blood cells 8  Neutrophils 48%  Lymphocytes 21%  RBCs 1  Glucose 74  Protein 77.8  Increased IgG synthesis rate  No results found for: PHENYTOIN, PHENYTOIN, VALPROATE, CBMZ    IMAGING    CTH repeat 5/20/2020  No acute findings     CTPE  No evidence of pulmonary embolism. Left lower lobar atelectasis. MRI thoracic/ lumbar spine   No cord abnormality identified. No MRI evidence of discitis-osteomyelitis of the cervical or thoracic spine. Degenerative changes as detailed above. Incidentally noted left lower lobe atelectasis. MRI brain w/wo contrast 5/14/2020  No acute disease. No evidence of meningeal enhancement or empyema. MRI brain w/wo contrast 5/17/2020  Possible acute microinfarct involving within the splenium of corpus callosum. Motion degraded exam        ASSESSMENT AND RECOMMENDATIONS   62year old female with history of alcohol abuse, who presented with new onset severe 8-9/10 generalized headache associated with photophobia, phonophobia, fever, cough, and diarrhea. COVID neg x2. CSF studies ruled out infectious encephalitis.  She is being     Meningoencephalitis, cryptococcal    Lobar pneumonia with para pneumonic effusion     Sepsis due to above  Hypoxic respiratory failure due to above  Hypernatremia  Dehydration   Alcohol abuse     Repeat LP tomorrow as per NICU team  Cryptococcal meningitis per ID team   Sepsis management as per ID, 1ry team   Continue thiamine 250 mg IV q24h    Amitripytline 25

## 2020-05-23 NOTE — PLAN OF CARE
Problem: Falls - Risk of:  Goal: Will remain free from falls  Description: Will remain free from falls  5/23/2020 1329 by Sam Durbin RN  Outcome: Ongoing  5/23/2020 0634 by Thanh Oscar RN  Outcome: Ongoing  Goal: Absence of physical injury  Description: Absence of physical injury  5/23/2020 1329 by Sam Durbin RN  Outcome: Ongoing  5/23/2020 0634 by Thanh Oscar RN  Outcome: Ongoing     Problem: Pain:  Description: Pain management should include both nonpharmacologic and pharmacologic interventions.   Goal: Pain level will decrease  Description: Pain level will decrease  5/23/2020 1329 by Sam Durbin RN  Outcome: Ongoing  5/23/2020 0634 by Thanh Oscar RN  Outcome: Ongoing  Goal: Control of acute pain  Description: Control of acute pain  5/23/2020 1329 by Sam Durbin RN  Outcome: Ongoing  5/23/2020 0634 by Thanh Oscar RN  Outcome: Ongoing  Goal: Control of chronic pain  Description: Control of chronic pain  5/23/2020 1329 by Sam Durbin RN  Outcome: Ongoing  5/23/2020 0634 by Thanh Oscar RN  Outcome: Ongoing     Problem: Skin Integrity - Impaired:  Goal: Will show no infection signs and symptoms  Description: Will show no infection signs and symptoms  5/23/2020 1329 by Sam Durbin RN  Outcome: Ongoing  5/23/2020 0634 by Thanh Oscar RN  Outcome: Ongoing  Goal: Absence of new skin breakdown  Description: Absence of new skin breakdown  5/23/2020 1329 by Sam Durbin RN  Outcome: Ongoing  5/23/2020 0634 by Thanh Oscar RN  Outcome: Ongoing     Problem: Physical Regulation:  Goal: Diagnostic test results will improve  Description: Diagnostic test results will improve  5/23/2020 1329 by Sam Durbin RN  Outcome: Ongoing  5/23/2020 0634 by Thanh Oscar RN  Outcome: Ongoing  Goal: Will remain free from infection  Description: Will remain free from infection  5/23/2020 1329 by Dariusz White ABEBE Sterling  Outcome: Ongoing  5/23/2020 0634 by Jack Pollard RN  Outcome: Ongoing  Goal: Ability to maintain vital signs within normal range will improve  Description: Ability to maintain vital signs within normal range will improve  5/23/2020 1329 by Marlen Mcknight RN  Outcome: Ongoing  5/23/2020 0634 by Jack Pollard RN  Outcome: Ongoing     Problem: Discharge Planning:  Goal: Discharged to appropriate level of care  Description: Discharged to appropriate level of care  5/23/2020 1329 by Marlen Mcknight RN  Outcome: Ongoing  5/23/2020 0634 by Jack Pollard RN  Outcome: Ongoing  Goal: Ability to perform activities of daily living will improve  Description: Ability to perform activities of daily living will improve  5/23/2020 1329 by Marlen Mcknight RN  Outcome: Ongoing  5/23/2020 0634 by Jack Pollard RN  Outcome: Ongoing  Goal: Participates in care planning  Description: Participates in care planning  5/23/2020 1329 by Marlen Mcknight RN  Outcome: Ongoing  5/23/2020 0634 by Jack Pollard RN  Outcome: Ongoing     Problem: Fluid Volume - Deficit:  Goal: Absence of fluid volume deficit signs and symptoms  Description: Absence of fluid volume deficit signs and symptoms  5/23/2020 1329 by Marlen Mcknight RN  Outcome: Ongoing  5/23/2020 0634 by Jack Pollard RN  Outcome: Ongoing     Problem: Nutrition Deficit:  Goal: Ability to achieve adequate nutritional intake will improve  Description: Ability to achieve adequate nutritional intake will improve  5/23/2020 1329 by Marlen Mcknight RN  Outcome: Ongoing  5/23/2020 0634 by Jack Pollard RN  Outcome: Ongoing     Problem: Sleep Pattern Disturbance:  Goal: Appears well-rested  Description: Appears well-rested  5/23/2020 1329 by Marlen Mcknight RN  Outcome: Ongoing  5/23/2020 0634 by Jack Pollard RN  Outcome: Ongoing     Problem: Violence - Risk of, Self/Other-Directed:  Goal: Knowledge of developmental care interventions  Description: Absence of violence  5/23/2020 1329 by Marlen Mcknight RN  Outcome: Ongoing  5/23/2020 0634 by Jack Pollard RN  Outcome: Ongoing     Problem: Respiratory Function - Compromised:  Goal: Absence of pulmonary infection  Description: Absence of pulmonary infection  5/23/2020 1329 by Marlen Mcknight RN  Outcome: Ongoing  5/23/2020 0737 by Derrell Valenica RCP  Outcome: Ongoing  5/23/2020 0634 by Jack Pollard RN  Outcome: Ongoing  Goal: Levels of oxygenation will improve  Description: Levels of oxygenation will improve  5/23/2020 1329 by Marlen Mcknight RN  Outcome: Ongoing  5/23/2020 0737 by Derrell Valencia RCP  Outcome: Ongoing  5/23/2020 0634 by Jack Pollard RN  Outcome: Ongoing  Goal: Ability to maintain a clear airway will improve  Description: Ability to maintain a clear airway will improve  5/23/2020 1329 by Marlen Mcknight RN  Outcome: Ongoing  5/23/2020 0737 by Derrell Valencia RCP  Outcome: Ongoing  5/23/2020 0634 by Jack Pollard RN  Outcome: Ongoing     Problem: Confusion - Acute:  Goal: Absence of continued neurological deterioration signs and symptoms  Description: Absence of continued neurological deterioration signs and symptoms  5/23/2020 1329 by Marlen Mcknight RN  Outcome: Ongoing  5/23/2020 0634 by Jack Pollard RN  Outcome: Ongoing  Goal: Mental status will be restored to baseline  Description: Mental status will be restored to baseline  5/23/2020 1329 by Marlen Mcknight RN  Outcome: Ongoing  5/23/2020 0634 by Jack Pollard RN  Outcome: Ongoing     Problem: Injury - Risk of, Physical Injury:  Goal: Will remain free from falls  Description: Will remain free from falls  5/23/2020 1329 by Marlen Mcknight RN  Outcome: Ongoing  5/23/2020 0634 by Jack Pollard RN  Outcome: Ongoing  Goal: Absence of physical injury  Description: Absence of physical injury  5/23/2020 1329 by Louis Canales RN  Outcome: Ongoing  5/23/2020 7543 by Hector Vines RN  Outcome: Ongoing     Problem: Mood - Altered:  Goal: Mood stable  Description: Mood stable  5/23/2020 1329 by Louis Canales RN  Outcome: Ongoing  5/23/2020 0634 by Hector Vines RN  Outcome: Ongoing  Goal: Absence of abusive behavior  Description: Absence of abusive behavior  5/23/2020 1329 by Louis Canales RN  Outcome: Ongoing  5/23/2020 0634 by Hector Vines RN  Outcome: Ongoing  Goal: Verbalizations of feeling emotionally comfortable while being cared for will increase  Description: Verbalizations of feeling emotionally comfortable while being cared for will increase  5/23/2020 1329 by Louis Canales RN  Outcome: Ongoing  5/23/2020 0634 by Hector Vines RN  Outcome: Ongoing     Problem: Psychomotor Activity - Altered:  Goal: Absence of psychomotor disturbance signs and symptoms  Description: Absence of psychomotor disturbance signs and symptoms  5/23/2020 1329 by Louis Canales RN  Outcome: Ongoing  5/23/2020 0634 by Hector Vines RN  Outcome: Ongoing     Problem: Sensory Perception - Impaired:  Goal: Demonstrations of improved sensory functioning will increase  Description: Demonstrations of improved sensory functioning will increase  5/23/2020 1329 by Louis Canales RN  Outcome: Ongoing  5/23/2020 0634 by Hector Vines RN  Outcome: Ongoing  Goal: Decrease in sensory misperception frequency  Description: Decrease in sensory misperception frequency  5/23/2020 1329 by Louis Canales RN  Outcome: Ongoing  5/23/2020 0634 by Hector Vines RN  Outcome: Ongoing  Goal: Able to refrain from responding to false sensory perceptions  Description: Able to refrain from responding to false sensory perceptions  5/23/2020 1329 by Louis Canales RN  Outcome: Ongoing  5/23/2020 0634 by Hector Vines RN  Outcome: Ongoing  Goal: Demonstrates accurate environmental perceptions  Description: Demonstrates accurate environmental perceptions  5/23/2020 1329 by Laura Matt RN  Outcome: Ongoing  5/23/2020 0634 by Kathryn Booht RN  Outcome: Ongoing  Goal: Able to distinguish between reality-based and nonreality-based thinking  Description: Able to distinguish between reality-based and nonreality-based thinking  5/23/2020 1329 by Laura Matt RN  Outcome: Ongoing  5/23/2020 0634 by Kathryn Booth RN  Outcome: Ongoing  Goal: Able to interrupt nonreality-based thinking  Description: Able to interrupt nonreality-based thinking  5/23/2020 1329 by Laura Matt RN  Outcome: Ongoing  5/23/2020 0634 by Kathryn Booth RN  Outcome: Ongoing     Problem: MECHANICAL VENTILATION  Goal: Patient will maintain patent airway  5/23/2020 1329 by Laura Matt RN  Outcome: Ongoing  5/23/2020 0737 by RAJESH SebastianP  Outcome: Ongoing  5/23/2020 0634 by Kathryn Booth RN  Outcome: Ongoing  Goal: Oral health is maintained or improved  5/23/2020 1329 by Laura Matt RN  Outcome: Ongoing  5/23/2020 0737 by RAJESH SebastianP  Outcome: Ongoing  5/23/2020 0634 by Kathryn Booth RN  Outcome: Ongoing  Goal: ET tube will be managed safely  5/23/2020 1329 by Laura Matt RN  Outcome: Ongoing  5/23/2020 0737 by Ashli Jones RCP  Outcome: Ongoing  5/23/2020 0634 by Kathryn Booth RN  Outcome: Ongoing  Goal: Ability to express needs and understand communication  5/23/2020 1329 by Laura Matt RN  Outcome: Ongoing  5/23/2020 0737 by Ashli Jones, RCP  Outcome: Ongoing  5/23/2020 0634 by Kathryn Booth RN  Outcome: Ongoing  Goal: Mobility/activity is maintained at optimum level for patient  5/23/2020 1329 by Laura Matt RN  Outcome: Ongoing  5/23/2020 0737 by RAJESH SebastianP  Outcome: Ongoing  5/23/2020 0634 by Kathryn Booth RN  Outcome: Ongoing     Problem: OXYGENATION/RESPIRATORY FUNCTION  Goal:

## 2020-05-24 LAB
ABSOLUTE EOS #: 0.12 K/UL (ref 0–0.44)
ABSOLUTE IMMATURE GRANULOCYTE: 0.1 K/UL (ref 0–0.3)
ABSOLUTE LYMPH #: 1.85 K/UL (ref 1.1–3.7)
ABSOLUTE MONO #: 0.72 K/UL (ref 0.1–1.2)
ALLEN TEST: ABNORMAL
APPEARANCE CSF: ABNORMAL
BASOPHILS # BLD: 0 % (ref 0–2)
BASOPHILS ABSOLUTE: 0.03 K/UL (ref 0–0.2)
C-REACTIVE PROTEIN: 49.1 MG/L (ref 0–5)
CRYPTOCOCCUS NEOFORMANS/GATTI CSF FILM ARR.: NOT DETECTED
CYTOMEGALOVIRUS (CMV) CSF FILM ARRAY: NOT DETECTED
DIFFERENTIAL TYPE: ABNORMAL
ENTEROVIRUS CSF FILM ARRAY: NOT DETECTED
EOSINOPHILS RELATIVE PERCENT: 1 % (ref 1–4)
ESCHERICHIA COLI K1 CSF FILM ARRAY: NOT DETECTED
FIO2: 30
GLUCOSE BLD-MCNC: 134 MG/DL (ref 74–100)
GLUCOSE, CSF: 56 MG/DL (ref 40–70)
HAEMOPHILUS INFLUENZA CSF FILM ARRAY: NOT DETECTED
HCT VFR BLD CALC: 30.7 % (ref 36.3–47.1)
HEMOGLOBIN: 10.2 G/DL (ref 11.9–15.1)
HHV-6 (HERPESVIRUS 6) CSF FILM ARRAY: NOT DETECTED
HSV-1 CSF FILM ARRAY: NOT DETECTED
HSV-2 CSF FILM ARRAY: NOT DETECTED
IMMATURE GRANULOCYTES: 1 %
LISTERIA MONOCYTOGENES CSF FILM ARRAY: NOT DETECTED
LYMPHOCYTES # BLD: 16 % (ref 24–43)
MCH RBC QN AUTO: 30.3 PG (ref 25.2–33.5)
MCHC RBC AUTO-ENTMCNC: 33.2 G/DL (ref 28.4–34.8)
MCV RBC AUTO: 91.1 FL (ref 82.6–102.9)
MISCELLANEOUS LAB TEST RESULT: NORMAL
MODE: ABNORMAL
MONOCYTES # BLD: 6 % (ref 3–12)
NEGATIVE BASE EXCESS, ART: ABNORMAL (ref 0–2)
NEISSERIA MENIGITIDIS CSF FILM ARRAY: NOT DETECTED
NRBC AUTOMATED: 0 PER 100 WBC
O2 DEVICE/FLOW/%: ABNORMAL
PARECHOVIRUS CSF FILM ARRAY: NOT DETECTED
PARTIAL THROMBOPLASTIN TIME: 36.6 SEC (ref 20.5–30.5)
PARTIAL THROMBOPLASTIN TIME: 52.8 SEC (ref 20.5–30.5)
PATIENT TEMP: ABNORMAL
PDW BLD-RTO: 13.7 % (ref 11.8–14.4)
PLATELET # BLD: 198 K/UL (ref 138–453)
PLATELET ESTIMATE: ABNORMAL
PMV BLD AUTO: 10.7 FL (ref 8.1–13.5)
POC HCO3: 30.7 MMOL/L (ref 21–28)
POC O2 SATURATION: 98 % (ref 94–98)
POC PCO2 TEMP: ABNORMAL MM HG
POC PCO2: 37.9 MM HG (ref 35–48)
POC PH TEMP: ABNORMAL
POC PH: 7.52 (ref 7.35–7.45)
POC PO2 TEMP: ABNORMAL MM HG
POC PO2: 86.8 MM HG (ref 83–108)
POSITIVE BASE EXCESS, ART: 7 (ref 0–3)
PROTEIN CSF: 95.7 MG/DL (ref 15–45)
RBC # BLD: 3.37 M/UL (ref 3.95–5.11)
RBC # BLD: ABNORMAL 10*6/UL
RBC CSF: 1600 /MM3
SAMPLE SITE: ABNORMAL
SEG NEUTROPHILS: 76 % (ref 36–65)
SEGMENTED NEUTROPHILS ABSOLUTE COUNT: 8.97 K/UL (ref 1.5–8.1)
SPECIMEN DESCRIPTION: NORMAL
STREPTOCOCCUS AGALACTIAE CSF FILM ARRAY: NOT DETECTED
STREPTOCOCCUS PNEUMONIAE CSF FILM ARRAY: NOT DETECTED
SUPERNAT COLOR CSF: ABNORMAL
TCO2 (CALC), ART: 32 MMOL/L (ref 22–29)
TEST NAME: NORMAL
TUBE NUMBER CSF: 3
VARICELLA-ZOSTER CSF FILM ARRAY: NOT DETECTED
VOLUME CSF: 11
WBC # BLD: 11.8 K/UL (ref 3.5–11.3)
WBC # BLD: ABNORMAL 10*3/UL
WBC CSF: 95 /MM3
XANTHOCHROMIA: ABNORMAL

## 2020-05-24 PROCEDURE — 99291 CRITICAL CARE FIRST HOUR: CPT | Performed by: INTERNAL MEDICINE

## 2020-05-24 PROCEDURE — 2060000000 HC ICU INTERMEDIATE R&B

## 2020-05-24 PROCEDURE — 6360000002 HC RX W HCPCS: Performed by: INTERNAL MEDICINE

## 2020-05-24 PROCEDURE — 84157 ASSAY OF PROTEIN OTHER: CPT

## 2020-05-24 PROCEDURE — 89051 BODY FLUID CELL COUNT: CPT

## 2020-05-24 PROCEDURE — 87070 CULTURE OTHR SPECIMN AEROBIC: CPT

## 2020-05-24 PROCEDURE — 99291 CRITICAL CARE FIRST HOUR: CPT | Performed by: PSYCHIATRY & NEUROLOGY

## 2020-05-24 PROCEDURE — 87015 SPECIMEN INFECT AGNT CONCNTJ: CPT

## 2020-05-24 PROCEDURE — 82803 BLOOD GASES ANY COMBINATION: CPT

## 2020-05-24 PROCEDURE — 86618 LYME DISEASE ANTIBODY: CPT

## 2020-05-24 PROCEDURE — 6360000002 HC RX W HCPCS: Performed by: STUDENT IN AN ORGANIZED HEALTH CARE EDUCATION/TRAINING PROGRAM

## 2020-05-24 PROCEDURE — 2580000003 HC RX 258: Performed by: INTERNAL MEDICINE

## 2020-05-24 PROCEDURE — 2700000000 HC OXYGEN THERAPY PER DAY

## 2020-05-24 PROCEDURE — 82945 GLUCOSE OTHER FLUID: CPT

## 2020-05-24 PROCEDURE — 6370000000 HC RX 637 (ALT 250 FOR IP): Performed by: STUDENT IN AN ORGANIZED HEALTH CARE EDUCATION/TRAINING PROGRAM

## 2020-05-24 PROCEDURE — 87798 DETECT AGENT NOS DNA AMP: CPT

## 2020-05-24 PROCEDURE — 62270 DX LMBR SPI PNXR: CPT | Performed by: PSYCHIATRY & NEUROLOGY

## 2020-05-24 PROCEDURE — 009U3ZX DRAINAGE OF SPINAL CANAL, PERCUTANEOUS APPROACH, DIAGNOSTIC: ICD-10-PCS | Performed by: PSYCHIATRY & NEUROLOGY

## 2020-05-24 PROCEDURE — 99233 SBSQ HOSP IP/OBS HIGH 50: CPT | Performed by: INTERNAL MEDICINE

## 2020-05-24 PROCEDURE — 85730 THROMBOPLASTIN TIME PARTIAL: CPT

## 2020-05-24 PROCEDURE — 99233 SBSQ HOSP IP/OBS HIGH 50: CPT | Performed by: PSYCHIATRY & NEUROLOGY

## 2020-05-24 PROCEDURE — 94770 HC ETCO2 MONITOR DAILY: CPT

## 2020-05-24 PROCEDURE — 85025 COMPLETE CBC W/AUTO DIFF WBC: CPT

## 2020-05-24 PROCEDURE — 2000000000 HC ICU R&B

## 2020-05-24 PROCEDURE — 82947 ASSAY GLUCOSE BLOOD QUANT: CPT

## 2020-05-24 PROCEDURE — 6370000000 HC RX 637 (ALT 250 FOR IP): Performed by: PSYCHIATRY & NEUROLOGY

## 2020-05-24 PROCEDURE — 37799 UNLISTED PX VASCULAR SURGERY: CPT

## 2020-05-24 PROCEDURE — 82164 ANGIOTENSIN I ENZYME TEST: CPT

## 2020-05-24 PROCEDURE — 86140 C-REACTIVE PROTEIN: CPT

## 2020-05-24 PROCEDURE — 87483 CNS DNA AMP PROBE TYPE 12-25: CPT

## 2020-05-24 PROCEDURE — 87205 SMEAR GRAM STAIN: CPT

## 2020-05-24 PROCEDURE — 94003 VENT MGMT INPAT SUBQ DAY: CPT

## 2020-05-24 PROCEDURE — 83605 ASSAY OF LACTIC ACID: CPT

## 2020-05-24 PROCEDURE — 83615 LACTATE (LD) (LDH) ENZYME: CPT

## 2020-05-24 PROCEDURE — 94761 N-INVAS EAR/PLS OXIMETRY MLT: CPT

## 2020-05-24 RX ADMIN — Medication 15 MG: at 08:14

## 2020-05-24 RX ADMIN — VITAM B12 50 MCG: 100 TAB at 09:23

## 2020-05-24 RX ADMIN — AMPHOTERICIN B 230 MG: 50 INJECTION, POWDER, LYOPHILIZED, FOR SOLUTION INTRAVENOUS at 15:44

## 2020-05-24 RX ADMIN — HEPARIN SODIUM 1520 UNITS: 1000 INJECTION INTRAVENOUS; SUBCUTANEOUS at 23:15

## 2020-05-24 RX ADMIN — HEPARIN SODIUM AND DEXTROSE 32 UNITS/KG/HR: 10000; 5 INJECTION INTRAVENOUS at 23:17

## 2020-05-24 RX ADMIN — SODIUM CHLORIDE, PRESERVATIVE FREE 10 ML: 5 INJECTION INTRAVENOUS at 09:33

## 2020-05-24 RX ADMIN — HEPARIN SODIUM AND DEXTROSE 30 UNITS/KG/HR: 10000; 5 INJECTION INTRAVENOUS at 21:01

## 2020-05-24 RX ADMIN — FENTANYL CITRATE 25 MCG: 50 INJECTION, SOLUTION INTRAMUSCULAR; INTRAVENOUS at 13:15

## 2020-05-24 RX ADMIN — FOLIC ACID 1 MG: 1 TABLET ORAL at 08:14

## 2020-05-24 ASSESSMENT — PULMONARY FUNCTION TESTS
PIF_VALUE: 18
PIF_VALUE: 18
PIF_VALUE: 20
PIF_VALUE: 16
PIF_VALUE: 18
PIF_VALUE: 20

## 2020-05-24 ASSESSMENT — PAIN SCALES - GENERAL: PAINLEVEL_OUTOF10: 0

## 2020-05-24 NOTE — PLAN OF CARE
Problem: Falls - Risk of:  Goal: Will remain free from falls  Description: Will remain free from falls  5/24/2020 0910 by Cari Dumont RN  Outcome: Ongoing  5/24/2020 0650 by John Perez RN  Outcome: Ongoing  Goal: Absence of physical injury  Description: Absence of physical injury  5/24/2020 0910 by Cari Dumont RN  Outcome: Ongoing  5/24/2020 0650 by John Perez RN  Outcome: Ongoing     Problem: Pain:  Description: Pain management should include both nonpharmacologic and pharmacologic interventions.   Goal: Pain level will decrease  Description: Pain level will decrease  5/24/2020 0910 by Cari Dumont RN  Outcome: Ongoing  5/24/2020 0650 by John Perez RN  Outcome: Ongoing  Goal: Control of acute pain  Description: Control of acute pain  5/24/2020 0910 by Cari Dumont RN  Outcome: Ongoing  5/24/2020 0650 by John Perez RN  Outcome: Ongoing  Goal: Control of chronic pain  Description: Control of chronic pain  5/24/2020 0910 by Cari Dumont RN  Outcome: Ongoing  5/24/2020 0650 by John Perez RN  Outcome: Ongoing     Problem: Skin Integrity - Impaired:  Goal: Will show no infection signs and symptoms  Description: Will show no infection signs and symptoms  5/24/2020 0910 by Cari Dumont RN  Outcome: Ongoing  5/24/2020 0650 by John Perez RN  Outcome: Ongoing  Goal: Absence of new skin breakdown  Description: Absence of new skin breakdown  5/24/2020 0910 by Cari Dumont RN  Outcome: Ongoing  5/24/2020 0650 by John Perez RN  Outcome: Ongoing     Problem: Physical Regulation:  Goal: Diagnostic test results will improve  Description: Diagnostic test results will improve  5/24/2020 0910 by Cari Dumont RN  Outcome: Ongoing  5/24/2020 0650 by John Perez RN  Outcome: Ongoing  Goal: Will remain free from infection  Description: Will remain free from infection  5/24/2020 0910 by Margot Mann ABEBE Sterling  Outcome: Ongoing  5/24/2020 0650 by Adrianna Bob RN  Outcome: Ongoing  Goal: Ability to maintain vital signs within normal range will improve  Description: Ability to maintain vital signs within normal range will improve  5/24/2020 0910 by dEy Teran RN  Outcome: Ongoing  5/24/2020 0650 by Adrianna Bob RN  Outcome: Ongoing     Problem: Discharge Planning:  Goal: Discharged to appropriate level of care  Description: Discharged to appropriate level of care  5/24/2020 0910 by Edy Teran RN  Outcome: Ongoing  5/24/2020 0650 by Adrianna Bob RN  Outcome: Ongoing  Goal: Ability to perform activities of daily living will improve  Description: Ability to perform activities of daily living will improve  5/24/2020 0910 by Edy Teran RN  Outcome: Ongoing  5/24/2020 0650 by Adrianna Bob RN  Outcome: Ongoing  Goal: Participates in care planning  Description: Participates in care planning  5/24/2020 0910 by Edy Teran RN  Outcome: Ongoing  5/24/2020 0650 by Adrianna Bob RN  Outcome: Ongoing     Problem: Fluid Volume - Deficit:  Goal: Absence of fluid volume deficit signs and symptoms  Description: Absence of fluid volume deficit signs and symptoms  5/24/2020 0910 by Edy Teran RN  Outcome: Ongoing  5/24/2020 0650 by Adrianna Bob RN  Outcome: Ongoing     Problem: Nutrition Deficit:  Goal: Ability to achieve adequate nutritional intake will improve  Description: Ability to achieve adequate nutritional intake will improve  5/24/2020 0910 by Edy Teran RN  Outcome: Ongoing  5/24/2020 0650 by Adrianna Bob RN  Outcome: Ongoing     Problem: Sleep Pattern Disturbance:  Goal: Appears well-rested  Description: Appears well-rested  5/24/2020 0910 by Edy Teran RN  Outcome: Ongoing  5/24/2020 0650 by Adrianna Bob RN  Outcome: Ongoing     Problem: Violence - Risk of, Self/Other-Directed:  Goal: Knowledge

## 2020-05-24 NOTE — PROGRESS NOTES
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 · 5/16 MRI LB old osteoarthritis and no cord compression  · 5/16 MRI brain neg - 5/15 LP 8 WBC and normal glucose  · 5/17 CT chest LLL consolidation, ? Atelectasis. · LP 8 WBC and normal glucose  · Treated w steroids and  ceftriaxone 5/14 - 5/15 , fever improved, but fever relapsed off steroids  · Fever relapsed since 5/17   · Increased oxygen requirement, increased lethargy  · Left lower lobe infiltrate worse on x-ray  · Failed Levaquin since 5/19 -legionella AG neg  · 5/20  intubated - fever 104. - CXR worse LLL  · Generalized Body rash noticed at admission .   · CT chest 5/21 progression of the left lower lobe infiltrate, around pneumonia  · Sputum culture did not show cryptococcus  · Cryptococcus CSF antigen -5/22  · Opening pressure 5/2416  · All CSF meningitis panel is negative for cryptococcus PCR  · 5/21 cryptococcus serum antigen positive, 4  · 5/22 lumbar puncture repeated - WBC 63, neutrophils 39%, 54 lymp, Protein 142, glucose 53 -  PCR neg  · 5/22 abelcet started  · 5/22 and 5/23 no fever  · The lack of cryptococcus CSF antigen positivity does not rule out meningoencephalitis to cryptococcus      Recommendations     · Serum cryptoAG positive titer 4 tramaine  Musculoskeletal:         General: No swelling or deformity. Skin:     General: Skin is warm. Coloration: Skin is not jaundiced or pale. Findings: No bruising or erythema. Neurological:      Mental Status: She is alert. Cranial Nerves: No cranial nerve deficit. Comments: On the vent sedated   Psychiatric:         Mood and Affect: Mood normal.         Thought Content: Thought content normal.         Judgment: Judgment normal.           Medical Decision Making:   I have independently reviewed/ordered the following labs:    CBC with Differential:   Recent Labs     05/23/20  0631 05/24/20  0513   WBC 11.2 11.8*   HGB 10.1* 10.2*   HCT 30.5* 30.7*    198   LYMPHOPCT 17* 16*   MONOPCT 4 6     BMP:  Recent Labs     05/22/20  0810      K 3.3*      CO2 25   BUN 11   CREATININE 0.26*     Hepatic Function Panel:   Recent Labs     05/23/20  1241   PROT 4.3*     No results for input(s): RPR in the last 72 hours. No results for input(s): HIV in the last 72 hours. No results for input(s): BC in the last 72 hours. Lab Results   Component Value Date    CREATININE 0.26 05/22/2020    GLUCOSE 144 05/22/2020       Detailed results: Thank you for allowing us to participate in the care of this patient. Please call with questions. This note is created with the assistance of a speech recognition program.  While intending to generate adocument that actually reflects the content of the visit, the document can still have some errors including those of syntax and sound a like substitutions which may escape proof reading. It such instances, actual meaningcan be extrapolated by contextual diversion.     Edita Beard MD  Office: [phone]  Perfect serve / office [phone]

## 2020-05-24 NOTE — PROGRESS NOTES
organomegaly  NEUROLOGIC: intubated. No spontaneous movements off sedation  Extremities:  peripheral pulses normal, +pedal edema.       Any additional physical findings:      MEDICATIONS:  Scheduled Meds:   amphotericin B liposome (AMBISOME) IVPB  4 mg/kg Intravenous Q24H    potassium bicarb-citric acid  40 mEq Oral Once    lansoprazole  15 mg Oral QAM AC    [Held by provider] amitriptyline  25 mg Oral Nightly    sodium chloride flush  10 mL Intravenous 2 times per day    folic acid  1 mg Oral Daily    vitamin B-12  50 mcg Oral Daily     Continuous Infusions:   sodium chloride 75 mL/hr at 05/22/20 2238    heparin (porcine) Stopped (05/24/20 0800)     PRN Meds:   ipratropium-albuterol, 1 ampule, Q6H PRN  fentanNYL, 25 mcg, Q1H PRN    Or  fentanNYL, 50 mcg, Q1H PRN  LORazepam, 2 mg, Q4H PRN  heparin (porcine), 60 Units/kg, PRN  heparin (porcine), 30 Units/kg, PRN  sodium chloride flush, 10 mL, PRN  acetaminophen, 650 mg, Q6H PRN    Or  acetaminophen, 650 mg, Q6H PRN  polyethylene glycol, 17 g, Daily PRN  promethazine, 12.5 mg, Q6H PRN    Or  ondansetron, 4 mg, Q6H PRN  potassium chloride, 40 mEq, PRN    Or  potassium alternative oral replacement, 40 mEq, PRN    Or  potassium chloride, 10 mEq, PRN  magnesium sulfate, 2 g, PRN  midodrine, 5 mg, TID PRN        SUPPORT DEVICES: [x] Ventilator [] BIPAP  [] Nasal Cannula [] Room Air    VENT SETTINGS (Comprehensive) (if applicable):  Vent Information  $Ventilation: $Initial Day  Skin Assessment: Clean, dry, & intact  Suction Catheter Diameter: 14  Equipment Changed: Airway securing device  Vent Type: Servo i  Vent Mode: PRVC  Vt Ordered: 500 mL  Rate Set: 16 bmp  FiO2 : 30 %  SpO2: 100 %  SpO2/FiO2 ratio: 333.33  Sensitivity: 5  PEEP/CPAP: 5  I Time/ I Time %: 0.9 s  Humidification Source: HME  Nitric Oxide/Epoprostenol In Use?: No  Additional Respiratory  Assessments  Pulse: 75  Resp: 17  SpO2: 100 %  End Tidal CO2: 30 (%)  Position: Semi-Sherman's  Humidification Source: [facility]  Oral Care Completed?: Yes  Oral Care: Suction toothette, Mouth suctioned, Mouth swabbed, Mouth moisturizer, Mouthwash  Subglottic Suction Done?: No  Cuff Pressure (cm H2O): 30 cm H2O    ABGs:     Lab Results   Component Value Date    OUJ5KMF 32 05/24/2020    FIO2 30.0 05/24/2020     Lactic Acid:   Lab Results   Component Value Date    LACTA NOT REPORTED 05/14/2020         DATA:  Complete Blood Count:   Recent Labs     05/22/20  0529 05/23/20  0631 05/24/20  0513   WBC 14.5* 11.2 11.8*   HGB 10.4* 10.1* 10.2*   MCV 93.0 91.6 91.1    181 198   RBC 3.41* 3.33* 3.37*   HCT 31.7* 30.5* 30.7*   MCH 30.5 30.3 30.3   MCHC 32.8 33.1 33.2   RDW 14.1 13.6 13.7   MPV 10.1 10.6 10.7        PT/INR:    Lab Results   Component Value Date    PROTIME 10.7 05/14/2020    INR 1.0 05/14/2020     PTT:    Lab Results   Component Value Date    APTT 52.8 05/24/2020       Basal Metabolic Profile:   Recent Labs     05/22/20  0810      K 3.3*   BUN 11   CREATININE 0.26*      CO2 25      Magnesium:   Lab Results   Component Value Date    MG 2.1 05/15/2020    MG 2.1 05/14/2020    MG 1.9 05/14/2020     Phosphorus:   Lab Results   Component Value Date    PHOS 2.4 05/14/2020     S. Calcium:  Recent Labs     05/22/20  0810   CALCIUM 8.0*     S. Ionized Calcium:No results for input(s): IONCA in the last 72 hours.       Urinalysis:   Lab Results   Component Value Date    NITRU NEGATIVE 05/14/2020    COLORU YELLOW 05/14/2020    PHUR 5.0 05/14/2020    WBCUA None 05/14/2020    RBCUA 2 TO 5 05/14/2020    MUCUS NOT REPORTED 05/14/2020    TRICHOMONAS NOT REPORTED 05/14/2020    YEAST NOT REPORTED 05/14/2020    BACTERIA FEW 05/14/2020    SPECGRAV 1.015 05/14/2020    LEUKOCYTESUR NEGATIVE 05/14/2020    UROBILINOGEN Normal 05/14/2020    BILIRUBINUR NEGATIVE 05/14/2020    GLUCOSEU NEGATIVE 05/14/2020    KETUA NEGATIVE 05/14/2020    AMORPHOUS 3+ 05/14/2020       CARDIAC ENZYMES: No results for input(s): CKMB, CKMBINDEX, TROPONINI in the last 72 hours. Invalid input(s): CKTOTAL;3  BNP: No results for input(s): BNP in the last 72 hours. LFTS  No results for input(s): ALKPHOS, ALT, AST, BILITOT, BILIDIR, LABALBU in the last 72 hours. AMYLASE/LIPASE/AMMONIA  No results for input(s): AMYLASE, LIPASE, AMMONIA in the last 72 hours. Last 3 Blood Glucose:   Recent Labs     05/22/20  0810   GLUCOSE 144*      HgBA1c:  No results found for: LABA1C      TSH:    Lab Results   Component Value Date    TSH 0.70 05/14/2020     ANEMIA STUDIES  No results for input(s): LABIRON, TIBC, FERRITIN, [id number], FOLATE, OCCULTBLD in the last 72 hours. Cultures during this admission:     Blood cultures:                 [] None drawn      [x] Negative             []  Positive (Details:  )  Urine Culture:                   [x] None drawn      [] Negative             []  Positive (Details:  )  Sputum Culture:               [] None drawn       [x] Negative             []  Positive (Details:  )   CSF aspirate:      [] None drawn       [x] Negative             []  Positive (Details:  )        ASSESSMENT:     Principal Problem:    Fever  Active Problems:    Acute intractable headache    Chronic abdominal pain    Hyponatremia    Mucopurulent chronic bronchitis (HCC)    DIXON (acute kidney injury) ([address]    Smoker    Suspected COVID-19 virus infection    Chronic obstructive pulmonary disease (HCC)    Pericardial effusion    Thrombocytopenia (HCC)    Hypotension due to hypovolemia    Skin rash    Metabolic encephalopathy    DVT (deep venous thrombosis) ([facility] [address]    Hypoxia    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[facility] [address]  Resolved Problems:    Hx of cholecystectomy    Encephalopathy        PLAN:     1. Acute encephalopathy, presumptively toxic secondary to Cryptococcus. Cryptococcal blood antigen positive. CSF fluid culture still pending. ID following, started on amphotericin B yesterday. Unsure whether the patient has any cause to be immunosuppressed so will run a full autoimmune work-up. Neurology also following. 2. Hypoxic respiratory failure status post intubation. Continued of sedation. Will hold off on weaning trials as neurologically she would most likely not be able to protect her airway. 3. Cryptococcal pneumonia. Vancomycin and Levaquin discontinued. 4. Hypertension. Resolved. Maintained off pressors. Maintain map greater than 65  5. DIXON resolved. 6. Normocytic anemia. Hemoglobin stable  7. History of right gastrocnemius DVT. On Eliquis at home. Will continue with heparin infusion  8. Diet. Tube feeds  9. GI prophylaxis. Lansoprazole  10. DVT prophylaxis.   Heparin infusion         Sandrine Luciano MD  PGY-1, Internal medicine resident  [address] [place] [address]  5/24/2020 8:39 AM

## 2020-05-24 NOTE — PROGRESS NOTES
Spouse name: None    Number of children: None    Years of education: None    Highest education level: None   Occupational History    None   Social Needs    Financial resource strain: None    Food insecurity     Worry: None     Inability: None    Transportation needs     Medical: None     Non-medical: None   Tobacco Use    Smoking status: Current Every Day Smoker     Packs/day: 2.00     Types: Cigarettes    Smokeless tobacco: Never Used   Substance and Sexual Activity    Alcohol use: Yes     Frequency: 4 or more times a week     Drinks per session: 3 or 4     Binge frequency: Patient refused     Comment: hasn't drank in 12 days, usually daily drinker (vodka)    Drug use: Never    Sexual activity: None   Lifestyle    Physical activity     Days per week: Patient refused     Minutes per session: Patient refused    Stress: None   Relationships    Social connections     Talks on phone: None     Gets together: None     Attends Judaism service: None     Active member of club or organization: None     Attends meetings of clubs or organizations: None     Relationship status: None    Intimate partner violence     Fear of current or ex partner: No     Emotionally abused: No     Physically abused: No     Forced sexual activity: No   Other Topics Concern    None   Social History Narrative    None       Current Facility-Administered Medications   Medication Dose Route Frequency Provider Last Rate Last Dose    ipratropium-albuterol (DUONEB) nebulizer solution 1 ampule  1 ampule Inhalation Q6H PRN Sheryle Potts, MD        amphotericin B liposome (AMBISOME) 230 mg in dextrose 5 % 250 mL IVPB  4 mg/kg Intravenous Q24H Edita Beard MD   Stopped at 05/23/20 1707    potassium bicarb-citric acid (EFFER-K) effervescent tablet 40 mEq  40 mEq Oral Once Gutsavo Correa MD        0.9 % sodium chloride infusion   Intravenous Continuous Mariya Shipley MD 75 mL/hr at 05/22/20 2238      fentaNYL (SUBLIMAZE) bilaterally  Present meningeal signs     Assessment :    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 Intractable symptomatic vascular headache .  Alcoholism    Plan:    As per critical care

## 2020-05-25 ENCOUNTER — APPOINTMENT (OUTPATIENT)
Dept: GENERAL RADIOLOGY | Age: 60
DRG: 720 | End: 2020-05-25
Attending: INTERNAL MEDICINE
Payer: MEDICARE

## 2020-05-25 LAB
ABSOLUTE EOS #: 0.05 K/UL (ref 0–0.44)
ABSOLUTE IMMATURE GRANULOCYTE: 0.1 K/UL (ref 0–0.3)
ABSOLUTE LYMPH #: 1.87 K/UL (ref 1.1–3.7)
ABSOLUTE MONO #: 0.89 K/UL (ref 0.1–1.2)
ALBUMIN SERPL-MCNC: 1.9 G/DL (ref 3.5–5.2)
ALBUMIN/GLOBULIN RATIO: 0.7 (ref 1–2.5)
ALLEN TEST: ABNORMAL
ALP BLD-CCNC: 64 U/L (ref 35–104)
ALT SERPL-CCNC: 65 U/L (ref 5–33)
ANION GAP SERPL CALCULATED.3IONS-SCNC: 10 MMOL/L (ref 9–17)
AST SERPL-CCNC: 65 U/L
BASOPHILS # BLD: 0 % (ref 0–2)
BASOPHILS ABSOLUTE: <0.03 K/UL (ref 0–0.2)
BILIRUB SERPL-MCNC: 0.25 MG/DL (ref 0.3–1.2)
BUN BLDV-MCNC: 18 MG/DL (ref 6–20)
BUN/CREAT BLD: ABNORMAL (ref 9–20)
CALCIUM IONIZED: 1.25 MMOL/L (ref 1.13–1.33)
CALCIUM SERPL-MCNC: 8.9 MG/DL (ref 8.6–10.4)
CHLORIDE BLD-SCNC: 106 MMOL/L (ref 98–107)
CO2: 25 MMOL/L (ref 20–31)
CREAT SERPL-MCNC: 0.33 MG/DL (ref 0.5–0.9)
CULTURE: ABNORMAL
DIFFERENTIAL TYPE: ABNORMAL
DIRECT EXAM: ABNORMAL
DIRECT EXAM: NORMAL
EKG ATRIAL RATE: 93 BPM
EKG P-R INTERVAL: 118 MS
EKG Q-T INTERVAL: 372 MS
EKG QRS DURATION: 74 MS
EKG QTC CALCULATION (BAZETT): 462 MS
EKG R AXIS: 8 DEGREES
EKG T AXIS: 28 DEGREES
EKG VENTRICULAR RATE: 93 BPM
EOSINOPHILS RELATIVE PERCENT: 1 % (ref 1–4)
FIO2: 30
GFR AFRICAN AMERICAN: >60 ML/MIN
GFR NON-AFRICAN AMERICAN: >60 ML/MIN
GFR SERPL CREATININE-BSD FRML MDRD: ABNORMAL ML/MIN/{1.73_M2}
GFR SERPL CREATININE-BSD FRML MDRD: ABNORMAL ML/MIN/{1.73_M2}
GLUCOSE BLD-MCNC: 125 MG/DL (ref 70–99)
GLUCOSE BLD-MCNC: 128 MG/DL (ref 74–100)
HCT VFR BLD CALC: 29.1 % (ref 36.3–47.1)
HEMOGLOBIN: 9.8 G/DL (ref 11.9–15.1)
IMMATURE GRANULOCYTES: 1 %
LYMPHOCYTES # BLD: 17 % (ref 24–43)
Lab: ABNORMAL
Lab: NORMAL
MAGNESIUM: 2 MG/DL (ref 1.6–2.6)
MCH RBC QN AUTO: 30.7 PG (ref 25.2–33.5)
MCHC RBC AUTO-ENTMCNC: 33.7 G/DL (ref 28.4–34.8)
MCV RBC AUTO: 91.2 FL (ref 82.6–102.9)
MODE: ABNORMAL
MONOCYTES # BLD: 8 % (ref 3–12)
NEGATIVE BASE EXCESS, ART: ABNORMAL (ref 0–2)
NRBC AUTOMATED: 0 PER 100 WBC
O2 DEVICE/FLOW/%: ABNORMAL
PARTIAL THROMBOPLASTIN TIME: 47.3 SEC (ref 20.5–30.5)
PARTIAL THROMBOPLASTIN TIME: 51.3 SEC (ref 20.5–30.5)
PARTIAL THROMBOPLASTIN TIME: 58.2 SEC (ref 20.5–30.5)
PATIENT TEMP: ABNORMAL
PDW BLD-RTO: 13.8 % (ref 11.8–14.4)
PLATELET # BLD: 199 K/UL (ref 138–453)
PLATELET ESTIMATE: ABNORMAL
PMV BLD AUTO: 10.4 FL (ref 8.1–13.5)
POC HCO3: 29.9 MMOL/L (ref 21–28)
POC O2 SATURATION: 96 % (ref 94–98)
POC PCO2 TEMP: ABNORMAL MM HG
POC PCO2: 37 MM HG (ref 35–48)
POC PH TEMP: ABNORMAL
POC PH: 7.52 (ref 7.35–7.45)
POC PO2 TEMP: ABNORMAL MM HG
POC PO2: 74.8 MM HG (ref 83–108)
POSITIVE BASE EXCESS, ART: 7 (ref 0–3)
POTASSIUM SERPL-SCNC: 2.5 MMOL/L (ref 3.7–5.3)
POTASSIUM SERPL-SCNC: 2.8 MMOL/L (ref 3.7–5.3)
RBC # BLD: 3.19 M/UL (ref 3.95–5.11)
RBC # BLD: ABNORMAL 10*6/UL
SAMPLE SITE: ABNORMAL
SEG NEUTROPHILS: 73 % (ref 36–65)
SEGMENTED NEUTROPHILS ABSOLUTE COUNT: 7.97 K/UL (ref 1.5–8.1)
SODIUM BLD-SCNC: 141 MMOL/L (ref 135–144)
SPECIMEN DESCRIPTION: ABNORMAL
SPECIMEN DESCRIPTION: NORMAL
TCO2 (CALC), ART: 31 MMOL/L (ref 22–29)
TOTAL PROTEIN: 4.6 G/DL (ref 6.4–8.3)
WBC # BLD: 10.9 K/UL (ref 3.5–11.3)
WBC # BLD: ABNORMAL 10*3/UL

## 2020-05-25 PROCEDURE — 82947 ASSAY GLUCOSE BLOOD QUANT: CPT

## 2020-05-25 PROCEDURE — 87327 CRYPTOCOCCUS NEOFORM AG IA: CPT

## 2020-05-25 PROCEDURE — 99291 CRITICAL CARE FIRST HOUR: CPT | Performed by: INTERNAL MEDICINE

## 2020-05-25 PROCEDURE — 99233 SBSQ HOSP IP/OBS HIGH 50: CPT | Performed by: PSYCHIATRY & NEUROLOGY

## 2020-05-25 PROCEDURE — 82164 ANGIOTENSIN I ENZYME TEST: CPT

## 2020-05-25 PROCEDURE — 36415 COLL VENOUS BLD VENIPUNCTURE: CPT

## 2020-05-25 PROCEDURE — 82330 ASSAY OF CALCIUM: CPT

## 2020-05-25 PROCEDURE — 6360000002 HC RX W HCPCS: Performed by: INTERNAL MEDICINE

## 2020-05-25 PROCEDURE — 2580000003 HC RX 258: Performed by: INTERNAL MEDICINE

## 2020-05-25 PROCEDURE — 71045 X-RAY EXAM CHEST 1 VIEW: CPT

## 2020-05-25 PROCEDURE — 6370000000 HC RX 637 (ALT 250 FOR IP): Performed by: PSYCHIATRY & NEUROLOGY

## 2020-05-25 PROCEDURE — 94761 N-INVAS EAR/PLS OXIMETRY MLT: CPT

## 2020-05-25 PROCEDURE — 82803 BLOOD GASES ANY COMBINATION: CPT

## 2020-05-25 PROCEDURE — 2000000000 HC ICU R&B

## 2020-05-25 PROCEDURE — 94770 HC ETCO2 MONITOR DAILY: CPT

## 2020-05-25 PROCEDURE — 94003 VENT MGMT INPAT SUBQ DAY: CPT

## 2020-05-25 PROCEDURE — 2060000000 HC ICU INTERMEDIATE R&B

## 2020-05-25 PROCEDURE — 2700000000 HC OXYGEN THERAPY PER DAY

## 2020-05-25 PROCEDURE — 99233 SBSQ HOSP IP/OBS HIGH 50: CPT | Performed by: INTERNAL MEDICINE

## 2020-05-25 PROCEDURE — 80053 COMPREHEN METABOLIC PANEL: CPT

## 2020-05-25 PROCEDURE — 2580000003 HC RX 258: Performed by: STUDENT IN AN ORGANIZED HEALTH CARE EDUCATION/TRAINING PROGRAM

## 2020-05-25 PROCEDURE — 6360000002 HC RX W HCPCS: Performed by: STUDENT IN AN ORGANIZED HEALTH CARE EDUCATION/TRAINING PROGRAM

## 2020-05-25 PROCEDURE — 6370000000 HC RX 637 (ALT 250 FOR IP): Performed by: STUDENT IN AN ORGANIZED HEALTH CARE EDUCATION/TRAINING PROGRAM

## 2020-05-25 PROCEDURE — 83735 ASSAY OF MAGNESIUM: CPT

## 2020-05-25 PROCEDURE — 85730 THROMBOPLASTIN TIME PARTIAL: CPT

## 2020-05-25 PROCEDURE — 85025 COMPLETE CBC W/AUTO DIFF WBC: CPT

## 2020-05-25 PROCEDURE — 37799 UNLISTED PX VASCULAR SURGERY: CPT

## 2020-05-25 PROCEDURE — 84132 ASSAY OF SERUM POTASSIUM: CPT

## 2020-05-25 RX ORDER — POTASSIUM CHLORIDE 7.45 MG/ML
40 INJECTION INTRAVENOUS ONCE
Status: COMPLETED | OUTPATIENT
Start: 2020-05-25 | End: 2020-05-25

## 2020-05-25 RX ORDER — POTASSIUM CHLORIDE 20MEQ/15ML
40 LIQUID (ML) ORAL ONCE
Status: DISCONTINUED | OUTPATIENT
Start: 2020-05-25 | End: 2020-05-25

## 2020-05-25 RX ADMIN — POTASSIUM BICARBONATE 20 MEQ: 782 TABLET, EFFERVESCENT ORAL at 20:27

## 2020-05-25 RX ADMIN — POTASSIUM CHLORIDE 10 MEQ: 7.46 INJECTION, SOLUTION INTRAVENOUS at 11:22

## 2020-05-25 RX ADMIN — HEPARIN SODIUM AND DEXTROSE 34 UNITS/KG/HR: 10000; 5 INJECTION INTRAVENOUS at 14:38

## 2020-05-25 RX ADMIN — POTASSIUM CHLORIDE 40 MEQ: 7.46 INJECTION, SOLUTION INTRAVENOUS at 20:49

## 2020-05-25 RX ADMIN — POTASSIUM CHLORIDE 10 MEQ: 7.46 INJECTION, SOLUTION INTRAVENOUS at 14:36

## 2020-05-25 RX ADMIN — SODIUM CHLORIDE, PRESERVATIVE FREE 10 ML: 5 INJECTION INTRAVENOUS at 08:14

## 2020-05-25 RX ADMIN — HEPARIN SODIUM AND DEXTROSE 34 UNITS/KG/HR: 10000; 5 INJECTION INTRAVENOUS at 05:53

## 2020-05-25 RX ADMIN — POTASSIUM CHLORIDE 10 MEQ: 7.46 INJECTION, SOLUTION INTRAVENOUS at 10:15

## 2020-05-25 RX ADMIN — HEPARIN SODIUM 1520 UNITS: 1000 INJECTION INTRAVENOUS; SUBCUTANEOUS at 05:54

## 2020-05-25 RX ADMIN — Medication 15 MG: at 08:14

## 2020-05-25 RX ADMIN — SODIUM CHLORIDE: 9 INJECTION, SOLUTION INTRAVENOUS at 08:21

## 2020-05-25 RX ADMIN — POTASSIUM CHLORIDE 10 MEQ: 7.46 INJECTION, SOLUTION INTRAVENOUS at 12:24

## 2020-05-25 RX ADMIN — POTASSIUM CHLORIDE 10 MEQ: 7.46 INJECTION, SOLUTION INTRAVENOUS at 08:49

## 2020-05-25 RX ADMIN — AMPHOTERICIN B 230 MG: 50 INJECTION, POWDER, LYOPHILIZED, FOR SOLUTION INTRAVENOUS at 15:59

## 2020-05-25 RX ADMIN — SODIUM CHLORIDE, PRESERVATIVE FREE 10 ML: 5 INJECTION INTRAVENOUS at 20:02

## 2020-05-25 RX ADMIN — POTASSIUM CHLORIDE 10 MEQ: 7.46 INJECTION, SOLUTION INTRAVENOUS at 13:21

## 2020-05-25 RX ADMIN — VITAM B12 50 MCG: 100 TAB at 08:14

## 2020-05-25 RX ADMIN — FOLIC ACID 1 MG: 1 TABLET ORAL at 08:14

## 2020-05-25 ASSESSMENT — PULMONARY FUNCTION TESTS
PIF_VALUE: 19
PIF_VALUE: 18
PIF_VALUE: 18
PIF_VALUE: 17
PIF_VALUE: 16
PIF_VALUE: 19
PIF_VALUE: 17
PIF_VALUE: 17
PIF_VALUE: 18
PIF_VALUE: 21
PIF_VALUE: 18
PIF_VALUE: 17
PIF_VALUE: 21
PIF_VALUE: 17

## 2020-05-25 ASSESSMENT — PAIN SCALES - GENERAL
PAINLEVEL_OUTOF10: 0

## 2020-05-25 NOTE — PROGRESS NOTES
PRN  heparin (porcine), 60 Units/kg, PRN  heparin (porcine), 30 Units/kg, PRN  sodium chloride flush, 10 mL, PRN  acetaminophen, 650 mg, Q6H PRN    Or  acetaminophen, 650 mg, Q6H PRN  polyethylene glycol, 17 g, Daily PRN  promethazine, 12.5 mg, Q6H PRN    Or  ondansetron, 4 mg, Q6H PRN  potassium chloride, 40 mEq, PRN    Or  potassium alternative oral replacement, 40 mEq, PRN    Or  potassium chloride, 10 mEq, PRN  magnesium sulfate, 2 g, PRN  midodrine, 5 mg, TID PRN          VENT SETTINGS (Comprehensive) (if applicable):  Vent Information  $Ventilation: $Initial Day  Skin Assessment: Clean, dry, & intact  Suction Catheter Diameter: 14  Equipment Changed: Expiratory Filter  Vent Type: Servo i  Vent Mode: PRVC  Vt Ordered: 500 mL  Rate Set: 16 bmp  FiO2 : 30 %  SpO2: 100 %  SpO2/FiO2 ratio: 333.33  Sensitivity: 5  PEEP/CPAP: 5  I Time/ I Time %: 0.9 s  Humidification Source: HME  Nitric Oxide/Epoprostenol In Use?: No  Additional Respiratory  Assessments  Pulse: 93  Resp: 15  SpO2: 100 %  End Tidal CO2: 32 (%)  Position: Semi-Sherman's  Humidification Source: HME  Oral Care Completed?: Yes  Oral Care: Mouth suctioned, Mouth swabbed  Subglottic Suction Done?: No  Cuff Pressure (cm H2O): 30 cm H2O    ABGs: see chart    Laboratory findings:    Complete Blood Count:   Recent Labs     05/23/20  0631 05/24/20  0513 05/25/20  0437   WBC 11.2 11.8* 10.9   HGB 10.1* 10.2* 9.8*   HCT 30.5* 30.7* 29.1*    198 199        Last 3 Blood Glucose:   Recent Labs     05/22/20  0810   GLUCOSE 144*        PT/INR:    Lab Results   Component Value Date    PROTIME 10.7 05/14/2020    INR 1.0 05/14/2020     PTT:    Lab Results   Component Value Date    APTT 47.3 05/25/2020       Comprehensive Metabolic Profile:   Recent Labs     05/22/20  0810 05/23/20  1241     --    K 3.3*  --      --    CO2 25  --    BUN 11  --    CREATININE 0.26*  --    GLUCOSE 144*  --    CALCIUM 8.0*  --    PROT  --  4.3*      Magnesium:   Lab Results Component Value Date    MG 2.1 05/15/2020     Phosphorus:   Lab Results   Component Value Date    PHOS 2.4 05/14/2020     Ionized Calcium:   Lab Results   Component Value Date    CAION 1.15 05/14/2020        Urinalysis: see chart    Troponin: No results for input(s): TROPONINI in the last 72 hours. Microbiology:    Cultures during this admission:     Blood cultures:                 [] None drawn      [x] Negative             []  Positive (Details:  )  Urine Culture:                   [] None drawn      [x] Negative             []  Positive (Details:  )  Sputum Culture:               [x] None drawn       [] Negative             []  Positive (Details:  )   Endotracheal aspirate:     [x] None drawn       [] Negative             []  Positive (Details:  )     Other pertinent Labs: see charting      Radiology/Imaging:     Chest Xray (5/25/2020): see chart    ASSESSMENT:     Patient Active Problem List    Diagnosis Date Noted    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[facility] [address]     Hypoxia     DVT (deep venous thrombosis) ([facility] [address] 05/18/2020    Fever 05/17/2020    Skin rash 18/82/7531    Metabolic encephalopathy     Chronic obstructive pulmonary disease (HCC)     Pericardial effusion     Thrombocytopenia (HCC)     Hypotension due to hypovolemia     Alcoholism ([facility] [address] 05/14/2020    Hyponatremia 05/14/2020    DIXON (acute kidney injury) ([facility] [address] 05/14/2020    Smoker 05/14/2020    Suspected COVID-19 virus infection     Acute intractable headache 05/11/2020    Chronic abdominal pain 10/30/2018    Mucopurulent chronic bronchitis ([facility] [address] 07/25/2018    Weight loss 07/25/2018       Additional assessment:          PLAN:     WEAN PER PROTOCOL:  [] No   [x] Yes  [] N/A    DISCONTINUE ANY LABS:   [x] No   [] Yes    ICU PROPHYLAXIS:  Stress ulcer:  [x] PPI Agent  [] C2Yaszb [] Sucralfate  [] Other:  VTE:   [] Enoxaparin  [x] Unfract.  Heparin Subcut  [] EPC Cuffs    NUTRITION:  [] NPO [] Tube Feeding (Specify: ) [] TPN  [x] PO (Diet: DIET TUBE FEED

## 2020-05-25 NOTE — PROGRESS NOTES
(1.702 m)   Wt 151 lb 0.2 oz (68.5 kg)   SpO2 100%   BMI 23.65 kg/m²     Blood pressure range: Systolic (55ZCP), GKO:108 , Min:103 , QSQ:389   ; Diastolic (66QIR), SRH:76, Min:47, Max:65      ROS:  Review of Systems   Unable to perform ROS: Mental status change         NEUROLOGIC EXAMINATION  Physical Exam  Constitutional:       General: She is not in acute distress. Appearance: She is ill-appearing and toxic-appearing. She is not diaphoretic. Interventions: She is intubated. Comments: She is more alert today as opposed to yesterday. She is still not following commands   HENT:      Head: Normocephalic and atraumatic. Eyes:      Pupils: Pupils are equal, round, and reactive to light. Neck:      Musculoskeletal: Decreased range of motion. No neck rigidity. Cardiovascular:      Rate and Rhythm: Normal rate and regular rhythm. Pulmonary:      Effort: Pulmonary effort is normal. She is intubated. Abdominal:      General: Abdomen is flat. Skin:     General: Skin is warm. Findings: Rash (Generalized skin rash, moribilliform) present. Neurological:      GCS: GCS eye subscore is 2. GCS verbal subscore is 1. GCS motor subscore is 5. Cranial Nerves: No cranial nerve deficit or facial asymmetry. Deep Tendon Reflexes: Babinski sign absent on the right side. Babinski sign absent on the left side. Comments: Stuporous          Psychiatric:         Speech: Speech is slurred. Neurologic Exam     Mental Status   Speech: slurred   Level of consciousness: unresponsive to painful stimuli  Unable to name object. Unable to read. Unable to repeat. Unable to write. Cranial Nerves     CN III, IV, VI   Pupils are equal, round, and reactive to light. Nystagmus: none     CN VII   Facial expression full, symmetric. Motor Exam Withdraws to pain on all extremities.    No following commands         Lab Results:   CBC:   Recent Labs     05/23/20  0631 05/24/20  0513 05/25/20  0437   WBC 11.2 11.8* 10.9   HGB 10.1* 10.2* 9.8*    198 199     BMP:    Recent Labs     05/25/20  0813      K 2.5*      CO2 25   BUN 18   CREATININE 0.33*   GLUCOSE 125*         Lab Results   Component Value Date    ALT 65 (H) 05/25/2020    AST 65 (H) 05/25/2020    TSH 0.70 05/14/2020    INR 1.0 05/14/2020    [id number] 1358 (H) 05/17/2020     CSF studies  White blood cells 8  Neutrophils 48%  Lymphocytes 21%  RBCs 1  Glucose 74  Protein 77.8  Increased IgG synthesis rate  No results found for: PHENYTOIN, PHENYTOIN, VALPROATE, CBMZ  Repeat CSF was done twice   Opening pressure 14 cm H2O    IMAGING    CTH repeat 5/20/2020  No acute findings     CTPE  No evidence of pulmonary embolism. Left lower lobar atelectasis. MRI thoracic/ lumbar spine   No cord abnormality identified. No MRI evidence of discitis-osteomyelitis of the cervical or thoracic spine. Degenerative changes as detailed above. Incidentally noted left lower lobe atelectasis. MRI brain w/wo contrast 5/14/2020  No acute disease. No evidence of meningeal enhancement or empyema. MRI brain w/wo contrast 5/17/2020  Possible acute microinfarct involving within the splenium of corpus callosum. Motion degraded exam        ASSESSMENT AND RECOMMENDATIONS   62year old female with history of alcohol abuse, who presented with new onset severe 8-9/10 generalized headache associated with photophobia, phonophobia, fever, cough, and diarrhea. COVID neg x2. During admission she had worsening mentation and became obtunded. Was found to have left lower lobar pneumonia.      Meningoencephalitis for work up, possible cryptococcus    Lobar pneumonia with para pneumonic effusion     Sepsis due to above  Hypoxic respiratory failure due to above  Hypernatremia  Dehydration   Alcohol abuse     Pneumonia management as per ID, primary team   Sepsis management as per ID, 1ry team   Continue thiamine 250 mg IV q24h    DC Amitripytline      Discussed with

## 2020-05-25 NOTE — PLAN OF CARE
neurological deterioration signs and symptoms  Description: Absence of continued neurological deterioration signs and symptoms  5/24/2020 2257 by Fouzia Clements RN  Outcome: Ongoing     Problem: Confusion - Acute:  Goal: Mental status will be restored to baseline  Description: Mental status will be restored to baseline  5/24/2020 2257 by Fouzia Clements RN  Outcome: Ongoing     Problem: Injury - Risk of, Physical Injury:  Goal: Will remain free from falls  Description: Will remain free from falls  5/24/2020 2257 by Fouzia Clements RN  Outcome: Ongoing     Problem: Injury - Risk of, Physical Injury:  Goal: Absence of physical injury  Description: Absence of physical injury  5/24/2020 2257 by Fouzia Clements RN  Outcome: Ongoing     Problem: Mood - Altered:  Goal: Mood stable  Description: Mood stable  5/24/2020 2257 by Fouzia Clements RN  Outcome: Ongoing     Problem: Mood - Altered:  Goal: Absence of abusive behavior  Description: Absence of abusive behavior  5/24/2020 2257 by Fouzia Clements RN  Outcome: Ongoing     Problem: Mood - Altered:  Goal: Verbalizations of feeling emotionally comfortable while being cared for will increase  Description: Verbalizations of feeling emotionally comfortable while being cared for will increase  5/24/2020 2257 by Fouzia Clements RN  Outcome: Ongoing     Problem: Psychomotor Activity - Altered:  Goal: Absence of psychomotor disturbance signs and symptoms  Description: Absence of psychomotor disturbance signs and symptoms  5/24/2020 2257 by Fouzia Clements RN  Outcome: Ongoing     Problem: Sensory Perception - Impaired:  Goal: Demonstrations of improved sensory functioning will increase  Description: Demonstrations of improved sensory functioning will increase  5/24/2020 2257 by Fouzia Clements RN  Outcome: Ongoing     Problem: Sensory Perception - Impaired:  Goal: Decrease in sensory misperception frequency  Description: Decrease in sensory misperception frequency  5/24/2020 2257 by Michael Olsen RN  Outcome: Ongoing     Problem: Sensory Perception - Impaired:  Goal: Able to refrain from responding to false sensory perceptions  Description: Able to refrain from responding to false sensory perceptions  5/24/2020 2257 by Michael Olsen RN  Outcome: Ongoing     Problem: Sensory Perception - Impaired:  Goal: Demonstrates accurate environmental perceptions  Description: Demonstrates accurate environmental perceptions  5/24/2020 2257 by Michael Olsen RN  Outcome: Ongoing     Problem: Sensory Perception - Impaired:  Goal: Able to distinguish between reality-based and nonreality-based thinking  Description: Able to distinguish between reality-based and nonreality-based thinking  5/24/2020 2257 by Michael Olsen RN  Outcome: Ongoing     Problem: Sensory Perception - Impaired:  Goal: Able to interrupt nonreality-based thinking  Description: Able to interrupt nonreality-based thinking  5/24/2020 2257 by Michael Olsen RN  Outcome: Ongoing     Problem: MECHANICAL VENTILATION  Goal: Patient will maintain patent airway  5/24/2020 2257 by Michael Olsen RN  Outcome: Ongoing     Problem: MECHANICAL VENTILATION  Goal: Oral health is maintained or improved  5/24/2020 2257 by Michael Olsen RN  Outcome: Ongoing     Problem: MECHANICAL VENTILATION  Goal: ET tube will be managed safely  5/24/2020 2257 by Michael Olsen RN  Outcome: Ongoing     Problem: MECHANICAL VENTILATION  Goal: Ability to express needs and understand communication  5/24/2020 2257 by Michael Olsen RN  Outcome: Ongoing     Problem: MECHANICAL VENTILATION  Goal: Mobility/activity is maintained at optimum level for patient  5/24/2020 2257 by Michael Olsen RN  Outcome: Ongoing     Problem: OXYGENATION/RESPIRATORY FUNCTION  Goal: Patient will maintain patent airway  5/24/2020 2257 by Michael Olsen RN  Outcome: Ongoing     Problem:

## 2020-05-25 NOTE — PROGRESS NOTES
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 · 5/16 MRI LB old osteoarthritis and no cord compression  · 5/16 MRI brain neg - 5/15 LP 8 WBC and normal glucose  · 5/17 CT chest LLL consolidation, ? Atelectasis. · LP 8 WBC and normal glucose  · Treated w steroids and  ceftriaxone 5/14 - 5/15 , fever improved, but fever relapsed off steroids  · Fever relapsed since 5/17   · Increased oxygen requirement, increased lethargy  · Left lower lobe infiltrate worse on x-ray  · Failed Levaquin since 5/19 -legionella AG neg  · 5/20  intubated - fever 104. - CXR worse LLL  · Generalized Body rash noticed at admission .   · CT chest 5/21 progression of the left lower lobe infiltrate, around pneumonia  · Sputum culture did not show cryptococcus  · Cryptococcus CSF antigen -5/22  · Opening pressure 5/2416  · All CSF meningitis panel is negative for cryptococcus PCR  · 5/21 cryptococcus serum antigen positive, 4  · 5/22 lumbar puncture repeated - WBC 63, neutrophils 39%, 54 lymp, Protein 142, glucose 53 -  PCR neg  · 5/22 abelcet started  · 5/22 and 5/23 no fever  · The lack of cryptococcus CSF antigen positivity does not rule out meningoencephalitis to cryptococcus      Recommendations     · Serum cryptoAG positive titer 4 alert starting to respond on the ventilator, very minimal responses, difficult to assess due to underlying lethargy. Both upper extremities remain edematous, legs are not edematous, the rash over the legs has improved  She continues to have some redness over the neck as when she came in  Abdomen is soft nontender. Labs reviewed      Repeat lumbar puncture was done 5/24 with an opening pressure of 16, CSF RBC 1600, WBC 95, 63% lymphocytes, 33% PMNs, - Meningitis panel negative PCR. CRP decreased to 49  Cryptococcus CSF antigen negative-negative CSF cryptococcus antigen does not rule out cryptococcus meningoencephalitis. get cytology on the fluid  Histoplasma urine antigen negative  WBC 11      Pend  immunity work po to understand why he had the crypto infection. Summary of relevant labs:  Labs:  WBC 10.2 - 16 - 14 - 11  ALC 0.14    -43-23 - 10 -49  Ferritin 9357    Micro:  Sp cx 5/20 neg  BC x2  5/20 neg   BC  x2  5/13 neg    HIV neg 5/15/20  Crypto serum AG + titer 4 ( 5/21)  Crypto CSF AG neg 5/22    · 5/24 LP OP 16 - CSF:  RBC 1600, WBC 95, 63% lymphocytes, 33% PMNs, - Meningitis panel negative PCR. · 5/22 LP  CSF WBC 63, neutrophils 39%, 54 lymp, Protein 142, glucose 53 - PCR panel neg including the crypto PCR -cx neg  · 5/15 LP WBC 8- glucose 74- 48% PMN-meningitis PCR neg - lyme neg - EBV neg - VZV neg - cx neg    COVID neg 5/14  Legionella AG neg    Cardiology   Echo 5/17 no vegetation  Left ventricle is normal in size with normal systolic function globally. Calculated ejection fraction is 56%. Mildly dilated right atrium. Mild buckling of the right atrial wall. Aortic sclerosis without stenosis. Mild tricuspid regurgitation. Estimated right ventricular systolic pressure is 25 mmHg.   Imaging:  CT chest 5/21 - progression of the RLL infilt/ pneumonia looks larger than on the first CT        EEG  Slowing 5/19  CXR 5/20 progression of the left infiltrate/ consolidation  CXR 5/19 shows

## 2020-05-26 LAB
ABSOLUTE EOS #: 0.1 K/UL (ref 0–0.44)
ABSOLUTE IMMATURE GRANULOCYTE: 0.16 K/UL (ref 0–0.3)
ABSOLUTE LYMPH #: 2.46 K/UL (ref 1.1–3.7)
ABSOLUTE MONO #: 0.85 K/UL (ref 0.1–1.2)
ALBUMIN (CALCULATED): 2.1 G/DL (ref 3.2–5.2)
ALBUMIN PERCENT: 50 % (ref 45–65)
ALLEN TEST: ABNORMAL
ALPHA 1 PERCENT: 6 % (ref 3–6)
ALPHA 2 PERCENT: 15 % (ref 6–13)
ALPHA-1-GLOBULIN: 0.3 G/DL (ref 0.1–0.4)
ALPHA-2-GLOBULIN: 0.7 G/DL (ref 0.5–0.9)
ANION GAP SERPL CALCULATED.3IONS-SCNC: 8 MMOL/L (ref 9–17)
ANTICARDIOLIPIN IGA ANTIBODY: 0.7 APU
BANDS, CSF: NORMAL %
BANDS, CSF: NORMAL %
BASO CSF: NORMAL %
BASO CSF: NORMAL %
BASOPHILS # BLD: 0 % (ref 0–2)
BASOPHILS ABSOLUTE: <0.03 K/UL (ref 0–0.2)
BETA GLOBULIN: 0.5 G/DL (ref 0.5–1.1)
BETA PERCENT: 11 % (ref 11–19)
BETA-2 MICROGLOBULIN: 1.9 MG/L (ref 0.6–2.4)
BLAST CSF: NORMAL %
BLAST CSF: NORMAL %
BUN BLDV-MCNC: 19 MG/DL (ref 6–20)
BUN/CREAT BLD: ABNORMAL (ref 9–20)
CALCIUM SERPL-MCNC: 8.9 MG/DL (ref 8.6–10.4)
CASE NUMBER:: NORMAL
CASE NUMBER:: NORMAL
CHLORIDE BLD-SCNC: 105 MMOL/L (ref 98–107)
CO2: 27 MMOL/L (ref 20–31)
CREAT SERPL-MCNC: 0.28 MG/DL (ref 0.5–0.9)
CRYPTOCOCCAL ANTIGEN TITER: 2 TITER
CRYPTOCOCCAL ANTIGEN: POSITIVE
CULTURE: ABNORMAL
DIFFERENTIAL TYPE: ABNORMAL
DIRECT EXAM: ABNORMAL
DIRECT EXAM: ABNORMAL
EOS CSF: NORMAL %
EOS CSF: NORMAL %
EOSINOPHILS RELATIVE PERCENT: 1 % (ref 1–4)
FIO2: 30
FLUID DIFF COMMENT: NORMAL
FLUID DIFF COMMENT: NORMAL
GAMMA GLOBULIN %: 18 % (ref 9–20)
GAMMA GLOBULIN: 0.8 G/DL (ref 0.5–1.5)
GFR AFRICAN AMERICAN: >60 ML/MIN
GFR NON-AFRICAN AMERICAN: >60 ML/MIN
GFR SERPL CREATININE-BSD FRML MDRD: ABNORMAL ML/MIN/{1.73_M2}
GFR SERPL CREATININE-BSD FRML MDRD: ABNORMAL ML/MIN/{1.73_M2}
GLIADIN DEAMINIDATED PEPTIDE AB IGA: 0.6 U/ML
GLIADIN DEAMINIDATED PEPTIDE AB IGG: <0.4 U/ML
GLUCOSE BLD-MCNC: 118 MG/DL (ref 74–100)
GLUCOSE BLD-MCNC: 120 MG/DL (ref 70–99)
HCT VFR BLD CALC: 28.3 % (ref 36.3–47.1)
HEMOGLOBIN: 9.5 G/DL (ref 11.9–15.1)
IGA: 89 MG/DL (ref 70–400)
IMMATURE GRANULOCYTES: 2 %
INTERVENTION: NORMAL
LYMPHOCYTES # BLD: 24 % (ref 24–43)
LYMPHS CSF: 54 %
LYMPHS CSF: 63 %
Lab: ABNORMAL
MAGNESIUM: 2 MG/DL (ref 1.6–2.6)
MCH RBC QN AUTO: 30.7 PG (ref 25.2–33.5)
MCHC RBC AUTO-ENTMCNC: 33.6 G/DL (ref 28.4–34.8)
MCV RBC AUTO: 91.6 FL (ref 82.6–102.9)
METAYELO CSF: NORMAL %
METAYELO CSF: NORMAL %
MODE: ABNORMAL
MONO/MACROPHAGE CSF (MANUAL): NORMAL %
MONO/MACROPHAGE CSF (MANUAL): NORMAL %
MONOCYTES # BLD: 8 % (ref 3–12)
MYELOCYTE CSF: NORMAL %
MYELOCYTE CSF: NORMAL %
NEGATIVE BASE EXCESS, ART: ABNORMAL (ref 0–2)
NEUTROPHILS, CSF: 33 %
NEUTROPHILS, CSF: 39 %
NRBC AUTOMATED: 0 PER 100 WBC
O2 DEVICE/FLOW/%: ABNORMAL
OTHER CELLS FLUID: NORMAL %
OTHER CELLS FLUID: NORMAL %
PARTIAL THROMBOPLASTIN TIME: 58.9 SEC (ref 20.5–30.5)
PARTIAL THROMBOPLASTIN TIME: 60.2 SEC (ref 20.5–30.5)
PARTIAL THROMBOPLASTIN TIME: 60.5 SEC (ref 20.5–30.5)
PATHOLOGIST: ABNORMAL
PATHOLOGIST: NORMAL
PATIENT TEMP: ABNORMAL
PDW BLD-RTO: 14.3 % (ref 11.8–14.4)
PLATELET # BLD: 205 K/UL (ref 138–453)
PLATELET ESTIMATE: ABNORMAL
PMV BLD AUTO: 10 FL (ref 8.1–13.5)
POC HCO3: 29.8 MMOL/L (ref 21–28)
POC O2 SATURATION: 96 % (ref 94–98)
POC PCO2 TEMP: ABNORMAL MM HG
POC PCO2: 36.5 MM HG (ref 35–48)
POC PH TEMP: ABNORMAL
POC PH: 7.52 (ref 7.35–7.45)
POC PO2 TEMP: ABNORMAL MM HG
POC PO2: 69.9 MM HG (ref 83–108)
POSITIVE BASE EXCESS, ART: 7 (ref 0–3)
POTASSIUM SERPL-SCNC: 3.2 MMOL/L (ref 3.7–5.3)
POTASSIUM SERPL-SCNC: 3.4 MMOL/L (ref 3.7–5.3)
PROTEIN ELECTROPHORESIS, SERUM: ABNORMAL
RBC # BLD: 3.09 M/UL (ref 3.95–5.11)
RBC # BLD: ABNORMAL 10*6/UL
SAMPLE SITE: ABNORMAL
SEG NEUTROPHILS: 65 % (ref 36–65)
SEGMENTED NEUTROPHILS ABSOLUTE COUNT: 6.59 K/UL (ref 1.5–8.1)
SERUM IFX INTERP: NORMAL
SODIUM BLD-SCNC: 140 MMOL/L (ref 135–144)
SPECIMEN DESCRIPTION: ABNORMAL
SPECIMEN DESCRIPTION: NORMAL
SPECIMEN DESCRIPTION: NORMAL
SURGICAL PATHOLOGY REPORT: NORMAL
TCO2 (CALC), ART: 31 MMOL/L (ref 22–29)
TISSUE TRANSGLUTAMINASE ANTIBODY IGG: <0.6 U/ML
TISSUE TRANSGLUTAMINASE IGA: 0.3 U/ML
TOTAL PROT. SUM,%: 100 % (ref 98–102)
TOTAL PROT. SUM: 4.4 G/DL (ref 6.3–8.2)
TOTAL PROTEIN: 4.3 G/DL (ref 6.4–8.3)
WBC # BLD: 10.2 K/UL (ref 3.5–11.3)
WBC # BLD: ABNORMAL 10*3/UL

## 2020-05-26 PROCEDURE — 88112 CYTOPATH CELL ENHANCE TECH: CPT

## 2020-05-26 PROCEDURE — 82803 BLOOD GASES ANY COMBINATION: CPT

## 2020-05-26 PROCEDURE — 2580000003 HC RX 258: Performed by: INTERNAL MEDICINE

## 2020-05-26 PROCEDURE — 6370000000 HC RX 637 (ALT 250 FOR IP): Performed by: STUDENT IN AN ORGANIZED HEALTH CARE EDUCATION/TRAINING PROGRAM

## 2020-05-26 PROCEDURE — 99291 CRITICAL CARE FIRST HOUR: CPT | Performed by: INTERNAL MEDICINE

## 2020-05-26 PROCEDURE — 82947 ASSAY GLUCOSE BLOOD QUANT: CPT

## 2020-05-26 PROCEDURE — 6370000000 HC RX 637 (ALT 250 FOR IP): Performed by: PSYCHIATRY & NEUROLOGY

## 2020-05-26 PROCEDURE — 2700000000 HC OXYGEN THERAPY PER DAY

## 2020-05-26 PROCEDURE — 80048 BASIC METABOLIC PNL TOTAL CA: CPT

## 2020-05-26 PROCEDURE — 94770 HC ETCO2 MONITOR DAILY: CPT

## 2020-05-26 PROCEDURE — 83735 ASSAY OF MAGNESIUM: CPT

## 2020-05-26 PROCEDURE — 88312 SPECIAL STAINS GROUP 1: CPT

## 2020-05-26 PROCEDURE — 97110 THERAPEUTIC EXERCISES: CPT

## 2020-05-26 PROCEDURE — 94003 VENT MGMT INPAT SUBQ DAY: CPT

## 2020-05-26 PROCEDURE — 85730 THROMBOPLASTIN TIME PARTIAL: CPT

## 2020-05-26 PROCEDURE — 6360000002 HC RX W HCPCS: Performed by: STUDENT IN AN ORGANIZED HEALTH CARE EDUCATION/TRAINING PROGRAM

## 2020-05-26 PROCEDURE — 84132 ASSAY OF SERUM POTASSIUM: CPT

## 2020-05-26 PROCEDURE — 99233 SBSQ HOSP IP/OBS HIGH 50: CPT | Performed by: INTERNAL MEDICINE

## 2020-05-26 PROCEDURE — 6360000002 HC RX W HCPCS: Performed by: INTERNAL MEDICINE

## 2020-05-26 PROCEDURE — 94761 N-INVAS EAR/PLS OXIMETRY MLT: CPT

## 2020-05-26 PROCEDURE — 99233 SBSQ HOSP IP/OBS HIGH 50: CPT | Performed by: PSYCHIATRY & NEUROLOGY

## 2020-05-26 PROCEDURE — 85025 COMPLETE CBC W/AUTO DIFF WBC: CPT

## 2020-05-26 PROCEDURE — 2000000000 HC ICU R&B

## 2020-05-26 PROCEDURE — 2060000000 HC ICU INTERMEDIATE R&B

## 2020-05-26 PROCEDURE — 31720 CLEARANCE OF AIRWAYS: CPT

## 2020-05-26 RX ORDER — HEPARIN SODIUM 1000 [USP'U]/ML
80 INJECTION, SOLUTION INTRAVENOUS; SUBCUTANEOUS PRN
Status: DISCONTINUED | OUTPATIENT
Start: 2020-05-26 | End: 2020-05-29

## 2020-05-26 RX ORDER — HEPARIN SODIUM 1000 [USP'U]/ML
40 INJECTION, SOLUTION INTRAVENOUS; SUBCUTANEOUS PRN
Status: DISCONTINUED | OUTPATIENT
Start: 2020-05-26 | End: 2020-05-29

## 2020-05-26 RX ORDER — HEPARIN SODIUM 1000 [USP'U]/ML
80 INJECTION, SOLUTION INTRAVENOUS; SUBCUTANEOUS ONCE
Status: DISCONTINUED | OUTPATIENT
Start: 2020-05-26 | End: 2020-05-27

## 2020-05-26 RX ORDER — HEPARIN SODIUM 10000 [USP'U]/100ML
18 INJECTION, SOLUTION INTRAVENOUS CONTINUOUS
Status: DISCONTINUED | OUTPATIENT
Start: 2020-05-26 | End: 2020-05-27

## 2020-05-26 RX ADMIN — HEPARIN SODIUM AND DEXTROSE 34 UNITS/KG/HR: 10000; 5 INJECTION INTRAVENOUS at 06:15

## 2020-05-26 RX ADMIN — SODIUM CHLORIDE, PRESERVATIVE FREE 10 ML: 5 INJECTION INTRAVENOUS at 21:20

## 2020-05-26 RX ADMIN — ACETAMINOPHEN 650 MG: 325 TABLET ORAL at 16:05

## 2020-05-26 RX ADMIN — Medication 15 MG: at 06:17

## 2020-05-26 RX ADMIN — AMPHOTERICIN B 230 MG: 50 INJECTION, POWDER, LYOPHILIZED, FOR SOLUTION INTRAVENOUS at 16:51

## 2020-05-26 RX ADMIN — VITAM B12 50 MCG: 100 TAB at 09:04

## 2020-05-26 RX ADMIN — FOLIC ACID 1 MG: 1 TABLET ORAL at 08:41

## 2020-05-26 RX ADMIN — HEPARIN SODIUM AND DEXTROSE 24.75 UNITS/KG/HR: 10000; 5 INJECTION INTRAVENOUS at 21:24

## 2020-05-26 RX ADMIN — POTASSIUM BICARBONATE 40 MEQ: 782 TABLET, EFFERVESCENT ORAL at 08:40

## 2020-05-26 RX ADMIN — POTASSIUM BICARBONATE 40 MEQ: 782 TABLET, EFFERVESCENT ORAL at 02:23

## 2020-05-26 RX ADMIN — SODIUM CHLORIDE, PRESERVATIVE FREE 10 ML: 5 INJECTION INTRAVENOUS at 08:41

## 2020-05-26 ASSESSMENT — PULMONARY FUNCTION TESTS
PIF_VALUE: 11
PIF_VALUE: 14
PIF_VALUE: 19
PIF_VALUE: 13
PIF_VALUE: 18
PIF_VALUE: 13
PIF_VALUE: 16
PIF_VALUE: 13

## 2020-05-26 ASSESSMENT — PAIN SCALES - GENERAL
PAINLEVEL_OUTOF10: 0

## 2020-05-26 NOTE — PROGRESS NOTES
Daily Progress Note  Neuro Critical Care    Patient Name: Delmy Villafana  Patient : 1960  Room/Bed: 0103/0103-01  Code Status: Full  Allergies: Allergies   Allergen Reactions    Sulfa Antibiotics        CHIEF COMPLAINT:      Encephalopathy     INTERVAL HISTORY    Initial Presentation (Admitted 20): The patient is a 61 yo female who presented with acute encephalopathy. She initially presented with complaints of new onset headache and generalized weakness. She was being treated for septic shock with worsening of mental status on . There is concern for cryptococcus meningitis, neuro critical care consulted for management and concern for possible elevated ICP. LP was completed, negative meningitis panel. Patient was initially started on steroids for 48 hours and EEG demonstrated diffuse encephalopathic picture.  Patient eventually required intubation in the MICU.     Patient is currently being treated for cryptococcal meningitis with amphotericin B.  Left lower lobe cryptococcoma patient also has had intractable symptomatic vascular headache.  Serum cryptococcus antigen was positive and has an increased cryptococcal titer.  Lumbar puncture demonstrated an inflammatory-like picture however CSF for cryptococcal antigen was negative. Hospital Course:   : Repeat LP performed, opening pressure of 16, 18 mL removed with closing pressure of 14.   : CSF cryptococcal antigen negative.        CURRENT MEDICATIONS:  SCHEDULED MEDICATIONS:   potassium bicarb-citric acid  40 mEq Oral Daily    heparin (porcine)  80 Units/kg Intravenous Once    amphotericin B liposome (AMBISOME) IVPB  4 mg/kg Intravenous Q24H    lansoprazole  15 mg Oral QAM AC    sodium chloride flush  10 mL Intravenous 2 times per day    folic acid  1 mg Oral Daily    vitamin B-12  50 mcg Oral Daily     CONTINUOUS INFUSIONS:   heparin (porcine) 24.748 Units/kg/hr (20 1700)    sodium chloride Stopped (20 0713) PRN MEDICATIONS:   heparin (porcine), heparin (porcine), fentanNYL **OR** fentanNYL, sodium chloride flush, acetaminophen **OR** acetaminophen, polyethylene glycol, promethazine **OR** ondansetron, potassium chloride **OR** potassium alternative oral replacement **OR** potassium chloride, magnesium sulfate, midodrine    VITALS:  Temperature Range: Temp: 97.7 °F (36.5 °C) Temp  Av.9 °F (36.6 °C)  Min: 97.7 °F (36.5 °C)  Max: 98.4 °F (36.9 °C)  BP Range: Systolic (93EOS), BGL:649 , Min:96 , YDO:445     Diastolic (02TIJ), QBZ:38, Min:56, Max:79    Pulse Range: Pulse  Av.9  Min: 68  Max: 83  Respiration Range: Resp  Av.6  Min: 16  Max: 25  Current Pulse Ox: SpO2: 100 %  24HR Pulse Ox Range: SpO2  Av.4 %  Min: 96 %  Max: 100 %  Patient Vitals for the past 12 hrs:   BP Temp Temp src Pulse Resp SpO2 Weight   20 1527 125/75 -- -- 83 25 100 % --   20 1427 130/77 -- -- 83 24 98 % --   20 1327 116/64 -- -- 77 25 100 % --   20 1227 121/79 97.7 °F (36.5 °C) Oral 77 22 98 % --   20 1151 -- -- -- -- 25 99 % --   20 1149 -- -- -- 76 22 100 % --   20 1137 -- -- -- 77 19 99 % --   20 1127 101/61 -- -- 78 20 100 % --   20 1100 -- -- -- 76 23 100 % --   20 1027 104/72 -- -- 76 23 100 % --   20 0945 -- -- -- 73 17 100 % --   20 0927 96/62 -- -- 73 17 100 % --   20 0909 -- 98.1 °F (36.7 °C) Oral 69 18 100 % --   20 0810 -- -- -- 75 18 98 % --   20 0600 -- -- -- 79 16 100 % 153 lb 3.5 oz (69.5 kg)     Estimated body mass index is 24 kg/m² as calculated from the following:    Height as of this encounter: 5' 7\" (1.702 m).     Weight as of this encounter: 153 lb 3.5 oz (69.5 kg).  []<16 Severe malnutrition  []16-16.99 Moderate malnutrition  []17-18.49 Mild malnutrition  [x]18.5-24.9 Normal  []25-29.9 Overweight (not obese)  []30-34.9 Obese class 1 (Low Risk)  []35-39.9 Obese class 2 (Moderate Risk)  []?40 Obese class 3 (High Risk)    RECENT LABS:   Lab Results   Component Value Date    WBC 10.2 05/26/2020    HGB 9.5 (L) 05/26/2020    HCT 28.3 (L) 05/26/2020     05/26/2020    ALT 65 (H) 05/25/2020    AST 65 (H) 05/25/2020     05/26/2020    K 3.4 (L) 05/26/2020     05/26/2020    CREATININE 0.28 (L) 05/26/2020    BUN 19 05/26/2020    CO2 27 05/26/2020    TSH 0.70 05/14/2020    INR 1.0 05/14/2020     24 HOUR INTAKE/OUTPUT:    Intake/Output Summary (Last 24 hours) at 5/26/2020 1745  Last data filed at 5/26/2020 1146  Gross per 24 hour   Intake 2361.12 ml   Output 1914 ml   Net 447.12 ml       IMAGING:   XR CHEST PORTABLE   Final Result   Overall, no significant change in chest findings compared to the prior study   from May 21st.  Vascular congestion with left lower lobe airspace disease and   small pleural effusion persists. IR LUMBAR PUNCTURE FOR DIAGNOSIS   Final Result   Successful fluoroscopic-guided lumbar puncture. XR CHEST PORTABLE   Final Result   Improved left lung aeration with persistent basilar opacity, likely pleural   fluid with compressive atelectasis and possibly underlying infiltrate. Support tubes satisfactory. CT ABDOMEN PELVIS WO CONTRAST Additional Contrast? None   Final Result   Left basilar effusion with adjacent consolidation representing atelectasis   versus pneumonia. Small amount of free fluid within the pelvis. Fluid within the distal small bowel loops that may relate to an enteritis. Tomlin catheter and rectal tube are visualized. CT HEAD WO CONTRAST   Final Result   No acute intracranial abnormality is evident. Partially imaged NG tube. XR CHEST (SINGLE VIEW FRONTAL)   Final Result   1. Stable volume loss of the left hemithorax. Worsening airspace disease   projecting over the left upper and left mid lung zone. Stable retrocardiac   airspace consolidation. Small to moderate left-sided pleural effusion,   stable.   Follow-up is

## 2020-05-26 NOTE — PLAN OF CARE
Problem: Falls - Risk of:  Goal: Will remain free from falls  Description: Will remain free from falls  Outcome: Ongoing  Goal: Absence of physical injury  Description: Absence of physical injury  Outcome: Ongoing     Problem: Pain:  Goal: Pain level will decrease  Description: Pain level will decrease  Outcome: Ongoing  Goal: Control of acute pain  Description: Control of acute pain  Outcome: Ongoing  Goal: Control of chronic pain  Description: Control of chronic pain  Outcome: Ongoing     Problem: Skin Integrity - Impaired:  Goal: Will show no infection signs and symptoms  Description: Will show no infection signs and symptoms  Outcome: Ongoing  Goal: Absence of new skin breakdown  Description: Absence of new skin breakdown  Outcome: Ongoing     Problem: Physical Regulation:  Goal: Diagnostic test results will improve  Description: Diagnostic test results will improve  Outcome: Ongoing  Goal: Will remain free from infection  Description: Will remain free from infection  Outcome: Ongoing  Goal: Ability to maintain vital signs within normal range will improve  Description: Ability to maintain vital signs within normal range will improve  Outcome: Ongoing     Problem: Discharge Planning:  Goal: Discharged to appropriate level of care  Description: Discharged to appropriate level of care  Outcome: Ongoing  Goal: Ability to perform activities of daily living will improve  Description: Ability to perform activities of daily living will improve  Outcome: Ongoing  Goal: Participates in care planning  Description: Participates in care planning  Outcome: Ongoing     Problem: Fluid Volume - Deficit:  Goal: Absence of fluid volume deficit signs and symptoms  Description: Absence of fluid volume deficit signs and symptoms  Outcome: Ongoing     Problem: Nutrition Deficit:  Goal: Ability to achieve adequate nutritional intake will improve  Description: Ability to achieve adequate nutritional intake will improve  Outcome:

## 2020-05-26 NOTE — PROGRESS NOTES
day    folic acid  1 mg Oral Daily    vitamin B-12  50 mcg Oral Daily     Continuous Infusions:   sodium chloride Stopped (05/26/20 0614)    heparin (porcine) 34 Units/kg/hr (05/26/20 0615)     PRN Meds:   ipratropium-albuterol, 1 ampule, Q6H PRN  fentanNYL, 25 mcg, Q1H PRN    Or  fentanNYL, 50 mcg, Q1H PRN  LORazepam, 2 mg, Q4H PRN  heparin (porcine), 60 Units/kg, PRN  heparin (porcine), 30 Units/kg, PRN  sodium chloride flush, 10 mL, PRN  acetaminophen, 650 mg, Q6H PRN    Or  acetaminophen, 650 mg, Q6H PRN  polyethylene glycol, 17 g, Daily PRN  promethazine, 12.5 mg, Q6H PRN    Or  ondansetron, 4 mg, Q6H PRN  potassium chloride, 40 mEq, PRN    Or  potassium alternative oral replacement, 40 mEq, PRN    Or  potassium chloride, 10 mEq, PRN  magnesium sulfate, 2 g, PRN  midodrine, 5 mg, TID PRN          VENT SETTINGS (Comprehensive) (if applicable):  Vent Information  $Ventilation: $Subsequent Day  Skin Assessment: Clean, dry, & intact  Suction Catheter Diameter: 14  Equipment Changed: HME  Vent Type: Servo i  Vent Mode: PRVC  Vt Ordered: 500 mL  Rate Set: (S) 12 bmp(per abg result)  FiO2 : 30 %  SpO2: 98 %  SpO2/FiO2 ratio: 326.67  Sensitivity: 5  PEEP/CPAP: 5  I Time/ I Time %: 0.9 s  Humidification Source: HME  Nitric Oxide/Epoprostenol In Use?: No  Additional Respiratory  Assessments  Pulse: 75  Resp: 18  SpO2: 98 %  End Tidal CO2: 28 (%)  Position: Semi-Sherman's  Humidification Source: HME  Oral Care Completed?: Yes  Oral Care: Teeth brushed, Mouthwash, Mouth swabbed, Mouth suctioned, Suction toothette  Subglottic Suction Done?: Yes  Cuff Pressure (cm H2O): (mov)    ABGs: see chart    Laboratory findings:    Complete Blood Count:   Recent Labs     05/24/20  0513 05/25/20  0437 05/26/20  0612   WBC 11.8* 10.9 10.2   HGB 10.2* 9.8* 9.5*   HCT 30.7* 29.1* 28.3*    199 205        Last 3 Blood Glucose:   Recent Labs     05/25/20  0813 05/26/20  0612   GLUCOSE 125* 120*        PT/INR:    Lab Results Component Value Date    PROTIME 10.7 05/14/2020    INR 1.0 05/14/2020     PTT:    Lab Results   Component Value Date    APTT 58.9 05/26/2020       Comprehensive Metabolic Profile:   Recent Labs     05/25/20  0813 05/25/20  1801 05/26/20  0040 05/26/20  0612     --   --  140   K 2.5* 2.8* 3.2* 3.4*     --   --  105   CO2 25  --   --  27   BUN 18  --   --  19   CREATININE 0.33*  --   --  0.28*   GLUCOSE 125*  --   --  120*   CALCIUM 8.9  --   --  8.9   PROT 4.6*  --   --   --    LABALBU 1.9*  --   --   --    BILITOT 0.25*  --   --   --    ALKPHOS 64  --   --   --    AST 65*  --   --   --    ALT 65*  --   --   --       Magnesium:   Lab Results   Component Value Date    MG 2.0 05/26/2020     Phosphorus:   Lab Results   Component Value Date    PHOS 2.4 05/14/2020     Ionized Calcium:   Lab Results   Component Value Date    CAION 1.25 05/25/2020        Urinalysis: see chart    Troponin: No results for input(s): TROPONINI in the last 72 hours.     Microbiology:    Cultures during this admission:     Blood cultures:                 [] None drawn      [x] Negative             []  Positive (Details:  )  Urine Culture:                   [] None drawn      [x] Negative             []  Positive (Details:  )  Sputum Culture:               [x] None drawn       [x] Negative             []  Positive (Details:  )   Endotracheal aspirate:     [x] None drawn       [] Negative             []  Positive (Details:  )     Other pertinent Labs: see charting      Radiology/Imaging:     Chest Xray (5/26/2020): see chart    ASSESSMENT:     Patient Active Problem List    Diagnosis Date Noted    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     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     Hypoxia     DVT (deep venous thrombosis) ([facility] 05/18/2020    Fever 05/17/2020    Skin rash 77/44/4476    Metabolic encephalopathy     Chronic obstructive pulmonary disease ([facility]     Pericardial effusion     Thrombocytopenia (HCC)     Hypotension due to hypovolemia     Alcoholism ([facility] 05/14/2020    Hyponatremia 05/14/2020    DIXON (acute kidney injury) ([facility] 05/14/2020    Smoker 05/14/2020    Suspected COVID-19 virus infection     Acute intractable headache 05/11/2020    Chronic abdominal pain 10/30/2018    Mucopurulent chronic bronchitis ([facility] 07/25/2018    Weight loss 07/25/2018       Additional assessment:          PLAN:     WEAN PER PROTOCOL:  [] No   [x] Yes  [] N/A    DISCONTINUE ANY LABS:   [x] No   [] Yes    ICU PROPHYLAXIS:  Stress ulcer:  [x] PPI Agent  [] [id number] [] Sucralfate  [] Other:  VTE:   [] Enoxaparin  [x] Unfract. Heparin Subcut  [] EPC Cuffs    NUTRITION:  [] NPO [] Tube Feeding (Specify: ) [] TPN  [x] PO (Diet: DIET TUBE FEED CONTINUOUS/CYCLIC NPO; Semi-elemental; 25; 45; 24)    HOME MEDICATIONS RECONCILED: [x] No  [] Yes    INSULIN DRIP:   [x] No   [] Yes    CONSULTATION NEEDED:  [x] No   [] Yes    FAMILY UPDATED:    [x] No   [] Yes    TRANSFER OUT OF ICU:   [x] No   [] Yes    ADDITIONAL PLAN:      Cryptococcal left-sided pneumonia-improving  Cryptococcal antigen-4 in the blood  Continue amphotericin B-day 5  Chest x-ray shows improvement in the left-sided effusion. pH of 7.52, PCO2 36.5, PO2 of 69.9, bicarb of 29.8  Off sedation since many days, plan to extubate if tolerated well  Tracks with eyes, still do not follow commands    Concern for cryptococcal meningoencephalitis  LP done 3 times, does not reveal cryptococcal antigen however high suspicion of same  Spinal fluid showing high protein of 95.7.   Continue amphotericin B likely 6-week treatment  Continue to monitor LFTs and kidney function being on amphotericin  MRI shows possible micro infarct involving the splenic corpus callosum  EEG shows diffuse slowing    Hypotension- off pressors  Continues to be on normal saline at 75 mils per hour    Acute kidney injury-resolved since admission  Good urine output  On maintenance fluids    Hypokalemia- replace per protocol  Patient on amphotericin, continue to replace potassium    Right gastrocnemius DVT  On heparin drip  We will switch to Eliquis if no plans of intervention by neurology    Lansoprazole for GI prophylaxis  Tube feeds for nutrition at 2 [place] MD Jah  R [facility] 21 5/26/2020, 8:35 AM

## 2020-05-26 NOTE — PROGRESS NOTES
NEUROLOGY INPATIENT PROGRESS NOTE    5/26/2020         Subjective: Extubated. On room air. Stuporous. Not following commands. Briefly, this is a  62 y.o. female admitted on 5/14/2020 with new onset headache and generalized weakness has been treated for septic shock, transferred to the floor. Had worsening of mental status since 5/16 and has been worked for possible encephalitis. CSF was negative for viral and bacterial meningitis. Was started on steroids initially for 2 days. EEG showed diffused encephalopathic picture. Overnight, she rapid response was called and the patient was found to be in respiratory distress with hypoxia PO2 49, PCO2 45. Intubated in the MICU. She's being treated     No current facility-administered medications on file prior to encounter. No current outpatient medications on file prior to encounter. Allergies: Ami Dockery is allergic to sulfa antibiotics.     Past Medical History:   Diagnosis Date    COPD (chronic obstructive pulmonary disease) ([facility]     ETOH abuse        Past Surgical History:   Procedure Laterality Date    CHOLECYSTECTOMY      THYROIDECTOMY      TONSILLECTOMY         Medications:     potassium bicarb-citric acid  40 mEq Oral Daily    amphotericin B liposome (AMBISOME) IVPB  4 mg/kg Intravenous Q24H    lansoprazole  15 mg Oral QAM AC    sodium chloride flush  10 mL Intravenous 2 times per day    folic acid  1 mg Oral Daily    vitamin B-12  50 mcg Oral Daily     PRN Meds include: ipratropium-albuterol, fentanNYL **OR** fentanNYL, LORazepam, heparin (porcine), heparin (porcine), sodium chloride flush, acetaminophen **OR** acetaminophen, polyethylene glycol, promethazine **OR** ondansetron, potassium chloride **OR** potassium alternative oral replacement **OR** potassium chloride, magnesium sulfate, midodrine    Objective:   BP (!) 99/56   Pulse 79   Temp 97.7 °F (36.5 °C) (Oral)   Resp 16   Ht 5' 7\" (1.702 m)   Wt 153 lb 3.5 oz (69.5 kg)   SpO2

## 2020-05-26 NOTE — PLAN OF CARE
Problem: MECHANICAL VENTILATION  Goal: Patient will maintain patent airway  5/25/2020 2358 by Gabriela Madrid RCP  Outcome: Ongoing     Problem: MECHANICAL VENTILATION  Goal: Oral health is maintained or improved  5/25/2020 2358 by Gabriela Madrid RCP  Outcome: Ongoing     Problem: MECHANICAL VENTILATION  Goal: ET tube will be managed safely  5/25/2020 2358 by Gabriela Madrid RCP  Outcome: Ongoing     Problem: MECHANICAL VENTILATION  Goal: Ability to express needs and understand communication  5/25/2020 2358 by Gabriela Madrid RCP  Outcome: Ongoing     Problem: MECHANICAL VENTILATION  Goal: Mobility/activity is maintained at optimum level for patient  5/25/2020 2358 by Gabriela Madrid RCP  Outcome: Ongoing     Problem: OXYGENATION/RESPIRATORY FUNCTION  Goal: Patient will maintain patent airway  5/25/2020 2358 by Gabriela Madrid RCP  Outcome: Ongoing     Problem: OXYGENATION/RESPIRATORY FUNCTION  Goal: Patient will achieve/maintain normal respiratory rate/effort  Description: Respiratory rate and effort will be within normal limits for the patient  5/25/2020 2358 by Gabriela Madrid RCP  Outcome: Ongoing

## 2020-05-27 LAB
ABSOLUTE EOS #: 0.05 K/UL (ref 0–0.44)
ABSOLUTE IMMATURE GRANULOCYTE: 0.19 K/UL (ref 0–0.3)
ABSOLUTE LYMPH #: 2.36 K/UL (ref 1.1–3.7)
ABSOLUTE MONO #: 0.89 K/UL (ref 0.1–1.2)
ALBUMIN SERPL-MCNC: 2 G/DL (ref 3.5–5.2)
ALBUMIN/GLOBULIN RATIO: 0.6 (ref 1–2.5)
ALP BLD-CCNC: 73 U/L (ref 35–104)
ALT SERPL-CCNC: 56 U/L (ref 5–33)
ANCA MYELOPEROXIDASE: 47 AU/ML
ANCA PROTEINASE 3: 8 AU/ML
ANION GAP SERPL CALCULATED.3IONS-SCNC: 12 MMOL/L (ref 9–17)
AST SERPL-CCNC: 38 U/L
BASOPHILS # BLD: 0 % (ref 0–2)
BASOPHILS ABSOLUTE: 0.04 K/UL (ref 0–0.2)
BILIRUB SERPL-MCNC: 0.24 MG/DL (ref 0.3–1.2)
BILIRUBIN DIRECT: 0.08 MG/DL
BILIRUBIN, INDIRECT: 0.16 MG/DL (ref 0–1)
BUN BLDV-MCNC: 19 MG/DL (ref 6–20)
BUN/CREAT BLD: ABNORMAL (ref 9–20)
CALCIUM SERPL-MCNC: 9.1 MG/DL (ref 8.6–10.4)
CHLORIDE BLD-SCNC: 104 MMOL/L (ref 98–107)
CO2: 26 MMOL/L (ref 20–31)
CREAT SERPL-MCNC: 0.27 MG/DL (ref 0.5–0.9)
CULTURE: ABNORMAL
CULTURE: NORMAL
DIFFERENTIAL TYPE: ABNORMAL
DIRECT EXAM: ABNORMAL
EOSINOPHILS RELATIVE PERCENT: 1 % (ref 1–4)
GFR AFRICAN AMERICAN: >60 ML/MIN
GFR NON-AFRICAN AMERICAN: >60 ML/MIN
GFR SERPL CREATININE-BSD FRML MDRD: ABNORMAL ML/MIN/{1.73_M2}
GFR SERPL CREATININE-BSD FRML MDRD: ABNORMAL ML/MIN/{1.73_M2}
GLOBULIN: ABNORMAL G/DL (ref 1.5–3.8)
GLUCOSE BLD-MCNC: 118 MG/DL (ref 70–99)
HCT VFR BLD CALC: 31.6 % (ref 36.3–47.1)
HEMOGLOBIN: 10.2 G/DL (ref 11.9–15.1)
HISTOPLASMA ABS, ID: NORMAL
HISTOPLASMA ANTIBODY MYCELIAL CF: NORMAL
HISTOPLASMA ANTIBODY YEAST CF: NORMAL
IMMATURE GRANULOCYTES: 2 %
LYMPHOCYTES # BLD: 24 % (ref 24–43)
Lab: ABNORMAL
Lab: NORMAL
MAGNESIUM: 1.9 MG/DL (ref 1.6–2.6)
MCH RBC QN AUTO: 30.2 PG (ref 25.2–33.5)
MCHC RBC AUTO-ENTMCNC: 32.3 G/DL (ref 28.4–34.8)
MCV RBC AUTO: 93.5 FL (ref 82.6–102.9)
MONOCYTES # BLD: 9 % (ref 3–12)
NRBC AUTOMATED: 0 PER 100 WBC
PARTIAL THROMBOPLASTIN TIME: 61.9 SEC (ref 20.5–30.5)
PDW BLD-RTO: 14.6 % (ref 11.8–14.4)
PLATELET # BLD: 231 K/UL (ref 138–453)
PLATELET ESTIMATE: ABNORMAL
PMV BLD AUTO: 10.2 FL (ref 8.1–13.5)
POTASSIUM SERPL-SCNC: 3.1 MMOL/L (ref 3.7–5.3)
RBC # BLD: 3.38 M/UL (ref 3.95–5.11)
RBC # BLD: ABNORMAL 10*6/UL
SEG NEUTROPHILS: 64 % (ref 36–65)
SEGMENTED NEUTROPHILS ABSOLUTE COUNT: 6.31 K/UL (ref 1.5–8.1)
SODIUM BLD-SCNC: 142 MMOL/L (ref 135–144)
SPECIMEN DESCRIPTION: ABNORMAL
SPECIMEN DESCRIPTION: NORMAL
SURGICAL PATHOLOGY REPORT: NORMAL
TOTAL PROTEIN: 5.1 G/DL (ref 6.4–8.3)
WBC # BLD: 9.8 K/UL (ref 3.5–11.3)
WBC # BLD: ABNORMAL 10*3/UL

## 2020-05-27 PROCEDURE — 2580000003 HC RX 258: Performed by: STUDENT IN AN ORGANIZED HEALTH CARE EDUCATION/TRAINING PROGRAM

## 2020-05-27 PROCEDURE — 6370000000 HC RX 637 (ALT 250 FOR IP): Performed by: STUDENT IN AN ORGANIZED HEALTH CARE EDUCATION/TRAINING PROGRAM

## 2020-05-27 PROCEDURE — 2580000003 HC RX 258: Performed by: INTERNAL MEDICINE

## 2020-05-27 PROCEDURE — 80048 BASIC METABOLIC PNL TOTAL CA: CPT

## 2020-05-27 PROCEDURE — 2060000000 HC ICU INTERMEDIATE R&B

## 2020-05-27 PROCEDURE — 99233 SBSQ HOSP IP/OBS HIGH 50: CPT | Performed by: INTERNAL MEDICINE

## 2020-05-27 PROCEDURE — 85730 THROMBOPLASTIN TIME PARTIAL: CPT

## 2020-05-27 PROCEDURE — 97530 THERAPEUTIC ACTIVITIES: CPT

## 2020-05-27 PROCEDURE — 94761 N-INVAS EAR/PLS OXIMETRY MLT: CPT

## 2020-05-27 PROCEDURE — 94640 AIRWAY INHALATION TREATMENT: CPT

## 2020-05-27 PROCEDURE — 97110 THERAPEUTIC EXERCISES: CPT

## 2020-05-27 PROCEDURE — 83735 ASSAY OF MAGNESIUM: CPT

## 2020-05-27 PROCEDURE — 80076 HEPATIC FUNCTION PANEL: CPT

## 2020-05-27 PROCEDURE — 6360000002 HC RX W HCPCS: Performed by: INTERNAL MEDICINE

## 2020-05-27 PROCEDURE — 99232 SBSQ HOSP IP/OBS MODERATE 35: CPT | Performed by: PSYCHIATRY & NEUROLOGY

## 2020-05-27 PROCEDURE — 85025 COMPLETE CBC W/AUTO DIFF WBC: CPT

## 2020-05-27 PROCEDURE — 6370000000 HC RX 637 (ALT 250 FOR IP): Performed by: PSYCHIATRY & NEUROLOGY

## 2020-05-27 RX ORDER — THIAMINE MONONITRATE (VIT B1) 100 MG
100 TABLET ORAL DAILY
Status: DISCONTINUED | OUTPATIENT
Start: 2020-05-27 | End: 2020-06-08 | Stop reason: HOSPADM

## 2020-05-27 RX ORDER — IPRATROPIUM BROMIDE AND ALBUTEROL SULFATE 2.5; .5 MG/3ML; MG/3ML
1 SOLUTION RESPIRATORY (INHALATION)
Status: DISCONTINUED | OUTPATIENT
Start: 2020-05-27 | End: 2020-06-01

## 2020-05-27 RX ORDER — MULTIVIT-MIN/FERROUS GLUCONATE 9 MG/15 ML
15 LIQUID (ML) ORAL DAILY
Status: DISCONTINUED | OUTPATIENT
Start: 2020-05-27 | End: 2020-06-08 | Stop reason: HOSPADM

## 2020-05-27 RX ADMIN — SODIUM CHLORIDE, PRESERVATIVE FREE 10 ML: 5 INJECTION INTRAVENOUS at 19:49

## 2020-05-27 RX ADMIN — POTASSIUM BICARBONATE 40 MEQ: 782 TABLET, EFFERVESCENT ORAL at 07:58

## 2020-05-27 RX ADMIN — IPRATROPIUM BROMIDE AND ALBUTEROL SULFATE 1 AMPULE: .5; 3 SOLUTION RESPIRATORY (INHALATION) at 19:58

## 2020-05-27 RX ADMIN — AMPHOTERICIN B 230 MG: 50 INJECTION, POWDER, LYOPHILIZED, FOR SOLUTION INTRAVENOUS at 14:55

## 2020-05-27 RX ADMIN — IPRATROPIUM BROMIDE AND ALBUTEROL SULFATE 1 AMPULE: .5; 3 SOLUTION RESPIRATORY (INHALATION) at 18:05

## 2020-05-27 RX ADMIN — Medication 15 ML: at 10:50

## 2020-05-27 RX ADMIN — Medication 15 MG: at 07:57

## 2020-05-27 RX ADMIN — Medication 100 MG: at 10:50

## 2020-05-27 RX ADMIN — IPRATROPIUM BROMIDE AND ALBUTEROL SULFATE 1 AMPULE: .5; 3 SOLUTION RESPIRATORY (INHALATION) at 08:31

## 2020-05-27 RX ADMIN — FOLIC ACID 1 MG: 1 TABLET ORAL at 07:58

## 2020-05-27 RX ADMIN — APIXABAN 10 MG: 5 TABLET, FILM COATED ORAL at 19:48

## 2020-05-27 RX ADMIN — SODIUM CHLORIDE: 9 INJECTION, SOLUTION INTRAVENOUS at 08:00

## 2020-05-27 RX ADMIN — IPRATROPIUM BROMIDE AND ALBUTEROL SULFATE 1 AMPULE: .5; 3 SOLUTION RESPIRATORY (INHALATION) at 23:21

## 2020-05-27 RX ADMIN — IPRATROPIUM BROMIDE AND ALBUTEROL SULFATE 1 AMPULE: .5; 3 SOLUTION RESPIRATORY (INHALATION) at 13:08

## 2020-05-27 RX ADMIN — APIXABAN 10 MG: 5 TABLET, FILM COATED ORAL at 10:49

## 2020-05-27 ASSESSMENT — PAIN SCALES - WONG BAKER
WONGBAKER_NUMERICALRESPONSE: 4

## 2020-05-27 ASSESSMENT — PAIN SCALES - GENERAL: PAINLEVEL_OUTOF10: 0

## 2020-05-27 NOTE — PLAN OF CARE
range will improve  5/27/2020 0910 by Wicho Fofana RN  Outcome: Ongoing  5/27/2020 0454 by Geovany Gregory RN  Outcome: Ongoing     Problem: Discharge Planning:  Goal: Discharged to appropriate level of care  Description: Discharged to appropriate level of care  5/27/2020 0910 by Wicho Fofana RN  Outcome: Ongoing  5/27/2020 0454 by Geovany Gregory RN  Outcome: Ongoing  Goal: Ability to perform activities of daily living will improve  Description: Ability to perform activities of daily living will improve  5/27/2020 0910 by Wicho Fofana RN  Outcome: Ongoing  5/27/2020 0454 by Geovany Gregory RN  Outcome: Ongoing  Goal: Participates in care planning  Description: Participates in care planning  5/27/2020 0910 by Wicho Fofana RN  Outcome: Ongoing  5/27/2020 0454 by Geovany Gregory RN  Outcome: Ongoing     Problem: Fluid Volume - Deficit:  Goal: Absence of fluid volume deficit signs and symptoms  Description: Absence of fluid volume deficit signs and symptoms  5/27/2020 0910 by Wicho Fofana RN  Outcome: Ongoing  5/27/2020 0454 by Geovany Gregory RN  Outcome: Ongoing     Problem: Nutrition Deficit:  Goal: Ability to achieve adequate nutritional intake will improve  Description: Ability to achieve adequate nutritional intake will improve  5/27/2020 0910 by Wicho Fofana RN  Outcome: Ongoing  5/27/2020 0454 by Geovany Gregory RN  Outcome: Ongoing     Problem: Sleep Pattern Disturbance:  Goal: Appears well-rested  Description: Appears well-rested  5/27/2020 0910 by Wicho Fofana RN  Outcome: Ongoing  5/27/2020 0454 by Geovany Gregory RN  Outcome: Ongoing     Problem: Violence - Risk of, Self/Other-Directed:  Goal: Knowledge of developmental care interventions  Description: Absence of violence  5/27/2020 0910 by Wicho Fofana RN  Outcome: Ongoing  5/27/2020 0454 by Geovany Gregory RN  Outcome: Ongoing     Problem: Respiratory Function - Compromised:  Goal: Absence of pulmonary infection  Description: Absence of pulmonary Ongoing     Problem: OXYGENATION/RESPIRATORY FUNCTION  Goal: Patient will maintain patent airway  5/27/2020 0910 by Faraz Hunt RN  Outcome: Ongoing  5/27/2020 0454 by Donna Hollingsworth RN  Outcome: Ongoing  Goal: Patient will achieve/maintain normal respiratory rate/effort  Description: Respiratory rate and effort will be within normal limits for the patient  5/27/2020 0910 by Faraz Hunt RN  Outcome: Ongoing  5/27/2020 0454 by Donna Hollingsworth RN  Outcome: Ongoing     Problem: Nutrition  Goal: Optimal nutrition therapy  5/27/2020 0910 by Faraz Hunt RN  Outcome: Ongoing  5/27/2020 0454 by Donna Hollingsworth RN  Outcome: Ongoing

## 2020-05-27 NOTE — PROGRESS NOTES
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 · 5/16 MRI LB old osteoarthritis and no cord compression  · 5/16 MRI brain neg - 5/15 LP 8 WBC and normal glucose  · 5/17 CT chest LLL consolidation, ? Atelectasis. · LP 8 WBC and normal glucose  · Treated w steroids and  ceftriaxone 5/14 - 5/15 , fever improved, but fever relapsed off steroids  · Fever relapsed since 5/17   · Increased oxygen requirement, increased lethargy  · Left lower lobe infiltrate worse on x-ray  · Failed Levaquin since 5/19 -legionella AG neg  · 5/20  intubated - fever 104. - CXR worse LLL  · Generalized Body rash noticed at admission .   · CT chest 5/21 progression of the left lower lobe infiltrate, around pneumonia  · Sputum culture did not show cryptococcus  · Cryptococcus CSF antigen -5/22  · Opening pressure 5/2416  · All CSF meningitis panel is negative for cryptococcus PCR  · 5/21 cryptococcus serum antigen positive, 4  · 5/22 lumbar puncture repeated - WBC 63, neutrophils 39%, 54 lymp, Protein 142, glucose 53 -  PCR neg  · 5/22 abelcet started  · 5/22 and 5/23 no fever  · The lack of cryptococcus CSF antigen positivity does not rule out meningoencephalitis to cryptococcus      Recommendations     · Repeat cryptococcus urine neck rigidity or muscular tenderness. Cardiovascular:      Rate and Rhythm: Normal rate and regular rhythm. Heart sounds: Normal heart sounds. No murmur. No friction rub. Pulmonary:      Effort: Pulmonary effort is normal. No respiratory distress. Breath sounds: No stridor. Abdominal:      General: There is no distension. Tenderness: There is no abdominal tenderness. Genitourinary:     Comments: No redd  Musculoskeletal:         General: No swelling or deformity. Skin:     General: Skin is warm. Coloration: Skin is not jaundiced or pale. Findings: No bruising or erythema. Neurological:      General: No focal deficit present. Mental Status: She is alert. Mental status is at baseline. Cranial Nerves: No cranial nerve deficit. Psychiatric:         Mood and Affect: Mood normal.           Medical Decision Making:   I have independently reviewed/ordered the following labs:    CBC with Differential:   Recent Labs     05/25/20  0437 05/26/20  0612   WBC 10.9 10.2   HGB 9.8* 9.5*   HCT 29.1* 28.3*    205   LYMPHOPCT 17* 24   MONOPCT 8 8     BMP:  Recent Labs     05/25/20  0813  05/25/20  1947 05/26/20  0040 05/26/20  0612     --   --   --  140   K 2.5*   < >  --  3.2* 3.4*     --   --   --  105   CO2 25  --   --   --  27   BUN 18  --   --   --  19   CREATININE 0.33*  --   --   --  0.28*   MG  --   --  2.0  --  2.0    < > = values in this interval not displayed. Hepatic Function Panel:   Recent Labs     05/25/20  0813   PROT 4.6*   LABALBU 1.9*   BILITOT 0.25*   ALKPHOS 64   ALT 65*   AST 65*     No results for input(s): RPR in the last 72 hours. No results for input(s): HIV in the last 72 hours. No results for input(s): BC in the last 72 hours. Lab Results   Component Value Date    CREATININE 0.28 05/26/2020    GLUCOSE 120 05/26/2020       Detailed results: Thank you for allowing us to participate in the care of this patient. Please call with

## 2020-05-27 NOTE — PROGRESS NOTES
Critical Care Team - Daily Progress Note      Date and time: 5/27/2020 8:48 AM  Patient's name:  Yvette Bautista  Medical Record Number: [id number]  Patient's account/billing number: [de-identified]  Patient's YOB: 1960  Age: 62 y.o. Date of Admission: 5/14/2020 10:46 AM  Length of stay during current admission: 13      Primary Care Physician: Renu Nweman  ICU Attending Physician: Dr. Gordon Boss Status: Full Code    Reason for ICU admission: AMS, febrile      SUBJECTIVE:     OVERNIGHT EVENTS:       NO acute events overnight. Patient still open eyes on commands. Extubated 5/26. Doing well. Still Wheezy. Patient slightly more awake today. Does try to follow commands. Moves her right hand fingers. Tries to speak as well. Still has NG tube in place. Nursing will evaluate for swallow at bedside.     AWAKE & FOLLOWING COMMANDS:  [x] No   [] Yes    CURRENT VENTILATION STATUS:     [] Ventilator  [] BIPAP  [x] Nasal Cannula [] Room Air      SEDATION:  RAAS Score:  [] Propofol gtt  [] Versed gtt  [] Ativan gtt   [x] No Sedation    PARALYZED:  [x] No    [] Yes    DIARRHEA:                [x] No                [] Yes  (C. Difficile status: [] positive                                                                                                                       [] negative                                                                                                                     [] pending)    VASOPRESSORS:  [x] No    [] Yes    If yes -   [] Levophed       [] Dopamine     [] Vasopressin       [] Dobutamine  [] Phenylephrine         [] Epinephrine    CENTRAL LINES:     [x] No   [] Yes   (Date of Insertion:   )           If yes -     [] Right IJ     [] Left IJ [] Right Femoral [] Left Femoral                   [] Right Subclavian [] Left Subclavian       MEZA'S CATHETER:   [] No   [] Yes  (Date of Insertion:  5/23 ) external cath  URINE OUTPUT:            [x] Good   [] Low              [] Anuric      OBJECTIVE:     VITAL SIGNS:  BP (!) 145/77   Pulse 84   Temp 98.2 °F (36.8 °C) (Axillary)   Resp 24   Ht 5' 7\" (1.702 m)   Wt 153 lb 3.5 oz (69.5 kg)   SpO2 97%   BMI 24.00 kg/m²     Tmax over 24 hours:  Temp (24hrs), Av.2 °F (36.8 °C), Min:97.7 °F (36.5 °C), Max:99 °F (37.2 °C)      Patient Vitals for the past 6 hrs:   BP Temp Temp src Pulse Resp SpO2   20 0835 -- -- -- -- 24 --   20 0800 (!) 145/77 98.2 °F (36.8 °C) Axillary 84 23 97 %   20 0700 -- -- -- 81 24 99 %   20 0600 -- -- -- 80 21 98 %   20 0500 -- -- -- 78 24 99 %   20 0400 -- 98.2 °F (36.8 °C) Axillary 82 24 100 %   20 0300 -- -- -- 89 22 100 %         Intake/Output Summary (Last 24 hours) at 2020 0848  Last data filed at 2020 0500  Gross per 24 hour   Intake 2990 ml   Output 1515 ml   Net 1475 ml     Wt Readings from Last 2 Encounters:   20 153 lb 3.5 oz (69.5 kg)   20 120 lb 9.6 oz (54.7 kg)     Body mass index is 24 kg/m². PHYSICAL EXAMINATION:  General appearance - ill-appearing, extubated  Mental status - drowsy  Eyes - pupils equal and reactive  Chest - clear to auscultation, no wheezes, rales or rhonchi, symmetric air entry, on nasal cannula  Heart - normal rate and regular rhythm  Abdomen - soft, nontender, nondistended, no masses or organomegaly  Neurological - tracks with eyes. Does not follow commands. Arouses to voice.   Extremities - no pedal edema noted  Skin - normal coloration and turgor, no rashes, no suspicious skin lesions noted over exposed skin    MEDICATIONS:    Scheduled Meds:   thiamine  100 mg Oral Daily    CENTRUM/CERTA-ALYSSA with minerals oral  15 mL Oral Daily    ipratropium-albuterol  1 ampule Inhalation Q4H While awake    potassium bicarb-citric acid  40 mEq Oral Daily    heparin (porcine)  80 Units/kg Intravenous Once    amphotericin B liposome (AMBISOME) IVPB  4 mg/kg Intravenous Q24H    lansoprazole  15 mg Oral QAM AC    Lab Results   Component Value Date    APTT 61.9 05/27/2020       Comprehensive Metabolic Profile:   Recent Labs     05/25/20  0813  05/26/20  0040 05/26/20  0612 05/27/20  0604     --   --  140 142   K 2.5*   < > 3.2* 3.4* 3.1*     --   --  105 104   CO2 25  --   --  27 26   BUN 18  --   --  19 19   CREATININE 0.33*  --   --  0.28* 0.27*   GLUCOSE 125*  --   --  120* 118*   CALCIUM 8.9  --   --  8.9 9.1   PROT 4.6*  --   --   --  5.1*   LABALBU 1.9*  --   --   --  2.0*   BILITOT 0.25*  --   --   --  0.24*   ALKPHOS 64  --   --   --  73   AST 65*  --   --   --  38*   ALT 65*  --   --   --  56*    < > = values in this interval not displayed. Magnesium:   Lab Results   Component Value Date    MG 2.0 05/26/2020     Phosphorus:   Lab Results   Component Value Date    PHOS 2.4 05/14/2020     Ionized Calcium:   Lab Results   Component Value Date    CAION 1.25 05/25/2020        Urinalysis: see chart    Troponin: No results for input(s): TROPONINI in the last 72 hours.     Microbiology:    Cultures during this admission:     Blood cultures:                 [] None drawn      [x] Negative             []  Positive (Details:  )  Urine Culture:                   [] None drawn      [x] Negative             []  Positive (Details:  )  Sputum Culture:               [x] None drawn       [x] Negative             []  Positive (Details:  )   Endotracheal aspirate:     [x] None drawn       [] Negative             []  Positive (Details:  )     Other pertinent Labs: see charting      Radiology/Imaging:     Chest Xray (5/27/2020): see chart    ASSESSMENT:     Patient Active Problem List    Diagnosis Date Noted    Acute respiratory failure with hypoxia and hypercapnia (HCC)     Pleural effusion associated with pulmonary infection     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     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[facility]     Hypoxia     DVT (deep venous thrombosis) ([facility] 05/18/2020    Fever 05/17/2020    Skin rash 05/17/2020    Metabolic encephalopathy     Chronic obstructive pulmonary disease (HCC)     Pericardial effusion     Thrombocytopenia (HCC)     Hypotension due to hypovolemia     Alcoholism ([facility] 05/14/2020    Hyponatremia 05/14/2020    DIXON (acute kidney injury) ([facility] 05/14/2020    Smoker 05/14/2020    Acute intractable headache 05/11/2020    Chronic abdominal pain 10/30/2018    Mucopurulent chronic bronchitis ([facility] 07/25/2018    Weight loss 07/25/2018       Additional assessment:          PLAN:     WEAN PER PROTOCOL:  [] No   [x] Yes  [] N/A    DISCONTINUE ANY LABS:   [x] No   [] Yes    ICU PROPHYLAXIS:  Stress ulcer:  [x] PPI Agent  [] I1Merao [] Sucralfate  [] Other:  VTE:   [] Enoxaparin  [x] Unfract. Heparin Subcut  [] EPC Cuffs    NUTRITION:  [] NPO [] Tube Feeding (Specify: ) [] TPN  [x] PO (Diet: DIET TUBE FEED CONTINUOUS/CYCLIC NPO; Semi-elemental; 25; 45; 24)    HOME MEDICATIONS RECONCILED: [x] No  [] Yes    INSULIN DRIP:   [x] No   [] Yes    CONSULTATION NEEDED:  [x] No   [] Yes    FAMILY UPDATED:    [x] No   [] Yes    TRANSFER OUT OF ICU:   [x] No   [] Yes    ADDITIONAL PLAN:      Cryptococcal left-sided pneumonia-improving  Cryptococcal antigen-4 in the blood, repeat level is 2  Continue amphotericin B-day 6  Chest x-ray shows improvement in the left-sided effusion. 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   Test for whipple disease  Continue amphotericin B likely 6-week treatment  Continue to monitor LFTs and kidney function being on amphotericin  MRI shows possible micro infarct involving the splenic corpus callosum  EEG shows diffuse slowing    Hypotension- off pressors  Continues to be on normal saline at 75 ml per hour    Acute kidney injury-resolved since admission  Good urine

## 2020-05-27 NOTE — PROGRESS NOTES
HGB 9.8* 9.5* 10.2*    205 231     BMP:    Recent Labs     05/25/20  0813 05/25/20  1801 05/26/20  0040 05/26/20  0612     --   --  140   K 2.5* 2.8* 3.2* 3.4*     --   --  105   CO2 25  --   --  27   BUN 18  --   --  19   CREATININE 0.33*  --   --  0.28*   GLUCOSE 125*  --   --  120*         Lab Results   Component Value Date    ALT 56 (H) 05/27/2020    AST 38 (H) 05/27/2020    TSH 0.70 05/14/2020    INR 1.0 05/14/2020    [id number] 1358 (H) 05/17/2020     CSF studies  White blood cells 8  Neutrophils 48%  Lymphocytes 21%  RBCs 1  Glucose 74  Protein 77.8  Increased IgG synthesis rate  No results found for: PHENYTOIN, PHENYTOIN, VALPROATE, CBMZ  Repeat CSF was done twice   Opening pressure 14 cm H2O    IMAGING    CTH repeat 5/20/2020  No acute findings     CTPE  No evidence of pulmonary embolism. Left lower lobar atelectasis. MRI thoracic/ lumbar spine   No cord abnormality identified. No MRI evidence of discitis-osteomyelitis of the cervical or thoracic spine. Degenerative changes as detailed above. Incidentally noted left lower lobe atelectasis. MRI brain w/wo contrast 5/14/2020  No acute disease. No evidence of meningeal enhancement or empyema. MRI brain w/wo contrast 5/17/2020  Possible acute microinfarct involving within the splenium of corpus callosum. Motion degraded exam        ASSESSMENT AND RECOMMENDATIONS   62year old female with history of alcohol abuse, who presented with new onset severe 8-9/10 generalized headache associated with photophobia, phonophobia, fever, cough, and diarrhea. COVID neg x2. During admission she had worsening mentation and became obtunded. Was found to have left lower lobar pneumonia. Cryptococcus serum Ag +ve x 2. CSF Cryptococcus Ag -ve x3. CSF culture -ve. Possible cryptococcal meningitis. Other DDx Whipple dx giving significant history of chronic diarrhea x 3 years. Melena. Unintentional weight loss. CSF PAS stain is -ve.

## 2020-05-28 ENCOUNTER — APPOINTMENT (OUTPATIENT)
Dept: CT IMAGING | Age: 60
DRG: 720 | End: 2020-05-28
Attending: INTERNAL MEDICINE
Payer: MEDICARE

## 2020-05-28 LAB
ABSOLUTE EOS #: <0.03 K/UL (ref 0–0.44)
ABSOLUTE IMMATURE GRANULOCYTE: 0.15 K/UL (ref 0–0.3)
ABSOLUTE LYMPH #: 1.98 K/UL (ref 1.1–3.7)
ABSOLUTE MONO #: 0.91 K/UL (ref 0.1–1.2)
ALBUMIN SERPL-MCNC: 2.5 G/DL (ref 3.5–5.2)
ALBUMIN/GLOBULIN RATIO: 0.9 (ref 1–2.5)
ALP BLD-CCNC: 77 U/L (ref 35–104)
ALT SERPL-CCNC: 42 U/L (ref 5–33)
ANION GAP SERPL CALCULATED.3IONS-SCNC: 10 MMOL/L (ref 9–17)
AST SERPL-CCNC: 27 U/L
B BURGDORFERI AB,CSF: 0.29 LIV
BASOPHILS # BLD: 0 % (ref 0–2)
BASOPHILS ABSOLUTE: <0.03 K/UL (ref 0–0.2)
BILIRUB SERPL-MCNC: 0.29 MG/DL (ref 0.3–1.2)
BUN BLDV-MCNC: 17 MG/DL (ref 6–20)
BUN/CREAT BLD: ABNORMAL (ref 9–20)
CALCIUM SERPL-MCNC: 9.7 MG/DL (ref 8.6–10.4)
CHLORIDE BLD-SCNC: 109 MMOL/L (ref 98–107)
CO2: 27 MMOL/L (ref 20–31)
CREAT SERPL-MCNC: 0.32 MG/DL (ref 0.5–0.9)
DIFFERENTIAL TYPE: ABNORMAL
EOSINOPHILS RELATIVE PERCENT: 0 % (ref 1–4)
GFR AFRICAN AMERICAN: >60 ML/MIN
GFR NON-AFRICAN AMERICAN: >60 ML/MIN
GFR SERPL CREATININE-BSD FRML MDRD: ABNORMAL ML/MIN/{1.73_M2}
GFR SERPL CREATININE-BSD FRML MDRD: ABNORMAL ML/MIN/{1.73_M2}
GLUCOSE BLD-MCNC: 129 MG/DL (ref 70–99)
HCT VFR BLD CALC: 33.5 % (ref 36.3–47.1)
HEMOGLOBIN: 10.7 G/DL (ref 11.9–15.1)
IMMATURE GRANULOCYTES: 1 %
LACTATE CSF: 2.3 MMOL/L (ref 1.2–2.6)
LYMPHOCYTES # BLD: 18 % (ref 24–43)
MCH RBC QN AUTO: 30 PG (ref 25.2–33.5)
MCHC RBC AUTO-ENTMCNC: 31.9 G/DL (ref 28.4–34.8)
MCV RBC AUTO: 93.8 FL (ref 82.6–102.9)
MISCELLANEOUS LAB TEST RESULT: NORMAL
MONOCYTES # BLD: 8 % (ref 3–12)
NRBC AUTOMATED: 0 PER 100 WBC
PDW BLD-RTO: 14.8 % (ref 11.8–14.4)
PLATELET # BLD: 251 K/UL (ref 138–453)
PLATELET ESTIMATE: ABNORMAL
PMV BLD AUTO: 10.2 FL (ref 8.1–13.5)
POTASSIUM SERPL-SCNC: 3.4 MMOL/L (ref 3.7–5.3)
RBC # BLD: 3.57 M/UL (ref 3.95–5.11)
RBC # BLD: ABNORMAL 10*6/UL
SEG NEUTROPHILS: 73 % (ref 36–65)
SEGMENTED NEUTROPHILS ABSOLUTE COUNT: 7.88 K/UL (ref 1.5–8.1)
SODIUM BLD-SCNC: 146 MMOL/L (ref 135–144)
TEST NAME: NORMAL
TOTAL PROTEIN: 5.4 G/DL (ref 6.4–8.3)
VDRL CSF SCREEN: NONREACTIVE
WBC # BLD: 11 K/UL (ref 3.5–11.3)
WBC # BLD: ABNORMAL 10*3/UL

## 2020-05-28 PROCEDURE — 6360000002 HC RX W HCPCS: Performed by: STUDENT IN AN ORGANIZED HEALTH CARE EDUCATION/TRAINING PROGRAM

## 2020-05-28 PROCEDURE — 97110 THERAPEUTIC EXERCISES: CPT

## 2020-05-28 PROCEDURE — 2060000000 HC ICU INTERMEDIATE R&B

## 2020-05-28 PROCEDURE — 2580000003 HC RX 258: Performed by: INTERNAL MEDICINE

## 2020-05-28 PROCEDURE — 99233 SBSQ HOSP IP/OBS HIGH 50: CPT | Performed by: INTERNAL MEDICINE

## 2020-05-28 PROCEDURE — 6370000000 HC RX 637 (ALT 250 FOR IP): Performed by: INTERNAL MEDICINE

## 2020-05-28 PROCEDURE — 99254 IP/OBS CNSLTJ NEW/EST MOD 60: CPT | Performed by: INTERNAL MEDICINE

## 2020-05-28 PROCEDURE — 70450 CT HEAD/BRAIN W/O DYE: CPT

## 2020-05-28 PROCEDURE — 94761 N-INVAS EAR/PLS OXIMETRY MLT: CPT

## 2020-05-28 PROCEDURE — 6370000000 HC RX 637 (ALT 250 FOR IP): Performed by: STUDENT IN AN ORGANIZED HEALTH CARE EDUCATION/TRAINING PROGRAM

## 2020-05-28 PROCEDURE — 97530 THERAPEUTIC ACTIVITIES: CPT

## 2020-05-28 PROCEDURE — 99232 SBSQ HOSP IP/OBS MODERATE 35: CPT | Performed by: PSYCHIATRY & NEUROLOGY

## 2020-05-28 PROCEDURE — 6370000000 HC RX 637 (ALT 250 FOR IP): Performed by: PSYCHIATRY & NEUROLOGY

## 2020-05-28 PROCEDURE — 99213 OFFICE O/P EST LOW 20 MIN: CPT

## 2020-05-28 PROCEDURE — 2580000003 HC RX 258: Performed by: STUDENT IN AN ORGANIZED HEALTH CARE EDUCATION/TRAINING PROGRAM

## 2020-05-28 PROCEDURE — 85025 COMPLETE CBC W/AUTO DIFF WBC: CPT

## 2020-05-28 PROCEDURE — 80053 COMPREHEN METABOLIC PANEL: CPT

## 2020-05-28 PROCEDURE — 94640 AIRWAY INHALATION TREATMENT: CPT

## 2020-05-28 PROCEDURE — 36415 COLL VENOUS BLD VENIPUNCTURE: CPT

## 2020-05-28 PROCEDURE — 6360000002 HC RX W HCPCS: Performed by: INTERNAL MEDICINE

## 2020-05-28 RX ADMIN — SODIUM CHLORIDE, PRESERVATIVE FREE 10 ML: 5 INJECTION INTRAVENOUS at 09:48

## 2020-05-28 RX ADMIN — IPRATROPIUM BROMIDE AND ALBUTEROL SULFATE 1 AMPULE: .5; 3 SOLUTION RESPIRATORY (INHALATION) at 20:37

## 2020-05-28 RX ADMIN — FLUCYTOSINE 1750 MG: 250 CAPSULE ORAL at 18:37

## 2020-05-28 RX ADMIN — IPRATROPIUM BROMIDE AND ALBUTEROL SULFATE 1 AMPULE: .5; 3 SOLUTION RESPIRATORY (INHALATION) at 08:08

## 2020-05-28 RX ADMIN — APIXABAN 10 MG: 5 TABLET, FILM COATED ORAL at 20:19

## 2020-05-28 RX ADMIN — FOLIC ACID 1 MG: 1 TABLET ORAL at 09:47

## 2020-05-28 RX ADMIN — SODIUM CHLORIDE, PRESERVATIVE FREE 10 ML: 5 INJECTION INTRAVENOUS at 20:19

## 2020-05-28 RX ADMIN — SODIUM CHLORIDE: 9 INJECTION, SOLUTION INTRAVENOUS at 09:51

## 2020-05-28 RX ADMIN — POTASSIUM BICARBONATE 40 MEQ: 782 TABLET, EFFERVESCENT ORAL at 09:47

## 2020-05-28 RX ADMIN — APIXABAN 10 MG: 5 TABLET, FILM COATED ORAL at 09:30

## 2020-05-28 RX ADMIN — Medication 100 MG: at 09:47

## 2020-05-28 RX ADMIN — FENTANYL CITRATE 50 MCG: 50 INJECTION, SOLUTION INTRAMUSCULAR; INTRAVENOUS at 23:04

## 2020-05-28 RX ADMIN — AMPHOTERICIN B 230 MG: 50 INJECTION, POWDER, LYOPHILIZED, FOR SOLUTION INTRAVENOUS at 16:35

## 2020-05-28 RX ADMIN — Medication 15 ML: at 11:15

## 2020-05-28 RX ADMIN — IPRATROPIUM BROMIDE AND ALBUTEROL SULFATE 1 AMPULE: .5; 3 SOLUTION RESPIRATORY (INHALATION) at 15:24

## 2020-05-28 RX ADMIN — Medication 15 MG: at 06:01

## 2020-05-28 ASSESSMENT — PAIN SCALES - GENERAL
PAINLEVEL_OUTOF10: 0
PAINLEVEL_OUTOF10: 0
PAINLEVEL_OUTOF10: 8

## 2020-05-28 NOTE — PROGRESS NOTES
Appearance: She is normal weight. HENT:      Head: Normocephalic and atraumatic. Nose: No congestion or rhinorrhea. Eyes:      General: No scleral icterus. Conjunctiva/sclera: Conjunctivae normal.   Neck:      Musculoskeletal: Neck supple. No neck rigidity or muscular tenderness. Cardiovascular:      Rate and Rhythm: Normal rate and regular rhythm. Heart sounds: Normal heart sounds. No murmur. No friction rub. Pulmonary:      Effort: Pulmonary effort is normal. No respiratory distress. Breath sounds: No stridor. Abdominal:      General: There is no distension. Tenderness: There is no abdominal tenderness. Genitourinary:     Comments: No redd  Musculoskeletal:         General: No swelling or deformity. Skin:     General: Skin is warm. Coloration: Skin is not jaundiced or pale. Findings: No bruising or erythema. Neurological:      Mental Status: She is alert. Mental status is at baseline. Cranial Nerves: No cranial nerve deficit. Psychiatric:         Mood and Affect: Mood normal.           Medical Decision Making:   I have independently reviewed/ordered the following labs:    CBC with Differential:   Recent Labs     05/27/20  0604 05/28/20  0328   WBC 9.8 11.0   HGB 10.2* 10.7*   HCT 31.6* 33.5*    251   LYMPHOPCT 24 18*   MONOPCT 9 8     BMP:  Recent Labs     05/26/20  0612 05/27/20  0604 05/28/20  1019    142 146*   K 3.4* 3.1* 3.4*    104 109*   CO2 27 26 27   BUN 19 19 17   CREATININE 0.28* 0.27* 0.32*   MG 2.0 1.9  --      Hepatic Function Panel:   Recent Labs     05/27/20  0604 05/28/20  1019   PROT 5.1* 5.4*   LABALBU 2.0* 2.5*   BILIDIR 0.08  --    IBILI 0.16  --    BILITOT 0.24* 0.29*   ALKPHOS 73 77   ALT 56* 42*   AST 38* 27     No results for input(s): RPR in the last 72 hours. No results for input(s): HIV in the last 72 hours. No results for input(s): BC in the last 72 hours.   Lab Results   Component Value Date    CREATININE 0.32 05/28/2020    GLUCOSE 129 05/28/2020       Detailed results: Thank you for allowing us to participate in the care of this patient. Please call with questions. This note is created with the assistance of a speech recognition program.  While intending to generate adocument that actually reflects the content of the visit, the document can still have some errors including those of syntax and sound a like substitutions which may escape proof reading. It such instances, actual meaningcan be extrapolated by contextual diversion.     Sia Michaels MD  Office: [phone]  Perfect serve / office [phone]

## 2020-05-28 NOTE — PROGRESS NOTES
[address] [id number] Continence Nurse  Consult Note       NAME:  Fallon Rhodes  MEDICAL RECORD NUMBER:  [id number]  AGE: 62 y.o. GENDER: female  : 1960  TODAY'S DATE:  2020    Subjective:     Reason for WOCN Evaluation and Assessment: \"open wound on coccyx/buttocks\"      Fallon Rhodes is a 62 y.o. female referred by:   [x] Physician  [] Nursing  [] Other:     Wound Identification:  Sacrum and coccygeal prominence is intact. Superficial skin peel with two areas of denuded skin in a 3 x 7cm area of injury to the proximal perianal skin consistent with incontinence associated dermatitis. Patient now has FMS and Purewick devices in use to contain dual incontinence.          PAST MEDICAL HISTORY        Diagnosis Date    [facility]     ETOH abuse        PAST SURGICAL HISTORY    Past Surgical History:   Procedure Laterality Date    CHOLECYSTECTOMY      THYROIDECTOMY      TONSILLECTOMY         FAMILY HISTORY    Family History   Problem Relation Age of Onset    Stroke Mother     Cancer Father        SOCIAL HISTORY    Social History     Tobacco Use    Smoking status: Current Every Day Smoker     Packs/day: 2.00     Types: Cigarettes    Smokeless tobacco: Never Used   Substance Use Topics    Alcohol use: Yes     Frequency: 4 or more times a week     Drinks per session: 3 or 4     Binge frequency: Patient refused     Comment: hasn't drank in 12 days, usually daily drinker (vodka)    Drug use: Never       ALLERGIES    Allergies   Allergen Reactions    Sulfa Antibiotics            Objective:      BP (!) 150/85   Pulse 87   Temp 97 °F (36.1 °C) (Axillary)   Resp 20   Ht 5' 7\" (1.702 m)   Wt 154 lb 5.2 oz (70 kg)   SpO2 96%   BMI 24.17 kg/m²   Salvador Risk Score: Salvador Scale Score: 11    LABS    CBC:   Lab Results   Component Value Date    WBC 11.0 2020    RBC 3.57 2020    HGB 10.7 2020     CMP:  Albumin:    Lab Results   Component Value Date LABALBU 2.5 05/28/2020     PT/INR:    Lab Results   Component Value Date    PROTIME 10.7 05/14/2020    INR 1.0 05/14/2020     HgBA1c:  No results found for: LABA1C  PTT: No components found for: LABPTT      Assessment:     Patient Active Problem List   Diagnosis    Acute intractable headache    Chronic abdominal pain    Alcoholism ([facility]    Hyponatremia    Mucopurulent chronic bronchitis (HCC)    Weight loss    [facility]    Smoker    Chronic obstructive pulmonary disease (HCC)    Pericardial effusion    Thrombocytopenia (HCC)    Hypotension due to hypovolemia    Fever    Skin rash    Metabolic encephalopathy    DVT (deep venous thrombosis) (HCC)    Hypoxia    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HCC)    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    Acute respiratory failure with hypoxia and hypercapnia ([facility])    Pleural effusion associated with pulmonary infection       Measurements:     05/28/20 1401   Wound 05/28/20 Perineum Anal fissure   Date First Assessed/Time First Assessed: 05/28/20 0000   Present on Hospital Admission: No  Primary Wound Type: Skin Tear  Location: Perineum  Wound Description (Comments): Anal fissure   Wound   (incontinence associated dermatitis proximal perianal skin)   Dressing Status Reinforced   Dressing/Treatment Protective barrier  (zinc oxide cream)   Dressing Change Due 05/28/20   Wound Length (cm) 3 cm   Wound Width (cm) 7 cm   Wound Surface Area (cm^2) 21 cm^2   Wound Assessment Clean;Pink  (peeling epidermis, shallow denuded areas x 2)   Drainage Amount None   Odor None   Leda-wound Assessment Dry; Intact      no obvious anal fissure noted. Zinc paste is in use which obscures some areas. Plan:     Plan of Care: Turn every 2 hours  Float heels off of bed with pillows under calves    Use lift sling to reposition patient to minimize potential for shear injury. Foam sacrum dressing to sacrococcygeal area.  Peel back dressing, inspect skin beneath, re-secure. Change every 72 hours and prn wrinkles, soilage. Discontinue Sacral dressing if repeatedly soiled by incontinence. Routine incontinence care with incontinence barrier cloths and zinc oxide cream. Apply zinc oxide cream BID and prn incontinence.    Moisture wicking under pads vs cloth backed briefs      Specialty Bed Required : Yes   [x] Low Air Loss   [x] Pressure Redistribution  [] Fluid Immersion  [] Bariatric  [] Total Pressure Relief  [] Other:     Discharge Plan:  TBD    Patient/Caregiver Teaching:    [] Indicates understanding       [] Needs reinforcement  [] Unsuccessful      [] Verbal Understanding  [] Demonstrated understanding       [x] No evidence of learning  [] Refused teaching         [] N/A       Electronically signed by Rasheed Lundy RN, CWON on 5/28/2020 at 2:02 PM

## 2020-05-28 NOTE — PROGRESS NOTES
Tmax over 24 hours:  Temp (24hrs), Av.2 °F (36.8 °C), Min:97.9 °F (36.6 °C), Max:98.4 °F (36.9 °C)      Patient Vitals for the past 6 hrs:   Temp Temp src Pulse Resp SpO2   20 0400 98.2 °F (36.8 °C) Axillary 80 24 93 %   20 0300 -- -- 86 21 96 %   20 0200 -- -- 81 22 96 %         Intake/Output Summary (Last 24 hours) at 2020 0700  Last data filed at 2020 0400  Gross per 24 hour   Intake 2734 ml   Output 1435 ml   Net 1299 ml     Wt Readings from Last 2 Encounters:   20 153 lb 3.5 oz (69.5 kg)   20 120 lb 9.6 oz (54.7 kg)     Body mass index is 24 kg/m².         PHYSICAL EXAMINATION:  General appearance - drowsy, chronically ill appearing  Mental status - drowsy  Eyes - pupils equal and reactive  Chest - occasional coarse breath sounds, no wheezes, symmetric air entry  Heart - normal rate and regular rhythm  Abdomen - soft, nontender, nondistended  Extremities - no pedal edema noted  Skin - normal coloration and turgor    MEDICATIONS:    Scheduled Meds:   thiamine  100 mg Oral Daily    CENTRUM/CERTA-ALYSSA with minerals oral  15 mL Oral Daily    ipratropium-albuterol  1 ampule Inhalation Q4H While awake    apixaban  10 mg Oral BID    [START ON 6/3/2020] apixaban  5 mg Oral BID    potassium bicarb-citric acid  40 mEq Oral Daily    amphotericin B liposome (AMBISOME) IVPB  4 mg/kg Intravenous Q24H    lansoprazole  15 mg Oral QAM AC    sodium chloride flush  10 mL Intravenous 2 times per day    folic acid  1 mg Oral Daily     Continuous Infusions:   sodium chloride 75 mL/hr at 20 0800     PRN Meds:   heparin (porcine), 80 Units/kg, PRN  heparin (porcine), 40 Units/kg, PRN  fentanNYL, 25 mcg, Q1H PRN    Or  fentanNYL, 50 mcg, Q1H PRN  sodium chloride flush, 10 mL, PRN  acetaminophen, 650 mg, Q6H PRN    Or  acetaminophen, 650 mg, Q6H PRN  polyethylene glycol, 17 g, Daily PRN  promethazine, 12.5 mg, Q6H PRN    Or  ondansetron, 4 mg, Q6H PRN  potassium chloride, 65*  --   --   --  56*    < > = values in this interval not displayed. Magnesium:   Lab Results   Component Value Date    MG 1.9 05/27/2020     Phosphorus:   Lab Results   Component Value Date    PHOS 2.4 05/14/2020     Ionized Calcium:   Lab Results   Component Value Date    CAION 1.25 05/25/2020        Urinalysis: see chart    Troponin: No results for input(s): TROPONINI in the last 72 hours.     Microbiology:    Cultures during this admission:     Blood cultures:                 [] None drawn      [x] Negative             []  Positive (Details:  )  Urine Culture:                   [] None drawn      [x] Negative             []  Positive (Details:  )  Sputum Culture:               [x] None drawn       [x] Negative             []  Positive (Details:  )   Endotracheal aspirate:     [x] None drawn       [] Negative             []  Positive (Details:  )     Other pertinent Labs: see charting      Radiology/Imaging:     Chest Xray (5/28/2020): see chart    ASSESSMENT:     Patient Active Problem List    Diagnosis Date Noted    Acute respiratory failure with hypoxia and hypercapnia (HCC)     Pleural effusion associated with pulmonary infection     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     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[facility]     Hypoxia     DVT (deep venous thrombosis) ([facility] 05/18/2020    Fever 05/17/2020    Skin rash 03/88/4637    Metabolic encephalopathy     Chronic obstructive pulmonary disease (HCC)     Pericardial effusion     Thrombocytopenia (HCC)     Hypotension due to hypovolemia     Alcoholism ([facility] 05/14/2020    Hyponatremia 05/14/2020    DIXON (acute kidney injury) ([facility] 05/14/2020    Smoker 05/14/2020    Acute intractable headache 05/11/2020    Chronic abdominal pain 10/30/2018    Mucopurulent chronic bronchitis ([facility] 07/25/2018    Weight loss 07/25/2018       Additional assessment:          PLAN:     WEAN PER PROTOCOL:  [] No   [] Yes  [x] N/A    DISCONTINUE ANY LABS:   [x] No   []

## 2020-05-28 NOTE — PROGRESS NOTES
Occupational Therapy    Occupational Therapy Not Seen Note    DATE: 2020  Name: Natalie Hurley  : 1960  MRN: [id number]    Patient not available for Occupational Therapy due to: Other: Pt moved to room 426.         Electronically signed by AMANDA Childers on 2020 at 3:59 PM

## 2020-05-28 NOTE — PLAN OF CARE
behavior  Description: Absence of abusive behavior  Outcome: Ongoing  Goal: Verbalizations of feeling emotionally comfortable while being cared for will increase  Description: Verbalizations of feeling emotionally comfortable while being cared for will increase  Outcome: Ongoing     Problem: Psychomotor Activity - Altered:  Goal: Absence of psychomotor disturbance signs and symptoms  Description: Absence of psychomotor disturbance signs and symptoms  Outcome: Ongoing     Problem: Sensory Perception - Impaired:  Goal: Demonstrations of improved sensory functioning will increase  Description: Demonstrations of improved sensory functioning will increase  Outcome: Ongoing  Goal: Decrease in sensory misperception frequency  Description: Decrease in sensory misperception frequency  Outcome: Ongoing  Goal: Able to refrain from responding to false sensory perceptions  Description: Able to refrain from responding to false sensory perceptions  Outcome: Ongoing  Goal: Demonstrates accurate environmental perceptions  Description: Demonstrates accurate environmental perceptions  Outcome: Ongoing  Goal: Able to distinguish between reality-based and nonreality-based thinking  Description: Able to distinguish between reality-based and nonreality-based thinking  Outcome: Ongoing  Goal: Able to interrupt nonreality-based thinking  Description: Able to interrupt nonreality-based thinking  Outcome: Ongoing     Problem: OXYGENATION/RESPIRATORY FUNCTION  Goal: Patient will maintain patent airway  Outcome: Ongoing  Goal: Patient will achieve/maintain normal respiratory rate/effort  Description: Respiratory rate and effort will be within normal limits for the patient  Outcome: Ongoing     Problem: Nutrition  Goal: Optimal nutrition therapy  Outcome: Ongoing

## 2020-05-28 NOTE — PLAN OF CARE
BRONCHOSPASM/BRONCHOCONSTRICTION     [x]         IMPROVE AERATION/BREATH SOUNDS  [x]   ADMINISTER BRONCHODILATOR THERAPY AS APPROPRIATE  [x]   ASSESS BREATH SOUNDS  []   IMPLEMENT AEROSOL/MDI PROTOCOL  [x]   PATIENT EDUCATION AS NEEDED    PROVIDE ADEQUATE OXYGENATION WITH ACCEPTABLE SP02/ABG'S    [x]  IDENTIFY APPROPRIATE OXYGEN THERAPY  [x]   MONITOR SP02/ABG'S AS NEEDED   [x]   PATIENT EDUCATION AS NEEDED    Patient is still requiring frequent suctioning and is unable to cough up secretions on her own.

## 2020-05-28 NOTE — PROGRESS NOTES
chest 5/21 - progression of the RLL infilt/ pneumonia looks larger than on the first CT        EEG  Slowing 5/19  CXR 5/20 progression of the left infiltrate/ consolidation  CXR 5/19 shows worsening left lower lobe infiltrate with possible effusion      CT chest 5/17    CXR 5/16 5/17 MRI of the lumbar spine showed no active infection but osteoarthritis  5/17  MRI of the brain was negative       I have personally reviewed the past medical history, past surgical history, medications, social history, and family history, and I haveupdated the database accordingly.   Past Medical History:     Past Medical History:   Diagnosis Date    COPD (chronic obstructive pulmonary disease) ([facility]     ETOH abuse        Past Surgical  History:     Past Surgical History:   Procedure Laterality Date    CHOLECYSTECTOMY      THYROIDECTOMY      TONSILLECTOMY         Medications:      thiamine  100 mg Oral Daily    CENTRUM/CERTA-ALYSSA with minerals oral  15 mL Oral Daily    ipratropium-albuterol  1 ampule Inhalation Q4H While awake    apixaban  10 mg Oral BID    [START ON 6/3/2020] apixaban  5 mg Oral BID    potassium bicarb-citric acid  40 mEq Oral Daily    amphotericin B liposome (AMBISOME) IVPB  4 mg/kg Intravenous Q24H    lansoprazole  15 mg Oral QAM AC    sodium chloride flush  10 mL Intravenous 2 times per day    folic acid  1 mg Oral Daily       Social History:     Social History     Socioeconomic History    Marital status: Single     Spouse name: Not on file    Number of children: Not on file    Years of education: Not on file    Highest education level: Not on file   Occupational History    Not on file   Social Needs    Financial resource strain: Not on file    Food insecurity     Worry: Not on file     Inability: Not on file    Transportation needs     Medical: Not on file     Non-medical: Not on file   Tobacco Use    Smoking status: Current Every Day Smoker     Packs/day: 2.00     Types: Cigarettes    Smokeless tobacco: Never Used   Substance and Sexual Activity    Alcohol use: Yes     Frequency: 4 or more times a week     Drinks per session: 3 or 4     Binge frequency: Patient refused     Comment: hasn't drank in 12 days, usually daily drinker (vodka)    Drug use: Never    Sexual activity: Not on file   Lifestyle    Physical activity     Days per week: Patient refused     Minutes per session: Patient refused    Stress: Not on file   Relationships    Social connections     Talks on phone: Not on file     Gets together: Not on file     Attends [employer] service: Not on file     Active member of club or organization: Not on file     Attends meetings of clubs or organizations: Not on file     Relationship status: Not on file    Intimate partner violence     Fear of current or ex partner: No     Emotionally abused: No     Physically abused: No     Forced sexual activity: No   Other Topics Concern    Not on file   Social History Narrative    Not on file       Family History:     Family History   Problem Relation Age of Onset    Stroke Mother     Cancer Father         Allergies:   Sulfa antibiotics     Review of Systems:     Review of Systems   Unable to perform ROS: Mental status change       Physical Examination :     Patient Vitals for the past 8 hrs:   BP Temp Temp src Pulse Resp SpO2   05/27/20 2000 -- 98.2 °F (36.8 °C) Axillary -- 25 95 %   05/27/20 1900 (!) 144/80 -- -- 99 24 90 %   05/27/20 1806 -- -- -- -- 25 93 %   05/27/20 1800 -- -- -- 91 25 93 %   05/27/20 1700 -- -- -- 89 25 94 %   05/27/20 1600 (!) 155/87 98.4 °F (36.9 °C) Oral 89 27 95 %       Physical Exam  Constitutional:       General: She is not in acute distress. Appearance: She is normal weight. HENT:      Head: Normocephalic and atraumatic. Nose: No congestion or rhinorrhea. Eyes:      General: No scleral icterus. Conjunctiva/sclera: Conjunctivae normal.   Neck:      Musculoskeletal: Neck supple.  No neck rigidity or the visit, the document can still have some errors including those of syntax and sound a like substitutions which may escape proof reading. It such instances, actual meaningcan be extrapolated by contextual diversion.     Eddie Agee MD  Office: [phone]  Perfect serve / office [phone]

## 2020-05-28 NOTE — PLAN OF CARE
Problem: Falls - Risk of:  Goal: Will remain free from falls  Description: Will remain free from falls  Outcome: Ongoing  Goal: Absence of physical injury  Description: Absence of physical injury  Outcome: Ongoing     Problem: Pain:  Goal: Pain level will decrease  Description: Pain level will decrease  Outcome: Ongoing  Goal: Control of acute pain  Description: Control of acute pain  Outcome: Ongoing  Goal: Control of chronic pain  Description: Control of chronic pain  Outcome: Ongoing     Problem: Skin Integrity - Impaired:  Goal: Will show no infection signs and symptoms  Description: Will show no infection signs and symptoms  Outcome: Ongoing  Goal: Absence of new skin breakdown  Description: Absence of new skin breakdown  Outcome: Ongoing     Problem: Physical Regulation:  Goal: Diagnostic test results will improve  Description: Diagnostic test results will improve  Outcome: Ongoing  Goal: Will remain free from infection  Description: Will remain free from infection  Outcome: Ongoing  Goal: Ability to maintain vital signs within normal range will improve  Description: Ability to maintain vital signs within normal range will improve  Outcome: Ongoing     Problem: Discharge Planning:  Goal: Discharged to appropriate level of care  Description: Discharged to appropriate level of care  Outcome: Ongoing  Goal: Ability to perform activities of daily living will improve  Description: Ability to perform activities of daily living will improve  Outcome: Ongoing  Goal: Participates in care planning  Description: Participates in care planning  Outcome: Ongoing     Problem: Fluid Volume - Deficit:  Goal: Absence of fluid volume deficit signs and symptoms  Description: Absence of fluid volume deficit signs and symptoms  Outcome: Ongoing     Problem: Nutrition Deficit:  Goal: Ability to achieve adequate nutritional intake will improve  Description: Ability to achieve adequate nutritional intake will improve  Outcome: Ongoing     Problem: Sleep Pattern Disturbance:  Goal: Appears well-rested  Description: Appears well-rested  Outcome: Ongoing     Problem: Violence - Risk of, Self/Other-Directed:  Goal: Knowledge of developmental care interventions  Description: Absence of violence  Outcome: Ongoing     Problem: Respiratory Function - Compromised:  Goal: Absence of pulmonary infection  Description: Absence of pulmonary infection  Outcome: Ongoing  Goal: Levels of oxygenation will improve  Description: Levels of oxygenation will improve  Outcome: Ongoing  Goal: Ability to maintain a clear airway will improve  Description: Ability to maintain a clear airway will improve  Outcome: Ongoing     Problem: Confusion - Acute:  Goal: Absence of continued neurological deterioration signs and symptoms  Description: Absence of continued neurological deterioration signs and symptoms  Outcome: Ongoing  Goal: Mental status will be restored to baseline  Description: Mental status will be restored to baseline  Outcome: Ongoing     Problem: Injury - Risk of, Physical Injury:  Goal: Will remain free from falls  Description: Will remain free from falls  Outcome: Ongoing  Goal: Absence of physical injury  Description: Absence of physical injury  Outcome: Ongoing     Problem: Mood - Altered:  Goal: Mood stable  Description: Mood stable  Outcome: Ongoing  Goal: Absence of abusive behavior  Description: Absence of abusive behavior  Outcome: Ongoing  Goal: Verbalizations of feeling emotionally comfortable while being cared for will increase  Description: Verbalizations of feeling emotionally comfortable while being cared for will increase  Outcome: Ongoing     Problem: Psychomotor Activity - Altered:  Goal: Absence of psychomotor disturbance signs and symptoms  Description: Absence of psychomotor disturbance signs and symptoms  Outcome: Ongoing     Problem: Sensory Perception - Impaired:  Goal: Demonstrations of improved sensory functioning will increase  Description: Demonstrations of improved sensory functioning will increase  Outcome: Ongoing  Goal: Decrease in sensory misperception frequency  Description: Decrease in sensory misperception frequency  Outcome: Ongoing  Goal: Able to refrain from responding to false sensory perceptions  Description: Able to refrain from responding to false sensory perceptions  Outcome: Ongoing  Goal: Demonstrates accurate environmental perceptions  Description: Demonstrates accurate environmental perceptions  Outcome: Ongoing  Goal: Able to distinguish between reality-based and nonreality-based thinking  Description: Able to distinguish between reality-based and nonreality-based thinking  Outcome: Ongoing  Goal: Able to interrupt nonreality-based thinking  Description: Able to interrupt nonreality-based thinking  Outcome: Ongoing     Problem: OXYGENATION/RESPIRATORY FUNCTION  Goal: Patient will maintain patent airway  Outcome: Ongoing  Goal: Patient will achieve/maintain normal respiratory rate/effort  Description: Respiratory rate and effort will be within normal limits for the patient  Outcome: Ongoing     Problem: Nutrition  Goal: Optimal nutrition therapy  Outcome: Ongoing

## 2020-05-28 NOTE — PLAN OF CARE
Problem: Falls - Risk of:  Goal: Will remain free from falls  Description: Will remain free from falls  5/28/2020 0956 by Rosalva Hidalgo RN  Outcome: Ongoing  5/28/2020 0416 by Peri Contreras RN  Outcome: Ongoing  Goal: Absence of physical injury  Description: Absence of physical injury  5/28/2020 0416 by Peri Contreras RN  Outcome: Ongoing     Problem: Pain:  Goal: Pain level will decrease  Description: Pain level will decrease  5/28/2020 0416 by Peri Contreras RN  Outcome: Ongoing  Goal: Control of acute pain  Description: Control of acute pain  5/28/2020 0956 by Rosalva Hidalgo RN  Outcome: Ongoing  5/28/2020 0416 by Peri Contreras RN  Outcome: Ongoing  Goal: Control of chronic pain  Description: Control of chronic pain  5/28/2020 0416 by Peri Contreras RN  Outcome: Ongoing     Problem: Skin Integrity - Impaired:  Goal: Will show no infection signs and symptoms  Description: Will show no infection signs and symptoms  5/28/2020 0956 by Rosalva Hidalgo RN  Outcome: Ongoing  5/28/2020 0416 by Peri Contreras RN  Outcome: Ongoing  Goal: Absence of new skin breakdown  Description: Absence of new skin breakdown  5/28/2020 0416 by Peri Contreras RN  Outcome: Ongoing     Problem: Physical Regulation:  Goal: Diagnostic test results will improve  Description: Diagnostic test results will improve  5/28/2020 0956 by Rosalva Hidalgo RN  Outcome: Ongoing  5/28/2020 0416 by Peri Contreras RN  Outcome: Ongoing  Goal: Will remain free from infection  Description: Will remain free from infection  5/28/2020 0416 by Peri Contreras RN  Outcome: Ongoing  Goal: Ability to maintain vital signs within normal range will improve  Description: Ability to maintain vital signs within normal range will improve  5/28/2020 0416 by Peri Contreras RN  Outcome: Ongoing     Problem: Discharge Planning:  Goal: Discharged to appropriate level of care  Description: Discharged to appropriate level of care  5/28/2020 0416 by Dinorah Abrams RN  Outcome: Ongoing  Goal: Mental status will be restored to baseline  Description: Mental status will be restored to baseline  5/28/2020 0416 by Rosa Maria Walton RN  Outcome: Ongoing     Problem: Injury - Risk of, Physical Injury:  Goal: Will remain free from falls  Description: Will remain free from falls  5/28/2020 0956 by Gee Pendleton RN  Outcome: Ongoing  5/28/2020 0416 by Rosa Maria Walton RN  Outcome: Ongoing  Goal: Absence of physical injury  Description: Absence of physical injury  5/28/2020 0416 by Rosa Maria Walton RN  Outcome: Ongoing     Problem: Mood - Altered:  Goal: Mood stable  Description: Mood stable  5/28/2020 0416 by Rosa Maria Walton RN  Outcome: Ongoing  Goal: Absence of abusive behavior  Description: Absence of abusive behavior  5/28/2020 0416 by Rosa Maria Walton RN  Outcome: Ongoing  Goal: Verbalizations of feeling emotionally comfortable while being cared for will increase  Description: Verbalizations of feeling emotionally comfortable while being cared for will increase  5/28/2020 0416 by Rosa Maria Walton RN  Outcome: Ongoing     Problem: Psychomotor Activity - Altered:  Goal: Absence of psychomotor disturbance signs and symptoms  Description: Absence of psychomotor disturbance signs and symptoms  5/28/2020 0416 by Rosa Maria Walton RN  Outcome: Ongoing     Problem: Sensory Perception - Impaired:  Goal: Demonstrations of improved sensory functioning will increase  Description: Demonstrations of improved sensory functioning will increase  5/28/2020 0416 by Rosa Maria Walton RN  Outcome: Ongoing  Goal: Decrease in sensory misperception frequency  Description: Decrease in sensory misperception frequency  5/28/2020 0416 by Rosa Maria Walton RN  Outcome: Ongoing  Goal: Able to refrain from responding to false sensory perceptions  Description: Able to refrain from responding to false sensory perceptions  5/28/2020 0416 by Rosa Maria Walton RN  Outcome: Ongoing  Goal: Demonstrates accurate environmental

## 2020-05-29 ENCOUNTER — APPOINTMENT (OUTPATIENT)
Dept: GENERAL RADIOLOGY | Age: 60
DRG: 720 | End: 2020-05-29
Attending: INTERNAL MEDICINE
Payer: MEDICARE

## 2020-05-29 LAB
ABSOLUTE EOS #: <0.03 K/UL (ref 0–0.44)
ABSOLUTE IMMATURE GRANULOCYTE: 0.12 K/UL (ref 0–0.3)
ABSOLUTE LYMPH #: 1.73 K/UL (ref 1.1–3.7)
ABSOLUTE MONO #: 1.04 K/UL (ref 0.1–1.2)
ALBUMIN SERPL-MCNC: 2.3 G/DL (ref 3.5–5.2)
ALBUMIN/GLOBULIN RATIO: 0.7 (ref 1–2.5)
ALP BLD-CCNC: 81 U/L (ref 35–104)
ALT SERPL-CCNC: 41 U/L (ref 5–33)
ANION GAP SERPL CALCULATED.3IONS-SCNC: 11 MMOL/L (ref 9–17)
AST SERPL-CCNC: 31 U/L
BASOPHILS # BLD: 0 % (ref 0–2)
BASOPHILS ABSOLUTE: 0.04 K/UL (ref 0–0.2)
BILIRUB SERPL-MCNC: 0.3 MG/DL (ref 0.3–1.2)
BUN BLDV-MCNC: 20 MG/DL (ref 6–20)
BUN/CREAT BLD: ABNORMAL (ref 9–20)
C-REACTIVE PROTEIN: 44.6 MG/L (ref 0–5)
CALCIUM SERPL-MCNC: 9.8 MG/DL (ref 8.6–10.4)
CHLORIDE BLD-SCNC: 108 MMOL/L (ref 98–107)
CO2: 26 MMOL/L (ref 20–31)
CREAT SERPL-MCNC: 0.4 MG/DL (ref 0.5–0.9)
DIFFERENTIAL TYPE: ABNORMAL
EOSINOPHILS RELATIVE PERCENT: 0 % (ref 1–4)
FREE KAPPA/LAMBDA RATIO: 1.47 (ref 0.26–1.65)
GFR AFRICAN AMERICAN: >60 ML/MIN
GFR NON-AFRICAN AMERICAN: >60 ML/MIN
GFR SERPL CREATININE-BSD FRML MDRD: ABNORMAL ML/MIN/{1.73_M2}
GFR SERPL CREATININE-BSD FRML MDRD: ABNORMAL ML/MIN/{1.73_M2}
GLUCOSE BLD-MCNC: 107 MG/DL (ref 65–105)
GLUCOSE BLD-MCNC: 120 MG/DL (ref 70–99)
GLUCOSE BLD-MCNC: 132 MG/DL (ref 65–105)
GLUCOSE BLD-MCNC: 95 MG/DL (ref 65–105)
HCT VFR BLD CALC: 34 % (ref 36.3–47.1)
HEMOGLOBIN: 10.6 G/DL (ref 11.9–15.1)
IGA: 108 MG/DL (ref 70–400)
IGG: 567 MG/DL (ref 700–1600)
IGM: 642 MG/DL (ref 40–230)
IMMATURE GRANULOCYTES: 1 %
INTERVENTION: NORMAL
KAPPA FREE LIGHT CHAINS QNT: 3.72 MG/DL (ref 0.37–1.94)
LAMBDA FREE LIGHT CHAINS QNT: 2.53 MG/DL (ref 0.57–2.63)
LYMPHOCYTES # BLD: 15 % (ref 24–43)
MCH RBC QN AUTO: 30.3 PG (ref 25.2–33.5)
MCHC RBC AUTO-ENTMCNC: 31.2 G/DL (ref 28.4–34.8)
MCV RBC AUTO: 97.1 FL (ref 82.6–102.9)
MONOCYTES # BLD: 9 % (ref 3–12)
NRBC AUTOMATED: 0 PER 100 WBC
PDW BLD-RTO: 15.7 % (ref 11.8–14.4)
PLATELET # BLD: 235 K/UL (ref 138–453)
PLATELET ESTIMATE: ABNORMAL
PMV BLD AUTO: 9.9 FL (ref 8.1–13.5)
POTASSIUM SERPL-SCNC: 3.2 MMOL/L (ref 3.7–5.3)
RBC # BLD: 3.5 M/UL (ref 3.95–5.11)
RBC # BLD: ABNORMAL 10*6/UL
SEG NEUTROPHILS: 75 % (ref 36–65)
SEGMENTED NEUTROPHILS ABSOLUTE COUNT: 8.35 K/UL (ref 1.5–8.1)
SODIUM BLD-SCNC: 145 MMOL/L (ref 135–144)
TOTAL PROTEIN: 5.5 G/DL (ref 6.4–8.3)
WBC # BLD: 11.3 K/UL (ref 3.5–11.3)
WBC # BLD: ABNORMAL 10*3/UL

## 2020-05-29 PROCEDURE — 99233 SBSQ HOSP IP/OBS HIGH 50: CPT | Performed by: INTERNAL MEDICINE

## 2020-05-29 PROCEDURE — 83883 ASSAY NEPHELOMETRY NOT SPEC: CPT

## 2020-05-29 PROCEDURE — 85025 COMPLETE CBC W/AUTO DIFF WBC: CPT

## 2020-05-29 PROCEDURE — 6370000000 HC RX 637 (ALT 250 FOR IP): Performed by: STUDENT IN AN ORGANIZED HEALTH CARE EDUCATION/TRAINING PROGRAM

## 2020-05-29 PROCEDURE — 82787 IGG 1 2 3 OR 4 EACH: CPT

## 2020-05-29 PROCEDURE — 94761 N-INVAS EAR/PLS OXIMETRY MLT: CPT

## 2020-05-29 PROCEDURE — 94667 MNPJ CHEST WALL 1ST: CPT

## 2020-05-29 PROCEDURE — 82784 ASSAY IGA/IGD/IGG/IGM EACH: CPT

## 2020-05-29 PROCEDURE — 97530 THERAPEUTIC ACTIVITIES: CPT

## 2020-05-29 PROCEDURE — 94640 AIRWAY INHALATION TREATMENT: CPT

## 2020-05-29 PROCEDURE — 94668 MNPJ CHEST WALL SBSQ: CPT

## 2020-05-29 PROCEDURE — 6370000000 HC RX 637 (ALT 250 FOR IP): Performed by: PSYCHIATRY & NEUROLOGY

## 2020-05-29 PROCEDURE — 86140 C-REACTIVE PROTEIN: CPT

## 2020-05-29 PROCEDURE — 36415 COLL VENOUS BLD VENIPUNCTURE: CPT

## 2020-05-29 PROCEDURE — 71045 X-RAY EXAM CHEST 1 VIEW: CPT

## 2020-05-29 PROCEDURE — 2700000000 HC OXYGEN THERAPY PER DAY

## 2020-05-29 PROCEDURE — 6360000002 HC RX W HCPCS: Performed by: INTERNAL MEDICINE

## 2020-05-29 PROCEDURE — 6370000000 HC RX 637 (ALT 250 FOR IP): Performed by: INTERNAL MEDICINE

## 2020-05-29 PROCEDURE — 2060000000 HC ICU INTERMEDIATE R&B

## 2020-05-29 PROCEDURE — 31720 CLEARANCE OF AIRWAYS: CPT

## 2020-05-29 PROCEDURE — 99233 SBSQ HOSP IP/OBS HIGH 50: CPT | Performed by: PSYCHIATRY & NEUROLOGY

## 2020-05-29 PROCEDURE — 2580000003 HC RX 258: Performed by: INTERNAL MEDICINE

## 2020-05-29 PROCEDURE — 80053 COMPREHEN METABOLIC PANEL: CPT

## 2020-05-29 PROCEDURE — 97110 THERAPEUTIC EXERCISES: CPT

## 2020-05-29 PROCEDURE — 82947 ASSAY GLUCOSE BLOOD QUANT: CPT

## 2020-05-29 RX ADMIN — FLUCYTOSINE 1750 MG: 250 CAPSULE ORAL at 17:00

## 2020-05-29 RX ADMIN — IPRATROPIUM BROMIDE AND ALBUTEROL SULFATE 1 AMPULE: .5; 3 SOLUTION RESPIRATORY (INHALATION) at 17:02

## 2020-05-29 RX ADMIN — FLUCYTOSINE 1750 MG: 250 CAPSULE ORAL at 00:51

## 2020-05-29 RX ADMIN — APIXABAN 10 MG: 5 TABLET, FILM COATED ORAL at 20:38

## 2020-05-29 RX ADMIN — IPRATROPIUM BROMIDE AND ALBUTEROL SULFATE 1 AMPULE: .5; 3 SOLUTION RESPIRATORY (INHALATION) at 21:50

## 2020-05-29 RX ADMIN — FLUCYTOSINE 1750 MG: 250 CAPSULE ORAL at 23:38

## 2020-05-29 RX ADMIN — FLUCYTOSINE 1750 MG: 250 CAPSULE ORAL at 11:16

## 2020-05-29 RX ADMIN — Medication 15 ML: at 09:27

## 2020-05-29 RX ADMIN — FOLIC ACID 1 MG: 1 TABLET ORAL at 09:26

## 2020-05-29 RX ADMIN — FLUCYTOSINE 1750 MG: 250 CAPSULE ORAL at 06:12

## 2020-05-29 RX ADMIN — IPRATROPIUM BROMIDE AND ALBUTEROL SULFATE 1 AMPULE: .5; 3 SOLUTION RESPIRATORY (INHALATION) at 08:58

## 2020-05-29 RX ADMIN — AMPHOTERICIN B 230 MG: 50 INJECTION, POWDER, LYOPHILIZED, FOR SOLUTION INTRAVENOUS at 15:11

## 2020-05-29 RX ADMIN — Medication 100 MG: at 09:26

## 2020-05-29 RX ADMIN — POTASSIUM BICARBONATE 40 MEQ: 782 TABLET, EFFERVESCENT ORAL at 12:34

## 2020-05-29 RX ADMIN — APIXABAN 10 MG: 5 TABLET, FILM COATED ORAL at 09:26

## 2020-05-29 RX ADMIN — POTASSIUM BICARBONATE 40 MEQ: 782 TABLET, EFFERVESCENT ORAL at 09:27

## 2020-05-29 RX ADMIN — IPRATROPIUM BROMIDE AND ALBUTEROL SULFATE 1 AMPULE: .5; 3 SOLUTION RESPIRATORY (INHALATION) at 14:32

## 2020-05-29 ASSESSMENT — PAIN SCALES - GENERAL
PAINLEVEL_OUTOF10: 0

## 2020-05-29 NOTE — PROGRESS NOTES
level of 4. Amphotericin B was started.     Lumbar puncture was repeated again by neurology. Total 3 times lumbar puncture. The spinal fluid continue to have elevated protein levels. Cryptococcal antigen was repeated which was around 2. Concern for left-sided pneumonia with effusion. The pneumonia is responded well to amphotericin B initially patient was on Levaquin which was then discontinued.     She was extubated on 5/26  Infectious disease following. Patient has been responding well to amphotericin B, afebrile for 3 days.     She also has rt sided DVT gastrocnemius, started on heparin, switched to eliquis 5/27. \"    Review of Systems:     Unable to assess due to patient's mental status. Medications: Allergies: Allergies   Allergen Reactions    Sulfa Antibiotics        Current Meds:   Scheduled Meds:    flucytosine  25 mg/kg Oral 4 times per day    thiamine  100 mg Oral Daily    CENTRUM/CERTA-ALYSSA with minerals oral  15 mL Oral Daily    ipratropium-albuterol  1 ampule Inhalation Q4H While awake    apixaban  10 mg Oral BID    [START ON 6/3/2020] apixaban  5 mg Oral BID    potassium bicarb-citric acid  40 mEq Oral Daily    amphotericin B liposome (AMBISOME) IVPB  4 mg/kg Intravenous Q24H    sodium chloride flush  10 mL Intravenous 2 times per day    folic acid  1 mg Oral Daily     Continuous Infusions:    sodium chloride 75 mL/hr at 05/28/20 0951     PRN Meds: heparin (porcine), heparin (porcine), fentanNYL **OR** fentanNYL, sodium chloride flush, acetaminophen **OR** acetaminophen, polyethylene glycol, promethazine **OR** ondansetron, potassium chloride **OR** potassium alternative oral replacement **OR** potassium chloride, magnesium sulfate, midodrine    Data:     Past Medical History:   has a past medical history of COPD (chronic obstructive pulmonary disease) ([address] and ETOH abuse. Social History:   reports that she has been smoking cigarettes.  She has been smoking about 2.00 packs per day. She has never used smokeless tobacco. She reports current alcohol use. She reports that she does not use drugs. Family History:   Family History   Problem Relation Age of Onset    Stroke Mother     Cancer Father        Vitals:  BP (!) 152/86   Pulse 106   Temp 99 °F (37.2 °C) (Temporal)   Resp 20   Ht 5' 7\" (1.702 m)   Wt 158 lb 11.7 oz (72 kg)   SpO2 93%   BMI 24.86 kg/m²   Temp (24hrs), Av.9 °F (36.6 °C), Min:97 °F (36.1 °C), Max:99 °F (37.2 °C)    Recent Labs     20  0633   POCGLU 132*       I/O (24Hr):     Intake/Output Summary (Last 24 hours) at 2020 0834  Last data filed at 2020 0500  Gross per 24 hour   Intake 1764.89 ml   Output 1000 ml   Net 764.89 ml       Labs:  Hematology:  Recent Labs     20  0604 20  0328 20  0742   WBC 9.8 11.0 11.3   RBC 3.38* 3.57* 3.50*   HGB 10.2* 10.7* 10.6*   HCT 31.6* 33.5* 34.0*   MCV 93.5 93.8 97.1   MCH 30.2 30.0 30.3   MCHC 32.3 31.9 31.2   RDW 14.6* 14.8* 15.7*    251 235   MPV 10.2 10.2 9.9     Chemistry:  Recent Labs     20  0604 20  1019 20  0742    146* 145*   K 3.1* 3.4* 3.2*    109* 108*   CO2 26 27 26   GLUCOSE 118* 129* 120*   BUN 19 17 20   CREATININE 0.27* 0.32* 0.40*   MG 1.9  --   --    ANIONGAP 12 10 11   LABGLOM >60 >60 >60   GFRAA >60 >60 >60   CALCIUM 9.1 9.7 9.8     Recent Labs     20  0604 20  1019 20  0633 20  0742   PROT 5.1* 5.4*  --  5.5*   LABALBU 2.0* 2.5*  --  2.3*   AST 38* 27  --  31   ALT 56* 42*  --  41*   ALKPHOS 73 77  --  81   BILITOT 0.24* 0.29*  --  0.30   BILIDIR 0.08  --   --   --    POCGLU  --   --  132*  --      ABG:  Lab Results   Component Value Date    POCPH 7.521 2020    POCPCO2 36.5 2020    POCPO2 69.9 2020    POCHCO3 29.8 2020    NBEA NOT REPORTED 2020    PBEA 7 2020    HLR3MEN 31 2020    RTNF0DRO 96 2020    FIO2 30.0 2020     Lab Results   Component

## 2020-05-29 NOTE — PROGRESS NOTES
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 · 5/16 MRI LB old osteoarthritis and no cord compression  · 5/16 MRI brain neg - 5/15 LP 8 WBC and normal glucose  · 5/17 CT chest LLL consolidation, ? Atelectasis. · LP 8 WBC and normal glucose  · Treated w steroids and  ceftriaxone 5/14 - 5/15 , fever improved, but fever relapsed off steroids  · Fever relapsed since 5/17   · Increased oxygen requirement, increased lethargy  · Left lower lobe infiltrate worse on x-ray  · Failed Levaquin since 5/19 -legionella AG neg  · 5/20  intubated - fever 104. - CXR worse LLL  · Generalized Body rash noticed at admission .   · CT chest 5/21 progression of the left lower lobe infiltrate, around pneumonia  · Sputum culture did not show cryptococcus  · Cryptococcus CSF antigen -5/22  · Opening pressure 5/2416  · All CSF meningitis panel is negative for cryptococcus PCR  · 5/21 cryptococcus serum antigen positive, 4  · 5/22 lumbar puncture repeated - WBC 63, neutrophils 39%, 54 lymp, Protein 142, glucose 53 -  PCR neg  · 5/22 abelcet started  · 5/22 and 5/23 no fever  · The lack of cryptococcus CSF antigen positivity does not rule out meningoencephalitis to cryptococcus,   · The picture is not fully typical and hence will will defer further antibiotic doses before we have the MRI finding    The patient denies any history of IV drug use    Interval changes  5/26/20  No fever - still very lethargic and  Followed some commands for me and woke up  Diarrhea - FMS  External u cath  Tolerated 5 FC and creat so far normal  Liver enz niormal  CXR 5/29 worsened LLL, might be due to her poor inspiration    CSF Cytology negative for cancer and for cryptococcus  Repeat serum cryptococcus AG titers low positive twice    Case discussed with neurology as well, ? concerned about Whipple disease, testing was sent on the CSF  Will defer steroids at this point due to concern of cryptococcus  CSF ACE neg, R/O neuro sarcoidosis. Histo titers and AG are neg    CRP decreased to 49 under antifungals    Cryptococcus CSF antigen negative- does not rule out cryptococcus meningoencephalitis in the presence of a positive cryptococcus antigen and abnormal CSF/encephalitis. Summary of relevant labs:  Labs:  WBC 10.2 - 16 - 14 - 11  ALC 0.14    -43-23 - 10 -49 - 44  Ferritin 9357    Micro:  Sp cx 5/20 neg  BC x2  5/20 neg   BC  x2  5/13 neg    HIV neg 5/15/20  Crypto serum AG + titer= 4 ( 5/21) / + titer = 2 ( 5/25)  Crypto CSF AG neg 5/22  Crypto PCR CSF neg 5/15, 5/22, 5/24    Cytology CSF 5/22 5/24 5/26 are  completely negative - no fungal elements    · 5/24 LP OP 16 - CSF:  RBC 1600, WBC 95, 63% lymphocytes, 33% PMNs, - Meningitis panel negative PCR.   · 5/22 LP  CSF WBC 63, neutrophils 39%, 54 lymp, Protein 142, glucose 53 - PCR panel neg including the crypto PCR -cx neg  · 5/15 LP WBC 8- glucose 74- 48% PMN-meningitis PCR neg - lyme neg - EBV neg - VZV neg - cx neg    COVID neg 5/14  Legionella AG neg  Histoplasma titers and antigens negative  CSF lyme titers  Neg  Celiac panel neg  ANCA -cardiolipins -beta 2 microglobine- ceruleoplasmine: all neg  Electrophoresis proteins monoclonal gamma and monoclonal lambda 5./23   low  CSF ACE neg, R/O 05/29/20 1114 133/81 97.6 °F (36.4 °C) Temporal 93 22 95 %       Physical Exam  Constitutional:       General: She is not in acute distress. Appearance: She is normal weight. HENT:      Head: Normocephalic and atraumatic. Nose: No congestion or rhinorrhea. Eyes:      General: No scleral icterus. Conjunctiva/sclera: Conjunctivae normal.   Neck:      Musculoskeletal: Neck supple. No neck rigidity or muscular tenderness. Cardiovascular:      Rate and Rhythm: Normal rate and regular rhythm. Heart sounds: Normal heart sounds. No murmur. No friction rub. Pulmonary:      Effort: Pulmonary effort is normal. No respiratory distress. Breath sounds: No stridor. No wheezing or rhonchi. Abdominal:      General: There is no distension. Tenderness: There is no abdominal tenderness. Genitourinary:     Comments: No redd  Musculoskeletal:         General: No swelling or deformity. Skin:     General: Skin is warm. Coloration: Skin is not jaundiced or pale. Findings: No bruising or erythema. Neurological:      Mental Status: She is alert. Mental status is at baseline. Cranial Nerves: No cranial nerve deficit.    Psychiatric:         Mood and Affect: Mood normal.           Medical Decision Making:   I have independently reviewed/ordered the following labs:    CBC with Differential:   Recent Labs     05/28/20  0328 05/29/20  0742   WBC 11.0 11.3   HGB 10.7* 10.6*   HCT 33.5* 34.0*    235   LYMPHOPCT 18* 15*   MONOPCT 8 9     BMP:  Recent Labs     05/27/20  0604 05/28/20  1019 05/29/20  0742    146* 145*   K 3.1* 3.4* 3.2*    109* 108*   CO2 26 27 26   BUN 19 17 20   CREATININE 0.27* 0.32* 0.40*   MG 1.9  --   --      Hepatic Function Panel:   Recent Labs     05/27/20  0604 05/28/20  1019 05/29/20  0742   PROT 5.1* 5.4* 5.5*   LABALBU 2.0* 2.5* 2.3*   BILIDIR 0.08  --   --    IBILI 0.16  --   --    BILITOT 0.24* 0.29* 0.30   ALKPHOS 73 77 81   ALT 56* 42* 41* AST 38* 27 31     No results for input(s): RPR in the last 72 hours. No results for input(s): HIV in the last 72 hours. No results for input(s): BC in the last 72 hours. Lab Results   Component Value Date    CREATININE 0.40 05/29/2020    GLUCOSE 120 05/29/2020       Detailed results: Thank you for allowing us to participate in the care of this patient. Please call with questions. This note is created with the assistance of a speech recognition program.  While intending to generate adocument that actually reflects the content of the visit, the document can still have some errors including those of syntax and sound a like substitutions which may escape proof reading. It such instances, actual meaningcan be extrapolated by contextual diversion.     Shilpi Mcginnis MD  Office: [phone]  Perfect serve / office [phone]

## 2020-05-29 NOTE — PLAN OF CARE
Problem: OXYGENATION/RESPIRATORY FUNCTION  Goal: Patient will maintain patent airway  5/29/2020 0052 by Arianne Samson, RCP  Outcome: Ongoing     Problem: OXYGENATION/RESPIRATORY FUNCTION  Goal: Patient will achieve/maintain normal respiratory rate/effort  Description: Respiratory rate and effort will be within normal limits for the patient  5/29/2020 0052 by Arianne Mali, RCP  Outcome: Ongoing

## 2020-05-29 NOTE — PROGRESS NOTES
Feeding  Continued Inpatient Monitoring, Education Not Indicated    Nutrition Evaluation:   · Evaluation: Progressing toward goals   · Goals: provide more than 75% of nutrition needs   · Monitoring: TF Intake, TF Tolerance, Monitor Bowel Function, Pertinent Labs      Electronically signed by Susan Marquez RD, LD on 5/29/20 at 2:59 PM EDT    Contact Number: [phone]

## 2020-05-29 NOTE — PLAN OF CARE
Problem: Skin Integrity - Impaired:  Goal: Absence of new skin breakdown  Description: Absence of new skin breakdown  5/29/2020 1409 by Elizabet Morelos RN  Outcome: Ongoing  Note: Pts skin exhibits no new signs of breakdown. Assisting pt with turns every 2 hours and heels elevated off bed. Linens remain clean and dry. Will continue to monitor.

## 2020-05-29 NOTE — PROGRESS NOTES
PULMONARY & CRITICAL CARE MEDICINE PROGRESS  NOTE     Patient:  Richard Rogers  MRN: [id number]  Admit date: 2020  CODE Status: Full Code     SUBJECTIVE     I personally interviewed/examined the patient, reviewed interval history and interpreted all available radiographic, laboratory data at the time of service. Chief Compliant/Reason for Initial Consult: Acute respiratory failure    Brief Hospital Course: The patient is a 62 y.o. female with history of alcohol abuse, initially admitted from [facility], [place], operated by [facility] for [address]. She presented with complaints of cough, fever and generalized weakness. Patient also had headache. Her COVID-19 test was negative. Patient had a complicated hospital stay and was intubated for worsening respiratory failure. Neurology consulted altered mental status and patient has had multiple CSF studies. During evaluation patient's cryptococcal antigen was reportedly positive and patient was started on amphotericin B by the ID service. She was eventually extubated on  and was transferred out of ICU yesterday.     Interval History:  20  It seems the patient was not on any supplemental oxygen for the last couple of days   This morning she was having thick secretions and had to be started back on nasal cannula  Patient is currently on O2 Flow Rate (L/min)  Av L/min  Min: 6 L/min  Max: 6 L/min  T-max is 99, WBC count is 11.3 today  She is currently receiving amphotericin B and flucytosine  Chest x-ray shows worsening left-sided infiltrate with mediastinal shift to the left side suggestive of atelectasis/with possible mucous plugging    Review of Systems:  General: negative for chills, fatigue or fever  ENT: negative for headaches, nasal congestion, sore throat or visual changes  Allergy and Immunology: negative for postnasal drip or seasonal allergies  Hematological and Lymphatic: negative for noted  Extremities - peripheral pulses normal, no pedal edema, no clubbing or cyanosis  Skin - normal coloration and turgor, no rashes, no suspicious skin lesions noted     DATA REVIEW     Medications:  Scheduled Meds:   flucytosine  25 mg/kg Oral 4 times per day    thiamine  100 mg Oral Daily    CENTRUM/CERTA-ALYSSA with minerals oral  15 mL Oral Daily    ipratropium-albuterol  1 ampule Inhalation Q4H While awake    apixaban  10 mg Oral BID    [START ON 6/3/2020] apixaban  5 mg Oral BID    potassium bicarb-citric acid  40 mEq Oral Daily    amphotericin B liposome (AMBISOME) IVPB  4 mg/kg Intravenous Q24H    sodium chloride flush  10 mL Intravenous 2 times per day    folic acid  1 mg Oral Daily     Continuous Infusions:   sodium chloride 75 mL/hr at 05/28/20 0951     LABS:-  ABG:   No results for input(s): POCPH, POCPCO2, POCPO2, POCHCO3, QYBZ3VYH in the last 72 hours. CBC:   Recent Labs     05/27/20  0604 05/28/20  0328 05/29/20  0742   WBC 9.8 11.0 11.3   HGB 10.2* 10.7* 10.6*   HCT 31.6* 33.5* 34.0*   MCV 93.5 93.8 97.1    251 235   LYMPHOPCT 24 18* 15*   RBC 3.38* 3.57* 3.50*   MCH 30.2 30.0 30.3   MCHC 32.3 31.9 31.2   RDW 14.6* 14.8* 15.7*     BMP:   Recent Labs     05/27/20  0604 05/28/20  1019 05/29/20  0742    146* 145*   K 3.1* 3.4* 3.2*    109* 108*   CO2 26 27 26   BUN 19 17 20   CREATININE 0.27* 0.32* 0.40*   GLUCOSE 118* 129* 120*     Liver Function Test:   Recent Labs     05/29/20  0742   PROT 5.5*   LABALBU 2.3*   ALT 41*   AST 31   ALKPHOS 81   BILITOT 0.30     Amylase/Lipase:  No results for input(s): AMYLASE, LIPASE in the last 72 hours. Coagulation Profile:   Recent Labs     05/26/20  1738 05/27/20  0604   APTT 60.5* 61.9*     Cardiac Enzymes:  No results for input(s): CKTOTAL, CKMB, CKMBINDEX, TROPONINI in the last 72 hours.   Lactic Acid:  Lab Results   Component Value Date    LACTA NOT REPORTED 05/14/2020     BNP:   No results found for: BNP  D-Dimer:  No results found for: DDIMER  Others:   Lab Results   Component Value Date    TSH 0.70 05/14/2020     Lab Results   Component Value Date    QUYEN NEGATIVE 05/14/2020    SEDRATE 51 (H) 05/23/2020    CRP 44.6 (H) 05/29/2020     No results found for: Mirtha Race  Lab Results   Component Value Date    FERRITIN 9,357 (H) 05/13/2020     No results found for: SPEP, UPEP  No results found for: PSA, CEA, , CI3635,     Input/Output:    Intake/Output Summary (Last 24 hours) at 5/29/2020 1546  Last data filed at 5/29/2020 1532  Gross per 24 hour   Intake 988.89 ml   Output 2200 ml   Net -1211.11 ml       Microbiology:  No results for input(s): SPECDESC, SPECDESC, SPECIAL, CULTURE, CULTURE, STATUS, ORG, CDIFFTOXPCR, CAMPYLOBPCR, SALMONELLAPC, SHIGAPCR, SHIGELLAPCR, MPNEUG, MPNEUM, LACTOQL in the last 72 hours. Pathology:    Radiology reports:  CT HEAD WO CONTRAST   Final Result   No acute intracranial abnormality. XR CHEST PORTABLE   Final Result   Overall, no significant change in chest findings compared to the prior study   from May 21st.  Vascular congestion with left lower lobe airspace disease and   small pleural effusion persists. IR LUMBAR PUNCTURE FOR DIAGNOSIS   Final Result   Successful fluoroscopic-guided lumbar puncture. XR CHEST PORTABLE   Final Result   Improved left lung aeration with persistent basilar opacity, likely pleural   fluid with compressive atelectasis and possibly underlying infiltrate. Support tubes satisfactory. CT ABDOMEN PELVIS WO CONTRAST Additional Contrast? None   Final Result   Left basilar effusion with adjacent consolidation representing atelectasis   versus pneumonia. Small amount of free fluid within the pelvis. Fluid within the distal small bowel loops that may relate to an enteritis. Tomlin catheter and rectal tube are visualized. CT HEAD WO CONTRAST   Final Result   No acute intracranial abnormality is evident.       Partially

## 2020-05-30 LAB
ABSOLUTE EOS #: 0.07 K/UL (ref 0–0.44)
ABSOLUTE IMMATURE GRANULOCYTE: 0.08 K/UL (ref 0–0.3)
ABSOLUTE LYMPH #: 1.61 K/UL (ref 1.1–3.7)
ABSOLUTE MONO #: 0.77 K/UL (ref 0.1–1.2)
ALBUMIN SERPL-MCNC: 2 G/DL (ref 3.5–5.2)
ALBUMIN/GLOBULIN RATIO: 0.6 (ref 1–2.5)
ALP BLD-CCNC: 79 U/L (ref 35–104)
ALT SERPL-CCNC: 42 U/L (ref 5–33)
ANION GAP SERPL CALCULATED.3IONS-SCNC: 13 MMOL/L (ref 9–17)
AST SERPL-CCNC: 38 U/L
BASOPHILS # BLD: 1 % (ref 0–2)
BASOPHILS ABSOLUTE: 0.05 K/UL (ref 0–0.2)
BILIRUB SERPL-MCNC: 0.32 MG/DL (ref 0.3–1.2)
BUN BLDV-MCNC: 17 MG/DL (ref 6–20)
BUN/CREAT BLD: ABNORMAL (ref 9–20)
CALCIUM SERPL-MCNC: 9.9 MG/DL (ref 8.6–10.4)
CHLORIDE BLD-SCNC: 106 MMOL/L (ref 98–107)
CO2: 26 MMOL/L (ref 20–31)
CREAT SERPL-MCNC: 0.37 MG/DL (ref 0.5–0.9)
DIFFERENTIAL TYPE: ABNORMAL
EOSINOPHILS RELATIVE PERCENT: 1 % (ref 1–4)
GFR AFRICAN AMERICAN: >60 ML/MIN
GFR NON-AFRICAN AMERICAN: >60 ML/MIN
GFR SERPL CREATININE-BSD FRML MDRD: ABNORMAL ML/MIN/{1.73_M2}
GFR SERPL CREATININE-BSD FRML MDRD: ABNORMAL ML/MIN/{1.73_M2}
GLUCOSE BLD-MCNC: 103 MG/DL (ref 65–105)
GLUCOSE BLD-MCNC: 112 MG/DL (ref 65–105)
GLUCOSE BLD-MCNC: 118 MG/DL (ref 70–99)
GLUCOSE BLD-MCNC: 122 MG/DL (ref 65–105)
GLUCOSE BLD-MCNC: 144 MG/DL (ref 65–105)
GLUCOSE BLD-MCNC: 144 MG/DL (ref 65–105)
HCT VFR BLD CALC: 34.6 % (ref 36.3–47.1)
HEMOGLOBIN: 10.2 G/DL (ref 11.9–15.1)
IMMATURE GRANULOCYTES: 1 %
LYMPHOCYTES # BLD: 17 % (ref 24–43)
MCH RBC QN AUTO: 30.8 PG (ref 25.2–33.5)
MCHC RBC AUTO-ENTMCNC: 29.5 G/DL (ref 28.4–34.8)
MCV RBC AUTO: 104.5 FL (ref 82.6–102.9)
MONOCYTES # BLD: 8 % (ref 3–12)
NRBC AUTOMATED: 0 PER 100 WBC
PDW BLD-RTO: 16.1 % (ref 11.8–14.4)
PLATELET # BLD: 178 K/UL (ref 138–453)
PLATELET ESTIMATE: ABNORMAL
PMV BLD AUTO: 11.1 FL (ref 8.1–13.5)
POTASSIUM SERPL-SCNC: 3.8 MMOL/L (ref 3.7–5.3)
RBC # BLD: 3.31 M/UL (ref 3.95–5.11)
RBC # BLD: ABNORMAL 10*6/UL
SEG NEUTROPHILS: 73 % (ref 36–65)
SEGMENTED NEUTROPHILS ABSOLUTE COUNT: 7 K/UL (ref 1.5–8.1)
SODIUM BLD-SCNC: 145 MMOL/L (ref 135–144)
TOTAL PROTEIN: 5.2 G/DL (ref 6.4–8.3)
WBC # BLD: 9.6 K/UL (ref 3.5–11.3)
WBC # BLD: ABNORMAL 10*3/UL

## 2020-05-30 PROCEDURE — 99232 SBSQ HOSP IP/OBS MODERATE 35: CPT | Performed by: INTERNAL MEDICINE

## 2020-05-30 PROCEDURE — 2580000003 HC RX 258: Performed by: PSYCHIATRY & NEUROLOGY

## 2020-05-30 PROCEDURE — 2580000003 HC RX 258: Performed by: INTERNAL MEDICINE

## 2020-05-30 PROCEDURE — 94668 MNPJ CHEST WALL SBSQ: CPT

## 2020-05-30 PROCEDURE — 6360000002 HC RX W HCPCS: Performed by: PSYCHIATRY & NEUROLOGY

## 2020-05-30 PROCEDURE — 80053 COMPREHEN METABOLIC PANEL: CPT

## 2020-05-30 PROCEDURE — 82947 ASSAY GLUCOSE BLOOD QUANT: CPT

## 2020-05-30 PROCEDURE — 6370000000 HC RX 637 (ALT 250 FOR IP): Performed by: STUDENT IN AN ORGANIZED HEALTH CARE EDUCATION/TRAINING PROGRAM

## 2020-05-30 PROCEDURE — 6360000002 HC RX W HCPCS: Performed by: STUDENT IN AN ORGANIZED HEALTH CARE EDUCATION/TRAINING PROGRAM

## 2020-05-30 PROCEDURE — 94640 AIRWAY INHALATION TREATMENT: CPT

## 2020-05-30 PROCEDURE — 6360000002 HC RX W HCPCS: Performed by: INTERNAL MEDICINE

## 2020-05-30 PROCEDURE — 6370000000 HC RX 637 (ALT 250 FOR IP): Performed by: PSYCHIATRY & NEUROLOGY

## 2020-05-30 PROCEDURE — 2700000000 HC OXYGEN THERAPY PER DAY

## 2020-05-30 PROCEDURE — 36415 COLL VENOUS BLD VENIPUNCTURE: CPT

## 2020-05-30 PROCEDURE — 2060000000 HC ICU INTERMEDIATE R&B

## 2020-05-30 PROCEDURE — 6370000000 HC RX 637 (ALT 250 FOR IP): Performed by: INTERNAL MEDICINE

## 2020-05-30 PROCEDURE — 85025 COMPLETE CBC W/AUTO DIFF WBC: CPT

## 2020-05-30 PROCEDURE — 99232 SBSQ HOSP IP/OBS MODERATE 35: CPT | Performed by: PSYCHIATRY & NEUROLOGY

## 2020-05-30 RX ORDER — METHYLPREDNISOLONE SODIUM SUCCINATE 125 MG/2ML
250 INJECTION, POWDER, LYOPHILIZED, FOR SOLUTION INTRAMUSCULAR; INTRAVENOUS EVERY 6 HOURS
Status: DISCONTINUED | OUTPATIENT
Start: 2020-05-30 | End: 2020-05-30

## 2020-05-30 RX ADMIN — IPRATROPIUM BROMIDE AND ALBUTEROL SULFATE 1 AMPULE: .5; 3 SOLUTION RESPIRATORY (INHALATION) at 12:19

## 2020-05-30 RX ADMIN — SODIUM CHLORIDE 250 MG: 9 INJECTION, SOLUTION INTRAVENOUS at 18:01

## 2020-05-30 RX ADMIN — AMPHOTERICIN B 230 MG: 50 INJECTION, POWDER, LYOPHILIZED, FOR SOLUTION INTRAVENOUS at 16:46

## 2020-05-30 RX ADMIN — Medication 100 MG: at 10:16

## 2020-05-30 RX ADMIN — FLUCYTOSINE 1750 MG: 250 CAPSULE ORAL at 13:28

## 2020-05-30 RX ADMIN — APIXABAN 10 MG: 5 TABLET, FILM COATED ORAL at 22:26

## 2020-05-30 RX ADMIN — APIXABAN 10 MG: 5 TABLET, FILM COATED ORAL at 11:38

## 2020-05-30 RX ADMIN — SODIUM CHLORIDE, PRESERVATIVE FREE 10 ML: 5 INJECTION INTRAVENOUS at 22:26

## 2020-05-30 RX ADMIN — FENTANYL CITRATE 25 MCG: 50 INJECTION, SOLUTION INTRAMUSCULAR; INTRAVENOUS at 10:37

## 2020-05-30 RX ADMIN — FLUCYTOSINE 1750 MG: 250 CAPSULE ORAL at 05:30

## 2020-05-30 RX ADMIN — IPRATROPIUM BROMIDE AND ALBUTEROL SULFATE 1 AMPULE: .5; 3 SOLUTION RESPIRATORY (INHALATION) at 08:28

## 2020-05-30 RX ADMIN — IPRATROPIUM BROMIDE AND ALBUTEROL SULFATE 1 AMPULE: .5; 3 SOLUTION RESPIRATORY (INHALATION) at 17:06

## 2020-05-30 RX ADMIN — FLUCYTOSINE 1750 MG: 250 CAPSULE ORAL at 18:01

## 2020-05-30 RX ADMIN — FOLIC ACID 1 MG: 1 TABLET ORAL at 10:16

## 2020-05-30 RX ADMIN — Medication 15 ML: at 11:38

## 2020-05-30 RX ADMIN — POTASSIUM BICARBONATE 40 MEQ: 782 TABLET, EFFERVESCENT ORAL at 10:16

## 2020-05-30 ASSESSMENT — PAIN SCALES - GENERAL
PAINLEVEL_OUTOF10: 6
PAINLEVEL_OUTOF10: 0

## 2020-05-30 NOTE — PLAN OF CARE
BRONCHOSPASM/BRONCHOCONSTRICTION   [x]  IMPROVE AERATION/BREATH SOUNDS  [x]   ADMINISTER BRONCHODILATOR THERAPY AS APPROPRIATE  [x]   ASSESS BREATH SOUNDS  []   IMPLEMENT AEROSOL/MDI PROTOCOL  [x]   PATIENT EDUCATION AS NEEDED   MOBILIZE SECRETIONS  [x]   ASSESS BREATH SOUNDS  [x]   ASSESS SPUTUM PRODUCTION  [x]   COUGH AND DEEP BREATHING  []  IMPLEMENT SECRETION MANAGEMENT PROTOCOL  [x]   PATIENT EDUCATION AS NEEDED

## 2020-05-30 NOTE — PROGRESS NOTES
NEUROLOGY INPATIENT PROGRESS NOTE    5/30/2020         Subjective: no acute events. Non-verbal     Briefly, this is a  62 y.o. female admitted on 5/14/2020 with new onset headache and generalized weakness has been treated for septic shock, transferred to the floor. Had worsening of mental status since 5/16 and has been worked for possible encephalitis. CSF was negative for viral and bacterial meningitis. Was started on steroids initially for 2 days. EEG showed diffused encephalopathic picture. Overnight, she rapid response was called and the patient was found to be in respiratory distress with hypoxia PO2 49, PCO2 45. Intubated in the MICU. She was extubated on 5/26. Transferred  Back to stepdown on 5/28     No current facility-administered medications on file prior to encounter. No current outpatient medications on file prior to encounter. Allergies: Juan J Ramos is allergic to sulfa antibiotics.     Past Medical History:   Diagnosis Date    COPD (chronic obstructive pulmonary disease) ([facility] [address]     ETOH abuse        Past Surgical History:   Procedure Laterality Date    CHOLECYSTECTOMY      THYROIDECTOMY      TONSILLECTOMY         Medications:     flucytosine  25 mg/kg Oral 4 times per day    thiamine  100 mg Oral Daily    CENTRUM/CERTA-ALYSSA with minerals oral  15 mL Oral Daily    ipratropium-albuterol  1 ampule Inhalation Q4H While awake    apixaban  10 mg Oral BID    [START ON 6/3/2020] apixaban  5 mg Oral BID    potassium bicarb-citric acid  40 mEq Oral Daily    amphotericin B liposome (AMBISOME) IVPB  4 mg/kg Intravenous Q24H    sodium chloride flush  10 mL Intravenous 2 times per day    folic acid  1 mg Oral Daily     PRN Meds include: fentanNYL **OR** fentanNYL, sodium chloride flush, acetaminophen **OR** acetaminophen, polyethylene glycol, promethazine **OR** ondansetron, potassium chloride **OR** potassium alternative oral replacement **OR** potassium chloride, magnesium sulfate, midodrine    Objective:   BP (!) 150/106   Pulse 107   Temp 95.1 °F (35.1 °C) (Axillary)   Resp 19   Ht 5' 7\" (1.702 m)   Wt 158 lb 11.7 oz (72 kg)   SpO2 95%   BMI 24.86 kg/m²     Blood pressure range: Systolic (12TAY), WMD:310 , Min:133 , QXZ:644   ; Diastolic (88QKM), FLU:12, Min:81, Max:106      ROS:  Review of Systems   Unable to perform ROS: Mental status change   patient is non-verbal       NEUROLOGIC EXAMINATION  Physical Exam  Constitutional:       General: She is not in acute distress. Appearance: She is ill-appearing and toxic-appearing. She is not diaphoretic. HENT:      Head: Normocephalic and atraumatic. Eyes:      Pupils: Pupils are equal, round, and reactive to light. Neck:      Musculoskeletal: Decreased range of motion. No neck rigidity. Cardiovascular:      Rate and Rhythm: Normal rate and regular rhythm. Pulmonary:      Effort: Pulmonary effort is normal.   Abdominal:      General: Abdomen is flat. Skin:     General: Skin is warm. Findings: Rash (Generalized skin rash, moribilliform) present. Neurological:      GCS: GCS eye subscore is 4. GCS verbal subscore is 2. GCS motor subscore is 6. Cranial Nerves: No cranial nerve deficit or facial asymmetry. Deep Tendon Reflexes: Babinski sign absent on the right side. Babinski sign absent on the left side. Comments:            Neurologic Exam     Mental Status   Speech: mute   Level of consciousness: unresponsive to painful stimuli  Unable to name object. Unable to read. Unable to repeat. Unable to write. Cranial Nerves     CN III, IV, VI   Pupils are equal, round, and reactive to light. Nystagmus: none     CN VII   Facial expression full, symmetric.      Motor Exam Follow simple commands inconsistently         Lab Results:   CBC:   Recent Labs     05/28/20  0328 05/29/20  0742 05/30/20  0623   WBC 11.0 11.3 9.6   HGB 10.7* 10.6* 10.2*    235 178     BMP:    Recent Labs     05/28/20  1019

## 2020-05-30 NOTE — PROGRESS NOTES
protein levels. Patient had positive cryptococcal antigen in the blood, level of 4. Amphotericin B was started.     Lumbar puncture was repeated again by neurology. Total 3 times lumbar puncture. The spinal fluid continue to have elevated protein levels. Cryptococcal antigen was repeated which was around 2. Concern for left-sided pneumonia with effusion. The pneumonia is responded well to amphotericin B initially patient was on Levaquin which was then discontinued.     She was extubated on 5/26  Infectious disease following. Patient has been responding well to amphotericin B, afebrile for 3 days.     She also has rt sided DVT gastrocnemius, started on heparin, switched to eliquis 5/27. \"    Review of Systems:     Unable to assess due to patient's mental status. Medications: Allergies: Allergies   Allergen Reactions    Sulfa Antibiotics        Current Meds:   Scheduled Meds:    flucytosine  25 mg/kg Oral 4 times per day    thiamine  100 mg Oral Daily    CENTRUM/CERTA-ALYSSA with minerals oral  15 mL Oral Daily    ipratropium-albuterol  1 ampule Inhalation Q4H While awake    apixaban  10 mg Oral BID    [START ON 6/3/2020] apixaban  5 mg Oral BID    potassium bicarb-citric acid  40 mEq Oral Daily    amphotericin B liposome (AMBISOME) IVPB  4 mg/kg Intravenous Q24H    sodium chloride flush  10 mL Intravenous 2 times per day    folic acid  1 mg Oral Daily     Continuous Infusions:    sodium chloride 75 mL/hr at 05/28/20 0951     PRN Meds: fentanNYL **OR** fentanNYL, sodium chloride flush, acetaminophen **OR** acetaminophen, polyethylene glycol, promethazine **OR** ondansetron, potassium chloride **OR** potassium alternative oral replacement **OR** potassium chloride, magnesium sulfate, midodrine    Data:     Past Medical History:   has a past medical history of COPD (chronic obstructive pulmonary disease) ([address] and ETOH abuse. Social History:   reports that she has been smoking cigarettes. She has been smoking about 2.00 packs per day. She has never used smokeless tobacco. She reports current alcohol use. She reports that she does not use drugs. Family History:   Family History   Problem Relation Age of Onset    Stroke Mother     Cancer Father        Vitals:  BP (!) 137/97   Pulse 107   Temp 97.5 °F (36.4 °C) (Temporal)   Resp 19   Ht 5' 7\" (1.702 m)   Wt 158 lb 11.7 oz (72 kg)   SpO2 95%   BMI 24.86 kg/m²   Temp (24hrs), Av.6 °F (36.4 °C), Min:97.4 °F (36.3 °C), Max:97.9 °F (36.6 °C)    Recent Labs     20  11220  0555   POCGLU 95 107* 103 112*       I/O (24Hr):     Intake/Output Summary (Last 24 hours) at 2020 0708  Last data filed at 2020 0355  Gross per 24 hour   Intake 2781 ml   Output 3550 ml   Net -769 ml       Labs:  Hematology:  Recent Labs     20  0328 20  0742   WBC 11.0 11.3   RBC 3.57* 3.50*   HGB 10.7* 10.6*   HCT 33.5* 34.0*   MCV 93.8 97.1   MCH 30.0 30.3   MCHC 31.9 31.2   RDW 14.8* 15.7*    235   MPV 10.2 9.9   CRP  --  44.6*     Chemistry:  Recent Labs     20  1019 20  0742   * 145*   K 3.4* 3.2*   * 108*   CO2 27 26   GLUCOSE 129* 120*   BUN 17 20   CREATININE 0.32* 0.40*   ANIONGAP 10 11   LABGLOM >60 >60   GFRAA >60 >60   CALCIUM 9.7 9.8     Recent Labs     20  1019 20  0633 20  0742 20  11220  0555   PROT 5.4*  --  5.5*  --   --   --   --    LABALBU 2.5*  --  2.3*  --   --   --   --    AST 27  --  31  --   --   --   --    ALT 42*  --  41*  --   --   --   --    ALKPHOS 77  --  81  --   --   --   --    BILITOT 0.29*  --  0.30  --   --   --   --    POCGLU  --  132*  --  95 107* 103 112*     ABG:  Lab Results   Component Value Date    POCPH 7.521 2020    POCPCO2 36.5 2020    POCPO2 69.9 2020    POCHCO3 29.8 2020    NBEA NOT REPORTED 2020    PBEA 7 2020    QWC1KJL 31 2020

## 2020-05-30 NOTE — PROGRESS NOTES
[facility]  Progress Note    Melody Mensah  MRN: [id number]  Date: 5/30/2020    Reason for F/U :   Cephalopathy, cryptococcus infection    Impression :   1. 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 2. Low back pain with associated weakness  3. Resolved respiratory failure    Recommendations:   · The patient continues on liposomal amphotericin B and flucytosine  · She remains severely encephalopathic  · Work-up for primary CNS cryptococcus infection is not positive and concern is whether this is truly secondary to the primary infection versus an inflammatory process    Infection Control Recommendations:   Universal precautions    Discharge Planning:   Estimated Length of IV antimicrobials: To be determined  Patient will need Midline Catheter Insertion/ PICC line Insertion: No  Patient will need: Home IV , [place],  SNF,  LTAC: Undetermined  Patient willneed outpatient wound care: No    MedicalDecision making / Summary of Stay:   Melody Mensah is a 62y.o.-year-old female who presented to the emergency room at [facility] with a 10 days history of a headache photophobia, a lower back pain with weakness. She has a smoker's cough that has improved recently. She has lower back pain and a poor appetite and has not eaten in 3 days, but does not think she has a taste bud problem  She was tested twice for COVID and came back negative  Unable to ambulate because of the weakness of her legs and the back pain. White count is normal but CRP is elevated as well as ferritin  CT scan of the chest is showing no infiltrates at all     The patient was transferred for possible COVID to [facility] and admitted to the [facility] floor ICU     I discussed with critical care, we can move him out of the [facility] ICU to a regular floor, get an MRI of the spine, consult neurosurgery, get a 2D echo,  The patient was started on Zosyn today.   We will Labs     05/29/20  0742 05/30/20  0623   WBC 11.3 9.6   HGB 10.6* 10.2*   HCT 34.0* 34.6*    178   LYMPHOPCT 15* 17*   MONOPCT 9 8     BMP:   Recent Labs     05/29/20  0742 05/30/20  0623   * 145*   K 3.2* 3.8   * 106   CO2 26 26   BUN 20 17   CREATININE 0.40* 0.37*     Hepatic Function Panel:   Recent Labs     05/29/20  0742 05/30/20  0623   PROT 5.5* 5.2*   LABALBU 2.3* 2.0*   BILITOT 0.30 0.32   ALKPHOS 81 79   ALT 41* 42*   AST 31 38*     No results for input(s): VANCOTROUGH in the last 72 hours. Lab Results   Component Value Date    CRP 44.6 (H) 05/29/2020     Lab Results   Component Value Date    SEDRATE 51 (H) 05/23/2020       No results for input(s): PROCAL in the last 72 hours. Imaging Studies:   MRI of the brain is pending    Cultures:     LYME DISEASE AB, CSF [[id number]] Collected: 05/24/20 1357   Order Status: Completed Specimen: CSF Updated: 05/28/20 1354    B burgdorferi Ab,CSF 0.29 FRANKIE      VDRL, CSF [[id number]] Collected: 05/22/20 1446   Order Status: Completed Specimen: CSF Updated: 05/28/20 1335    VDRL, CSF Screen NONREACTIVE     Culture, Blood 1 [[id number]] Collected: 05/20/20 2310   Order Status: Completed Specimen: Blood Updated: 05/27/20 1000    Specimen Description . BLOOD    Special Requests NO INFO ONLY ANAEROBIC RECEIVED    Culture NO GROWTH 6 DAYS   Culture, CSF [[id number]] (Abnormal) Collected: 05/24/20 1358   Order Status: Completed Specimen: CSF Updated: 05/27/20 0706    Specimen Description . CSF    Special Requests NOT REPORTED    Direct Exam MANY NEUTROPHILSAbnormal      MANY MONONUCLEAR WHITE BLOOD CELLS SEENAbnormal      NO BACTERIA SEEN     Gram stain made from cytocentrifuged specimen.  Organisms and cells will be concentrated. Culture NO GROWTH 3 DAYS     Culture, CSF [[id number]] (Abnormal) Collected: 05/22/20 0334   Order Status: Completed Specimen: CSF Updated: 05/26/20 0718    Specimen Description . CSF    Special Requests NOT REPORTED    Direct Exam Special Requests R HAND 9 ML    Culture NO GROWTH 6 DAYS   Meningitis Encephalitis Panel CSF, Molecular [[id number]] Collected: 05/22/20 1447   Order Status: Completed Specimen: CSF Updated: 05/22/20 1647    Specimen Description . CSF    ESCHERICHIA COLI K1 CSF FILM ARRAY Not Detected    HAEMOPHILUS INFLUENZA CSF FILM ARRAY Not Detected    LISTERIA MONOCYTOGENES CSF FILM ARRAY Not Detected    NEISSERIA MENIGITIDIS CSF FILM ARRAY Not Detected    STREPTOCOCCUS AGALACTIAE CSF FILM ARRAY Not Detected    STREPTOCOCCUS PNEUMONIAE CSF FILM ARRAY Not Detected    CYTOMEGALOVIRUS (CMV) CSF FILM ARRAY Not Detected    ENTEROVIRUS CSF FILM ARRAY Not Detected    HSV-1 CSF FILM ARRAY Not Detected    HSV-2 CSF FILM ARRAY Not Detected    HHV-6 (HERPESVIRUS 6) CSF FILM ARRAY Not Detected    PARECHOVIRUS CSF FILM ARRAY Not Detected    VARICELLA-ZOSTER CSF FILM ARRAY Not Detected    CRYPTOCOCCUS NEOFORMANS/ANKIT CSF FILM ARR. Not Detected    Comment: Performed by multiplexed nucleic acid assay. Culture, Blood 1 [[id number]] Collected: 05/16/20 0011   Order Status: Completed Specimen: Blood Updated: 05/22/20 0844    Specimen Description . BLOOD    Special Requests  RT HAND 20ML    Culture NO GROWTH 6 DAYS   Culture, Blood 1 [[id number]] Collected: 05/16/20 0003   Order Status: Completed Specimen: Blood Updated: 05/22/20 0844    Specimen Description . BLOOD    Special Requests RT AC 10ML EACH    Culture NO GROWTH 6 DAYS   Culture, Urine [[id number]] Collected: 05/20/20 1806   Order Status: Completed Specimen: Urine, straight catheter Updated: 05/21/20 2329    Specimen Description . URINE,STRAIGHT CATHETER    Special Requests NOT REPORTED    Culture NO GROWTH     Culture, CSF [[id number]] (Abnormal) Collected: 05/15/20 0936   Order Status: Completed Specimen: CSF Updated: 05/18/20 0648    Specimen Description . CSF    Special Requests NOT REPORTED    Direct Exam MANY NEUTROPHILSAbnormal      MANY MONONUCLEAR WHITE BLOOD CELLS SEENAbnormal NO BACTERIA SEEN    Culture NO GROWTH 3 DAYS       Medications:      flucytosine  25 mg/kg Oral 4 times per day    thiamine  100 mg Oral Daily    CENTRUM/CERTA-ALYSSA with minerals oral  15 mL Oral Daily    ipratropium-albuterol  1 ampule Inhalation Q4H While awake    apixaban  10 mg Oral BID    [START ON 6/3/2020] apixaban  5 mg Oral BID    potassium bicarb-citric acid  40 mEq Oral Daily    amphotericin B liposome (AMBISOME) IVPB  4 mg/kg Intravenous Q24H    sodium chloride flush  10 mL Intravenous 2 times per day    folic acid  1 mg Oral Daily           [facility]  Audelia Lomax MD  [employer] messaging  OFFICE: [phone]      Electronically signed by Audelia Lomax MD on 5/30/2020 at 9:50 AM  Thank you for allowing us to participate in the care of this patient. Please call with questions. Tiera Nuñez is a 62 y.o. female being evaluated by a Virtual Visit (video visit) encounter to address concerns as mentioned above. A caregiver was present when appropriate. Due to this being a TeleHealth encounter (During [facility] public health emergency), evaluation of the following organ systems was limited: Vitals/Constitutional/EENT/Resp/CV/GI//MS/Neuro/Skin/Heme-Lymph-Imm. Pursuant to the emergency declaration under the [address] [place] [address], [address] [place] [address] authority and the [employer] General Act, this Virtual Visit was conducted with patient's (and/or legal guardian's) consent, to reduce the patient's risk of exposure to COVID-19 and provide necessary medical care. Services were provided through a video synchronous discussion virtually to substitute for in-person inpatient visit. An electronic signature was used to authenticate this note.     This note iscreated with the assistance of a speech recognition program.  While intending to generate a document that actually reflects the

## 2020-05-30 NOTE — PLAN OF CARE
Ongoing  5/30/2020 0306 by Amberly Luque RN  Outcome: Ongoing     Problem: Respiratory Function - Compromised:  Goal: Absence of pulmonary infection  Description: Absence of pulmonary infection  5/30/2020 1618 by Juno Nicole RN  Outcome: Ongoing  5/30/2020 0306 by Amberly Luque RN  Outcome: Ongoing  Goal: Levels of oxygenation will improve  Description: Levels of oxygenation will improve  5/30/2020 1618 by Juno Nicole RN  Outcome: Ongoing  5/30/2020 0306 by Amberly Luque RN  Outcome: Ongoing  Goal: Ability to maintain a clear airway will improve  Description: Ability to maintain a clear airway will improve  5/30/2020 1618 by Juno Nicole RN  Outcome: Ongoing  5/30/2020 0306 by Amberly Luque RN  Outcome: Ongoing     Problem: Confusion - Acute:  Goal: Absence of continued neurological deterioration signs and symptoms  Description: Absence of continued neurological deterioration signs and symptoms  5/30/2020 1618 by Juno Nicole RN  Outcome: Ongoing  5/30/2020 0306 by Amberly Luque RN  Outcome: Ongoing  Goal: Mental status will be restored to baseline  Description: Mental status will be restored to baseline  5/30/2020 1618 by Juno Nicole RN  Outcome: Ongoing  5/30/2020 0306 by Amberly Luque RN  Outcome: Ongoing     Problem: Injury - Risk of, Physical Injury:  Goal: Will remain free from falls  Description: Will remain free from falls  5/30/2020 1618 by Juno Nicole RN  Outcome: Ongoing  5/30/2020 0306 by Amberly Luque RN  Outcome: Ongoing  Goal: Absence of physical injury  Description: Absence of physical injury  5/30/2020 1618 by Juno Nicole RN  Outcome: Ongoing  5/30/2020 0306 by Amberly Luque RN  Outcome: Ongoing     Problem: Mood - Altered:  Goal: Mood stable  Description: Mood stable  5/30/2020 1618 by Juno Nicole RN  Outcome: Ongoing  5/30/2020 0306 by Amberly Luque RN  Outcome: Ongoing  Goal: Absence of abusive behavior  Description: Absence of abusive behavior  5/30/2020 1618 by Juno Nicole RN  Outcome: Ongoing  5/30/2020 0306 by Amberly Luque RN  Outcome: Ongoing  Goal: Verbalizations of feeling emotionally comfortable while being cared for will increase  Description: Verbalizations of feeling emotionally comfortable while being cared for will increase  5/30/2020 1618 by Juon Nicole RN  Outcome: Ongoing  5/30/2020 0306 by Amberly Luque RN  Outcome: Ongoing     Problem: Psychomotor Activity - Altered:  Goal: Absence of psychomotor disturbance signs and symptoms  Description: Absence of psychomotor disturbance signs and symptoms  5/30/2020 1618 by Juno Nicole RN  Outcome: Ongoing  5/30/2020 0306 by Amberly Luque RN  Outcome: Ongoing     Problem: Sensory Perception - Impaired:  Goal: Demonstrations of improved sensory functioning will increase  Description: Demonstrations of improved sensory functioning will increase  5/30/2020 1618 by Juno Nicole RN  Outcome: Ongoing  5/30/2020 0306 by Amberly Luque RN  Outcome: Ongoing  Goal: Decrease in sensory misperception frequency  Description: Decrease in sensory misperception frequency  5/30/2020 1618 by Juno Nicole RN  Outcome: Ongoing  5/30/2020 0306 by Amberly Luque RN  Outcome: Ongoing  Goal: Able to refrain from responding to false sensory perceptions  Description: Able to refrain from responding to false sensory perceptions  5/30/2020 1618 by Juno Nicole RN  Outcome: Ongoing  5/30/2020 0306 by Amberly Luque RN  Outcome: Ongoing  Goal: Demonstrates accurate environmental perceptions  Description: Demonstrates accurate environmental perceptions  5/30/2020 1618 by Juno Nicole RN  Outcome: Ongoing  5/30/2020 0306 by Amberly Luque RN  Outcome: Ongoing  Goal: Able to distinguish between reality-based and nonreality-based thinking  Description: Able to distinguish between reality-based and nonreality-based thinking  5/30/2020 1618 by Juno Nicole RN  Outcome: Ongoing  5/30/2020 0306 by Amberly Luque RN  Outcome:

## 2020-05-31 ENCOUNTER — APPOINTMENT (OUTPATIENT)
Dept: MRI IMAGING | Age: 60
DRG: 720 | End: 2020-05-31
Attending: INTERNAL MEDICINE
Payer: MEDICARE

## 2020-05-31 LAB
ABSOLUTE EOS #: <0.03 K/UL (ref 0–0.44)
ABSOLUTE IMMATURE GRANULOCYTE: 0.05 K/UL (ref 0–0.3)
ABSOLUTE LYMPH #: 1.46 K/UL (ref 1.1–3.7)
ABSOLUTE MONO #: 0.16 K/UL (ref 0.1–1.2)
ALBUMIN SERPL-MCNC: 2.4 G/DL (ref 3.5–5.2)
ALBUMIN/GLOBULIN RATIO: 0.8 (ref 1–2.5)
ALP BLD-CCNC: 81 U/L (ref 35–104)
ALT SERPL-CCNC: 39 U/L (ref 5–33)
ANION GAP SERPL CALCULATED.3IONS-SCNC: 11 MMOL/L (ref 9–17)
AST SERPL-CCNC: 27 U/L
BASOPHILS # BLD: 0 % (ref 0–2)
BASOPHILS ABSOLUTE: <0.03 K/UL (ref 0–0.2)
BILIRUB SERPL-MCNC: 0.25 MG/DL (ref 0.3–1.2)
BUN BLDV-MCNC: 24 MG/DL (ref 6–20)
BUN/CREAT BLD: ABNORMAL (ref 9–20)
CALCIUM SERPL-MCNC: 10.1 MG/DL (ref 8.6–10.4)
CHLORIDE BLD-SCNC: 107 MMOL/L (ref 98–107)
CO2: 32 MMOL/L (ref 20–31)
CREAT SERPL-MCNC: 0.32 MG/DL (ref 0.5–0.9)
DIFFERENTIAL TYPE: ABNORMAL
EOSINOPHILS RELATIVE PERCENT: 0 % (ref 1–4)
GFR AFRICAN AMERICAN: >60 ML/MIN
GFR NON-AFRICAN AMERICAN: >60 ML/MIN
GFR SERPL CREATININE-BSD FRML MDRD: ABNORMAL ML/MIN/{1.73_M2}
GFR SERPL CREATININE-BSD FRML MDRD: ABNORMAL ML/MIN/{1.73_M2}
GLUCOSE BLD-MCNC: 138 MG/DL (ref 65–105)
GLUCOSE BLD-MCNC: 159 MG/DL (ref 65–105)
GLUCOSE BLD-MCNC: 166 MG/DL (ref 70–99)
HCT VFR BLD CALC: 32.8 % (ref 36.3–47.1)
HEMOGLOBIN: 10 G/DL (ref 11.9–15.1)
IMMATURE GRANULOCYTES: 1 %
LYMPHOCYTES # BLD: 17 % (ref 24–43)
MCH RBC QN AUTO: 29.9 PG (ref 25.2–33.5)
MCHC RBC AUTO-ENTMCNC: 30.5 G/DL (ref 28.4–34.8)
MCV RBC AUTO: 97.9 FL (ref 82.6–102.9)
MONOCYTES # BLD: 2 % (ref 3–12)
MYOGLOBIN: <21 NG/ML (ref 25–58)
NRBC AUTOMATED: 0 PER 100 WBC
PDW BLD-RTO: 15.7 % (ref 11.8–14.4)
PLATELET # BLD: 212 K/UL (ref 138–453)
PLATELET ESTIMATE: ABNORMAL
PMV BLD AUTO: 9.9 FL (ref 8.1–13.5)
POTASSIUM SERPL-SCNC: 3.2 MMOL/L (ref 3.7–5.3)
RBC # BLD: 3.35 M/UL (ref 3.95–5.11)
RBC # BLD: ABNORMAL 10*6/UL
SEG NEUTROPHILS: 80 % (ref 36–65)
SEGMENTED NEUTROPHILS ABSOLUTE COUNT: 6.84 K/UL (ref 1.5–8.1)
SODIUM BLD-SCNC: 142 MMOL/L (ref 135–144)
SODIUM BLD-SCNC: 150 MMOL/L (ref 135–144)
TOTAL CK: 8 U/L (ref 26–192)
TOTAL PROTEIN: 5.5 G/DL (ref 6.4–8.3)
WBC # BLD: 8.5 K/UL (ref 3.5–11.3)
WBC # BLD: ABNORMAL 10*3/UL

## 2020-05-31 PROCEDURE — 6370000000 HC RX 637 (ALT 250 FOR IP): Performed by: STUDENT IN AN ORGANIZED HEALTH CARE EDUCATION/TRAINING PROGRAM

## 2020-05-31 PROCEDURE — 82947 ASSAY GLUCOSE BLOOD QUANT: CPT

## 2020-05-31 PROCEDURE — 6360000002 HC RX W HCPCS: Performed by: STUDENT IN AN ORGANIZED HEALTH CARE EDUCATION/TRAINING PROGRAM

## 2020-05-31 PROCEDURE — 2580000003 HC RX 258: Performed by: INTERNAL MEDICINE

## 2020-05-31 PROCEDURE — 6360000002 HC RX W HCPCS: Performed by: PSYCHIATRY & NEUROLOGY

## 2020-05-31 PROCEDURE — 99232 SBSQ HOSP IP/OBS MODERATE 35: CPT | Performed by: INTERNAL MEDICINE

## 2020-05-31 PROCEDURE — 6360000002 HC RX W HCPCS: Performed by: INTERNAL MEDICINE

## 2020-05-31 PROCEDURE — 83874 ASSAY OF MYOGLOBIN: CPT

## 2020-05-31 PROCEDURE — 70553 MRI BRAIN STEM W/O & W/DYE: CPT

## 2020-05-31 PROCEDURE — 6370000000 HC RX 637 (ALT 250 FOR IP): Performed by: PSYCHIATRY & NEUROLOGY

## 2020-05-31 PROCEDURE — 99233 SBSQ HOSP IP/OBS HIGH 50: CPT | Performed by: PSYCHIATRY & NEUROLOGY

## 2020-05-31 PROCEDURE — A9576 INJ PROHANCE MULTIPACK: HCPCS | Performed by: STUDENT IN AN ORGANIZED HEALTH CARE EDUCATION/TRAINING PROGRAM

## 2020-05-31 PROCEDURE — 51701 INSERT BLADDER CATHETER: CPT

## 2020-05-31 PROCEDURE — 85025 COMPLETE CBC W/AUTO DIFF WBC: CPT

## 2020-05-31 PROCEDURE — 87040 BLOOD CULTURE FOR BACTERIA: CPT

## 2020-05-31 PROCEDURE — 94640 AIRWAY INHALATION TREATMENT: CPT

## 2020-05-31 PROCEDURE — 94669 MECHANICAL CHEST WALL OSCILL: CPT

## 2020-05-31 PROCEDURE — 94668 MNPJ CHEST WALL SBSQ: CPT

## 2020-05-31 PROCEDURE — 6360000004 HC RX CONTRAST MEDICATION: Performed by: STUDENT IN AN ORGANIZED HEALTH CARE EDUCATION/TRAINING PROGRAM

## 2020-05-31 PROCEDURE — 80053 COMPREHEN METABOLIC PANEL: CPT

## 2020-05-31 PROCEDURE — 36415 COLL VENOUS BLD VENIPUNCTURE: CPT

## 2020-05-31 PROCEDURE — 6370000000 HC RX 637 (ALT 250 FOR IP): Performed by: INTERNAL MEDICINE

## 2020-05-31 PROCEDURE — 2580000003 HC RX 258: Performed by: PSYCHIATRY & NEUROLOGY

## 2020-05-31 PROCEDURE — 2060000000 HC ICU INTERMEDIATE R&B

## 2020-05-31 PROCEDURE — 82550 ASSAY OF CK (CPK): CPT

## 2020-05-31 PROCEDURE — 84295 ASSAY OF SERUM SODIUM: CPT

## 2020-05-31 RX ORDER — SODIUM CHLORIDE 0.9 % (FLUSH) 0.9 %
10 SYRINGE (ML) INJECTION PRN
Status: DISCONTINUED | OUTPATIENT
Start: 2020-05-31 | End: 2020-06-08 | Stop reason: HOSPADM

## 2020-05-31 RX ADMIN — FLUCYTOSINE 1750 MG: 250 CAPSULE ORAL at 12:44

## 2020-05-31 RX ADMIN — Medication 15 ML: at 08:02

## 2020-05-31 RX ADMIN — FOLIC ACID 1 MG: 1 TABLET ORAL at 08:02

## 2020-05-31 RX ADMIN — IPRATROPIUM BROMIDE AND ALBUTEROL SULFATE 1 AMPULE: .5; 3 SOLUTION RESPIRATORY (INHALATION) at 08:39

## 2020-05-31 RX ADMIN — IPRATROPIUM BROMIDE AND ALBUTEROL SULFATE 1 AMPULE: .5; 3 SOLUTION RESPIRATORY (INHALATION) at 20:36

## 2020-05-31 RX ADMIN — APIXABAN 10 MG: 5 TABLET, FILM COATED ORAL at 08:02

## 2020-05-31 RX ADMIN — Medication 100 MG: at 08:02

## 2020-05-31 RX ADMIN — FENTANYL CITRATE 25 MCG: 50 INJECTION, SOLUTION INTRAMUSCULAR; INTRAVENOUS at 15:27

## 2020-05-31 RX ADMIN — SODIUM CHLORIDE 250 MG: 9 INJECTION, SOLUTION INTRAVENOUS at 01:44

## 2020-05-31 RX ADMIN — POTASSIUM BICARBONATE 40 MEQ: 782 TABLET, EFFERVESCENT ORAL at 08:04

## 2020-05-31 RX ADMIN — SODIUM CHLORIDE 250 MG: 9 INJECTION, SOLUTION INTRAVENOUS at 12:09

## 2020-05-31 RX ADMIN — APIXABAN 10 MG: 5 TABLET, FILM COATED ORAL at 21:04

## 2020-05-31 RX ADMIN — FLUCYTOSINE 1750 MG: 250 CAPSULE ORAL at 17:38

## 2020-05-31 RX ADMIN — GADOTERIDOL 14 ML: 279.3 INJECTION, SOLUTION INTRAVENOUS at 11:09

## 2020-05-31 RX ADMIN — IPRATROPIUM BROMIDE AND ALBUTEROL SULFATE 1 AMPULE: .5; 3 SOLUTION RESPIRATORY (INHALATION) at 16:46

## 2020-05-31 RX ADMIN — SODIUM CHLORIDE, PRESERVATIVE FREE 10 ML: 5 INJECTION INTRAVENOUS at 21:04

## 2020-05-31 RX ADMIN — AMPHOTERICIN B 230 MG: 50 INJECTION, POWDER, LYOPHILIZED, FOR SOLUTION INTRAVENOUS at 15:18

## 2020-05-31 RX ADMIN — SODIUM CHLORIDE 250 MG: 9 INJECTION, SOLUTION INTRAVENOUS at 06:49

## 2020-05-31 RX ADMIN — FLUCYTOSINE 1750 MG: 250 CAPSULE ORAL at 00:59

## 2020-05-31 RX ADMIN — FLUCYTOSINE 1750 MG: 250 CAPSULE ORAL at 06:47

## 2020-05-31 RX ADMIN — SODIUM CHLORIDE 250 MG: 9 INJECTION, SOLUTION INTRAVENOUS at 17:38

## 2020-05-31 ASSESSMENT — PAIN SCALES - GENERAL: PAINLEVEL_OUTOF10: 6

## 2020-05-31 NOTE — PLAN OF CARE
Problem: Falls - Risk of:  Goal: Will remain free from falls  Description: Will remain free from falls  5/31/2020 1626 by Erasmo Andrew RN  Outcome: Met This Shift  5/31/2020 0439 by Karolynn Schaumann, RN  Outcome: Ongoing  Goal: Absence of physical injury  Description: Absence of physical injury  5/31/2020 1626 by Erasmo Andrew RN  Outcome: Met This Shift  5/31/2020 0439 by Karolynn Schaumann, RN  Outcome: Ongoing     Problem: Skin Integrity - Impaired:  Goal: Will show no infection signs and symptoms  Description: Will show no infection signs and symptoms  5/31/2020 1626 by Erasmo Andrew RN  Outcome: Ongoing  5/31/2020 0439 by Karolynn Schaumann, RN  Outcome: Ongoing  Goal: Absence of new skin breakdown  Description: Absence of new skin breakdown  5/31/2020 1626 by Erasmo Andrew RN  Outcome: Ongoing  5/31/2020 0439 by Karolynn Schaumann, RN  Outcome: Ongoing     Problem: Physical Regulation:  Goal: Diagnostic test results will improve  Description: Diagnostic test results will improve  Outcome: Ongoing  Goal: Will remain free from infection  Description: Will remain free from infection  Outcome: Ongoing  Goal: Ability to maintain vital signs within normal range will improve  Description: Ability to maintain vital signs within normal range will improve  Outcome: Ongoing     Problem: Discharge Planning:  Goal: Discharged to appropriate level of care  Description: Discharged to appropriate level of care  Outcome: Ongoing  Goal: Ability to perform activities of daily living will improve  Description: Ability to perform activities of daily living will improve  Outcome: Ongoing  Goal: Participates in care planning  Description: Participates in care planning  Outcome: Ongoing     Problem: Fluid Volume - Deficit:  Goal: Absence of fluid volume deficit signs and symptoms  Description: Absence of fluid volume deficit signs and symptoms  Outcome: Ongoing     Problem: Nutrition Deficit:  Goal: Ability to achieve adequate

## 2020-05-31 NOTE — PROGRESS NOTES
Pulmonary Progress Note  [facility]      Patient - Tommie Levine,  Age - 62 y.o.    - 1960      Room Number - 1502/2251-60   MRN -  [id number]   Acct # - [de-identified]  Date of Admission -  2020 10:46 AM    SUBJECTIVE  Mental status remains unchanged. Patient remains encephalopathic. Cryptococcal antigen positive and patient remains on amphotericin B and flucytosine although work-up for primary CNS cryptococcus infection is not positive. OBJECTIVE    VITALS    height is 5' 7\" (1.702 m) and weight is 156 lb 8.4 oz (71 kg). Her temporal temperature is 97.7 °F (36.5 °C). Her blood pressure is 115/61 and her pulse is 103. Her respiration is 14 and oxygen saturation is 95%. Temperature Range: Temp: 97.7 °F (36.5 °C) Temp  Av.5 °F (36.4 °C)  Min: 96.6 °F (35.9 °C)  Max: 97.9 °F (36.6 °C)  BP Range:  Systolic (36KCK), KIS:749 , Min:115 , JDS:811     Diastolic (20XKQ), KYZ:95, Min:61, Max:106    Pulse Range: Pulse  Av.6  Min: 60  Max: 103  Respiration Range: Resp  Av.9  Min: 14  Max: 25  Current Pulse Ox[de-identified]  SpO2: 95 %  24HR Pulse Ox Range:  SpO2  Av.6 %  Min: 95 %  Max: 98 %  Oxygen Amount and Delivery:  O2 Flow Rate (L/min): 3 L/min      Wt Readings from Last 3 Encounters:   20 156 lb 8.4 oz (71 kg)   20 120 lb 9.6 oz (54.7 kg)       I/O (24 Hours)    Intake/Output Summary (Last 24 hours) at 2020 1529  Last data filed at 2020 1200  Gross per 24 hour   Intake 2803 ml   Output 2300 ml   Net 503 ml     EXAM  General Appearance noncommunicating, not following command , comatose = 4  HEENT - Normal, Head is normocephalic, atraumatic. Neck - Supple, symmetrical, while supineNo cervical, supraclavicular or axillary lymphadenopathy noted.   Lungs - positive findings: Mild rhonchi bilaterally examined anteriorly  Cardiovascular -regular rate and rhythm, no murmurs   Abdomen -soft, nontender, nondistended  Skin - No bruising or bleeding  Extremities - No cyanosis, clubbing none    MEDS   methylPREDNISolone (SOLU-MEDROL) IVPB  250 mg Intravenous Q6H    flucytosine  25 mg/kg Oral 4 times per day    thiamine  100 mg Oral Daily    CENTRUM/CERTA-ALYSSA with minerals oral  15 mL Oral Daily    ipratropium-albuterol  1 ampule Inhalation Q4H While awake    apixaban  10 mg Oral BID    [START ON 6/3/2020] apixaban  5 mg Oral BID    potassium bicarb-citric acid  40 mEq Oral Daily    amphotericin B liposome (AMBISOME) IVPB  4 mg/kg Intravenous Q24H    sodium chloride flush  10 mL Intravenous 2 times per day    folic acid  1 mg Oral Daily       sodium chloride flush, fentanNYL **OR** fentanNYL, sodium chloride flush, acetaminophen **OR** acetaminophen, polyethylene glycol, promethazine **OR** ondansetron, potassium chloride **OR** potassium alternative oral replacement **OR** potassium chloride, magnesium sulfate, midodrine    LABS  CBC   Recent Labs     05/31/20  0728   WBC 8.5   HGB 10.0*   HCT 32.8*   MCV 97.9        BMP:   Recent Labs     05/31/20  0728   *   K 3.2*      CO2 32*   BUN 24*   CREATININE 0.32*   GLUCOSE 166*     No results found for: PH, PCO2, PO2, HCO3, O2SAT  Lab Results   Component Value Date    MODE PRVC 05/26/2020     LIVER PROFILE   Recent Labs     05/31/20  0728   AST 27   ALT 39*   BILITOT 0.25*   ALKPHOS 81     INR No results for input(s): INR in the last 72 hours.   PTT   Lab Results   Component Value Date    APTT 61.9 (H) 05/27/2020     CBC:   Lab Results   Component Value Date    WBC 8.5 05/31/2020    RBC 3.35 05/31/2020    HGB 10.0 05/31/2020    HCT 32.8 05/31/2020    MCV 97.9 05/31/2020    MCH 29.9 05/31/2020    MCHC 30.5 05/31/2020    RDW 15.7 05/31/2020     05/31/2020    MPV 9.9 05/31/2020     BMP:    Lab Results   Component Value Date     05/31/2020    K 3.2 05/31/2020     05/31/2020    CO2 32 05/31/2020    BUN 24 05/31/2020    LABALBU 2.4 05/31/2020    CREATININE 0.32 05/31/2020    CALCIUM 10.1 05/31/2020    GFRAA >60 05/31/2020    LABGLOM >60 05/31/2020    GLUCOSE 166 05/31/2020       CULTURES  Urine Culture:  No components found for: CURINE  Blood Culture:  No components found for: CBLOOD, CFUNGUSBL  Sputum Culture:  No components found for: CSPUTUM    RADIOLOGY  N/a     (See actual reports for details)    ASSESSMENT  Patient Active Problem List   Diagnosis    Acute intractable headache    Chronic abdominal pain    [facility]ism [facility]    Hyponatremia    Mucopurulent chronic bronchitis ([facility]    Weight loss    DIXON (acute kidney injury) ([facility]    Smoker    Chronic obstructive pulmonary disease (HCC)    Pericardial effusion    Thrombocytopenia (HCC)    Hypotension due to hypovolemia    Fever    Skin rash    Metabolic encephalopathy    DVT (deep venous thrombosis) (HCC)    Hypoxia    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HCC)    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 Continue respiratory therapy including therapy vest.  2. Antifungals per infectious disease. Possible repeat lumbar puncture  3. High risk for reintubation. Would try to locate a/decision-makers. 4. Baseline Level not clear. 5. Pulmonology signs off.   Please call with any questions       Jalil Puga MD    PGY-3 Department of Internal Medicine  [facility]         5/31/2020, 3:29 PM

## 2020-05-31 NOTE — PROGRESS NOTES
[facility]  Progress Note    Jani Ortiz  MRN: [id number]  Date: 5/31/2020    Reason for F/U :   Cephalopathy, cryptococcus infection    Impression :   1. Acute encephalopathy  2. 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 3. Low back pain with associated weakness  4. Respiratory failure    Recommendations:   · The patient continues on liposomal amphotericin B and flucytosine  · Work-up for primary CNS cryptococcus infection is not positive and concern is whether this is truly secondary to the primary infection versus an inflammatory process  · The care was discussed with neurology yesterday and steroid therapy was resumed  · The patient underwent an MRI this morning the report is pending  · I will order repeat blood cultures x2 sets and add a CPK and myoglobin levels  · The tremors/chills are not associated with amphotericin infusion therefore not likely drug toxicity    Infection Control Recommendations:   Universal precautions    Discharge Planning:   Estimated Length of IV antimicrobials: To be determined  Patient will need Midline Catheter Insertion/ PICC line Insertion: No  Patient will need: Home IV , [place],  SNF,  LTAC: Undetermined  Patient willneed outpatient wound care: No    MedicalDecision making / Summary of Stay:   Jani Ortiz is a 62y.o.-year-old female who presented to the emergency room at [facility] with a 10 days history of a headache photophobia, a lower back pain with weakness. She has a smoker's cough that has improved recently. She has lower back pain and a poor appetite and has not eaten in 3 days, but does not think she has a taste bud problem  She was tested twice for COVID and came back negative  Unable to ambulate because of the weakness of her legs and the back pain.   White count is normal but CRP is elevated as well as ferritin  CT scan of the chest is showing no infiltrates at all     The patient was transferred for Tami Rizo MD on 5/31/2020 at 10:26 AM  Thank you for allowing us to participate in the care of this patient. Please call with questions. Katie Engel is a 62 y.o. female being evaluated by a Virtual Visit (video visit) encounter to address concerns as mentioned above. A caregiver was present when appropriate. Due to this being a TeleHealth encounter (During [facility] public health emergency), evaluation of the following organ systems was limited: Vitals/Constitutional/EENT/Resp/CV/GI//MS/Neuro/Skin/Heme-Lymph-Imm. Pursuant to the emergency declaration under the [address] [place] [address], [address] [place] [address] authority and the [facility] and [employer] Act, this Virtual Visit was conducted with patient's (and/or legal guardian's) consent, to reduce the patient's risk of exposure to COVID-19 and provide necessary medical care. Services were provided through a video synchronous discussion virtually to substitute for in-person inpatient visit. An electronic signature was used to authenticate this note. This note iscreated with the assistance of a speech recognition program.  While intending to generate a document that actually reflects the content of the visit, the document can still have some errors including those of syntax andsound a like substitutions which may escape proof reading. In such instances, actual meaning can be extrapolated by contextual diversion.

## 2020-05-31 NOTE — PLAN OF CARE
Problem: OXYGENATION/RESPIRATORY FUNCTION  Goal: Patient will maintain patent airway  Outcome: Ongoing  Goal: Patient will achieve/maintain normal respiratory rate/effort  Description: Respiratory rate and effort will be within normal limits for the patient  Outcome: Ongoing

## 2020-05-31 NOTE — PROGRESS NOTES
[facility]    Progress Note    5/31/2020    9:00 AM    Name:   Lars Guerra  MRN:     [id number]     Acct:      [de-identified]   Room:   [facility] [place] [address] Day:  16  Admit Date:  5/14/2020 10:46 AM    PCP:   Vilma Amaya  Code Status:  Full Code    Subjective:     C/C: Cough, weakness    Interval History Status: Interval worsening    Patient still does not follow commands. Increased O2 requirements yesterday afternoon and overnight. Currently on 3 L O2 per NC. Considerable suctioning of upper airway today. On TFs. Fecal management system  External urinary catheter with decreased urine output overnight. Darker colored urine. 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 Vital signs within normal limits. Brief History:     Per ICU summary:    \"62year-old female with history of alcohol use disorder, chronic smoker came as a transfer from [facility] for evaluation for possible Maco Bellow had history of fever, dry cough, feeling weak for past 2 days. Alea Stone also had loose stools 2-3 episodes, foul-smelling, no abdominal pain no blood in the stools.  She also reported severe headache since last 12 days, which worsened in the last 5 days.  She described it as sharp all over the head.  No nausea no blurry vision.     Neurology consulted. Underwent lumbar puncture. Meningitis panel, Lyme, VRDL negative. Stool studies negative thus far. CSF culture pending. Remained afebrile since transfer from [facility]. Patient was moved out initially from ICU. However patient on the floor got altered again, 5/19. She was obtunded. Blood gases showed pH of 7.4, CO2 41.8, O2 49.4, bicarb 29.8. Decision was taken to intubate the patient. Patient has had severe neurological deficit. She is only alert and wakes up and tracks in the room but does not move any extremities. He was continued to be intubated.   Lumbar puncture was History:   reports that she has been smoking cigarettes. She has been smoking about 2.00 packs per day. She has never used smokeless tobacco. She reports current alcohol use. She reports that she does not use drugs. Family History:   Family History   Problem Relation Age of Onset    Stroke Mother     Cancer Father        Vitals:  BP (!) 148/65   Pulse 60   Temp 97.9 °F (36.6 °C) (Temporal)   Resp 16   Ht 5' 7\" (1.702 m)   Wt 156 lb 8.4 oz (71 kg)   SpO2 97%   BMI 24.52 kg/m²   Temp (24hrs), Av.7 °F (36.5 °C), Min:97.4 °F (36.3 °C), Max:97.9 °F (36.6 °C)    Recent Labs     20  0555 20  1321 20  1819 20  2348   POCGLU 112* 122* 144* 144*       I/O (24Hr):     Intake/Output Summary (Last 24 hours) at 2020 0900  Last data filed at 2020 7741  Gross per 24 hour   Intake 2623 ml   Output 2300 ml   Net 323 ml       Labs:  Hematology:  Recent Labs     20  0742 20  0623 20  0728   WBC 11.3 9.6 8.5   RBC 3.50* 3.31* 3.35*   HGB 10.6* 10.2* 10.0*   HCT 34.0* 34.6* 32.8*   MCV 97.1 104.5* 97.9   MCH 30.3 30.8 29.9   MCHC 31.2 29.5 30.5   RDW 15.7* 16.1* 15.7*    178 212   MPV 9.9 11.1 9.9   CRP 44.6*  --   --      Chemistry:  Recent Labs     20  0742 20  0623 20  0728   * 145* 150*   K 3.2* 3.8 3.2*   * 106 107   CO2 26 26 32*   GLUCOSE 120* 118* 166*   BUN 20 17 24*   CREATININE 0.40* 0.37* 0.32*   ANIONGAP 11 13 11   LABGLOM >60 >60 >60   GFRAA >60 >60 >60   CALCIUM 9.8 9.9 10.1     Recent Labs     20  0742  20  0052 20  0555 20  0623 20  1321 20  1819 20  2348 20  0728   PROT 5.5*  --   --   --   --  5.2*  --   --   --  5.5*   LABALBU 2.3*  --   --   --   --  2.0*  --   --   --  2.4*   AST 31  --   --   --   --  38*  --   --   --  27   ALT 41*  --   --   --   --  42*  --   --   --  39*   ALKPHOS 81  --   --   --   --  79  --   --   --  81   BILITOT 0.30  -- --   --   --  0.32  --   --   --  0.25*   POCGLU  --    < > 107* 103 112*  --  122* 144* 144*  --     < > = values in this interval not displayed. ABG:  Lab Results   Component Value Date    POCPH 7.521 05/26/2020    POCPCO2 36.5 05/26/2020    POCPO2 69.9 05/26/2020    POCHCO3 29.8 05/26/2020    NBEA NOT REPORTED 05/26/2020    PBEA 7 05/26/2020    KZX1MIH 31 05/26/2020    KKXM6UTX 96 05/26/2020    FIO2 30.0 05/26/2020     Lab Results   Component Value Date/Time    SPECIAL NOT REPORTED 05/24/2020 01:58 PM     Lab Results   Component Value Date/Time    CULTURE NO GROWTH 3 DAYS 05/24/2020 01:58 PM       Radiology:  Ct Head Wo Contrast    Result Date: 5/28/2020  No acute intracranial abnormality. Xr Chest Portable    Result Date: 5/25/2020  Overall, no significant change in chest findings compared to the prior study from May 21st.  Vascular congestion with left lower lobe airspace disease and small pleural effusion persists. Ir Lumbar Puncture For Diagnosis    Result Date: 5/22/2020  Successful fluoroscopic-guided lumbar puncture. Physical Examination:        General appearance:  Drowsy, lethargic. However, able to be awoken.  Does not follow commands  Mental Status:  Unable to assess  Lungs:  clear to auscultation bilaterally, normal effort  Heart:  regular rate and rhythm, no murmur  Abdomen:  soft, nontender, nondistended, normal bowel sounds, no masses, hepatomegaly, splenomegaly  Extremities:  no edema, redness, tenderness in the calves  Skin:  no gross lesions, rashes, induration    Assessment:        Hospital Problems           Last Modified POA    * (Principal) Fever 5/17/2020 Yes    Acute intractable headache 5/17/2020 Yes    Chronic abdominal pain 5/14/2020 Yes    Hyponatremia 5/14/2020 Yes    Mucopurulent chronic bronchitis ([facility] 5/14/2020 Yes    DIXON (acute kidney injury) ([facility] 5/14/2020 Yes    Smoker 5/14/2020 Yes    Chronic obstructive pulmonary disease ([facility] 5/15/2020 Yes    Pericardial effusion 5/15/2020 Yes    Thrombocytopenia ([address] 5/15/2020 Yes    Hypotension due to hypovolemia 5/15/2020 Yes    Skin rash 0/25/2499 Yes    Metabolic encephalopathy 9/42/0575 Yes    DVT (deep venous thrombosis) ([address] 5/18/2020 Yes    Hypoxia 5/20/2020 Yes    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[address] 5/23/2020 Yes    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 Cryptococcal pneumonia and cryptococcal meningitis. Cont amphotericin B. ID following. 2. Per ID recs, flucytosine added for 2 weeks  3. Recommending repeat LP early next week to monitor for response to therapy. 4. Cont to monitor renal and hepatic function. 5. Currently normotensive, off of pressors. Cont to monitor hemodynamic status closely. 6. DIXON, resolved. External catheter in place. 7. Hypernatremia today. Start free water flushes. Sodium Q hrs.   8. Tube feeds continue. At goal. Onalee Zenon following  9. Right gastrocnemius DVT. On Eliquis. 10. Monitor electrolytes and replace as needed  11. Pre-cert pending for [facility].      Sukumar Sheikh MD  5/31/2020  9:00 AM

## 2020-05-31 NOTE — PROGRESS NOTES
Occupational Therapy    Occupational Therapy Not Seen Note    DATE: 2020  Name: Carla Gonzalez  : 1960  MRN: [id number]    Patient not available for Occupational Therapy due to:    Testing MRI    Next Scheduled Treatment: 20    Electronically signed by AMANDA Womack on 2020 at 11:14 AM

## 2020-06-01 ENCOUNTER — APPOINTMENT (OUTPATIENT)
Dept: CT IMAGING | Age: 60
DRG: 720 | End: 2020-06-01
Attending: INTERNAL MEDICINE
Payer: MEDICARE

## 2020-06-01 LAB
ABSOLUTE EOS #: <0.03 K/UL (ref 0–0.44)
ABSOLUTE IMMATURE GRANULOCYTE: 0.12 K/UL (ref 0–0.3)
ABSOLUTE LYMPH #: 1.62 K/UL (ref 1.1–3.7)
ABSOLUTE MONO #: 0.37 K/UL (ref 0.1–1.2)
ALBUMIN SERPL-MCNC: 2.5 G/DL (ref 3.5–5.2)
ALBUMIN/GLOBULIN RATIO: 1 (ref 1–2.5)
ALP BLD-CCNC: 72 U/L (ref 35–104)
ALT SERPL-CCNC: 32 U/L (ref 5–33)
ANION GAP SERPL CALCULATED.3IONS-SCNC: 13 MMOL/L (ref 9–17)
AST SERPL-CCNC: 21 U/L
BASOPHILS # BLD: 0 % (ref 0–2)
BASOPHILS ABSOLUTE: <0.03 K/UL (ref 0–0.2)
BILIRUB SERPL-MCNC: 0.22 MG/DL (ref 0.3–1.2)
BUN BLDV-MCNC: 36 MG/DL (ref 6–20)
BUN/CREAT BLD: ABNORMAL (ref 9–20)
CALCIUM SERPL-MCNC: 9.9 MG/DL (ref 8.6–10.4)
CHLORIDE BLD-SCNC: 109 MMOL/L (ref 98–107)
CO2: 30 MMOL/L (ref 20–31)
CREAT SERPL-MCNC: 0.43 MG/DL (ref 0.5–0.9)
DIFFERENTIAL TYPE: ABNORMAL
EOSINOPHILS RELATIVE PERCENT: 0 % (ref 1–4)
GFR AFRICAN AMERICAN: >60 ML/MIN
GFR NON-AFRICAN AMERICAN: >60 ML/MIN
GFR SERPL CREATININE-BSD FRML MDRD: ABNORMAL ML/MIN/{1.73_M2}
GFR SERPL CREATININE-BSD FRML MDRD: ABNORMAL ML/MIN/{1.73_M2}
GLUCOSE BLD-MCNC: 148 MG/DL (ref 65–105)
GLUCOSE BLD-MCNC: 155 MG/DL (ref 65–105)
GLUCOSE BLD-MCNC: 156 MG/DL (ref 65–105)
GLUCOSE BLD-MCNC: 158 MG/DL (ref 70–99)
HCT VFR BLD CALC: 31 % (ref 36.3–47.1)
HEMOGLOBIN: 9.6 G/DL (ref 11.9–15.1)
IMMATURE GRANULOCYTES: 1 %
LYMPHOCYTES # BLD: 12 % (ref 24–43)
MAGNESIUM: 2 MG/DL (ref 1.6–2.6)
MCH RBC QN AUTO: 30.6 PG (ref 25.2–33.5)
MCHC RBC AUTO-ENTMCNC: 31 G/DL (ref 28.4–34.8)
MCV RBC AUTO: 98.7 FL (ref 82.6–102.9)
MISCELLANEOUS LAB TEST RESULT: NORMAL
MONOCYTES # BLD: 3 % (ref 3–12)
NRBC AUTOMATED: 0 PER 100 WBC
PDW BLD-RTO: 15.5 % (ref 11.8–14.4)
PLATELET # BLD: 227 K/UL (ref 138–453)
PLATELET ESTIMATE: ABNORMAL
PMV BLD AUTO: 9.9 FL (ref 8.1–13.5)
POTASSIUM SERPL-SCNC: 2.9 MMOL/L (ref 3.7–5.3)
RBC # BLD: 3.14 M/UL (ref 3.95–5.11)
RBC # BLD: ABNORMAL 10*6/UL
SEG NEUTROPHILS: 85 % (ref 36–65)
SEGMENTED NEUTROPHILS ABSOLUTE COUNT: 11.74 K/UL (ref 1.5–8.1)
SODIUM BLD-SCNC: 146 MMOL/L (ref 135–144)
SODIUM BLD-SCNC: 149 MMOL/L (ref 135–144)
SODIUM BLD-SCNC: 152 MMOL/L (ref 135–144)
TEST NAME: NORMAL
TOTAL PROTEIN: 5.1 G/DL (ref 6.4–8.3)
WBC # BLD: 13.9 K/UL (ref 3.5–11.3)
WBC # BLD: ABNORMAL 10*3/UL

## 2020-06-01 PROCEDURE — 51701 INSERT BLADDER CATHETER: CPT

## 2020-06-01 PROCEDURE — 51798 US URINE CAPACITY MEASURE: CPT

## 2020-06-01 PROCEDURE — 6370000000 HC RX 637 (ALT 250 FOR IP): Performed by: STUDENT IN AN ORGANIZED HEALTH CARE EDUCATION/TRAINING PROGRAM

## 2020-06-01 PROCEDURE — 6370000000 HC RX 637 (ALT 250 FOR IP): Performed by: INTERNAL MEDICINE

## 2020-06-01 PROCEDURE — 84132 ASSAY OF SERUM POTASSIUM: CPT

## 2020-06-01 PROCEDURE — 31720 CLEARANCE OF AIRWAYS: CPT

## 2020-06-01 PROCEDURE — 80053 COMPREHEN METABOLIC PANEL: CPT

## 2020-06-01 PROCEDURE — 94640 AIRWAY INHALATION TREATMENT: CPT

## 2020-06-01 PROCEDURE — 82947 ASSAY GLUCOSE BLOOD QUANT: CPT

## 2020-06-01 PROCEDURE — 97110 THERAPEUTIC EXERCISES: CPT

## 2020-06-01 PROCEDURE — 99232 SBSQ HOSP IP/OBS MODERATE 35: CPT | Performed by: INTERNAL MEDICINE

## 2020-06-01 PROCEDURE — 6370000000 HC RX 637 (ALT 250 FOR IP): Performed by: NURSE PRACTITIONER

## 2020-06-01 PROCEDURE — 6370000000 HC RX 637 (ALT 250 FOR IP): Performed by: PSYCHIATRY & NEUROLOGY

## 2020-06-01 PROCEDURE — 6360000004 HC RX CONTRAST MEDICATION: Performed by: STUDENT IN AN ORGANIZED HEALTH CARE EDUCATION/TRAINING PROGRAM

## 2020-06-01 PROCEDURE — 99233 SBSQ HOSP IP/OBS HIGH 50: CPT | Performed by: INTERNAL MEDICINE

## 2020-06-01 PROCEDURE — 84295 ASSAY OF SERUM SODIUM: CPT

## 2020-06-01 PROCEDURE — 94761 N-INVAS EAR/PLS OXIMETRY MLT: CPT

## 2020-06-01 PROCEDURE — 2580000003 HC RX 258: Performed by: PSYCHIATRY & NEUROLOGY

## 2020-06-01 PROCEDURE — 2580000003 HC RX 258: Performed by: INTERNAL MEDICINE

## 2020-06-01 PROCEDURE — 83735 ASSAY OF MAGNESIUM: CPT

## 2020-06-01 PROCEDURE — 6360000002 HC RX W HCPCS: Performed by: PSYCHIATRY & NEUROLOGY

## 2020-06-01 PROCEDURE — 85025 COMPLETE CBC W/AUTO DIFF WBC: CPT

## 2020-06-01 PROCEDURE — 6360000002 HC RX W HCPCS: Performed by: INTERNAL MEDICINE

## 2020-06-01 PROCEDURE — 70498 CT ANGIOGRAPHY NECK: CPT

## 2020-06-01 PROCEDURE — 94669 MECHANICAL CHEST WALL OSCILL: CPT

## 2020-06-01 PROCEDURE — 2060000000 HC ICU INTERMEDIATE R&B

## 2020-06-01 PROCEDURE — 2700000000 HC OXYGEN THERAPY PER DAY

## 2020-06-01 PROCEDURE — 6360000002 HC RX W HCPCS: Performed by: STUDENT IN AN ORGANIZED HEALTH CARE EDUCATION/TRAINING PROGRAM

## 2020-06-01 PROCEDURE — 36415 COLL VENOUS BLD VENIPUNCTURE: CPT

## 2020-06-01 PROCEDURE — 99233 SBSQ HOSP IP/OBS HIGH 50: CPT | Performed by: PSYCHIATRY & NEUROLOGY

## 2020-06-01 RX ORDER — IPRATROPIUM BROMIDE AND ALBUTEROL SULFATE 2.5; .5 MG/3ML; MG/3ML
1 SOLUTION RESPIRATORY (INHALATION) 4 TIMES DAILY
Status: DISCONTINUED | OUTPATIENT
Start: 2020-06-01 | End: 2020-06-06

## 2020-06-01 RX ORDER — ALBUTEROL SULFATE 2.5 MG/3ML
2.5 SOLUTION RESPIRATORY (INHALATION) 2 TIMES DAILY
Status: DISCONTINUED | OUTPATIENT
Start: 2020-06-02 | End: 2020-06-06

## 2020-06-01 RX ORDER — IPRATROPIUM BROMIDE AND ALBUTEROL SULFATE 2.5; .5 MG/3ML; MG/3ML
1 SOLUTION RESPIRATORY (INHALATION) 4 TIMES DAILY
Status: DISCONTINUED | OUTPATIENT
Start: 2020-06-02 | End: 2020-06-01

## 2020-06-01 RX ORDER — FLUCYTOSINE 250 MG/1
25 CAPSULE ORAL EVERY 6 HOURS SCHEDULED
Status: DISCONTINUED | OUTPATIENT
Start: 2020-06-01 | End: 2020-06-01

## 2020-06-01 RX ADMIN — IPRATROPIUM BROMIDE AND ALBUTEROL SULFATE 1 AMPULE: .5; 3 SOLUTION RESPIRATORY (INHALATION) at 21:09

## 2020-06-01 RX ADMIN — APIXABAN 10 MG: 5 TABLET, FILM COATED ORAL at 21:39

## 2020-06-01 RX ADMIN — IOHEXOL 90 ML: 350 INJECTION, SOLUTION INTRAVENOUS at 15:01

## 2020-06-01 RX ADMIN — POTASSIUM CHLORIDE 10 MEQ: 7.46 INJECTION, SOLUTION INTRAVENOUS at 07:56

## 2020-06-01 RX ADMIN — AMPHOTERICIN B 230 MG: 50 INJECTION, POWDER, LYOPHILIZED, FOR SOLUTION INTRAVENOUS at 16:05

## 2020-06-01 RX ADMIN — POTASSIUM CHLORIDE 10 MEQ: 7.46 INJECTION, SOLUTION INTRAVENOUS at 10:40

## 2020-06-01 RX ADMIN — POTASSIUM CHLORIDE 10 MEQ: 7.46 INJECTION, SOLUTION INTRAVENOUS at 21:34

## 2020-06-01 RX ADMIN — SODIUM CHLORIDE 250 MG: 9 INJECTION, SOLUTION INTRAVENOUS at 06:35

## 2020-06-01 RX ADMIN — Medication 100 MG: at 07:51

## 2020-06-01 RX ADMIN — IPRATROPIUM BROMIDE AND ALBUTEROL SULFATE 1 AMPULE: .5; 3 SOLUTION RESPIRATORY (INHALATION) at 11:54

## 2020-06-01 RX ADMIN — SODIUM CHLORIDE 250 MG: 9 INJECTION, SOLUTION INTRAVENOUS at 13:01

## 2020-06-01 RX ADMIN — FLUCYTOSINE 1750 MG: 250 CAPSULE ORAL at 19:34

## 2020-06-01 RX ADMIN — POTASSIUM CHLORIDE 10 MEQ: 7.46 INJECTION, SOLUTION INTRAVENOUS at 18:37

## 2020-06-01 RX ADMIN — FLUCYTOSINE 1750 MG: 250 CAPSULE ORAL at 01:54

## 2020-06-01 RX ADMIN — FENTANYL CITRATE 50 MCG: 50 INJECTION, SOLUTION INTRAMUSCULAR; INTRAVENOUS at 18:37

## 2020-06-01 RX ADMIN — SODIUM CHLORIDE, PRESERVATIVE FREE 10 ML: 5 INJECTION INTRAVENOUS at 07:53

## 2020-06-01 RX ADMIN — FOLIC ACID 1 MG: 1 TABLET ORAL at 07:51

## 2020-06-01 RX ADMIN — POTASSIUM CHLORIDE 10 MEQ: 7.46 INJECTION, SOLUTION INTRAVENOUS at 16:41

## 2020-06-01 RX ADMIN — Medication 15 ML: at 07:51

## 2020-06-01 RX ADMIN — SODIUM CHLORIDE 250 MG: 9 INJECTION, SOLUTION INTRAVENOUS at 23:29

## 2020-06-01 RX ADMIN — FLUCYTOSINE 1750 MG: 250 CAPSULE ORAL at 06:35

## 2020-06-01 RX ADMIN — POTASSIUM CHLORIDE 10 MEQ: 7.46 INJECTION, SOLUTION INTRAVENOUS at 13:01

## 2020-06-01 RX ADMIN — APIXABAN 10 MG: 5 TABLET, FILM COATED ORAL at 07:51

## 2020-06-01 RX ADMIN — IPRATROPIUM BROMIDE AND ALBUTEROL SULFATE 1 AMPULE: .5; 3 SOLUTION RESPIRATORY (INHALATION) at 08:40

## 2020-06-01 RX ADMIN — SODIUM CHLORIDE 250 MG: 9 INJECTION, SOLUTION INTRAVENOUS at 18:01

## 2020-06-01 RX ADMIN — IPRATROPIUM BROMIDE AND ALBUTEROL SULFATE 1 AMPULE: .5; 3 SOLUTION RESPIRATORY (INHALATION) at 16:34

## 2020-06-01 RX ADMIN — POTASSIUM BICARBONATE 40 MEQ: 782 TABLET, EFFERVESCENT ORAL at 07:51

## 2020-06-01 RX ADMIN — FLUCYTOSINE 1750 MG: 250 CAPSULE ORAL at 13:01

## 2020-06-01 RX ADMIN — ALBUTEROL SULFATE 2.5 MG: 2.5 SOLUTION RESPIRATORY (INHALATION) at 23:35

## 2020-06-01 RX ADMIN — SODIUM CHLORIDE 250 MG: 9 INJECTION, SOLUTION INTRAVENOUS at 00:29

## 2020-06-01 ASSESSMENT — PAIN SCALES - GENERAL
PAINLEVEL_OUTOF10: 10
PAINLEVEL_OUTOF10: 0

## 2020-06-01 NOTE — PROGRESS NOTES
apixaban  5 mg Oral BID    potassium bicarb-citric acid  40 mEq Oral Daily    amphotericin B liposome (AMBISOME) IVPB  4 mg/kg Intravenous Q24H    sodium chloride flush  10 mL Intravenous 2 times per day    folic acid  1 mg Oral Daily     PRN Meds include: sodium chloride flush, fentanNYL **OR** fentanNYL, sodium chloride flush, acetaminophen **OR** acetaminophen, polyethylene glycol, promethazine **OR** ondansetron, potassium chloride **OR** potassium alternative oral replacement **OR** potassium chloride, magnesium sulfate, midodrine    Objective:   BP (!) 146/91   Pulse 87   Temp 98.4 °F (36.9 °C) (Temporal)   Resp 20   Ht 5' 7\" (1.702 m)   Wt 151 lb 3.8 oz (68.6 kg)   SpO2 97%   BMI 23.69 kg/m²     Blood pressure range: Systolic (17YZD), HOS:578 , Min:115 , YAE:254   ; Diastolic (65MMW), VLE:20, Min:61, Max:97      ROS:  Review of Systems   Unable to perform ROS: Mental status change   patient is non-verbal       NEUROLOGIC EXAMINATION  Physical Exam  Constitutional:       General: She is not in acute distress. Appearance: She is ill-appearing (looks better today) and toxic-appearing. She is not diaphoretic. HENT:      Head: Normocephalic and atraumatic. Comments: Central gaze. Eyes are open spontaneously   Eyes:      Pupils: Pupils are equal, round, and reactive to light. Neck:      Musculoskeletal: Decreased range of motion. No neck rigidity. Cardiovascular:      Rate and Rhythm: Normal rate and regular rhythm. Pulmonary:      Effort: Pulmonary effort is normal.   Abdominal:      General: Abdomen is flat. Skin:     General: Skin is warm. Findings: Rash (Generalized skin rash, moribilliform) present. Neurological:      GCS: GCS eye subscore is 4. GCS verbal subscore is 2. GCS motor subscore is 6. Cranial Nerves: No cranial nerve deficit or facial asymmetry. Deep Tendon Reflexes: Babinski sign absent on the right side. Babinski sign absent on the left side.

## 2020-06-01 NOTE — CONSULTS
(TYLENOL) tablet 650 mg, 650 mg, Oral, Q6H PRN, 650 mg at 20 1605 **OR** acetaminophen (TYLENOL) suppository 650 mg, 650 mg, Rectal, Q6H PRN, Charity Epps MD, 650 mg at 20 2105    polyethylene glycol (GLYCOLAX) packet 17 g, 17 g, Oral, Daily PRN, Charity Epps MD    promethazine (PHENERGAN) tablet 12.5 mg, 12.5 mg, Oral, Q6H PRN **OR** ondansetron (ZOFRAN) injection 4 mg, 4 mg, Intravenous, Q6H PRN, Charity Epps MD    potassium chloride (KLOR-CON M) extended release tablet 40 mEq, 40 mEq, Oral, PRN **OR** potassium bicarb-citric acid (EFFER-K) effervescent tablet 40 mEq, 40 mEq, Oral, PRN, 40 mEq at 20 1234 **OR** potassium chloride 10 mEq/100 mL IVPB (Peripheral Line), 10 mEq, Intravenous, PRN, Charity Epps MD, Last Rate: 100 mL/hr at 20 1040, 10 mEq at 20 1040    magnesium sulfate 2 g in dextrose 5 % 100 mL IVPB, 2 g, Intravenous, PRN, Charity Epps MD    midodrine (PROAMATINE) tablet 5 mg, 5 mg, Oral, TID PRN, Charity Epps MD, 5 mg at 20 1605    Allergies: Allergies   Allergen Reactions    Sulfa Antibiotics        Social History:   Smokin packs per day X ??  years  Social History     Socioeconomic History    Marital status: Single     Spouse name: Not on file    Number of children: Not on file    Years of education: Not on file    Highest education level: Not on file   Occupational History    Not on file   Social Needs    Financial resource strain: Not on file    Food insecurity     Worry: Not on file     Inability: Not on file    Transportation needs     Medical: Not on file     Non-medical: Not on file   Tobacco Use    Smoking status: Current Every Day Smoker     Packs/day: 2.00     Types: Cigarettes    Smokeless tobacco: Never Used   Substance and Sexual Activity    Alcohol use: Yes     Frequency: 4 or more times a week     Drinks per session: 3 or 4     Binge frequency: Patient refused Comment: hasn't drank in 12 days, usually daily drinker (vodka)    Drug use: Never    Sexual activity: Not on file   Lifestyle    Physical activity     Days per week: Patient refused     Minutes per session: Patient refused    Stress: Not on file   Relationships    Social connections     Talks on phone: Not on file     Gets together: Not on file     Attends [employer] service: Not on file     Active member of club or organization: Not on file     Attends meetings of clubs or organizations: Not on file     Relationship status: Not on file    Intimate partner violence     Fear of current or ex partner: No     Emotionally abused: No     Physically abused: No     Forced sexual activity: No   Other Topics Concern    Not on file   Social History Narrative    Not on file       Family History:    Family History   Problem Relation Age of Onset    Stroke Mother     Cancer Father        REVIEW OF SYSTEMS:  A comprehensive 14 point review of systems was unable to be obtained due to AMS  Physical Exam:      This a 62 y.o. female   Vitals:    06/01/20 1155   BP:    Pulse:    Resp: 12   Temp:    SpO2: (!) 89%       Constitutional: Patient in no acute distress  Neuro: Not oriented, non verbal  Psych: flat affect  Head: Atraumatic and normocephalic  Neck: Trachea midline  Lungs: Respiratory effort normal  Cardiovascular:  Regular rhythm  Abdomen: Soft, non-tender, non-distended.  CVA tenderness none  Bladder: Non-tender and not distended  Ext: 2+ DP pulses bilaterally  Skin: No rashes or bruising present  Lymphatics: No palpable lymphadenopathy  Pelvic exam: Deferred  Rectal exam: Not indicated    Labs:  Recent Labs     05/30/20  0623 05/31/20  0728 06/01/20  0652   WBC 9.6 8.5 13.9*   HGB 10.2* 10.0* 9.6*   HCT 34.6* 32.8* 31.0*   .5* 97.9 98.7    212 227     Recent Labs     05/30/20  0623 05/31/20  0728 05/31/20  1930 06/01/20  0652   * 150* 142 152*   K 3.8 3.2*  --  2.9*    107  --  109*   CO2 26 32*  --  30   BUN 17 24*  --  36*   CREATININE 0.37* 0.32*  --  0.43*       No results for input(s): COLORU, PHUR, LABCAST, WBCUA, RBCUA, MUCUS, TRICHOMONAS, YEAST, BACTERIA, CLARITYU, SPECGRAV, LEUKOCYTESUR, UROBILINOGEN, BILIRUBINUR, BLOODU in the last 72 hours. Invalid input(s): NITRATE, GLUCOSEUKETONESUAMORPHOUS    Culture results:  5/20/20 NO GROWTH    -----------------------------------------------------------------  Imaging Results:    CT Abd/Pelv 5/21/2020 reviewed --> kidneys, ureters normal. Bladder decompressed by redd catheter, no evidence of renal stones or urinary obstruction. Mri Lumbar Spine W Wo Contrast    Result Date: 5/14/2020  EXAMINATION: MRI OF THE LUMBAR SPINE WITHOUT AND WITH CONTRAST  5/14/2020 6:48 pm TECHNIQUE: Multiplanar multisequence MRI of the lumbar spine was performed without and with the administration of intravenous contrast. COMPARISON: None HISTORY: ORDERING SYSTEM PROVIDED HISTORY: weak both LE and Lower back pain - look for OM or diskitis TECHNOLOGIST PROVIDED HISTORY: weak both LE and Lower back pain - look for OM or diskitis FINDINGS: BONES/ALIGNMENT: No acute fracture. No subluxation. No suspicious bone marrow replacing lesion. Moderate-severe endplate degenerative changes and disc space narrowing at L5-S1. SPINAL CORD:  Normal signal within the conus which terminates at L1-L2. SOFT TISSUES: No abnormal postcontrast enhancement. No enhancing fluid collection. No paraspinal abnormality. L1-L2: Facet degenerative changes. No significant central canal or foraminal stenosis. L2-L3: Mild broad-based disc bulge. Facet degenerative changes. No significant central canal stenosis. Minimal bilateral foraminal stenosis. L3-L4: Minimal broad-based disc bulge. Facet degenerative changes. Hypertrophy of the ligamentum flavum. Changes combine to create mild central canal stenosis. Mild bilateral foraminal stenosis.  L4-L5: Broad-based disc bulge and facet degenerative

## 2020-06-01 NOTE — PLAN OF CARE
Breath Sounds   []  Patient Baseline []  No Wheeze good aeration []  Faint, scattered wheezing, good aeration []  Expiratory Wheezing and or moderately diminished []  Insp/Exp wheeze and/or very diminished []  Insp/Exp and/ or marked distress   Respiratory Rate   []  Patient Baseline []  Less than 20 []  Less than 20 []  20-25 []  Greater than 25 []  Greater than 25   Peak flow % of Pred or PB []  NA   []  Greater than 90%  []  81-90% []  71-80% []  Less than or equal to 70%  or unable to perform []  Unable due to Respiratory Distress   Dyspnea re []  Patient Baseline []  No SOB []  No SOB []  SOB on exertion []  SOB min activity []  At rest/acute   e FEV% Predicted       []  NA []  Above 69%  []  Unable []  Above 60-69%  []  Unable []  Above 50-59%  []  Unable []  Above 35-49%  []  Unable []  Less than 35%  []  Unable                 []  Hyperinflation Assessment  Score 1 2 3   CXR and Breath Sounds   []  Clear []  No atelectasis  Basilar aeration []  Atelectasis or absent basilar breath sounds   Incentive Spirometry Volume  (Per IBW)   []  Greater than or equal to 15ml/Kg []  less than 15ml/Kg []  less than 15ml/Kg   Surgery within last 2 weeks []  None or general   []  Abdominal or thoracic surgery  []  Abdominal or thoracic   Chronic Pulmonary Historyre []  No []  Yes []  Yes     [x]  Secretion Management Assessment  Score 1 2 3   Bilateral Breath Sounds   []  Occasional Rhonchi []  Scattered Rhonchi [x]  Course Rhonchi and/or poor aeration   Sputum    []  Small amount of thin secretions []  Moderate amount of viscous secretions [x]  Copius, Viscious Yellow/ Secretions   CXR as reported by physician []  clear  []  Unavailable []  Infiltrates and/or consolidation  []  Unavailable [x]  Mucus Plugging and or lobar consolidation  []  Unavailable   Cough []  Strong, productive cough []  Weak productive cough []  No cough or weak non-productive cough   ROXY PARK  4:54 PM                            FEMALE

## 2020-06-01 NOTE — PLAN OF CARE
Problem: Falls - Risk of:  Goal: Will remain free from falls  Description: Will remain free from falls  6/1/2020 1545 by Vance Tersea RN  Outcome: Ongoing  6/1/2020 0337 by Sang Martines RN  Outcome: Ongoing  Goal: Absence of physical injury  Description: Absence of physical injury  6/1/2020 1545 by Vance Teresa RN  Outcome: Ongoing  6/1/2020 0337 by Sang Martines RN  Outcome: Ongoing     Problem: Pain:  Goal: Pain level will decrease  Description: Pain level will decrease  Outcome: Ongoing  Goal: Control of acute pain  Description: Control of acute pain  Outcome: Met This Shift  Goal: Control of chronic pain  Description: Control of chronic pain  Outcome: Met This Shift     Problem: Skin Integrity - Impaired:  Goal: Will show no infection signs and symptoms  Description: Will show no infection signs and symptoms  Outcome: Ongoing  Goal: Absence of new skin breakdown  Description: Absence of new skin breakdown  Outcome: Ongoing     Problem: Physical Regulation:  Goal: Diagnostic test results will improve  Description: Diagnostic test results will improve  Outcome: Ongoing  Goal: Will remain free from infection  Description: Will remain free from infection  Outcome: Ongoing  Goal: Ability to maintain vital signs within normal range will improve  Description: Ability to maintain vital signs within normal range will improve  Outcome: Ongoing     Problem: Discharge Planning:  Goal: Discharged to appropriate level of care  Description: Discharged to appropriate level of care  Outcome: Ongoing  Goal: Ability to perform activities of daily living will improve  Description: Ability to perform activities of daily living will improve  Outcome: Ongoing  Goal: Participates in care planning  Description: Participates in care planning  Outcome: Ongoing     Problem: Fluid Volume - Deficit:  Goal: Absence of fluid volume deficit signs and symptoms  Description: Absence of fluid volume deficit signs and

## 2020-06-01 NOTE — PROGRESS NOTES
Pulmonary Progress Note  [facility]      Patient - Tiera Nuñez,  Age - 62 y.o.    - 1960      Room Number - 2327/4595-65   MRN -  [id number]   Acct # - [de-identified]  Date of Admission -  2020 10:46 AM    SUBJECTIVE  Patient appears to be more awake this morning. MRI brain was obtained which showed increased punctate subacute infarcts in bilateral cerebral hemisphere likely related to embolic infarctions. Patient is currently on Eliquis for DVT in gastrocnemius vein. She has had 2 LPs on this admission. Per primary and neurology LP is to be repeated again next week. She has also been started on pulse dose steroid for encephalitis. She remains on liposomal amphotericin D and flucytosine. Breathing status has declined. This morning patient was on 6 L nasal cannula oxygen which was later changed to high flow oxygen. Chest x-ray and CT chest from prior reviewed. Patient has atelectasis on the left. OBJECTIVE    VITALS    height is 5' 7\" (1.702 m) and weight is 151 lb 3.8 oz (68.6 kg). Her temporal temperature is 96.9 °F (36.1 °C). Her blood pressure is 135/87 and her pulse is 55. Her respiration is 12 and oxygen saturation is 89% (abnormal).        Temperature Range: Temp: 96.9 °F (36.1 °C) Temp  Av.2 °F (36.8 °C)  Min: 96.9 °F (36.1 °C)  Max: 99.8 °F (37.7 °C)  BP Range:  Systolic (71WEP), PUB:588 , Min:116 , SQP:946     Diastolic (89HOX), TSA:25, Min:67, Max:97    Pulse Range: Pulse  Av.4  Min: 55  Max: 115  Respiration Range: Resp  Av.7  Min: 11  Max: 20  Current Pulse Ox[de-identified]  SpO2: (!) 89 %  24HR Pulse Ox Range:  SpO2  Av.3 %  Min: 89 %  Max: 99 %  Oxygen Amount and Delivery:  O2 Flow Rate (L/min): 40 L/min      Wt Readings from Last 3 Encounters:   20 151 lb 3.8 oz (68.6 kg)   20 120 lb 9.6 oz (54.7 kg)       I/O (24 Hours)    Intake/Output Summary (Last 24 hours) at 2020 1651  Last data filed at 2020 0800  Gross per 24 hour   Intake 4667 Component Value Date    APTT 61.9 (H) 05/27/2020     CBC:   Lab Results   Component Value Date    WBC 13.9 06/01/2020    RBC 3.14 06/01/2020    HGB 9.6 06/01/2020    HCT 31.0 06/01/2020    MCV 98.7 06/01/2020    MCH 30.6 06/01/2020    MCHC 31.0 06/01/2020    RDW 15.5 06/01/2020     06/01/2020    MPV 9.9 06/01/2020     BMP:    Lab Results   Component Value Date     06/01/2020    K 2.9 06/01/2020     06/01/2020    CO2 30 06/01/2020    BUN 36 06/01/2020    LABALBU 2.5 06/01/2020    CREATININE 0.43 06/01/2020    CALCIUM 9.9 06/01/2020    GFRAA >60 06/01/2020    LABGLOM >60 06/01/2020    GLUCOSE 158 06/01/2020       CULTURES  Urine Culture:  No components found for: CURINE  Blood Culture:  No components found for: CBLOOD, CFUNGUSBL  Sputum Culture:  No components found for: CSPUTUM    RADIOLOGY  N/a     (See actual reports for details)    ASSESSMENT  Patient Active Problem List   Diagnosis    Acute intractable headache    Chronic abdominal pain    Alcoholism ([facility] [address]    Hyponatremia    Mucopurulent chronic bronchitis ([facility] [facility]    Weight loss    DIXON (acute kidney injury) ([facility] [address] [facility]    Smoker    Chronic obstructive pulmonary disease (HCC)    Pericardial effusion    Thrombocytopenia (HCC)    Hypotension due to hypovolemia    Fever    Skin rash    Metabolic encephalopathy    DVT (deep venous thrombosis) (HCC)    Hypoxia    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HCC)    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 High risk for reintubation. Respiratory status worse compared to yesterday. Currently on high flow oxygen. Will consider bronchoscopy if patient is intubated. However no procedure will be done at this point as patient is currently anticoagulated with Eliquis  2. Antifungals per infectious disease. Liposomal amphotericin B and flucytosine.     3. Possible repeat lumbar puncture  4. Consider transitioning to heparin drip for anticoagulation. Patient is currently on Eliquis for DVT gastrocnemius vein. 5. Consider echocardiogram with bubble study in the setting of increased punctate subacute infarcts seen on the MRI. 6. On high-dose steroids for encephalitis per neurology        Jadon Camacho MD    PGY-3 Department of Internal Medicine  [facility]         6/1/2020, 4:51 PM        Attending Physician Statement  I have discussed the care of Jani Ortiz, including pertinent history and exam findings with the resident. I have reviewed the key elements of all parts of the encounter with the resident. I have seen and examined the patient with the resident. I agree with the assessment and plan and status of the problem list as documented. I have seen the patient during my round today, I have reviewed the events, other note seen medications reviewed and chart seen. I have also reviewed other results including results of MRI brain and neurology note, chest x-rays and CT scan of the chest seen. She remained confused and encephalopathic when I saw her though according to nursing staff she was opening her eyes more she was arousable she follows some commands intermittently by hand squeeze which is very weak she also was having mild tremors no seizure activity was reported, she has poor cough she has upper airway sounds present in airway she was very weak was not able to mobilize secretions or cough. This morning she was more hypoxic O2 saturation was 89% on 3 L and she was increased to 6 L and later was placed on high flow nasal cannula she has bilateral upper airway sounds rhonchi and bilateral breath sounds significantly decreased at left base as compared to right side.   Her last chest x-ray shows decreased volume on the left side and left lower lobe atelectasis with pleural effusion likely she had some right pleural effusion also present. Initial CT scan on 05/17/2020 showed left lower lobe atelectasis with minimal left pleural effusion. Currently she is on Eliquis for gastrocnemius vein DVT she has been getting intermittent procedure including spinal tap and apparently there is another spinal tap to be done by neurology. She is currently on high-dose Solu-Medrol per neurology and infectious disease and she is getting amphotericin B for possible cryptococcal meningitis. Consider 2D echo with bubble study. Avoid any sedation or narcotics. Is strict aspiration precaution. On NG tube feeding. Monitor neurological status and mentation. She has left lower lobe atelectasis and she will likely need once she is less encephalopathic able to protect her airways and not hypoxic bronchoscopy will be risky at this time unless she is intubated, she is high risk for intubation mainly related to mentation encephalopathy and inability to clear airways and to mobilize secretion. I would recommend to change Eliquis to heparin at this time as she need procedures and possibly thoracentesis of the left side although mostly it is atelectasis. Discussed with nursing staff, treatment plan discussed. Please note that this chart was generated using voice recognition Dragon dictation software. Although every effort was made to ensure the accuracy of this automated transcription, some errors in transcription may have occurred.     Naif Joiner MD  6/1/2020 6:41 PM

## 2020-06-01 NOTE — PROGRESS NOTES
Physical Therapy  DATE: 2020  NAME: Pete Mcdowell  MRN: [id number]   : 1960    Discharge Recommendations: Continue to Assess (pending progress)     Subjective: RN agreeable to PT. Pt alert in bed upon arrival, response to name ,  mumbles throughout, but unable to follow any commands  Pain: screams with L knee ROM  Patient follows: NO Commands  Is patient on ventilator:NO  Is patient on sedation: NO  PrecautionGeneral    Therapeutic exercises:  PROM to BUE and BLEs x15 reps all plains. Bilateral gastrocnemius stretching 3 reps x 30 seconds  Pt SPO2 ranges from 86-89% throughout. FDS change to R foot . Pt repositioned for comfort. Goals  Short Term Goals  Short term goal 1: Pt to perform Bed mobility min A  Short term goal 2: Pt to improve sitting balance to Good  Short term goal 3: Pt to transfer Min A  Short term goal 4: Pt to ambulate with appropriate device 100 ft Min A  Short term goal 5: Pt to tolerate 30 minutes of activity to improve strength and endurance. Plan: Progress functional mobility as medically appropriate.    Time In: 1059  Time Out: 1116  Time Coded Minutes (treatment minutes): 17  Rehab Potential: Fair  Treatments/week: 5xwk    Barbara Aceevs PTA

## 2020-06-01 NOTE — PROGRESS NOTES
[facility]    Progress Note    6/1/2020    9:55 AM    Name:   Alejandra Guerrier  MRN:     [id number]     Acct:      [de-identified]   Room:   [facility] [place] [address] Day:  25  Admit Date:  5/14/2020 10:46 AM    PCP:   Damon Yeh  Code Status:  Full Code    Subjective:     C/C: Cough, weakness    Interval History Status: Interval worsening    Patient still does not follow commands. Increased O2 requirements yesterday afternoon and overnight. Currently on 6 L O2 per NC. Considerable suctioning of upper airway today. Hypokalemic today. On TFs. Fecal management system  Decreased urine output overnight. Darker colored urine. Hypernatremia worse this AM.    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 Vital signs within normal limits. Brief History:     Per ICU summary:    \"62year-old female with history of alcohol use disorder, chronic smoker came as a transfer from [facility] for evaluation for possible [employer] had history of fever, dry cough, feeling weak for past 2 days. Benito Sahni also had loose stools 2-3 episodes, foul-smelling, no abdominal pain no blood in the stools.  She also reported severe headache since last 12 days, which worsened in the last 5 days.  She described it as sharp all over the head.  No nausea no blurry vision.     Neurology consulted. Underwent lumbar puncture. Meningitis panel, Lyme, VRDL negative. Stool studies negative thus far. CSF culture pending. Remained afebrile since transfer from [facility]. Patient was moved out initially from ICU. However patient on the floor got altered again, 5/19. She was obtunded. Blood gases showed pH of 7.4, CO2 41.8, O2 49.4, bicarb 29.8. Decision was taken to intubate the patient. Patient has had severe neurological deficit. She is only alert and wakes up and tracks in the room but does not move any extremities.   He was continued to be intubated. Lumbar puncture was repeated by IR, only showed elevated protein levels. Patient had positive cryptococcal antigen in the blood, level of 4. Amphotericin B was started.     Lumbar puncture was repeated again by neurology. Total 3 times lumbar puncture. The spinal fluid continue to have elevated protein levels. Cryptococcal antigen was repeated which was around 2. Concern for left-sided pneumonia with effusion. The pneumonia is responded well to amphotericin B initially patient was on Levaquin which was then discontinued.     She was extubated on 5/26  Infectious disease following. Patient has been responding well to amphotericin B, afebrile for 3 days.     She also has rt sided DVT gastrocnemius, started on heparin, switched to eliquis 5/27. \"    Review of Systems:     Unable to assess due to patient's mental status. Medications: Allergies:     Allergies   Allergen Reactions    Sulfa Antibiotics        Current Meds:   Scheduled Meds:    flucytosine  25 mg/kg Oral 4 times per day    methylPREDNISolone (SOLU-MEDROL) IVPB  250 mg Intravenous Q6H    thiamine  100 mg Oral Daily    CENTRUM/CERTA-ALYSSA with minerals oral  15 mL Oral Daily    ipratropium-albuterol  1 ampule Inhalation Q4H While awake    apixaban  10 mg Oral BID    [START ON 6/3/2020] apixaban  5 mg Oral BID    potassium bicarb-citric acid  40 mEq Oral Daily    amphotericin B liposome (AMBISOME) IVPB  4 mg/kg Intravenous Q24H    sodium chloride flush  10 mL Intravenous 2 times per day    folic acid  1 mg Oral Daily     Continuous Infusions:     PRN Meds: sodium chloride flush, fentanNYL **OR** fentanNYL, sodium chloride flush, acetaminophen **OR** acetaminophen, polyethylene glycol, promethazine **OR** ondansetron, potassium chloride **OR** potassium alternative oral replacement **OR** potassium chloride, magnesium sulfate, midodrine    Data:     Past Medical History:   has a past medical history of COPD (chronic 27  --   --  21  --    ALT 42*  --   --   --  39*  --   --  32  --    ALKPHOS 79  --   --   --  81  --   --  72  --    BILITOT 0.32  --   --   --  0.25*  --   --  0.22*  --    POCGLU  --  122* 144* 144*  --  159* 138*  --  155*     ABG:  Lab Results   Component Value Date    POCPH 7.521 05/26/2020    POCPCO2 36.5 05/26/2020    POCPO2 69.9 05/26/2020    POCHCO3 29.8 05/26/2020    NBEA NOT REPORTED 05/26/2020    PBEA 7 05/26/2020    YUF3HIS 31 05/26/2020    ZRJM3IEJ 96 05/26/2020    FIO2 30.0 05/26/2020     Lab Results   Component Value Date/Time    SPECIAL R ARM 10CC 05/31/2020 07:22 PM     Lab Results   Component Value Date/Time    CULTURE NO GROWTH 10 HOURS 05/31/2020 07:22 PM       Radiology:  Ct Head Wo Contrast    Result Date: 5/28/2020  No acute intracranial abnormality. Xr Chest Portable    Result Date: 5/25/2020  Overall, no significant change in chest findings compared to the prior study from May 21st.  Vascular congestion with left lower lobe airspace disease and small pleural effusion persists. Ir Lumbar Puncture For Diagnosis    Result Date: 5/22/2020  Successful fluoroscopic-guided lumbar puncture. Physical Examination:        General appearance:  Drowsy, lethargic. However, able to be awoken. Does not follow commands  Mental Status:  Unable to assess  Lungs:  Diffuse rhonchi bilaterally.  +upper airway transmitted breath sounds  Heart:  regular rate and rhythm, no murmur  Abdomen:  soft, nontender, nondistended, normal bowel sounds, no masses, hepatomegaly, splenomegaly  Extremities:  no edema, redness, tenderness in the calves  Skin:  no gross lesions, rashes, induration    Assessment:        Hospital Problems           Last Modified POA    * (Principal) Fever 5/17/2020 Yes    Acute intractable headache 5/17/2020 Yes    Chronic abdominal pain 5/14/2020 Yes    Hyponatremia 5/14/2020 Yes    Mucopurulent chronic bronchitis ([address] 5/14/2020 Yes    DIXON (acute kidney injury) ([address] 5/14/2020 Yes

## 2020-06-01 NOTE — PROGRESS NOTES
obstructive pulmonary disease) (HCC)     ETOH abuse        Allergies   Allergen Reactions    Sulfa Antibiotics        Social History     Socioeconomic History    Marital status: Single     Spouse name: Not on file    Number of children: Not on file    Years of education: Not on file    Highest education level: Not on file   Occupational History    Not on file   Social Needs    Financial resource strain: Not on file    Food insecurity     Worry: Not on file     Inability: Not on file    Transportation needs     Medical: Not on file     Non-medical: Not on file   Tobacco Use    Smoking status: Current Every Day Smoker     Packs/day: 2.00     Types: Cigarettes    Smokeless tobacco: Never Used   Substance and Sexual Activity    Alcohol use: Yes     Frequency: 4 or more times a week     Drinks per session: 3 or 4     Binge frequency: Patient refused     Comment: hasn't drank in 12 days, usually daily drinker (vodka)    Drug use: Never    Sexual activity: Not on file   Lifestyle    Physical activity     Days per week: Patient refused     Minutes per session: Patient refused    Stress: Not on file   Relationships    Social connections     Talks on phone: Not on file     Gets together: Not on file     Attends [employer] service: Not on file     Active member of club or organization: Not on file     Attends meetings of clubs or organizations: Not on file     Relationship status: Not on file    Intimate partner violence     Fear of current or ex partner: No     Emotionally abused: No     Physically abused: No     Forced sexual activity: No   Other Topics Concern    Not on file   Social History Narrative    Not on file       Family History:        Family History   Problem Relation Age of Onset    Stroke Mother     Cancer Father        Outpatient Medications:     No medications prior to admission.     Current Medications:     Scheduled Meds:    flucytosine  25 mg/kg Oral 4 times per day    [START ON

## 2020-06-01 NOTE — PLAN OF CARE
Problem: Falls - Risk of:  Goal: Will remain free from falls  Description: Will remain free from falls  6/1/2020 0337 by Sharon Sims RN  Outcome: Ongoing     Problem: Falls - Risk of:  Goal: Absence of physical injury  Description: Absence of physical injury  6/1/2020 0337 by Sharon Sims RN  Outcome: Ongoing

## 2020-06-02 LAB
-: NORMAL
-: NORMAL
ABSOLUTE EOS #: <0.03 K/UL (ref 0–0.44)
ABSOLUTE IMMATURE GRANULOCYTE: 0.09 K/UL (ref 0–0.3)
ABSOLUTE LYMPH #: 0.88 K/UL (ref 1.1–3.7)
ABSOLUTE MONO #: 0.41 K/UL (ref 0.1–1.2)
ACTION: NORMAL
ALBUMIN SERPL-MCNC: 2.4 G/DL (ref 3.5–5.2)
ALBUMIN/GLOBULIN RATIO: 0.9 (ref 1–2.5)
ALLEN TEST: ABNORMAL
ALP BLD-CCNC: 69 U/L (ref 35–104)
ALT SERPL-CCNC: 27 U/L (ref 5–33)
AMORPHOUS: NORMAL
ANION GAP SERPL CALCULATED.3IONS-SCNC: 9 MMOL/L (ref 9–17)
ANION GAP: 9 MMOL/L (ref 7–16)
AST SERPL-CCNC: 19 U/L
BACTERIA: NORMAL
BASOPHILS # BLD: 0 % (ref 0–2)
BASOPHILS ABSOLUTE: <0.03 K/UL (ref 0–0.2)
BILIRUB SERPL-MCNC: 0.16 MG/DL (ref 0.3–1.2)
BILIRUBIN URINE: NEGATIVE
BUN BLDV-MCNC: 32 MG/DL (ref 6–20)
BUN/CREAT BLD: ABNORMAL (ref 9–20)
C-REACTIVE PROTEIN: 4.3 MG/L (ref 0–5)
CALCIUM SERPL-MCNC: 9.7 MG/DL (ref 8.6–10.4)
CASTS UA: NORMAL /LPF (ref 0–2)
CHLORIDE BLD-SCNC: 107 MMOL/L (ref 98–107)
CHLORIDE, UR: 34 MMOL/L
CO2: 32 MMOL/L (ref 20–31)
COLOR: YELLOW
CREAT SERPL-MCNC: 0.48 MG/DL (ref 0.5–0.9)
CREATININE URINE: 33.9 MG/DL (ref 28–217)
CRYSTALS, UA: NORMAL /HPF
DATE AND TIME: NORMAL
DIFFERENTIAL TYPE: ABNORMAL
EOSINOPHILS RELATIVE PERCENT: 0 % (ref 1–4)
EPITHELIAL CELLS UA: NORMAL /HPF (ref 0–5)
FIO2: 40
GFR AFRICAN AMERICAN: >60 ML/MIN
GFR NON-AFRICAN AMERICAN: >60 ML/MIN
GFR NON-AFRICAN AMERICAN: >60 ML/MIN
GFR SERPL CREATININE-BSD FRML MDRD: >60 ML/MIN
GFR SERPL CREATININE-BSD FRML MDRD: ABNORMAL ML/MIN/{1.73_M2}
GFR SERPL CREATININE-BSD FRML MDRD: ABNORMAL ML/MIN/{1.73_M2}
GFR SERPL CREATININE-BSD FRML MDRD: NORMAL ML/MIN/{1.73_M2}
GLUCOSE BLD-MCNC: 136 MG/DL (ref 65–105)
GLUCOSE BLD-MCNC: 144 MG/DL (ref 65–105)
GLUCOSE BLD-MCNC: 145 MG/DL (ref 65–105)
GLUCOSE BLD-MCNC: 158 MG/DL (ref 70–99)
GLUCOSE BLD-MCNC: 162 MG/DL (ref 65–105)
GLUCOSE BLD-MCNC: 174 MG/DL (ref 65–105)
GLUCOSE BLD-MCNC: 177 MG/DL (ref 74–100)
GLUCOSE URINE: NEGATIVE
HCT VFR BLD CALC: 28.5 % (ref 36.3–47.1)
HCT VFR BLD CALC: 29.3 % (ref 36.3–47.1)
HEMOGLOBIN: 8.9 G/DL (ref 11.9–15.1)
HEMOGLOBIN: 9.3 G/DL (ref 11.9–15.1)
IGG 1: 282 MG/DL (ref 240–1118)
IGG 2: 142 MG/DL (ref 124–549)
IGG 3: 34 MG/DL (ref 21–134)
IGG 4: 7 MG/DL (ref 1–123)
IMMATURE GRANULOCYTES: 1 %
KETONES, URINE: NEGATIVE
LEUKOCYTE ESTERASE, URINE: NEGATIVE
LYMPHOCYTES # BLD: 8 % (ref 24–43)
MAGNESIUM: 2 MG/DL (ref 1.6–2.6)
MCH RBC QN AUTO: 30.7 PG (ref 25.2–33.5)
MCH RBC QN AUTO: 30.7 PG (ref 25.2–33.5)
MCHC RBC AUTO-ENTMCNC: 31.2 G/DL (ref 28.4–34.8)
MCHC RBC AUTO-ENTMCNC: 31.7 G/DL (ref 28.4–34.8)
MCV RBC AUTO: 96.7 FL (ref 82.6–102.9)
MCV RBC AUTO: 98.3 FL (ref 82.6–102.9)
MODE: ABNORMAL
MONOCYTES # BLD: 4 % (ref 3–12)
MUCUS: NORMAL
NEGATIVE BASE EXCESS, ART: ABNORMAL (ref 0–2)
NITRITE, URINE: NEGATIVE
NOTIFY: NORMAL
NRBC AUTOMATED: 0 PER 100 WBC
NRBC AUTOMATED: 0 PER 100 WBC
O2 DEVICE/FLOW/%: ABNORMAL
OTHER OBSERVATIONS UA: NORMAL
PARTIAL THROMBOPLASTIN TIME: 20.5 SEC (ref 20.5–30.5)
PARTIAL THROMBOPLASTIN TIME: 21.8 SEC (ref 20.5–30.5)
PATIENT TEMP: ABNORMAL
PDW BLD-RTO: 15.4 % (ref 11.8–14.4)
PDW BLD-RTO: 15.6 % (ref 11.8–14.4)
PH UA: 7 (ref 5–8)
PLATELET # BLD: 243 K/UL (ref 138–453)
PLATELET # BLD: 248 K/UL (ref 138–453)
PLATELET ESTIMATE: ABNORMAL
PMV BLD AUTO: 9.8 FL (ref 8.1–13.5)
PMV BLD AUTO: 9.9 FL (ref 8.1–13.5)
POC CHLORIDE: 102 MMOL/L (ref 98–107)
POC CREATININE: 0.85 MG/DL (ref 0.51–1.19)
POC HCO3: 35.2 MMOL/L (ref 21–28)
POC HEMATOCRIT: 25 % (ref 36–46)
POC HEMOGLOBIN: 8.6 G/DL (ref 12–16)
POC IONIZED CALCIUM: 1.41 MMOL/L (ref 1.15–1.33)
POC LACTIC ACID: 1.45 MMOL/L (ref 0.56–1.39)
POC O2 SATURATION: 94 % (ref 94–98)
POC PCO2 TEMP: ABNORMAL MM HG
POC PCO2: 42.1 MM HG (ref 35–48)
POC PH TEMP: ABNORMAL
POC PH: 7.53 (ref 7.35–7.45)
POC PO2 TEMP: ABNORMAL MM HG
POC PO2: 64.5 MM HG (ref 83–108)
POC POTASSIUM: 2.9 MMOL/L (ref 3.5–4.5)
POC SODIUM: 146 MMOL/L (ref 138–146)
POSITIVE BASE EXCESS, ART: 11 (ref 0–3)
POTASSIUM SERPL-SCNC: 3.5 MMOL/L (ref 3.7–5.3)
POTASSIUM SERPL-SCNC: 3.6 MMOL/L (ref 3.7–5.3)
POTASSIUM, UR: 58.5 MMOL/L
PROTEIN UA: NEGATIVE
RBC # BLD: 2.9 M/UL (ref 3.95–5.11)
RBC # BLD: 3.03 M/UL (ref 3.95–5.11)
RBC # BLD: ABNORMAL 10*6/UL
RBC UA: NORMAL /HPF (ref 0–2)
READ BACK: YES
REASON FOR REJECTION: NORMAL
RENAL EPITHELIAL, UA: NORMAL /HPF
SAMPLE SITE: ABNORMAL
SEG NEUTROPHILS: 87 % (ref 36–65)
SEGMENTED NEUTROPHILS ABSOLUTE COUNT: 10.06 K/UL (ref 1.5–8.1)
SODIUM BLD-SCNC: 146 MMOL/L (ref 135–144)
SODIUM BLD-SCNC: 148 MMOL/L (ref 135–144)
SODIUM BLD-SCNC: 151 MMOL/L (ref 135–144)
SODIUM,UR: 27 MMOL/L
SPECIFIC GRAVITY UA: 1.02 (ref 1–1.03)
TCO2 (CALC), ART: 37 MMOL/L (ref 22–29)
TOTAL PROTEIN, URINE: 11 MG/DL
TOTAL PROTEIN: 5 G/DL (ref 6.4–8.3)
TRICHOMONAS: NORMAL
TURBIDITY: CLEAR
URINE HGB: NEGATIVE
URINE TOTAL PROTEIN CREATININE RATIO: 0.32 (ref 0–0.2)
UROBILINOGEN, URINE: NORMAL
WBC # BLD: 10.7 K/UL (ref 3.5–11.3)
WBC # BLD: 11.5 K/UL (ref 3.5–11.3)
WBC # BLD: ABNORMAL 10*3/UL
WBC UA: NORMAL /HPF (ref 0–5)
YEAST: NORMAL
ZZ NTE CLEAN UP: ORDERED TEST: NORMAL
ZZ NTE WITH NAME CLEAN UP: SPECIMEN SOURCE: NORMAL

## 2020-06-02 PROCEDURE — 83605 ASSAY OF LACTIC ACID: CPT

## 2020-06-02 PROCEDURE — 36600 WITHDRAWAL OF ARTERIAL BLOOD: CPT

## 2020-06-02 PROCEDURE — 94640 AIRWAY INHALATION TREATMENT: CPT

## 2020-06-02 PROCEDURE — 97110 THERAPEUTIC EXERCISES: CPT

## 2020-06-02 PROCEDURE — 99232 SBSQ HOSP IP/OBS MODERATE 35: CPT | Performed by: INTERNAL MEDICINE

## 2020-06-02 PROCEDURE — 84132 ASSAY OF SERUM POTASSIUM: CPT

## 2020-06-02 PROCEDURE — 85027 COMPLETE CBC AUTOMATED: CPT

## 2020-06-02 PROCEDURE — 82803 BLOOD GASES ANY COMBINATION: CPT

## 2020-06-02 PROCEDURE — 6370000000 HC RX 637 (ALT 250 FOR IP): Performed by: NURSE PRACTITIONER

## 2020-06-02 PROCEDURE — 2060000000 HC ICU INTERMEDIATE R&B

## 2020-06-02 PROCEDURE — 82435 ASSAY OF BLOOD CHLORIDE: CPT

## 2020-06-02 PROCEDURE — 82436 ASSAY OF URINE CHLORIDE: CPT

## 2020-06-02 PROCEDURE — 85014 HEMATOCRIT: CPT

## 2020-06-02 PROCEDURE — 36415 COLL VENOUS BLD VENIPUNCTURE: CPT

## 2020-06-02 PROCEDURE — 84295 ASSAY OF SERUM SODIUM: CPT

## 2020-06-02 PROCEDURE — 82565 ASSAY OF CREATININE: CPT

## 2020-06-02 PROCEDURE — 81001 URINALYSIS AUTO W/SCOPE: CPT

## 2020-06-02 PROCEDURE — 2580000003 HC RX 258: Performed by: PSYCHIATRY & NEUROLOGY

## 2020-06-02 PROCEDURE — 84300 ASSAY OF URINE SODIUM: CPT

## 2020-06-02 PROCEDURE — 82570 ASSAY OF URINE CREATININE: CPT

## 2020-06-02 PROCEDURE — 6360000002 HC RX W HCPCS: Performed by: STUDENT IN AN ORGANIZED HEALTH CARE EDUCATION/TRAINING PROGRAM

## 2020-06-02 PROCEDURE — 51798 US URINE CAPACITY MEASURE: CPT

## 2020-06-02 PROCEDURE — 84156 ASSAY OF PROTEIN URINE: CPT

## 2020-06-02 PROCEDURE — 2700000000 HC OXYGEN THERAPY PER DAY

## 2020-06-02 PROCEDURE — 6370000000 HC RX 637 (ALT 250 FOR IP): Performed by: INTERNAL MEDICINE

## 2020-06-02 PROCEDURE — 82947 ASSAY GLUCOSE BLOOD QUANT: CPT

## 2020-06-02 PROCEDURE — 6360000002 HC RX W HCPCS: Performed by: INTERNAL MEDICINE

## 2020-06-02 PROCEDURE — 99233 SBSQ HOSP IP/OBS HIGH 50: CPT | Performed by: INTERNAL MEDICINE

## 2020-06-02 PROCEDURE — 94761 N-INVAS EAR/PLS OXIMETRY MLT: CPT

## 2020-06-02 PROCEDURE — 97530 THERAPEUTIC ACTIVITIES: CPT

## 2020-06-02 PROCEDURE — 6370000000 HC RX 637 (ALT 250 FOR IP): Performed by: PSYCHIATRY & NEUROLOGY

## 2020-06-02 PROCEDURE — 6360000002 HC RX W HCPCS: Performed by: PSYCHIATRY & NEUROLOGY

## 2020-06-02 PROCEDURE — 2580000003 HC RX 258: Performed by: INTERNAL MEDICINE

## 2020-06-02 PROCEDURE — 85025 COMPLETE CBC W/AUTO DIFF WBC: CPT

## 2020-06-02 PROCEDURE — 82330 ASSAY OF CALCIUM: CPT

## 2020-06-02 PROCEDURE — 83735 ASSAY OF MAGNESIUM: CPT

## 2020-06-02 PROCEDURE — 94669 MECHANICAL CHEST WALL OSCILL: CPT

## 2020-06-02 PROCEDURE — 86140 C-REACTIVE PROTEIN: CPT

## 2020-06-02 PROCEDURE — 99233 SBSQ HOSP IP/OBS HIGH 50: CPT | Performed by: PSYCHIATRY & NEUROLOGY

## 2020-06-02 PROCEDURE — 6370000000 HC RX 637 (ALT 250 FOR IP): Performed by: STUDENT IN AN ORGANIZED HEALTH CARE EDUCATION/TRAINING PROGRAM

## 2020-06-02 PROCEDURE — 80053 COMPREHEN METABOLIC PANEL: CPT

## 2020-06-02 PROCEDURE — 84133 ASSAY OF URINE POTASSIUM: CPT

## 2020-06-02 PROCEDURE — 85730 THROMBOPLASTIN TIME PARTIAL: CPT

## 2020-06-02 RX ORDER — HEPARIN SODIUM 10000 [USP'U]/100ML
18 INJECTION, SOLUTION INTRAVENOUS CONTINUOUS
Status: DISCONTINUED | OUTPATIENT
Start: 2020-06-02 | End: 2020-06-05

## 2020-06-02 RX ORDER — HEPARIN SODIUM 1000 [USP'U]/ML
40 INJECTION, SOLUTION INTRAVENOUS; SUBCUTANEOUS PRN
Status: DISCONTINUED | OUTPATIENT
Start: 2020-06-02 | End: 2020-06-05

## 2020-06-02 RX ORDER — HEPARIN SODIUM 1000 [USP'U]/ML
80 INJECTION, SOLUTION INTRAVENOUS; SUBCUTANEOUS PRN
Status: DISCONTINUED | OUTPATIENT
Start: 2020-06-02 | End: 2020-06-05

## 2020-06-02 RX ORDER — HEPARIN SODIUM 1000 [USP'U]/ML
80 INJECTION, SOLUTION INTRAVENOUS; SUBCUTANEOUS ONCE
Status: COMPLETED | OUTPATIENT
Start: 2020-06-02 | End: 2020-06-02

## 2020-06-02 RX ADMIN — FOLIC ACID 1 MG: 1 TABLET ORAL at 10:09

## 2020-06-02 RX ADMIN — Medication 100 MG: at 07:57

## 2020-06-02 RX ADMIN — FLUCYTOSINE 1750 MG: 250 CAPSULE ORAL at 02:36

## 2020-06-02 RX ADMIN — AMPHOTERICIN B 230 MG: 50 INJECTION, POWDER, LYOPHILIZED, FOR SOLUTION INTRAVENOUS at 16:09

## 2020-06-02 RX ADMIN — SODIUM CHLORIDE, PRESERVATIVE FREE 10 ML: 5 INJECTION INTRAVENOUS at 21:09

## 2020-06-02 RX ADMIN — HEPARIN SODIUM AND DEXTROSE 18 UNITS/KG/HR: 10000; 5 INJECTION INTRAVENOUS at 21:01

## 2020-06-02 RX ADMIN — FLUCYTOSINE 1750 MG: 250 CAPSULE ORAL at 07:58

## 2020-06-02 RX ADMIN — SODIUM CHLORIDE 250 MG: 9 INJECTION, SOLUTION INTRAVENOUS at 13:33

## 2020-06-02 RX ADMIN — IPRATROPIUM BROMIDE AND ALBUTEROL SULFATE 1 AMPULE: .5; 3 SOLUTION RESPIRATORY (INHALATION) at 08:33

## 2020-06-02 RX ADMIN — POTASSIUM BICARBONATE 40 MEQ: 782 TABLET, EFFERVESCENT ORAL at 05:37

## 2020-06-02 RX ADMIN — HEPARIN SODIUM 5930 UNITS: 1000 INJECTION INTRAVENOUS; SUBCUTANEOUS at 21:02

## 2020-06-02 RX ADMIN — IPRATROPIUM BROMIDE AND ALBUTEROL SULFATE 1 AMPULE: .5; 3 SOLUTION RESPIRATORY (INHALATION) at 12:00

## 2020-06-02 RX ADMIN — SODIUM CHLORIDE 250 MG: 9 INJECTION, SOLUTION INTRAVENOUS at 05:12

## 2020-06-02 RX ADMIN — IPRATROPIUM BROMIDE AND ALBUTEROL SULFATE 1 AMPULE: .5; 3 SOLUTION RESPIRATORY (INHALATION) at 20:14

## 2020-06-02 RX ADMIN — IPRATROPIUM BROMIDE AND ALBUTEROL SULFATE 1 AMPULE: .5; 3 SOLUTION RESPIRATORY (INHALATION) at 16:46

## 2020-06-02 RX ADMIN — ALBUTEROL SULFATE 2.5 MG: 2.5 SOLUTION RESPIRATORY (INHALATION) at 03:13

## 2020-06-02 RX ADMIN — POTASSIUM CHLORIDE 10 MEQ: 7.46 INJECTION, SOLUTION INTRAVENOUS at 22:50

## 2020-06-02 RX ADMIN — FLUCYTOSINE 1750 MG: 250 CAPSULE ORAL at 13:34

## 2020-06-02 RX ADMIN — FLUCYTOSINE 1750 MG: 250 CAPSULE ORAL at 21:16

## 2020-06-02 RX ADMIN — POTASSIUM CHLORIDE 10 MEQ: 7.46 INJECTION, SOLUTION INTRAVENOUS at 23:58

## 2020-06-02 RX ADMIN — APIXABAN 10 MG: 5 TABLET, FILM COATED ORAL at 07:58

## 2020-06-02 RX ADMIN — POTASSIUM CHLORIDE 10 MEQ: 7.46 INJECTION, SOLUTION INTRAVENOUS at 21:41

## 2020-06-02 RX ADMIN — Medication 15 ML: at 07:56

## 2020-06-02 RX ADMIN — SODIUM CHLORIDE, PRESERVATIVE FREE 10 ML: 5 INJECTION INTRAVENOUS at 07:57

## 2020-06-02 ASSESSMENT — PAIN SCALES - GENERAL
PAINLEVEL_OUTOF10: 0

## 2020-06-02 ASSESSMENT — ENCOUNTER SYMPTOMS: COUGH: 1

## 2020-06-02 ASSESSMENT — PAIN SCALES - WONG BAKER: WONGBAKER_NUMERICALRESPONSE: 0

## 2020-06-02 NOTE — PROGRESS NOTES
Physical Therapy  DATE: 2020  NAME: Elenor Cowden  MRN: [id number]   : 1960    Discharge Recommendations: Continue to Assess (pending progress)     Subjective: RN agreeable to PT. Pt alert in bed upon arrival, opens eyes to name,  mumbles occasionally, but unable to follow any commands  Pain: states yes, unable to state where  Patient follows: NO Commands  Is patient on ventilator:NO  Is patient on sedation: NO  Precaution: General, NG    Therapeutic exercises:  PROM to BUE and BLEs x10 reps all plains. Bilateral gastrocnemius stretching 3 reps x 30 seconds   FDS donned to R foot  Assisted in proper scooting/positioning in supine,requiring TD x2    Goals  Short Term Goals  Short term goal 1: Pt to perform Bed mobility min A  Short term goal 2: Pt to improve sitting balance to Good  Short term goal 3: Pt to transfer Min A  Short term goal 4: Pt to ambulate with appropriate device 100 ft Min A  Short term goal 5: Pt to tolerate 30 minutes of activity to improve strength and endurance. Plan: Progress functional mobility as medically appropriate.    Time In: 8658  Time Out: 1218  Time Coded Minutes (treatment minutes): 24  Rehab Potential: Fair  Treatments/week: 5xwk    Marguerite Jean, PTA

## 2020-06-02 NOTE — PROGRESS NOTES
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 · 5/16 MRI LB old osteoarthritis and no cord compression  · 5/16 MRI brain neg - 5/15 LP 8 WBC and normal glucose  · 5/17 CT chest LLL consolidation, ? Atelectasis. · LP 8 WBC and normal glucose  · Treated w steroids and  ceftriaxone 5/14 - 5/15 , fever improved, but fever relapsed off steroids  · Fever relapsed since 5/17   · Increased oxygen requirement, increased lethargy  · Left lower lobe infiltrate worse on x-ray  · Failed Levaquin since 5/19 -legionella AG neg  · 5/20  intubated - fever 104. - CXR worse LLL  · Generalized Body rash noticed at admission .   · CT chest 5/21 progression of the left lower lobe infiltrate, around pneumonia  · Sputum culture did not show cryptococcus  · Cryptococcus CSF antigen -5/22  · Opening pressure 5/2416  · All CSF meningitis panel is negative for cryptococcus PCR  · 5/21 cryptococcus serum antigen positive, 4  · 5/22 lumbar puncture repeated - WBC 63, neutrophils 39%, 54 lymp, Protein 142, glucose 53 -  PCR neg  · 5/22 abelcet started  · 5/22 and 5/23 no fever  · The lack of cryptococcus CSF antigen positivity does not rule out meningoencephalitis to cryptococcus,   · The picture is not fully typical and hence will benefit from another LP in the next week to monitor response      Recommendations     · Day 11  Abelcet for cryptococcoma of the lung and 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 - 6 weeks till 7/2/20  · planned for  6 weeks of antifungal tx -   · Day 5 flucytosine for 2 weeks till 6/9/20, monitor CRP response and creatinine pulmonary note evaluated, high risk for reintubation  · Has been started on pulse steroids since 5/30 per neurology  · Fevers and CRP responded to Abelcet -   · Would benefit ultimately from another lumbar puncture early next week to look for response  · Disc w CC   Infection Control Recommendations   · [place] Precautions  · Contact Isolation   Antimicrobial Stewardship Recommendations   · Simplification of therapy  · Targeted therapy    Coordination ofOutpatient Care:   · Estimated Length of IV antimicrobials:  · Patient will need Midline / picc Catheter Insertion:   · Patient will need SNF:  · Patient will need outpatient wound care:     History of Present Illness:   Initial history:  Darinel Fabian is a 62y.o.-year-old female who presented to the emergency room at [facility] with a 10 days history of a headache photophobia, a lower back pain with weakness. She denies being short of breath, she does have a smoker's cough that has improved recently. She has no aches other than the lower back pain, she has no sweats or fevers. She does not have a good appetite and has not eaten in 3 days, but does not think she has a taste bud problem  She was tested twice for COVID and came back negative  Unable to go to the bathroom at this point because of the weakness of her legs and the back pain.   White count is normal but CRP is elevated as well as ferritin  CT scan of the chest is showing no infiltrates at all    The patient was transferred for possible COVID to [facility] and admitted to the [facility] floor ICU    I discussed with critical care, we can move him out of the [facility] ICU to a regular floor, get Forced sexual activity: No   Other Topics Concern    Not on file   Social History Narrative    Not on file       Family History:     Family History   Problem Relation Age of Onset   [employer] Stroke Mother     Cancer Father         Allergies:   Sulfa antibiotics     Review of Systems:     Review of Systems   Unable to perform ROS: Mental status change       Physical Examination :     Patient Vitals for the past 8 hrs:   BP Temp Temp src Pulse Resp SpO2   06/01/20 1647 -- -- -- -- 20 98 %   06/01/20 1600 (!) 137/100 97.3 °F (36.3 °C) Temporal 91 15 96 %   06/01/20 1230 138/71 97.7 °F (36.5 °C) -- -- -- --       Physical Exam  Constitutional:       General: She is not in acute distress. Appearance: She is normal weight. HENT:      Head: Normocephalic and atraumatic. Nose: No congestion or rhinorrhea. Eyes:      General: No scleral icterus. Conjunctiva/sclera: Conjunctivae normal.   Neck:      Musculoskeletal: Neck supple. No neck rigidity or muscular tenderness. Cardiovascular:      Rate and Rhythm: Normal rate and regular rhythm. Heart sounds: Normal heart sounds. No murmur. No friction rub. Pulmonary:      Effort: Pulmonary effort is normal. No respiratory distress. Breath sounds: No stridor. No wheezing or rhonchi. Abdominal:      General: There is no distension. Tenderness: There is no abdominal tenderness. Genitourinary:     Comments: No redd  Musculoskeletal:         General: No swelling or deformity. Skin:     General: Skin is warm. Coloration: Skin is not jaundiced or pale. Findings: No bruising or erythema. Neurological:      Mental Status: She is alert. Mental status is at baseline. Cranial Nerves: No cranial nerve deficit.    Psychiatric:         Mood and Affect: Mood normal.           Medical Decision Making:   I have independently reviewed/ordered the following labs:    CBC with Differential:   Recent Labs     05/31/20  0728 06/01/20  0652   WBC 8.5

## 2020-06-02 NOTE — PROGRESS NOTES
NEUROLOGY INPATIENT PROGRESS NOTE    6/2/2020         Subjective:  Patient still is not oriented x3 and is not following commands. Hemodynamically stable overnight. VSS, afebrile. WBC trending down @ 11.5 today (was 13.9 yesterday). Briefly, this is a  62 y.o. female admitted on 5/14/2020 with new onset headache and generalized weakness has been treated for septic shock, transferred to the floor. Had worsening mental status since 5/16 and has been worked for possible encephalitis. CSF was negative for viral and bacterial meningitis. Was started on steroids initially for 2 days. EEG showed diffused encephalopathic picture. Was transferred back to MICU on 5/18 after rapid response was called and the patient was found to be in respiratory distress with hypoxia PO2 49, PCO2 45. Intubated in the MICU. She was extubated on 5/26. Transferred  Back to stepdown on 5/28. Continued to have same mental status without improvement until yesterday 5/30 prior to starting steroids. She was started on methylprednisolone 250 mg Q6H    No current facility-administered medications on file prior to encounter. No current outpatient medications on file prior to encounter. Allergies: Delmy Villafana is allergic to sulfa antibiotics.     Past Medical History:   Diagnosis Date    COPD (chronic obstructive pulmonary disease) ([facility]     ETOH abuse        Past Surgical History:   Procedure Laterality Date    CHOLECYSTECTOMY      THYROIDECTOMY      TONSILLECTOMY         Medications:     albuterol  2.5 mg Nebulization BID    flucytosine  25 mg/kg Oral 4 times per day    ipratropium-albuterol  1 ampule Inhalation 4x daily    methylPREDNISolone (SOLU-MEDROL) IVPB  250 mg Intravenous Q6H    thiamine  100 mg Oral Daily    CENTRUM/CERTA-ALYSSA with minerals oral  15 mL Oral Daily    apixaban  10 mg Oral BID    [START ON 6/3/2020] apixaban  5 mg Oral BID    potassium bicarb-citric acid  40 mEq Oral Daily    amphotericin B spine.   Degenerative changes as detailed above. Incidentally noted left lower lobe atelectasis. MRI brain w/wo contrast 5/14/2020  No acute disease. No evidence of meningeal enhancement or empyema. MRI brain w/wo contrast 5/17/2020  Possible acute microinfarct involving within the splenium of corpus callosum. Motion degraded exam        ASSESSMENT AND RECOMMENDATIONS   62year old female with history of alcohol abuse, who presented with new onset severe 8-9/10 generalized headache associated with photophobia, phonophobia, fever, cough, and diarrhea. COVID neg x2. During admission she had worsening mentation and became obtunded. Was found to have left lower lobar pneumonia. Cryptococcus serum Ag +ve x 2. CSF Cryptococcus Ag -ve x3. CSF culture -ve. Possible cryptococcal meningitis. Other DDx Whipple dx giving significant history of chronic diarrhea x 3 years. Melena. Unintentional weight loss. CSF PAS stain is -ve. Repeat MRI as mentioned above showing progression of disease process with multiple hyperintensities as seen above which are less likely to be due stroke and more likely due to encephalitis giving the full clinical  history     Meningoencephalitis for work up, possible cryptococcus  Patient is clinically improving. MRI showing progression of encephalitis   Lobar pneumonia with para pneumonic effusion     Sepsis due to above, resolved   Hypernatremia, worsening  Dehydration   Alcohol abuse     Continue methylprednisolone 250 mg IV q6h   Repeat MRI Brain W WO increasing number of infarcts bilat  Follow up T.  Whippleii  CSF PCR  Cryptococcal pneumonia management as per ID, primary team   Sepsis management as per ID  Continue thiamine 250 mg IV q24h    ID recommending repeat LP early next week to look for a response to rx      Vazquez Fulton MD, [facility]  PGY-2 Neurology   6/2/2020 at 7:53 AM

## 2020-06-02 NOTE — PROGRESS NOTES
distress  HEENT - Normal, Head is normocephalic, atraumatic. Neck - Supple, symmetrical, while supine  Lungs -not in respiratory distress, on high flow oxygen with oxygen saturation in the 90s, rhonchi present bilaterally. Cardiovascular -regular rate and rhythm, normal S1-S2, no murmurs  Abdomen -soft, nontender, nondistended  Skin - No bruising or bleeding    MEDS   albuterol  2.5 mg Nebulization BID    flucytosine  25 mg/kg Oral 4 times per day    ipratropium-albuterol  1 ampule Inhalation 4x daily    thiamine  100 mg Oral Daily    CENTRUM/CERTA-ALYSSA with minerals oral  15 mL Oral Daily    apixaban  10 mg Oral BID    [START ON 6/3/2020] apixaban  5 mg Oral BID    potassium bicarb-citric acid  40 mEq Oral Daily    amphotericin B liposome (AMBISOME) IVPB  4 mg/kg Intravenous Q24H    sodium chloride flush  10 mL Intravenous 2 times per day    folic acid  1 mg Oral Daily       sodium chloride flush, fentanNYL **OR** fentanNYL, sodium chloride flush, acetaminophen **OR** acetaminophen, polyethylene glycol, promethazine **OR** ondansetron, potassium chloride **OR** potassium alternative oral replacement **OR** potassium chloride, magnesium sulfate, midodrine    LABS  CBC   Recent Labs     06/02/20  0433   WBC 11.5*   HGB 8.9*   HCT 28.5*   MCV 98.3        BMP:   Recent Labs     06/02/20  0433 06/02/20  1258   * 151*   K 3.5*  --      --    CO2 32*  --    BUN 32*  --    CREATININE 0.48*  --    GLUCOSE 158*  --    MG 2.0  --      No results found for: PH, PCO2, PO2, HCO3, O2SAT  Lab Results   Component Value Date    MODE [facility] 05/26/2020     LIVER PROFILE   Recent Labs     06/02/20  0433   AST 19   ALT 27   BILITOT 0.16*   ALKPHOS 69     INR No results for input(s): INR in the last 72 hours.   PTT   Lab Results   Component Value Date    APTT 61.9 (H) 05/27/2020     CBC:   Lab Results   Component Value Date    WBC 11.5 06/02/2020    RBC 2.90 06/02/2020    HGB 8.9 06/02/2020    HCT 28.5

## 2020-06-02 NOTE — PLAN OF CARE
Problem: Respiratory  Intervention: Administer medication as ordered  Note: BRONCHOSPASM/BRONCHOCONSTRICTION     [x]         IMPROVE AERATION/BREATH SOUNDS  [x]   ADMINISTER BRONCHODILATOR THERAPY AS APPROPRIATE  [x]   ASSESS BREATH SOUNDS  []   IMPLEMENT AEROSOL/MDI PROTOCOL  [x]   PATIENT EDUCATION AS NEEDED     Intervention: Respiratory assessment  Note: ROXY PARK, PPatient Assessment complete. Pneumonia due to COVID-19 virus [U07.1, J12.89] . Vitals:    06/02/20 1704   BP:    Pulse:    Resp: 12   Temp:    SpO2: 98%   . Patients home meds are   Prior to Admission medications    Not on File   Assessment     Respiratory assement remains unchanged from yesterday. Will continue current treatment.      RR 14  Breath Sounds: rhonchi      Secretion Management assessment at level  3    []    Bronchodilator Assessment  BRONCHODILATOR ASSESSMENT SCORE  Score 0 1 2 3 4 5   Breath Sounds   []  Patient Baseline []  No Wheeze good aeration []  Faint, scattered wheezing, good aeration []  Expiratory Wheezing and or moderately diminished []  Insp/Exp wheeze and/or very diminished []  Insp/Exp and/ or marked distress   Respiratory Rate   []  Patient Baseline []  Less than 20 []  Less than 20 []  20-25 []  Greater than 25 []  Greater than 25   Peak flow % of Pred or PB []  NA   []  Greater than 90%  []  81-90% []  71-80% []  Less than or equal to 70%  or unable to perform []  Unable due to Respiratory Distress   Dyspnea re []  Patient Baseline []  No SOB []  No SOB []  SOB on exertion []  SOB min activity []  At rest/acute   e FEV% Predicted       []  NA []  Above 69%  []  Unable []  Above 60-69%  []  Unable []  Above 50-59%  []  Unable []  Above 35-49%  []  Unable []  Less than 35%  []  Unable                 []  Hyperinflation Assessment  Score 1 2 3   CXR and Breath Sounds   []  Clear []  No atelectasis  Basilar aeration []  Atelectasis or absent basilar breath sounds   Incentive Spirometry Volume  (Per IBW) []  Greater than or equal to 15ml/Kg []  less than 15ml/Kg []  less than 15ml/Kg   Surgery within last 2 weeks []  None or general   []  Abdominal or thoracic surgery  []  Abdominal or thoracic   Chronic Pulmonary Historyre []  No []  Yes []  Yes     [x]  Secretion Management Assessment  Score 1 2 3   Bilateral Breath Sounds   []  Occasional Rhonchi []  Scattered Rhonchi [x]  Course Rhonchi and/or poor aeration   Sputum    []  Small amount of thin secretions []  Moderate amount of viscous secretions [x]  Copius, Viscious Yellow/ Secretions   CXR as reported by physician []  clear  []  Unavailable []  Infiltrates and/or consolidation  []  Unavailable [x]  Mucus Plugging and or lobar consolidation  []  Unavailable   Cough []  Strong, productive cough []  Weak productive cough [x]  No cough or weak non-productive cough   ROXY PARK  7:08 PM                            FEMALE                                  MALE                            FEV1 Predicted Normal Values                        FEV1 Predicted Normal Values          Age                                     Height in Feet and Inches       Age                                     Height in Feet and Inches       4' 11\" 5' 1\" 5' 3\" 5' 5\" 5' 7\" 5' 9\" 5' 11\" 6' 1\"  4' 11\" 5' 1\" 5' 3\" 5' 5\" 5' 7\" 5' 9\" 5' 11\" 6' 1\"   42 - 45 2.49 2.66 2.84 3.03 3.22 3.42 3.62 3.83 42 - 45 2.82 3.03 3.26 3.49 3.72 3.96 4.22 4.47   46 - 49 2.40 2.57 2.76 2.94 3.14 3.33 3.54 3.75 46 - 49 2.70 2.92 3.14 3.37 3.61 3.85 4.10 4.36   50 - 53 2.31 2.48 2.66 2.85 3.04 3.24 3.45 3.66 50 - 53 2.58 2.80 3.02 3.25 3.49 3.73 3.98 4.24   54 - 57 2.21 2.38 2.57 2.75 2.95 3.14 3.35 3.56 54 - 57 2.46 2.67 2.89 3.12 3.36 3.60 3.85 4.11   58 - 61 2.10 2.28 2.46 2.65 2.84 3.04 3.24 3.45 58 - 61 2.32 2.54 2.76 2.99 3.23 3.47 3.72 3.98   62 - 65 1.99 2.17 2.35 2.54 2.73 2.93 3.13 3.34 62 - 65 2.19 2.40 2.62 2.85 3.09 3.33 3.58 3.84   66 - 69 1.88 2.05 2.23 2.42 2.61 2.81 3.02 3.23 66 - 69 2.04 2.26 2.48

## 2020-06-02 NOTE — PLAN OF CARE
Problem: Skin Integrity - Impaired:  Goal: Absence of new skin breakdown  Description: Absence of new skin breakdown  Outcome: Ongoing   Pts skin maintains structural intactness and physiologic function. Pt needs to be reposition  in bed/  with Assist pt  every 2 hours and heels elevated off bed. Linens remain clean and dry. Will continue to monitor. See LDAs for skin integrity and treatment.    Electronically signed by Mindi Arechiga RN on 6/2/2020 at 6:22 PM

## 2020-06-02 NOTE — PROGRESS NOTES
1 ampule Inhalation 4x daily    methylPREDNISolone (SOLU-MEDROL) IVPB  250 mg Intravenous Q6H    thiamine  100 mg Oral Daily    CENTRUM/CERTA-ALYSSA with minerals oral  15 mL Oral Daily    apixaban  10 mg Oral BID    [START ON 6/3/2020] apixaban  5 mg Oral BID    potassium bicarb-citric acid  40 mEq Oral Daily    amphotericin B liposome (AMBISOME) IVPB  4 mg/kg Intravenous Q24H    sodium chloride flush  10 mL Intravenous 2 times per day    folic acid  1 mg Oral Daily     Continuous Infusions:   PRN Meds:  sodium chloride flush, fentanNYL **OR** fentanNYL, sodium chloride flush, acetaminophen **OR** acetaminophen, polyethylene glycol, promethazine **OR** ondansetron, potassium chloride **OR** potassium alternative oral replacement **OR** potassium chloride, magnesium sulfate, midodrine    Review of Systems:     Patient is not able to give any review of systems due to her underlying condition. Input/Output:       I/O last 3 completed shifts: In: 3426 [I.V.:2300; NG/GT:1126]  Out: 3018 [Urine:2500; Emesis/NG output:468; Stool:50]    Vital Signs:   Temperature:  Temp: 99.3 °F (37.4 °C)  TMax:   Temp (24hrs), Av.2 °F (36.8 °C), Min:97.3 °F (36.3 °C), Max:99.3 °F (37.4 °C)    Respirations:  Resp: 19  Pulse:   Pulse: 96  BP:    BP: 113/69  BP Range: Systolic (05DPD), PZZ:349 , Min:113 , HOB:410       Diastolic (27HPT), TLA:93, Min:69, Max:100      Physical Examination:     General:  Positive altered mental status, not able to answer any questions, no accessory muscle use. HEENT: Atraumatic, normocephalic, no throat congestion, dry mucosa. Eyes:   Pupils equal, round and reactive to light, EOMI. Neck:   Supple  Chest:   Bilateral vesicular breath sounds, no rales or wheezes. Cardiac:  S1 S2 RR, no murmurs, gallops or rubs. Abdomen: Soft, non-tender, no masses or organomegaly, BS audible. :   No suprapubic or flank tenderness.   Neuro:  Positive altered mental status, not able to answer any questions  SKIN:  No rashes, good skin turgor. Extremities:  No edema. Labs:       Recent Labs     05/31/20 0728 06/01/20 0652 06/02/20 0433   WBC 8.5 13.9* 11.5*   RBC 3.35* 3.14* 2.90*   HGB 10.0* 9.6* 8.9*   HCT 32.8* 31.0* 28.5*   MCV 97.9 98.7 98.3   MCH 29.9 30.6 30.7   MCHC 30.5 31.0 31.2   RDW 15.7* 15.5* 15.6*    227 248   MPV 9.9 9.9 9.9      BMP:   Recent Labs     05/31/20  0728  06/01/20  0652 06/01/20  1149 06/01/20 2023 06/02/20  0035 06/02/20  0433   *   < > 152* 149* 146*  --  148*   K 3.2*  --  2.9*  --   --  3.6* 3.5*     --  109*  --   --   --  107   CO2 32*  --  30  --   --   --  32*   BUN 24*  --  36*  --   --   --  32*   CREATININE 0.32*  --  0.43*  --   --   --  0.48*   GLUCOSE 166*  --  158*  --   --   --  158*   CALCIUM 10.1  --  9.9  --   --   --  9.7    < > = values in this interval not displayed. Magnesium:    Recent Labs     06/01/20 0652 06/02/20 0433   MG 2.0 2.0     Albumin:    Recent Labs     05/31/20 0728 06/01/20 0652 06/02/20 0433   LABALBU 2.4* 2.5* 2.4*     QUYEN:      Lab Results   Component Value Date    QUYEN NEGATIVE 05/14/2020     SPEP:  Lab Results   Component Value Date    PROT 5.0 06/02/2020    ALBCAL 2.1 05/23/2020    ALBPCT 50 05/23/2020    LABALPH 0.3 05/23/2020    LABALPH 0.7 05/23/2020    A1PCT 6 05/23/2020    A2PCT 15 05/23/2020    LABBETA 0.5 05/23/2020    BETAPCT 11 05/23/2020    GAMGLOB 0.8 05/23/2020    GGPCT 18 05/23/2020    PATH ELECTRONICALLY SIGNED. Kayleigh Rodriguez M.D. 05/23/2020    PATH ELECTRONICALLY SIGNED.  Kayleigh Rodriguez M.D. 05/23/2020     MPO ANCA:     Lab Results   Component Value Date    MPO 47 05/23/2020     PR3 ANCA:     Lab Results   Component Value Date    PR3 8 05/23/2020     Hep BsAg:         Lab Results   Component Value Date    HEPBSAG NONREACTIVE 05/14/2020     Hep C AB:          Lab Results   Component Value Date    HEPCAB NONREACTIVE 05/14/2020       Urinalysis/Chemistries:      Lab Results Component Value Date    NITRU NEGATIVE 05/14/2020    COLORU YELLOW 05/14/2020    PHUR 5.0 05/14/2020    WBCUA None 05/14/2020    RBCUA 2 TO 5 05/14/2020    MUCUS NOT REPORTED 05/14/2020    TRICHOMONAS NOT REPORTED 05/14/2020    YEAST NOT REPORTED 05/14/2020    BACTERIA FEW 05/14/2020    SPECGRAV 1.015 05/14/2020    LEUKOCYTESUR NEGATIVE 05/14/2020    UROBILINOGEN Normal 05/14/2020    BILIRUBINUR NEGATIVE 05/14/2020    GLUCOSEU NEGATIVE 05/14/2020    KETUA NEGATIVE 05/14/2020    AMORPHOUS 3+ 05/14/2020     Urine Sodium:     Lab Results   Component Value Date    PREETHI 33 05/14/2020     Urine Osmolarity:   Lab Results   Component Value Date    OSMOU 388 05/14/2020     Radiology:     Reviewed. Assessment:     1. Hypernatremia likely secondary to inadequate free water intake due to her underlying condition along with positive insensible losses. 2.  Hypokalemia. 3.  Urine retention status post Tomlin catheter. Urology on board. 4.  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 5.  Persistent encephalopathy, toxic metabolic encephalopathy. 6.  Alcohol abuse. 7.  Possible subacute punctate infarcts, likely due to embolic infarcts per MRI brain-5/31/2020.  8.  Cryptococcal pneumonia and cryptococcal meningitis. 10.  Lobar pneumonia. 11.  Sepsis-resolved. Plan:   1. Keep background IV fluids to half-normal saline  2. Increase free water flushes to 250 every 4 hours. 3.  Continue monitor sodium Q8 hours. 4.  BMP in a.m.  5.  Avoid any further nephrotoxic agents. 6.  Antibiotics/antifungals as per renal function. 7.  Agree with potassium replacements. 8.  Follow up with urinalysis with microscopy, urine electrolytes including urine sodium, potassium, chloride and also urine osmolarity. 10.  Will follow. Patient's nurse was updated. Nutrition   Please ensure that patient is on a renal diet/TF.  Avoid nephrotoxic drugs/contrast exposure. Thank you for the consultation. Please do not hesitate to contact us for any further questions/concerns. We will continue to follow along with you. Ilda woods,  Internal medicine, PGY-1    Attending Physician Statement  I have discussed the care of this patient, including pertinent history and exam findings, with the Resident/CNP. I have reviewed and edited the key elements of all parts of the encounter with the Resident/CNP. I agree with the assessment, plan and orders as documented by the Resident/CNP. James Rey MD   Nephrology [address] [place] [address] Drive    This note is created with the assistance of a speech-recognition program. While intending to generate a document that actually reflects the content of the visit, no guarantees can be provided that every mistake has been identified and corrected by editing.

## 2020-06-02 NOTE — PROGRESS NOTES
Nutrition Assessment (Enteral Nutrition)    Type and Reason for Visit: Reassess    Nutrition Recommendations: Recommend continue Vital AF at 50 ml per hour to provide 1464 kcal, 90 g protein. Recommend adding 150 ml free water 4 times daily for adequate free water with present tube feed. Nutrition Assessment: Tube feed tolerated at goal.    Malnutrition Assessment:  · Malnutrition Status: Insufficient data  · Context: Acute illness or injury  · Findings of the 6 clinical characteristics of malnutrition (Minimum of 2 out of 6 clinical characteristics is required to make the diagnosis of moderate or severe Protein Calorie Malnutrition based on AND/ASPEN Guidelines):  1. Energy Intake-Less than or equal to 75% of estimated energy requirement, (x 6 days)    2. Weight Loss-Unable to assess, unable to assess  3. Fat Loss-Unable to assess,    4. Muscle Loss-Unable to assess,    5. Fluid Accumulation-(mild-moderate fluid accumulation ), Extremities, Generalized  6.  Strength-Not measured    Nutrition Risk Level:  Moderate    Nutrition Needs:  · Estimated Daily Total Kcal: 1500 kcal/day   · Estimated Daily Protein (g): 70 g pro/day   · Estimated Daily Fluid (ml/day):  1500 ml per day    Nutrition Diagnosis:   · Problem: Inadequate oral intake  · Etiology: related to (Current medical condition)     Signs and symptoms:  as evidenced by NPO status due to medical condition, Nutrition support - EN    Objective Information:  · Nutrition-Focused Physical Findings: Na 148, meds reviewed, 50 ml stool  · Wound Type: Stage II, Pressure Ulcer  · Current Nutrition Therapies:  · Oral Diet Orders: NPO   · Tube Feeding (TF) Orders:   · Feeding Route: Nasogastric  · Formula: Semi-elemental  · Rate (ml/hr):45 ml/hr     · Volume (ml/day): 1080 ml/day   · Duration: Continuous  · Goal TF & Flush Orders Provides: 1300 kcal, 81 g protein  · Anthropometric Measures:  · Ht: 5' 7\" (170.2 cm)   · Current Body Wt: 163 lb (73.9

## 2020-06-02 NOTE — PLAN OF CARE
Problem: Falls - Risk of:  Goal: Will remain free from falls  Description: Will remain free from falls  6/2/2020 0411 by Michael Bowles RN  Outcome: Ongoing  6/1/2020 1545 by Rosibel Guzman RN  Outcome: Ongoing  Pt remains free from falls at this time. Floor free from obstacles, and bed is locked and in lowest position. Adequate lighting provided. Call light within reach; pt encouraged to call for any need. Will continue to monitor needs during hourly rounding. Problem: Pain:  Goal: Pain level will decrease  Description: Pain level will decrease  6/2/2020 0411 by Michael Bowles RN  Outcome: Ongoing  6/1/2020 1545 by Rosibel Guzman RN  Outcome: Ongoing   Pt's pain assessed frequently with hourly rounding; assessed all pain characteristics including level, type, location, frequency, and onset. Pt medicated by RN per PRN orders. Non-pharmacologic interventions offered to pt as well. Pt states pain is tolerable at this time. Will continue to monitor.     Electronically signed by Michael Bowles RN on 6/2/2020 at 4:11 AM

## 2020-06-02 NOTE — PROGRESS NOTES
chloride flush, acetaminophen **OR** acetaminophen, polyethylene glycol, promethazine **OR** ondansetron, potassium chloride **OR** potassium alternative oral replacement **OR** potassium chloride, magnesium sulfate, midodrine    Data:  CBC:   Recent Labs     06/01/20 0652 06/02/20 0433 06/02/20  1648   WBC 13.9* 11.5* 10.7   RBC 3.14* 2.90* 3.03*   HGB 9.6* 8.9* 9.3*   HCT 31.0* 28.5* 29.3*   MCV 98.7 98.3 96.7   RDW 15.5* 15.6* 15.4*    248 243     BMP:   Recent Labs     05/31/20  0728 06/01/20 0652 06/01/20 2023 06/02/20  0035 06/02/20 0433 06/02/20  1258   *   < > 152*   < > 146*  --  148* 151*   K 3.2*  --  2.9*  --   --  3.6* 3.5*  --      --  109*  --   --   --  107  --    CO2 32*  --  30  --   --   --  32*  --    BUN 24*  --  36*  --   --   --  32*  --    CREATININE 0.32*  --  0.43*  --   --   --  0.48*  --     < > = values in this interval not displayed. BNP: No results for input(s): BNP in the last 72 hours. PT/INR:No results for input(s): PROTIME, INR in the last 72 hours. APTT:   Recent Labs     06/02/20  1648   APTT 21.8     LIVER PROFILE:   Recent Labs     05/31/20 0728 06/01/20 0652 06/02/20  0433   AST 27 21 19   ALT 39* 32 27   BILITOT 0.25* 0.22* 0.16*   ALKPHOS 81 72 69      Results for Lane Avila (MRN [id number]) as of 6/3/2020 17:55   Ref. Range 5/23/2020 12:41   Sed Rate Latest Ref Range: 0 - 20 mm 51 (H)     Results for Lane Avila (MRN [id number]) as of 6/3/2020 17:55   Ref. Range 5/13/2020 18:40 5/16/2020 04:53 5/17/2020 12:34 5/20/2020 03:44   CRP Latest Ref Range: 0.0 - 5.0 mg/L 196.4 (H) 43.9 (H) 23.5 (H) 10.5 (H)     Results for Lane Avila (MRN [id number]) as of 6/3/2020 17:55   Ref. Range 5/24/2020 05:13 5/29/2020 07:42 6/2/2020 22:38   CRP Latest Ref Range: 0.0 - 5.0 mg/L 49.1 (H) 44.6 (H) 4.3     Results for Lane Avila (MRN [id number]) as of 6/2/2020 19:50   Ref.  Range 5/17/2020 17:15   Folate Latest Ref Range: >4.8 ng/mL 19.0   Vitamin B-12 Latest Ref Range: 232 - 1245 pg/mL 1358 (H)     Results for Mita Clarke (MRN [id number]) as of 6/3/2020 17:55   Ref. Range 5/20/2020 03:44   Ammonia Latest Ref Range: 11 - 51 umol/L 27     Results for Mita Clarke (MRN [id number]) as of 6/3/2020 17:55   Ref. Range 5/23/2020 12:41   Ceruloplasmin Latest Ref Range: 16 - 45 mg/dL 17     Results for Mita Clarke (MRN [id number]) as of 6/3/2020 17:55   Ref. Range 5/14/2020 12:50   TSH Latest Ref Range: 0.30 - 5.00 mIU/L 0.70     ABG. Results for Mita Clarke (MRN [id number]) as of 6/3/2020 17:55   Ref. Range 5/26/2020 04:24 6/2/2020 21:23   POC pH Latest Ref Range: 7.350 - 7.450  7.521 (H) 7.530 (H)   POC pH Temp Unknown NOT REPORTED NOT REPORTED   POC pCO2 Latest Ref Range: 35.0 - 48.0 mm Hg 36.5 42.1   POC pCO2 Temp Latest Units: mm Hg NOT REPORTED NOT REPORTED   POC PO2 Latest Ref Range: 83.0 - 108.0 mm Hg 69.9 (L) 64.5 (L)   POC pO2 Temp Latest Units: mm Hg NOT REPORTED NOT REPORTED   POC HCO3 Latest Ref Range: 21.0 - 28.0 mmol/L 29.8 (H) 35.2 (H)   POC O2 SAT Latest Ref Range: 94.0 - 98.0 % 96 94       URINALYSIS. Results for Mita Clarke (MRN [id number]) as of 6/2/2020 19:50   Ref.  Range 6/2/2020 18:44   Color, UA Latest Ref Range: YELLOW  YELLOW   Turbidity UA Latest Ref Range: CLEAR  CLEAR   Glucose, UA Latest Ref Range: NEGATIVE  NEGATIVE   Bilirubin, Urine Latest Ref Range: NEGATIVE  NEGATIVE   Ketones, Urine Latest Ref Range: NEGATIVE  NEGATIVE   Specific Gravity, UA Latest Ref Range: 1.005 - 1.030  1.019   pH, UA Latest Ref Range: 5.0 - 8.0  7.0   Protein, UA Latest Ref Range: NEGATIVE  NEGATIVE   Urobilinogen, Urine Latest Ref Range: Normal  Normal   Nitrite, Urine Latest Ref Range: NEGATIVE  NEGATIVE   Leukocyte Esterase, Urine Latest Ref Range: NEGATIVE  NEGATIVE   Casts UA Latest Units: /LPF PENDING   Mucus, UA Latest Ref Range: None  PENDING   WBC, UA Latest Units: /HPF PENDING   RBC, UA Latest Units: /HPF PENDING   Epithelial Cells, UA Latest Units: /HPF PENDING   Renal Epithelial, UA Latest Ref Range: 0 /HPF PENDING   Bacteria, UA Latest Ref Range: None  PENDING   Amorphous, UA Latest Ref Range: None  PENDING   Yeast, Urine Latest Ref Range: None  PENDING   Crystals, UA Latest Ref Range: None /HPF PENDING   Urine Hgb Latest Ref Range: NEGATIVE  NEGATIVE   Trichomonas, UA Latest Ref Range: None  PENDING   Other Observations UA Latest Ref Range: NOT REQ. PENDING   Chloride, Ur Latest Units: mmol/L 34   Creatinine, Ur Latest Ref Range: 28.0 - 217.0 mg/dL 33.9   Potassium, Ur Latest Units: mmol/L 58.5   Sodium, Ur Latest Units: mmol/L 27   Total Protein, Urine Latest Units: mg/dL 11   Urine Total Protein Creatinine Ratio Latest Ref Range: 0.00 - 0.20  0.32 (H)       SEROLOGY  Results for Lenin Jamison (MRN [id number]) as of 6/2/2020 19:50   Ref. Range 5/14/2020 16:21 5/15/2020 09:35   HIV Ag/Ab Latest Ref Range: NONREACTIVE   NONREACTIVE   Human Metapneumovirus PCR Latest Ref Range: Not Detected  Not Detected    Influenza A H1 PCR Latest Ref Range: Not Detected  NOT REPORTED        Results for Lenin Jamison (MRN [id number]) as of 6/2/2020 19:50   Ref. Range 5/29/2020 20:24   IgA Latest Ref Range: 70 - 400 mg/dL 108   IgG 1 Latest Ref Range: 240 - 1118 mg/dL 282   IgG 2 Latest Ref Range: 124 - 549 mg/dL 142   IgG 3 Latest Ref Range: 21 - 134 mg/dL 34   IgG 4 Latest Ref Range: 1 - 123 mg/dL 7   Total IgG Latest Ref Range: 700 - 1600 mg/dL 567 (L)   IgM Latest Ref Range: 40 - 230 mg/dL 642 (H)   Kappa Free Light Chains QNT Latest Ref Range: 0.37 - 1.94 mg/dL 3.72 (H)   Lambda Free Light Chains QNT Latest Ref Range: 0.57 - 2.63 mg/dL 2.53   Free Kappa/Lambda Ratio Latest Ref Range: 0.26 - 1.65  1.47     Results for Lenin Jamison (MRN [id number]) as of 6/2/2020 19:50   Ref.  Range 5/23/2020 12:41   ANCA Myeloperoxidase Latest Ref Range: <100 AU/mL 47   ANCA Proteinase 3 Latest Ref Range: <100 AU/mL 8   Rheumatoid Factor Latest Ref Range: <14 IU/mL 13.9   TISSUE TRANSGLUTAMINASE IGA Latest Ref Range: <7.0 U/mL 0.3   IgA Latest Ref Range: 70 - 400 mg/dL 89   Total IgG Latest Ref Range: 700 - 1600 mg/dL 451 (L)   Alpha 1 % Latest Ref Range: 3 - 6 % 6   Alpha 2 % Latest Ref Range: 6 - 13 % 15 (H)   Alpha-1-Globulin Latest Ref Range: 0.1 - 0.4 g/dL 0.3   Alpha-2-Globulin Latest Ref Range: 0.5 - 0.9 g/dL 0.7   Beta Globulin Latest Ref Range: 0.5 - 1.1 g/dL 0.5   Beta Percent Latest Ref Range: 11 - 19 % 11   Gamma Globulin Latest Ref Range: 0.5 - 1.5 g/dL 0.8   Gamma Globulin % Latest Ref Range: 9 - 20 % 18   Beta-2 Microglobulin Latest Ref Range: 0.6 - 2.4 mg/L 1.9   Serum IFX Interp Unknown (NOTE)   IMMUNOFIXATION SERUM PROFILE Unknown Rpt     Results for Desiree Tenorio (MRN [id number]) as of 6/2/2020 19:50   Ref. Range 5/14/2020 20:52   QUYEN Latest Ref Range: NEGATIVE  NEGATIVE   Hep A IgM Latest Ref Range: NONREACTIVE  NONREACTIVE   Hepatitis B Surface Ag Latest Ref Range: NONREACTIVE  NONREACTIVE   Hepatitis C Ab Latest Ref Range: NONREACTIVE  NONREACTIVE   Hep B Core Ab, IgM Latest Ref Range: NONREACTIVE  NONREACTIVE     Results for Desiree Tenorio (MRN [id number]) as of 6/3/2020 17:55   Ref. Range 5/15/2020 09:35 5/15/2020 09:35   Oligo Bands Unknown Oligoclonal bands are performed at [facility] using isoelectric focusing and immunofixation. Negative     TUMOR MARKER. None. MICROBIOLOGY  Results for Desiree Tenorio (MRN [id number]) as of 6/2/2020 19:50   Ref.  Range 5/24/2020 13:57   CYTOMEGALOVIRUS (CMV) CSF FILM ARRAY Latest Ref Range: Not Detected  Not Detected   HAEMOPHILUS INFLUENZA CSF FILM ARRAY Latest Ref Range: Not Detected  Not Detected   HHV-6 (HERPESVIRUS 6) CSF FILM ARRAY Latest Ref Range: Not Detected  Not Detected   HSV-1 CSF FILM ARRAY Latest Ref Range: Not Detected  Not Detected   HSV-2 CSF FILM ARRAY Latest Ref Range: Not Detected  Not Detected   PARECHOVIRUS CSF FILM ARRAY Latest Ref Range: Not Detected  Not Detected   ESCHERICHIA COLI K1 CSF FILM ARRAY Latest Ref Range: Not Detected  Not Detected   LISTERIA MONOCYTOGENES CSF FILM ARRAY Latest Ref Range: Not Detected  Not Detected   NEISSERIA MENIGITIDIS CSF FILM ARRAY Latest Ref Range: Not Detected  Not Detected   STREPTOCOCCUS AGALACTIAE CSF FILM ARRAY Latest Ref Range: Not Detected  Not Detected   STREPTOCOCCUS PNEUMONIAE CSF FILM ARRAY Latest Ref Range: Not Detected  Not Detected   CRYPTOCOCCUS NEOFORMANS/ANKIT CSF FILM ARR. Latest Ref Range: Not Detected  Not Detected   B burgdorferi Ab,CSF Latest Ref Range: <=0.99 FRANKIE 0.29     Results for Natalie Glass (MRN [id number]) as of 6/2/2020 19:50   Ref. Range 5/24/2020 13:57   Appearance, CSF Unknown SLIGHTLY BLOODY   Eosinophils, CSF Latest Units: % NOT REPORTED   Glucose, CSF Latest Ref Range: 40 - 70 mg/dL 56   Lactate, CSF Latest Ref Range: 1.2 - 2.6 mmol/L 2.3   Protein, CSF Latest Ref Range: 15.0 - 45.0 mg/dL 95.7 (H)   Supernatant Color, CSF Unknown NOT REPORTED   Volume, CSF Unknown 11   RBC, CSF Latest Ref Range: 0 /mm3 1600 (H)   WBC, CSF Latest Ref Range: <5 /mm3 95 (H)   Neutrophils, CSF Latest Units: % 33   Bands, CSF Latest Units: % NOT REPORTED   Lymphs, CSF Latest Units: % 63   Baso, CSF Latest Units: % NOT REPORTED   Blasts, CSF Latest Units: % NOT REPORTED   Mono/Macrophage, CSF Latest Units: % NOT REPORTED   Tube Number, CSF Unknown 3   Metamyelocyte, CSF Latest Units: % NOT REPORTED   Myelocyte, CSF Latest Units: % NOT REPORTED   Other Cells, Fluid Latest Units: % MONOCYTES     Results for Natalie Glass (MRN [id number]) as of 6/2/2020 19:50   Ref. Range 5/18/2020 12:33   SARS-CoV-2, IgG Latest Ref Range: Negative  Negative     Results for Natalie Glass (MRN [id number]) as of 6/2/2020 19:50   Ref. Range 5/21/2020 20:03 5/25/2020 22:38   Crypto Ag Latest Ref Range: NEGATIVE  POSITIVE (A) POSITIVE (A)     Results for Natalie Glass (MRN [id number]) as of 6/2/2020 19:50   Ref.  Range 5/14/2020 17:00   Legionella Pneumophilia Ag, Urine Unknown NEGATIVE     Results for Natalie Glass (MRN [id number]) as Vascular congestion with left lower lobe airspace disease and small pleural effusion persists. Chest x-ray-05/21/2020. IMPRESSION:  Improved left lung aeration with persistent basilar opacity, likely pleural  fluid with compressive atelectasis and possibly underlying infiltrate. Support tubes satisfactory. Chest x-ray-05/20/2020. IMPRESSION:  1. Stable volume loss of the left hemithorax. Worsening airspace disease projecting over the left upper and left mid lung zone. Stable retrocardiac airspace consolidation. Small to moderate left-sided pleural effusion,stable. Follow-up is recommended to document resolution. 2. Stable right to left mediastinal shift. 3. Stable mild cardiomegaly. 4. Tubes as above. Echocardiogram-05/16/2020. Left ventricle is normal in size with normal systolic function globally. Calculated ejection fraction is 56%. Mildly dilated right atrium. Mild buckling of the right atrial wall. Aortic sclerosis without stenosis. Mild tricuspid regurgitation. Estimated right ventricular systolic pressure is 25 mmHg.     ASSESSMENT AND  PLAN. 1.NEUROVASCULAR. 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 Multifocal punctate subacute infarcts on brain MRI. 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 ID is following. 2.PULMONARY. Acute hypoxic respiratory failure-not on home O2     COPD w/o exacerbation-prn nebs. Left lower lobe pna-suspected cryptococcus -on Amphotericin B. Possible postobstructive pneumonia-pending diagnostic bronchoscopy. Pulmonary is following. 3.CARDIOVASCULAR. Aortic valve sclerosis w/o stenosis on echo-5/16/2020.    4.GI. Alcoholic fatty liver. Ammonia level 27 on 5/20/2020. 5.RENAL AND ELECTROLYTES. Acute hypernatremia w/ serum Na 148.      Mild hypoxia kalemia-serum K

## 2020-06-03 ENCOUNTER — APPOINTMENT (OUTPATIENT)
Dept: GENERAL RADIOLOGY | Age: 60
DRG: 720 | End: 2020-06-03
Attending: INTERNAL MEDICINE
Payer: MEDICARE

## 2020-06-03 PROBLEM — K70.0 FATTY LIVER, ALCOHOLIC: Status: ACTIVE | Noted: 2020-06-03

## 2020-06-03 PROBLEM — G82.50 QUADRIPARESIS (HCC): Status: ACTIVE | Noted: 2020-06-03

## 2020-06-03 PROBLEM — E87.0 ACUTE HYPERNATREMIA: Status: ACTIVE | Noted: 2020-06-03

## 2020-06-03 PROBLEM — R70.0 ESR RAISED: Status: ACTIVE | Noted: 2020-06-03

## 2020-06-03 PROBLEM — E46 PROTEIN-ENERGY MALNUTRITION (HCC): Status: ACTIVE | Noted: 2020-06-03

## 2020-06-03 PROBLEM — R79.82 CRP ELEVATED: Status: ACTIVE | Noted: 2020-06-03

## 2020-06-03 PROBLEM — R53.1 WEAKNESS GENERALIZED: Status: ACTIVE | Noted: 2020-06-03

## 2020-06-03 LAB
ABSOLUTE EOS #: <0.03 K/UL (ref 0–0.44)
ABSOLUTE IMMATURE GRANULOCYTE: 0.13 K/UL (ref 0–0.3)
ABSOLUTE LYMPH #: 1 K/UL (ref 1.1–3.7)
ABSOLUTE MONO #: 0.81 K/UL (ref 0.1–1.2)
ALBUMIN SERPL-MCNC: 2.4 G/DL (ref 3.5–5.2)
ALBUMIN/GLOBULIN RATIO: 1 (ref 1–2.5)
ALP BLD-CCNC: 69 U/L (ref 35–104)
ALT SERPL-CCNC: 25 U/L (ref 5–33)
AMMONIA: 33 UMOL/L (ref 11–51)
ANION GAP SERPL CALCULATED.3IONS-SCNC: 10 MMOL/L (ref 9–17)
AST SERPL-CCNC: 20 U/L
BASOPHILS # BLD: 0 % (ref 0–2)
BASOPHILS ABSOLUTE: <0.03 K/UL (ref 0–0.2)
BILIRUB SERPL-MCNC: 0.16 MG/DL (ref 0.3–1.2)
BUN BLDV-MCNC: 39 MG/DL (ref 6–20)
BUN/CREAT BLD: ABNORMAL (ref 9–20)
CALCIUM SERPL-MCNC: 9.2 MG/DL (ref 8.6–10.4)
CHLORIDE BLD-SCNC: 106 MMOL/L (ref 98–107)
CO2: 29 MMOL/L (ref 20–31)
CREAT SERPL-MCNC: 0.49 MG/DL (ref 0.5–0.9)
DIFFERENTIAL TYPE: ABNORMAL
EOSINOPHILS RELATIVE PERCENT: 0 % (ref 1–4)
GFR AFRICAN AMERICAN: >60 ML/MIN
GFR NON-AFRICAN AMERICAN: >60 ML/MIN
GFR SERPL CREATININE-BSD FRML MDRD: ABNORMAL ML/MIN/{1.73_M2}
GFR SERPL CREATININE-BSD FRML MDRD: ABNORMAL ML/MIN/{1.73_M2}
GLUCOSE BLD-MCNC: 100 MG/DL (ref 65–105)
GLUCOSE BLD-MCNC: 122 MG/DL (ref 65–105)
GLUCOSE BLD-MCNC: 156 MG/DL (ref 70–99)
HCT VFR BLD CALC: 26.8 % (ref 36.3–47.1)
HEMOGLOBIN: 8.5 G/DL (ref 11.9–15.1)
IMMATURE GRANULOCYTES: 1 %
LYMPHOCYTES # BLD: 9 % (ref 24–43)
MAGNESIUM: 2 MG/DL (ref 1.6–2.6)
MCH RBC QN AUTO: 30.2 PG (ref 25.2–33.5)
MCHC RBC AUTO-ENTMCNC: 31.7 G/DL (ref 28.4–34.8)
MCV RBC AUTO: 95.4 FL (ref 82.6–102.9)
MONOCYTES # BLD: 7 % (ref 3–12)
NRBC AUTOMATED: 0 PER 100 WBC
PARTIAL THROMBOPLASTIN TIME: 59.7 SEC (ref 20.5–30.5)
PARTIAL THROMBOPLASTIN TIME: 71.5 SEC (ref 20.5–30.5)
PARTIAL THROMBOPLASTIN TIME: 71.6 SEC (ref 20.5–30.5)
PARTIAL THROMBOPLASTIN TIME: 99.8 SEC (ref 20.5–30.5)
PDW BLD-RTO: 15.1 % (ref 11.8–14.4)
PHOSPHORUS: 3.2 MG/DL (ref 2.6–4.5)
PLATELET # BLD: 251 K/UL (ref 138–453)
PLATELET ESTIMATE: ABNORMAL
PMV BLD AUTO: 9.9 FL (ref 8.1–13.5)
POTASSIUM SERPL-SCNC: 3.8 MMOL/L (ref 3.7–5.3)
RBC # BLD: 2.81 M/UL (ref 3.95–5.11)
RBC # BLD: ABNORMAL 10*6/UL
SEG NEUTROPHILS: 83 % (ref 36–65)
SEGMENTED NEUTROPHILS ABSOLUTE COUNT: 9.8 K/UL (ref 1.5–8.1)
SODIUM BLD-SCNC: 140 MMOL/L (ref 135–144)
SODIUM BLD-SCNC: 144 MMOL/L (ref 135–144)
SODIUM BLD-SCNC: 145 MMOL/L (ref 135–144)
T. PALLIDUM, IGG: NONREACTIVE
TOTAL PROTEIN: 4.7 G/DL (ref 6.4–8.3)
WBC # BLD: 11.8 K/UL (ref 3.5–11.3)
WBC # BLD: ABNORMAL 10*3/UL

## 2020-06-03 PROCEDURE — 99233 SBSQ HOSP IP/OBS HIGH 50: CPT | Performed by: PSYCHIATRY & NEUROLOGY

## 2020-06-03 PROCEDURE — 6370000000 HC RX 637 (ALT 250 FOR IP): Performed by: STUDENT IN AN ORGANIZED HEALTH CARE EDUCATION/TRAINING PROGRAM

## 2020-06-03 PROCEDURE — 83986 ASSAY PH BODY FLUID NOS: CPT

## 2020-06-03 PROCEDURE — 6360000002 HC RX W HCPCS: Performed by: STUDENT IN AN ORGANIZED HEALTH CARE EDUCATION/TRAINING PROGRAM

## 2020-06-03 PROCEDURE — 85025 COMPLETE CBC W/AUTO DIFF WBC: CPT

## 2020-06-03 PROCEDURE — 99233 SBSQ HOSP IP/OBS HIGH 50: CPT | Performed by: INTERNAL MEDICINE

## 2020-06-03 PROCEDURE — 94669 MECHANICAL CHEST WALL OSCILL: CPT

## 2020-06-03 PROCEDURE — 6370000000 HC RX 637 (ALT 250 FOR IP): Performed by: INTERNAL MEDICINE

## 2020-06-03 PROCEDURE — 97535 SELF CARE MNGMENT TRAINING: CPT

## 2020-06-03 PROCEDURE — 71045 X-RAY EXAM CHEST 1 VIEW: CPT

## 2020-06-03 PROCEDURE — 99232 SBSQ HOSP IP/OBS MODERATE 35: CPT | Performed by: INTERNAL MEDICINE

## 2020-06-03 PROCEDURE — 94640 AIRWAY INHALATION TREATMENT: CPT

## 2020-06-03 PROCEDURE — 84157 ASSAY OF PROTEIN OTHER: CPT

## 2020-06-03 PROCEDURE — 82595 ASSAY OF CRYOGLOBULIN: CPT

## 2020-06-03 PROCEDURE — 2700000000 HC OXYGEN THERAPY PER DAY

## 2020-06-03 PROCEDURE — 97110 THERAPEUTIC EXERCISES: CPT

## 2020-06-03 PROCEDURE — 6370000000 HC RX 637 (ALT 250 FOR IP): Performed by: PSYCHIATRY & NEUROLOGY

## 2020-06-03 PROCEDURE — 51798 US URINE CAPACITY MEASURE: CPT

## 2020-06-03 PROCEDURE — 84100 ASSAY OF PHOSPHORUS: CPT

## 2020-06-03 PROCEDURE — 94761 N-INVAS EAR/PLS OXIMETRY MLT: CPT

## 2020-06-03 PROCEDURE — 89051 BODY FLUID CELL COUNT: CPT

## 2020-06-03 PROCEDURE — 83615 LACTATE (LD) (LDH) ENZYME: CPT

## 2020-06-03 PROCEDURE — 97530 THERAPEUTIC ACTIVITIES: CPT

## 2020-06-03 PROCEDURE — 86780 TREPONEMA PALLIDUM: CPT

## 2020-06-03 PROCEDURE — 83735 ASSAY OF MAGNESIUM: CPT

## 2020-06-03 PROCEDURE — 2580000003 HC RX 258: Performed by: INTERNAL MEDICINE

## 2020-06-03 PROCEDURE — 87205 SMEAR GRAM STAIN: CPT

## 2020-06-03 PROCEDURE — 82945 GLUCOSE OTHER FLUID: CPT

## 2020-06-03 PROCEDURE — 6360000002 HC RX W HCPCS: Performed by: INTERNAL MEDICINE

## 2020-06-03 PROCEDURE — 36415 COLL VENOUS BLD VENIPUNCTURE: CPT

## 2020-06-03 PROCEDURE — 82140 ASSAY OF AMMONIA: CPT

## 2020-06-03 PROCEDURE — 80053 COMPREHEN METABOLIC PANEL: CPT

## 2020-06-03 PROCEDURE — 51701 INSERT BLADDER CATHETER: CPT

## 2020-06-03 PROCEDURE — 6370000000 HC RX 637 (ALT 250 FOR IP): Performed by: NURSE PRACTITIONER

## 2020-06-03 PROCEDURE — 82947 ASSAY GLUCOSE BLOOD QUANT: CPT

## 2020-06-03 PROCEDURE — 85730 THROMBOPLASTIN TIME PARTIAL: CPT

## 2020-06-03 PROCEDURE — 84295 ASSAY OF SERUM SODIUM: CPT

## 2020-06-03 PROCEDURE — 2060000000 HC ICU INTERMEDIATE R&B

## 2020-06-03 RX ADMIN — IPRATROPIUM BROMIDE AND ALBUTEROL SULFATE 1 AMPULE: .5; 3 SOLUTION RESPIRATORY (INHALATION) at 11:18

## 2020-06-03 RX ADMIN — FOLIC ACID 1 MG: 1 TABLET ORAL at 08:10

## 2020-06-03 RX ADMIN — FLUCYTOSINE 1750 MG: 250 CAPSULE ORAL at 20:12

## 2020-06-03 RX ADMIN — FLUCYTOSINE 1750 MG: 250 CAPSULE ORAL at 16:37

## 2020-06-03 RX ADMIN — SODIUM CHLORIDE, PRESERVATIVE FREE 10 ML: 5 INJECTION INTRAVENOUS at 20:12

## 2020-06-03 RX ADMIN — FLUCYTOSINE 1750 MG: 250 CAPSULE ORAL at 11:21

## 2020-06-03 RX ADMIN — ALBUTEROL SULFATE 2.5 MG: 2.5 SOLUTION RESPIRATORY (INHALATION) at 04:46

## 2020-06-03 RX ADMIN — IPRATROPIUM BROMIDE AND ALBUTEROL SULFATE 1 AMPULE: .5; 3 SOLUTION RESPIRATORY (INHALATION) at 19:45

## 2020-06-03 RX ADMIN — Medication 15 ML: at 08:09

## 2020-06-03 RX ADMIN — POTASSIUM CHLORIDE 10 MEQ: 7.46 INJECTION, SOLUTION INTRAVENOUS at 02:05

## 2020-06-03 RX ADMIN — ALBUTEROL SULFATE 2.5 MG: 2.5 SOLUTION RESPIRATORY (INHALATION) at 23:12

## 2020-06-03 RX ADMIN — POTASSIUM BICARBONATE 40 MEQ: 782 TABLET, EFFERVESCENT ORAL at 08:09

## 2020-06-03 RX ADMIN — HEPARIN SODIUM 2960 UNITS: 1000 INJECTION INTRAVENOUS; SUBCUTANEOUS at 10:09

## 2020-06-03 RX ADMIN — IPRATROPIUM BROMIDE AND ALBUTEROL SULFATE 1 AMPULE: .5; 3 SOLUTION RESPIRATORY (INHALATION) at 16:19

## 2020-06-03 RX ADMIN — IPRATROPIUM BROMIDE AND ALBUTEROL SULFATE 1 AMPULE: .5; 3 SOLUTION RESPIRATORY (INHALATION) at 08:53

## 2020-06-03 RX ADMIN — AMPHOTERICIN B 230 MG: 50 INJECTION, POWDER, LYOPHILIZED, FOR SOLUTION INTRAVENOUS at 16:06

## 2020-06-03 RX ADMIN — ALBUTEROL SULFATE 2.5 MG: 2.5 SOLUTION RESPIRATORY (INHALATION) at 00:08

## 2020-06-03 RX ADMIN — POTASSIUM CHLORIDE 10 MEQ: 7.46 INJECTION, SOLUTION INTRAVENOUS at 03:05

## 2020-06-03 RX ADMIN — MIDODRINE HYDROCHLORIDE 5 MG: 5 TABLET ORAL at 08:10

## 2020-06-03 RX ADMIN — HEPARIN SODIUM AND DEXTROSE 20 UNITS/KG/HR: 10000; 5 INJECTION INTRAVENOUS at 13:38

## 2020-06-03 RX ADMIN — SODIUM CHLORIDE, PRESERVATIVE FREE 10 ML: 5 INJECTION INTRAVENOUS at 08:09

## 2020-06-03 RX ADMIN — POTASSIUM CHLORIDE 10 MEQ: 7.46 INJECTION, SOLUTION INTRAVENOUS at 00:58

## 2020-06-03 RX ADMIN — FLUCYTOSINE 1750 MG: 250 CAPSULE ORAL at 02:05

## 2020-06-03 RX ADMIN — Medication 100 MG: at 08:09

## 2020-06-03 ASSESSMENT — PAIN SCALES - GENERAL
PAINLEVEL_OUTOF10: 0

## 2020-06-03 ASSESSMENT — PAIN DESCRIPTION - PROGRESSION
CLINICAL_PROGRESSION: NOT CHANGED

## 2020-06-03 ASSESSMENT — PAIN SCALES - WONG BAKER
WONGBAKER_NUMERICALRESPONSE: 0

## 2020-06-03 ASSESSMENT — ENCOUNTER SYMPTOMS: COUGH: 0

## 2020-06-03 NOTE — PROGRESS NOTES
Renal progress note    Patient :  Neela Mitchell; 62 y.o. MRN# [id number]  Location:  [id number]  Attending:  Hayden Pickard MD  Admit Date:  5/14/2020   Hospital Day: 20    Subjective:     Nephrology had initially seen the patient on admission due to hyponatremia which was thought to be euvolemic hyponatremia likely due to SIADH in the setting of excessive free water intake. Hyponatremia improved and now patient has developed hypernatremia, hypokalemia and hence nephrology was reconsulted today. Patient has been admitted since 5/14/2020 and has been had an extensive course. She came in initially due to headache, photophobia, lower back pain and weakness. Has been having positive altered mental status and underwent multiple tests and examinations. Was initially intubated and was in the ICU later extubated in our stepdown unit. She was found to have cryptococcal CSF antigen on 5/22/2020 and has been getting treatment with amphotericin B and flucytosine per ID. Patient also had positive multifocal punctate subacute infarcts in bilateral cerebral hemispheres likely due to embolic infarcts as per MRI brain. Patient has been having no oral intake due to her altered mental status. Does have positive NG tube and is getting tube feeds. Patient was seen and examined. Looks more awake as compared to yesterday, follows commands intermittently   Started on heparin drip, eliquis was discontinued   Planned for thoracentesis tomorrow  Heparin to be held 4 hrs before procedure  Has positive NG tube and getting tube feeds. She did make a total of 2 L of urine in the last 24 hours. Patient serum creatinine stable at 0.43 -0.49 mg/DL. Her sodium improved to 140 today, calcium of 9.2. She is getting half normal saline as background fluids. Past History/Allergies? Social History:     Past Medical History:   Diagnosis Date    COPD (chronic obstructive pulmonary disease) ([facility] [address]     ETOH abuse        Allergies Results   Component Value Date    NITRU NEGATIVE 06/02/2020    COLORU YELLOW 06/02/2020    PHUR 7.0 06/02/2020    WBCUA None 06/02/2020    RBCUA None 06/02/2020    MUCUS NOT REPORTED 06/02/2020    TRICHOMONAS NOT REPORTED 06/02/2020    YEAST NOT REPORTED 06/02/2020    BACTERIA NOT REPORTED 06/02/2020    SPECGRAV 1.019 06/02/2020    LEUKOCYTESUR NEGATIVE 06/02/2020    UROBILINOGEN Normal 06/02/2020    BILIRUBINUR NEGATIVE 06/02/2020    GLUCOSEU NEGATIVE 06/02/2020    KETUA NEGATIVE 06/02/2020    AMORPHOUS NOT REPORTED 06/02/2020     Urine Sodium:     Lab Results   Component Value Date    PREETHI 27 06/02/2020     Urine Osmolarity:   Lab Results   Component Value Date    OSMOU 388 05/14/2020     Radiology:     Reviewed. Assessment:     1. Hypernatremia likely secondary to inadequate free water intake due to her underlying condition along with positive insensible losses. 2.  Hypokalemia. 3.  Urine retention status post Tom[place] catheter. Urology on board. 4.  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 5.  Persistent encephalopathy, toxic metabolic encephalopathy. 6.  Alcohol abuse. 7.  Possible subacute punctate infarcts, likely due to embolic infarcts per MRI brain-5/31/2020.  8.  Cryptococcal pneumonia and cryptococcal meningitis. 10.  Lobar pneumonia. 11.  Sepsis-resolved. Plan:   1. Keep background IV fluids to half-normal saline  2. Free water flushes to 250 every 4 hours. 3.  Avoid any further nephrotoxic agents. 4.  Antibiotics/antifungals as per renal function. 5.  Since the renal function is stable and sodium improved, nephrology will sign off. Please call with any other questions. Patient's nurse was updated. Nutrition   Please ensure that patient is on a renal diet/TF. Avoid nephrotoxic drugs/contrast exposure. Thank you for the consultation. Please do not hesitate to contact us for any further questions/concerns.

## 2020-06-03 NOTE — PROGRESS NOTES
(H)     Results for Homa Eans (MRN [id number]) as of 6/3/2020 17:55   Ref. Range 5/24/2020 05:13 5/29/2020 07:42 6/2/2020 22:38   CRP Latest Ref Range: 0.0 - 5.0 mg/L 49.1 (H) 44.6 (H) 4.3     Results for Homa Eans (MRN [id number]) as of 6/2/2020 19:50   Ref. Range 5/17/2020 17:15   Folate Latest Ref Range: >4.8 ng/mL 19.0   Vitamin B-12 Latest Ref Range: 232 - 1245 pg/mL 1358 (H)     Results for Homa Eans (MRN [id number]) as of 6/3/2020 17:55   Ref. Range 5/20/2020 03:44 6/3/2020 02:57   Ammonia Latest Ref Range: 11 - 51 umol/L 27 33       Results for Homa Eans (MRN [id number]) as of 6/3/2020 17:55   Ref. Range 5/23/2020 12:41   Ceruloplasmin Latest Ref Range: 16 - 45 mg/dL 17     Results for Homa Eans (MRN [id number]) as of 6/3/2020 17:55   Ref. Range 5/14/2020 12:50   TSH Latest Ref Range: 0.30 - 5.00 mIU/L 0.70     ABG. Results for Homa Eans (MRN [id number]) as of 6/3/2020 17:55   Ref. Range 5/26/2020 04:24 6/2/2020 21:23   POC pH Latest Ref Range: 7.350 - 7.450  7.521 (H) 7.530 (H)   POC pH Temp Unknown NOT REPORTED NOT REPORTED   POC pCO2 Latest Ref Range: 35.0 - 48.0 mm Hg 36.5 42.1   POC pCO2 Temp Latest Units: mm Hg NOT REPORTED NOT REPORTED   POC PO2 Latest Ref Range: 83.0 - 108.0 mm Hg 69.9 (L) 64.5 (L)   POC pO2 Temp Latest Units: mm Hg NOT REPORTED NOT REPORTED   POC HCO3 Latest Ref Range: 21.0 - 28.0 mmol/L 29.8 (H) 35.2 (H)   POC O2 SAT Latest Ref Range: 94.0 - 98.0 % 96 94       URINALYSIS. Results for Homa Baker (MRN [id number]) as of 6/2/2020 19:50   Ref.  Range 6/2/2020 18:44   Color, UA Latest Ref Range: YELLOW  YELLOW   Turbidity UA Latest Ref Range: CLEAR  CLEAR   Glucose, UA Latest Ref Range: NEGATIVE  NEGATIVE   Bilirubin, Urine Latest Ref Range: NEGATIVE  NEGATIVE   Ketones, Urine Latest Ref Range: NEGATIVE  NEGATIVE   Specific Gravity, UA Latest Ref Range: 1.005 - 1.030  1.019   pH, UA Latest Ref Range: 5.0 - 8.0  7.0   Protein, UA Latest Ref Range: NEGATIVE  NEGATIVE   Urobilinogen, Urine Latest Ref Range: Normal  Normal   Nitrite, Urine Latest Ref Range: NEGATIVE  NEGATIVE   Leukocyte Esterase, Urine Latest Ref Range: NEGATIVE  NEGATIVE   Casts UA Latest Units: /LPF PENDING   Mucus, UA Latest Ref Range: None  PENDING   WBC, UA Latest Units: /HPF PENDING   RBC, UA Latest Units: /HPF PENDING   Epithelial Cells, UA Latest Units: /HPF PENDING   Renal Epithelial, UA Latest Ref Range: 0 /HPF PENDING   Bacteria, UA Latest Ref Range: None  PENDING   Amorphous, UA Latest Ref Range: None  PENDING   Yeast, Urine Latest Ref Range: None  PENDING   Crystals, UA Latest Ref Range: None /HPF PENDING   Urine Hgb Latest Ref Range: NEGATIVE  NEGATIVE   Trichomonas, UA Latest Ref Range: None  PENDING   Other Observations UA Latest Ref Range: NOT REQ. PENDING   Chloride, Ur Latest Units: mmol/L 34   Creatinine, Ur Latest Ref Range: 28.0 - 217.0 mg/dL 33.9   Potassium, Ur Latest Units: mmol/L 58.5   Sodium, Ur Latest Units: mmol/L 27   Total Protein, Urine Latest Units: mg/dL 11   Urine Total Protein Creatinine Ratio Latest Ref Range: 0.00 - 0.20  0.32 (H)       SEROLOGY  Results for Natalie Hsieh (MRN [id number]) as of 6/2/2020 19:50   Ref. Range 5/14/2020 16:21 5/15/2020 09:35   HIV Ag/Ab Latest Ref Range: NONREACTIVE   NONREACTIVE   Human Metapneumovirus PCR Latest Ref Range: Not Detected  Not Detected    Influenza A H1 PCR Latest Ref Range: Not Detected  NOT REPORTED        Results for Natalie Hsieh (MRN [id number]) as of 6/2/2020 19:50   Ref.  Range 5/29/2020 20:24   IgA Latest Ref Range: 70 - 400 mg/dL 108   IgG 1 Latest Ref Range: 240 - 1118 mg/dL 282   IgG 2 Latest Ref Range: 124 - 549 mg/dL 142   IgG 3 Latest Ref Range: 21 - 134 mg/dL 34   IgG 4 Latest Ref Range: 1 - 123 mg/dL 7   Total IgG Latest Ref Range: 700 - 1600 mg/dL 567 (L)   IgM Latest Ref Range: 40 - 230 mg/dL 642 (H)   Kappa Free Light Chains QNT Latest Ref Range: 0.37 - 1.94 mg/dL 3.72 (H)   Lambda Free Light Chains QNT Latest Ref Range: 0.57 - 2.63 HHV-6 (HERPESVIRUS 6) CSF FILM ARRAY Latest Ref Range: Not Detected  Not Detected   HSV-1 CSF FILM ARRAY Latest Ref Range: Not Detected  Not Detected   HSV-2 CSF FILM ARRAY Latest Ref Range: Not Detected  Not Detected   PARECHOVIRUS CSF FILM ARRAY Latest Ref Range: Not Detected  Not Detected   ESCHERICHIA COLI K1 CSF FILM ARRAY Latest Ref Range: Not Detected  Not Detected   LISTERIA MONOCYTOGENES CSF FILM ARRAY Latest Ref Range: Not Detected  Not Detected   NEISSERIA MENIGITIDIS CSF FILM ARRAY Latest Ref Range: Not Detected  Not Detected   STREPTOCOCCUS AGALACTIAE CSF FILM ARRAY Latest Ref Range: Not Detected  Not Detected   STREPTOCOCCUS PNEUMONIAE CSF FILM ARRAY Latest Ref Range: Not Detected  Not Detected   CRYPTOCOCCUS NEOFORMANS/ANKIT CSF FILM ARR. Latest Ref Range: Not Detected  Not Detected   B burgdorferi Ab,CSF Latest Ref Range: <=0.99 FRANKIE 0.29     Results for Desiree Tenorio (MRN [id number]) as of 6/2/2020 19:50   Ref. Range 5/24/2020 13:57   Appearance, CSF Unknown SLIGHTLY BLOODY   Eosinophils, CSF Latest Units: % NOT REPORTED   Glucose, CSF Latest Ref Range: 40 - 70 mg/dL 56   Lactate, CSF Latest Ref Range: 1.2 - 2.6 mmol/L 2.3   Protein, CSF Latest Ref Range: 15.0 - 45.0 mg/dL 95.7 (H)   Supernatant Color, CSF Unknown NOT REPORTED   Volume, CSF Unknown 11   RBC, CSF Latest Ref Range: 0 /mm3 1600 (H)   WBC, CSF Latest Ref Range: <5 /mm3 95 (H)   Neutrophils, CSF Latest Units: % 33   Bands, CSF Latest Units: % NOT REPORTED   Lymphs, CSF Latest Units: % 63   Baso, CSF Latest Units: % NOT REPORTED   Blasts, CSF Latest Units: % NOT REPORTED   Mono/Macrophage, CSF Latest Units: % NOT REPORTED   Tube Number, CSF Unknown 3   Metamyelocyte, CSF Latest Units: % NOT REPORTED   Myelocyte, CSF Latest Units: % NOT REPORTED   Other Cells, Fluid Latest Units: % MONOCYTES     Results for Desiree Tenorio (MRN [id number]) as of 6/2/2020 19:50   Ref.  Range 5/18/2020 12:33   SARS-CoV-2, IgG Latest Ref Range: Negative  Negative the left lower hemithorax. Some atelectatic component may be present as there appears be lower   mediastinal shift toward the left. Pulmonary vascular congestion remains. Interval removal of endotracheal tube.     Chest x-ray-05/25/2020. IMPRESSION:  Overall, no significant change in chest findings compared to the prior study from May 21st.      Vascular congestion with left lower lobe airspace disease and small pleural effusion persists. Chest x-ray-05/21/2020. IMPRESSION:  Improved left lung aeration with persistent basilar opacity, likely pleural  fluid with compressive atelectasis and possibly underlying infiltrate. Support tubes satisfactory. Chest x-ray-05/20/2020. IMPRESSION:  1. Stable volume loss of the left hemithorax. Worsening airspace disease projecting over the left upper and left mid lung zone. Stable retrocardiac airspace consolidation. Small to moderate left-sided pleural effusion,stable. Follow-up is recommended to document resolution. 2. Stable right to left mediastinal shift. 3. Stable mild cardiomegaly. 4. Tubes as above. Echocardiogram-05/16/2020. Left ventricle is normal in size with normal systolic function globally. Calculated ejection fraction is 56%. Mildly dilated right atrium. Mild buckling of the right atrial wall. Aortic sclerosis without stenosis. Mild tricuspid regurgitation. Estimated right ventricular systolic pressure is 25 mmHg.     ASSESSMENT AND  PLAN. 1.NEUROVASCULAR. 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 Multifocal punctate subacute infarcts on brain MRI. 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 ID is following. 2.PULMONARY. Acute hypoxic respiratory failure-not on home O2     COPD w/o exacerbation-prn nebs.      Left lower lobe pna-suspected cryptococcus -on Amphotericin B.     Possible postobstructive pneumonia-pending diagnostic bronchoscopy. Pulmonary is following. 3.CARDIOVASCULAR. Aortic valve sclerosis w/o stenosis on echo-5/16/2020.    4.GI. Alcoholic fatty liver. Ammonia level 27 on 5/20/2020. 5.RENAL AND ELECTROLYTES. Acute hypernatremia w/ serum Na 140 from 148 yesterday. Mild hypoxia kalemia-serum K 3.8 -replete. 6.ID. 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 Patient is on Amphotericin B infusion. ID is following. 7. ENDO.     TSH 0.70.      8.MUSCULOSKELETAL. General body weakness with flaccid paralysis. 9. LIFE STYLE.     H/o chronic tobacco use and alcohol abuse. 10.HEM-ONC. Normocytic anemia-Hb 9.3, MCV 96.7. Elevated sed rate 51. CRP trending down 4.3 from 196 - 05/13/2020. G20 8149, folic acid 19.    22.VPILUQSXT. Protein energy malnutrition -on tube feeds. 12.DISPO. Planned d/c to rehab soon.     Electronically signed Gordy Harden MD on 6/3/2020 at 6:29 PM    Rounding Hospitalist

## 2020-06-03 NOTE — PLAN OF CARE
Problem: Falls - Risk of:  Goal: Will remain free from falls  Description: Will remain free from falls  Outcome: Ongoing   Pt remains free from falls at this time. Floor free from obstacles, and bed is locked and in lowest position. Adequate lighting provided. Call light within reach; pt encouraged to call before getting OOB for any need. Will continue to monitor needs during hourly rounding. Problem: Pain:  Goal: Pain level will decrease  Description: Pain level will decrease  Outcome: Ongoing   Pt's pain assessed frequently with hourly rounding; assessed all pain characteristics including level, type, location, frequency, and onset. Pt medicated by RN per PRN orders. Non-pharmacologic interventions offered to pt as well. Pt states pain is tolerable at this time. Will continue to monitor. Problem: Skin Integrity - Impaired:  Goal: Absence of new skin breakdown  Description: Absence of new skin breakdown  6/3/2020 0334 by Gerry Pearson RN  Outcome: Ongoing  6/2/2020 1820 by Angel Arreola RN  Outcome: Ongoing   Full skin assessment completed this shift. No new skin breakdown at this time. Pt repositioned q2h and prn. Pt kept clean and dry. Low air loss mattress in place on bed, heels floated and foot drop boot rotated q2 with turns. Will continue to monitor.     Electronically signed by Gerry Pearson RN on 6/3/2020 at 3:36 AM

## 2020-06-03 NOTE — PROGRESS NOTES
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 · 5/16 MRI LB old osteoarthritis and no cord compression  · 5/16 MRI brain neg - 5/15 LP 8 WBC and normal glucose  · 5/17 CT chest LLL consolidation, ? Atelectasis. · LP 8 WBC and normal glucose  · Treated w steroids and  ceftriaxone 5/14 - 5/15 , fever improved, but fever relapsed off steroids  · Fever relapsed since 5/17   · Increased oxygen requirement, increased lethargy  · Left lower lobe infiltrate worse on x-ray  · Failed Levaquin since 5/19 -legionella AG neg  · 5/20  intubated - fever 104. - CXR worse LLL  · Generalized Body rash noticed at admission .   · CT chest 5/21 progression of the left lower lobe infiltrate, around pneumonia  · Sputum culture did not show cryptococcus  · Cryptococcus CSF antigen -5/22  · Opening pressure 5/2416  · All CSF meningitis panel is negative for cryptococcus PCR  · 5/21 cryptococcus serum antigen positive, 4  · 5/22 lumbar puncture repeated - WBC 63, neutrophils 39%, 54 lymp, Protein 142, glucose 53 -  PCR neg  · 5/22 abelcet started  · 5/22 and 5/23 no fever  · CRP decreased to 49 under antifungals-  · Fever also responded to antifungals  · CSF cytology twice negative for malignancy or cryptococcus antigen  · The lack of is normal in size with normal systolic function globally. Calculated ejection fraction is 56%. Mildly dilated right atrium. Mild buckling of the right atrial wall. Aortic sclerosis without stenosis. Mild tricuspid regurgitation. Estimated right ventricular systolic pressure is 25 mmHg. Imaging  Chest x-ray 6/3 seems to be rather stable with fluid left more than the right and some left lower lobe atelectasis    CTA neck 6/1/20  No acute arterial abnormality or hemodynamically significant arterial   stenosis in the head or neck. 2. Partially imaged small to moderate layering bilateral pleural effusions   with left upper lobe atelectasis         CXR 5/29 worsened LLL infiltrate since extubation    CT chest 5/21 - progression of the RLL infilt/ pneumonia looks larger than on the first CT        EEG  Slowing 5/19  CXR 5/20 progression of the left infiltrate/ consolidation  CXR 5/19 shows worsening left lower lobe infiltrate with possible effusion      CT chest 5/17    CXR 5/16 5/17 MRI of the lumbar spine showed no active infection but osteoarthritis  5/17  MRI of the brain was negative       I have personally reviewed the past medical history, past surgical history, medications, social history, and family history, and I haveupdated the database accordingly.   Past Medical History:     Past Medical History:   Diagnosis Date    COPD (chronic obstructive pulmonary disease) ([facility] [address]     ETOH abuse        Past Surgical  History:     Past Surgical History:   Procedure Laterality Date    CHOLECYSTECTOMY      THYROIDECTOMY      TONSILLECTOMY         Medications:      albuterol  2.5 mg Nebulization BID    flucytosine  25 mg/kg Oral 4 times per day    ipratropium-albuterol  1 ampule Inhalation 4x daily    thiamine  100 mg Oral Daily    CENTRUM/CERTA-ALYSSA with minerals oral  15 mL Oral Daily    potassium bicarb-citric acid  40 mEq Oral Daily    amphotericin B liposome (AMBISOME) IVPB  4 mg/kg Intravenous Q24H the past 8 hrs:   BP Temp Temp src Pulse Resp SpO2 Weight   06/03/20 1215 118/84 98 °F (36.7 °C) Temporal 66 15 95 % --   06/03/20 1123 -- -- -- -- 17 91 % --   06/03/20 1121 -- -- -- -- 18 91 % --   06/03/20 0858 -- -- -- -- 19 98 % --   06/03/20 0856 -- -- -- -- 13 100 % --   06/03/20 0745 121/63 99.2 °F (37.3 °C) Temporal 64 12 97 % --   06/03/20 0508 -- -- -- -- -- -- 160 lb 0.9 oz (72.6 kg)   06/03/20 0500 -- -- -- -- 22 99 % --   06/03/20 0449 -- -- -- -- 15 99 % --       Physical Exam  Constitutional:       General: She is not in acute distress. Appearance: She is normal weight. HENT:      Head: Normocephalic and atraumatic. Nose: No congestion or rhinorrhea. Eyes:      General: No scleral icterus. Conjunctiva/sclera: Conjunctivae normal.   Neck:      Musculoskeletal: Neck supple. No neck rigidity or muscular tenderness. Cardiovascular:      Rate and Rhythm: Normal rate and regular rhythm. Heart sounds: Normal heart sounds. No murmur. No friction rub. Pulmonary:      Effort: Pulmonary effort is normal. No respiratory distress. Breath sounds: No stridor. No wheezing or rhonchi. Abdominal:      General: There is no distension. Tenderness: There is no abdominal tenderness. Genitourinary:     Comments: No redd  Musculoskeletal:         General: No swelling, tenderness, deformity or signs of injury. Skin:     General: Skin is warm. Coloration: Skin is not jaundiced or pale. Findings: No bruising or erythema. Neurological:      Mental Status: She is alert. Mental status is at baseline. Cranial Nerves: No cranial nerve deficit.    Psychiatric:         Mood and Affect: Mood normal.           Medical Decision Making:   I have independently reviewed/ordered the following labs:    CBC with Differential:   Recent Labs     06/02/20  0433 06/02/20  1648 06/03/20  0257   WBC 11.5* 10.7 11.8*   HGB 8.9* 9.3* 8.5*   HCT 28.5* 29.3* 26.8*    243 251   LYMPHOPCT

## 2020-06-03 NOTE — PROGRESS NOTES
Intravenous Q24H    sodium chloride flush  10 mL Intravenous 2 times per day    folic acid  1 mg Oral Daily     PRN Meds include: heparin (porcine), heparin (porcine), sodium chloride flush, fentanNYL **OR** fentanNYL, sodium chloride flush, acetaminophen **OR** acetaminophen, polyethylene glycol, promethazine **OR** ondansetron, potassium chloride **OR** potassium alternative oral replacement **OR** potassium chloride, magnesium sulfate, midodrine    Objective:   /74   Pulse 66   Temp 98.6 °F (37 °C)   Resp 14   Ht 5' 7\" (1.702 m)   Wt 160 lb 0.9 oz (72.6 kg)   SpO2 97%   BMI 25.07 kg/m²     Blood pressure range: Systolic (12EZI), RNR:461 , Min:118 , LDD:697   ; Diastolic (30ZQD), TRQ:89, Min:63, Max:89      ROS:  Review of Systems   Unable to perform ROS: Mental status change   patient is non-verbal       NEUROLOGIC EXAMINATION  Physical Exam  Constitutional:       General: She is not in acute distress. Appearance: She is ill-appearing (looks better today) and toxic-appearing. She is not diaphoretic. HENT:      Head: Normocephalic and atraumatic. Comments: Central gaze. Eyes are open spontaneously   Eyes:      Pupils: Pupils are equal, round, and reactive to light. Neck:      Musculoskeletal: Decreased range of motion. No neck rigidity. Cardiovascular:      Rate and Rhythm: Normal rate and regular rhythm. Pulmonary:      Effort: Pulmonary effort is normal.   Abdominal:      General: Abdomen is flat. Skin:     General: Skin is warm. Findings: Rash (Generalized skin rash, moribilliform) present. Neurological:      GCS: GCS eye subscore is 4. GCS verbal subscore is 2. GCS motor subscore is 6. Cranial Nerves: No cranial nerve deficit or facial asymmetry. Deep Tendon Reflexes: Babinski sign absent on the right side. Babinski sign absent on the left side. Comments:            Neurologic Exam     Mental Status   Disoriented to person. Disoriented to place. Disoriented to time. Level of consciousness: unresponsive to painful stimuli ,  drowsy  Unable to name object. Unable to read. Unable to repeat. Unable to write. Abnormal comprehension. Cranial Nerves     CN III, IV, VI   Pupils are equal, round, and reactive to light. Nystagmus: none     CN VII   Facial expression full, symmetric. Motor Exam Follow simple commands consistently         Lab Results:   CBC:   Recent Labs     06/02/20  0433 06/02/20  1648 06/03/20  0257   WBC 11.5* 10.7 11.8*   HGB 8.9* 9.3* 8.5*    243 251     BMP:    Recent Labs     06/01/20  0652  06/02/20  0035 06/02/20  0433  06/02/20  2054 06/02/20  2123 06/03/20  0257 06/03/20  1306   *   < >  --  148*   < > 146*  --  145* 140   K 2.9*  --  3.6* 3.5*  --   --   --  3.8  --    *  --   --  107  --   --   --  106  --    CO2 30  --   --  32*  --   --   --  29  --    BUN 36*  --   --  32*  --   --   --  39*  --    CREATININE 0.43*  --   --  0.48*  --   --  0.85 0.49*  --    GLUCOSE 158*  --   --  158*  --   --   --  156*  --     < > = values in this interval not displayed. Lab Results   Component Value Date    ALT 25 06/03/2020    AST 20 06/03/2020    TSH 0.70 05/14/2020    INR 1.0 05/14/2020    [id number] 1358 (H) 05/17/2020     CSF studies  White blood cells 8  Neutrophils 48%  Lymphocytes 21%  RBCs 1  Glucose 74  Protein 77.8  Increased IgG synthesis rate  No results found for: PHENYTOIN, PHENYTOIN, VALPROATE, CBMZ  Repeat CSF was done twice   Opening pressure 14 cm H2O    IMAGING    Repeat MRI brain w/wo contrast      MRI brain showed non-enhancing bilateral punctate areas of diffusion restriction without ADC hypointensities. Discussed over the phone with the reading radiologist who think they're related to embolic infarcts    CTH repeat 5/20/2020  No acute findings     CTPE  No evidence of pulmonary embolism. Left lower lobar atelectasis. MRI thoracic/ lumbar spine   No cord abnormality identified.

## 2020-06-03 NOTE — PROGRESS NOTES
Occupational Therapy  Facility/Department: [address] STEPDOWN  Daily Treatment Note  NAME: Diaz Vaughan  : 1960  MRN: [id number]    Date of Service: 6/3/2020    Discharge Recommendations:  Patient would benefit from continued therapy after discharge    Assessment   Performance deficits / Impairments: Decreased functional mobility ; Decreased strength;Decreased endurance;Decreased vision/visual deficit; Decreased coordination;Decreased ADL status; Decreased safe awareness;Decreased posture;Decreased ROM; Decreased cognition;Decreased balance;Decreased fine motor control  Treatment Diagnosis: Pneumonia Due to COVID 19 Virus  Prognosis: Fair  Decision Making: High Complexity  OT Education: OT Role;Plan of Care  Patient Education: Pt ed on OT POC, question pts level of understanding. Barriers to Learning: cognition, difficuty following commands   REQUIRES OT FOLLOW UP: Yes  Activity Tolerance  Activity Tolerance: Treatment limited secondary to decreased cognition;Patient limited by fatigue  Safety Devices  Safety Devices in place: Yes  Type of devices: Bed alarm in place; Left in bed;Call light within reach(PTA and RN present on exit )  Restraints  Initially in place: No       Patient Diagnosis(es): There were no encounter diagnoses. has a past medical history of COPD (chronic obstructive pulmonary disease) (HCC) and ETOH abuse.   has a past surgical history that includes Cholecystectomy; Thyroidectomy; and Tonsillectomy.     Restrictions  Restrictions/Precautions  Restrictions/Precautions: Fall Risk, Up as Tolerated, Seizure  Required Braces or Orthoses?: No     Subjective   General  Patient assessed for rehabilitation services?: Yes  Family / Caregiver Present: No  Vital Signs  Patient Currently in Pain: Denies     Orientation  Orientation  Overall Orientation Status: Impaired  Orientation Level: Oriented to person;Disoriented to time;Disoriented to situation;Oriented to place(Pt able to nodd head \"yes\" to hosptial

## 2020-06-03 NOTE — PROGRESS NOTES
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 · Has been started on pulse steroids since 5/30 per neurology  · Fevers and CRP responded to Abelcet  · encephalopathy not better despite the Abelcet and the steroids  · Would benefit ultimately from another lumbar puncture early next week to look for response  · Disc w CC   Infection Control Recommendations   · [place] Precautions  · Contact Isolation   Antimicrobial Stewardship Recommendations   · Simplification of therapy  · Targeted therapy    Coordination ofOutpatient Care:   · Estimated Length of IV antimicrobials:  · Patient will need Midline / picc Catheter Insertion:   · Patient will need SNF:  · Patient will need outpatient wound care:     History of Present Illness:   Initial history:  Katie Engel is a 62y.o.-year-old female who presented to the emergency room at [facility] [place] [facility]"" with a 10 days history of a headache photophobia, a lower back pain with weakness. She denies being short of breath, she does have a smoker's cough that has improved recently. She has no aches other than the lower back pain, she has no sweats or fevers. She does not have a good appetite and has not eaten in 3 days, but does not think she has a taste bud problem  She was tested twice for COVID and came back negative  Unable to go to the bathroom at this point because of the weakness of her legs and the back pain.   White count is normal but CRP is elevated as well as ferritin  CT scan of the chest is showing no infiltrates at all    The patient was transferred for possible COVID to [facility] and admitted to the [facility] floor ICU    I discussed with critical care, we can move him out of the [facility] ICU to a regular floor, get an MRI of the spine, consult neurosurgery, get a 2D echo,  The patient was started on Zosyn today. We will defer further antibiotic doses before we have the MRI finding    The patient denies any history of IV drug use    Interval changes  5/26/20  Remains very encephalopathic, wakes up a little bit tries to mumble words. But that is about it. Very weak does not respond to commands and does not seem to be lifting her arms. Edema both upper and lower extremities present  Steroids stopped today, from 5/30 until 6/2    CT of the neck 6/1 is showing no acute arterial abnormality but moderate layering bilateral pleural effusions with left upper lobe atelectasis      CXR 5/29 worsened LLL, might be due to her poor inspiration    CSF Cytology negative for cancer and for cryptococcus  Repeat serum cryptococcus AG titers low positive twice    Case discussed with neurology as well, ? concerned about Whipple disease, testing was sent on the CSF    CSF ACE neg, R/O neuro sarcoidosis. Histo titers and AG are neg    CRP decreased to 49 under antifungals    Cryptococcus CSF antigen negative- does not rule out cryptococcus meningoencephalitis in the presence of a positive cryptococcus antigen and abnormal CSF/encephalitis. Summary of relevant labs:  Labs:  WBC 10.2 - 16 - 14 - 11 - 10.7  ALC 0.14    -43-23 - 10 -49 - 44  Ferritin 9357  Creat 0.48    Kappa lambda  ratio is normal and hence not multiple myeloma  Micro:  Sp cx 5/20 neg  BC x2  5/20 neg   BC  x2  5/13 neg    HIV neg 5/15/20  Crypto serum AG + titer= 4 ( 5/21) / + titer = 2 ( 5/25)  Crypto CSF AG neg 5/22  Crypto PCR CSF neg 5/15, 5/22, 5/24    Cytology CSF 5/22 5/24 5/26 are  completely negative - no fungal elements    · 5/24 LP OP 16 - CSF:  RBC 1600, WBC 95, 63% lymphocytes, 33% PMNs, - Meningitis panel negative PCR.   · 5/22 LP  CSF WBC 63, neutrophils 39%, 54 lymp, Protein 142, glucose 53 - PCR panel neg including the crypto PCR -cx neg  · 5/15

## 2020-06-03 NOTE — PROGRESS NOTES
present bilaterally. Cardiovascular -regular rate and rhythm, normal S1-S2, no murmurs  Abdomen -soft, nontender, nondistended  Skin - No bruising or bleeding    MEDS   albuterol  2.5 mg Nebulization BID    flucytosine  25 mg/kg Oral 4 times per day    ipratropium-albuterol  1 ampule Inhalation 4x daily    thiamine  100 mg Oral Daily    CENTRUM/CERTA-ALYSSA with minerals oral  15 mL Oral Daily    potassium bicarb-citric acid  40 mEq Oral Daily    amphotericin B liposome (AMBISOME) IVPB  4 mg/kg Intravenous Q24H    sodium chloride flush  10 mL Intravenous 2 times per day    folic acid  1 mg Oral Daily      heparin (porcine) 20 Units/kg/hr (06/03/20 1008)     heparin (porcine), heparin (porcine), sodium chloride flush, fentanNYL **OR** fentanNYL, sodium chloride flush, acetaminophen **OR** acetaminophen, polyethylene glycol, promethazine **OR** ondansetron, potassium chloride **OR** potassium alternative oral replacement **OR** potassium chloride, magnesium sulfate, midodrine    LABS  CBC   Recent Labs     06/03/20  0257   WBC 11.8*   HGB 8.5*   HCT 26.8*   MCV 95.4        BMP:   Recent Labs     06/03/20  0257   *   K 3.8      CO2 29   BUN 39*   CREATININE 0.49*   GLUCOSE 156*   MG 2.0   PHOS 3.2     No results found for: PH, PCO2, PO2, HCO3, O2SAT  Lab Results   Component Value Date    MODE NOT REPORTED 06/02/2020     LIVER PROFILE   Recent Labs     06/03/20  0257   AST 20   ALT 25   BILITOT 0.16*   ALKPHOS 69     INR No results for input(s): INR in the last 72 hours.   PTT   Lab Results   Component Value Date    APTT 59.7 (H) 06/03/2020     CBC:   Lab Results   Component Value Date    WBC 11.8 06/03/2020    RBC 2.81 06/03/2020    HGB 8.5 06/03/2020    HCT 26.8 06/03/2020    MCV 95.4 06/03/2020    MCH 30.2 06/03/2020    MCHC 31.7 06/03/2020    RDW 15.1 06/03/2020     06/03/2020    MPV 9.9 06/03/2020     BMP:    Lab Results   Component Value Date     06/03/2020    K 3.8

## 2020-06-03 NOTE — PROGRESS NOTES
Per IR, pt to get thoracentesis tomorrow AM. Heparin boluses to be stopped 6/4/20 at 0400 and gtt held at 0600. Updated Dr Aung Funez via perfect serve.   Electronically signed by Vitaliy Perdomo RN on 6/3/2020 at 3:33 PM

## 2020-06-03 NOTE — PROGRESS NOTES
poor trunk control)    Exercise  B UE and B LE PROM inall planes x10     Goals  Short term goals  Time Frame for Short term goals: 14 visits  Short term goal 1: Pt to perform Bed mobility min A  Short term goal 2: Pt to improve sitting balance to Good  Short term goal 3: Pt to transfer Min A  Short term goal 4: Pt to ambulate with appropriate device 100 ft Min A  Short term goal 5: Pt to tolerate 30 minutes of activity to improve strength and endurance. Patient Goals   Patient goals : Unable to state    Plan    Plan  Times per week: 5x/week  Current Treatment Recommendations: Strengthening, Functional Mobility Training, Transfer Training, Gait Training, Safety Education & Training, Balance Training, Endurance Training, Neuromuscular Re-education  Safety Devices  Type of devices:  All fall risk precautions in place, Left in bed, Call light within reach, Patient at risk for falls, Nurse notified  Restraints  Initially in place: No     Therapy Time   Individual Concurrent Group Co-treatment   Time In 1001         Time Out 1026         Minutes 25         Timed Code Treatment Minutes: 4605 Abdoul Lyles, PTA

## 2020-06-04 ENCOUNTER — APPOINTMENT (OUTPATIENT)
Dept: GENERAL RADIOLOGY | Age: 60
DRG: 720 | End: 2020-06-04
Attending: INTERNAL MEDICINE
Payer: MEDICARE

## 2020-06-04 ENCOUNTER — APPOINTMENT (OUTPATIENT)
Dept: ULTRASOUND IMAGING | Age: 60
DRG: 720 | End: 2020-06-04
Attending: INTERNAL MEDICINE
Payer: MEDICARE

## 2020-06-04 PROBLEM — Z98.890 S/P THORACENTESIS: Status: ACTIVE | Noted: 2020-06-04

## 2020-06-04 LAB
ABSOLUTE EOS #: 0.16 K/UL (ref 0–0.44)
ABSOLUTE IMMATURE GRANULOCYTE: 0.11 K/UL (ref 0–0.3)
ABSOLUTE LYMPH #: 1.76 K/UL (ref 1.1–3.7)
ABSOLUTE MONO #: 0.83 K/UL (ref 0.1–1.2)
ALBUMIN SERPL-MCNC: 2.5 G/DL (ref 3.5–5.2)
ALBUMIN/GLOBULIN RATIO: 1 (ref 1–2.5)
ALP BLD-CCNC: 81 U/L (ref 35–104)
ALT SERPL-CCNC: 36 U/L (ref 5–33)
ANION GAP SERPL CALCULATED.3IONS-SCNC: 8 MMOL/L (ref 9–17)
AST SERPL-CCNC: 35 U/L
BASOPHILS # BLD: 0 % (ref 0–2)
BASOPHILS ABSOLUTE: <0.03 K/UL (ref 0–0.2)
BILIRUB SERPL-MCNC: 0.25 MG/DL (ref 0.3–1.2)
BUN BLDV-MCNC: 25 MG/DL (ref 6–20)
BUN/CREAT BLD: ABNORMAL (ref 9–20)
CALCIUM SERPL-MCNC: 9.5 MG/DL (ref 8.6–10.4)
CASE NUMBER:: NORMAL
CHLORIDE BLD-SCNC: 105 MMOL/L (ref 98–107)
CO2: 33 MMOL/L (ref 20–31)
CREAT SERPL-MCNC: 0.4 MG/DL (ref 0.5–0.9)
DIFFERENTIAL TYPE: ABNORMAL
DIRECT EXAM: NORMAL
EOSINOPHILS RELATIVE PERCENT: 2 % (ref 1–4)
GFR AFRICAN AMERICAN: >60 ML/MIN
GFR NON-AFRICAN AMERICAN: >60 ML/MIN
GFR SERPL CREATININE-BSD FRML MDRD: ABNORMAL ML/MIN/{1.73_M2}
GFR SERPL CREATININE-BSD FRML MDRD: ABNORMAL ML/MIN/{1.73_M2}
GLUCOSE BLD-MCNC: 103 MG/DL (ref 70–99)
GLUCOSE BLD-MCNC: 111 MG/DL (ref 65–105)
GLUCOSE BLD-MCNC: 161 MG/DL (ref 65–105)
GLUCOSE BLD-MCNC: 167 MG/DL (ref 65–105)
GLUCOSE BLD-MCNC: 95 MG/DL (ref 65–105)
GLUCOSE, FLUID: 105 MG/DL
HCT VFR BLD CALC: 29.6 % (ref 36.3–47.1)
HEMOGLOBIN: 9.4 G/DL (ref 11.9–15.1)
IMMATURE GRANULOCYTES: 1 %
LACTATE DEHYDROGENASE, FLUID: 81 U/L
LYMPHOCYTES # BLD: 19 % (ref 24–43)
Lab: NORMAL
MAGNESIUM: 1.9 MG/DL (ref 1.6–2.6)
MCH RBC QN AUTO: 30.3 PG (ref 25.2–33.5)
MCHC RBC AUTO-ENTMCNC: 31.8 G/DL (ref 28.4–34.8)
MCV RBC AUTO: 95.5 FL (ref 82.6–102.9)
MONOCYTES # BLD: 9 % (ref 3–12)
NRBC AUTOMATED: 0 PER 100 WBC
PARTIAL THROMBOPLASTIN TIME: 40.6 SEC (ref 20.5–30.5)
PARTIAL THROMBOPLASTIN TIME: 65.9 SEC (ref 20.5–30.5)
PDW BLD-RTO: 15 % (ref 11.8–14.4)
PH FLUID: 8.4
PLATELET # BLD: 282 K/UL (ref 138–453)
PLATELET ESTIMATE: ABNORMAL
PMV BLD AUTO: 10 FL (ref 8.1–13.5)
POTASSIUM SERPL-SCNC: 3.2 MMOL/L (ref 3.7–5.3)
RBC # BLD: 3.1 M/UL (ref 3.95–5.11)
RBC # BLD: ABNORMAL 10*6/UL
SEG NEUTROPHILS: 69 % (ref 36–65)
SEGMENTED NEUTROPHILS ABSOLUTE COUNT: 6.6 K/UL (ref 1.5–8.1)
SODIUM BLD-SCNC: 141 MMOL/L (ref 135–144)
SODIUM BLD-SCNC: 145 MMOL/L (ref 135–144)
SODIUM BLD-SCNC: 146 MMOL/L (ref 135–144)
SPECIMEN DESCRIPTION: NORMAL
SPECIMEN DESCRIPTION: NORMAL
SPECIMEN TYPE: NORMAL
TOTAL PROTEIN, BODY FLUID: 1 G/DL
TOTAL PROTEIN: 4.9 G/DL (ref 6.4–8.3)
WBC # BLD: 9.5 K/UL (ref 3.5–11.3)
WBC # BLD: ABNORMAL 10*3/UL

## 2020-06-04 PROCEDURE — 6370000000 HC RX 637 (ALT 250 FOR IP): Performed by: STUDENT IN AN ORGANIZED HEALTH CARE EDUCATION/TRAINING PROGRAM

## 2020-06-04 PROCEDURE — 94760 N-INVAS EAR/PLS OXIMETRY 1: CPT

## 2020-06-04 PROCEDURE — 6360000002 HC RX W HCPCS: Performed by: INTERNAL MEDICINE

## 2020-06-04 PROCEDURE — 76937 US GUIDE VASCULAR ACCESS: CPT

## 2020-06-04 PROCEDURE — 2580000003 HC RX 258: Performed by: INTERNAL MEDICINE

## 2020-06-04 PROCEDURE — 36415 COLL VENOUS BLD VENIPUNCTURE: CPT

## 2020-06-04 PROCEDURE — 71045 X-RAY EXAM CHEST 1 VIEW: CPT

## 2020-06-04 PROCEDURE — 85025 COMPLETE CBC W/AUTO DIFF WBC: CPT

## 2020-06-04 PROCEDURE — 6360000002 HC RX W HCPCS: Performed by: STUDENT IN AN ORGANIZED HEALTH CARE EDUCATION/TRAINING PROGRAM

## 2020-06-04 PROCEDURE — 82947 ASSAY GLUCOSE BLOOD QUANT: CPT

## 2020-06-04 PROCEDURE — 88305 TISSUE EXAM BY PATHOLOGIST: CPT

## 2020-06-04 PROCEDURE — 51798 US URINE CAPACITY MEASURE: CPT

## 2020-06-04 PROCEDURE — 94640 AIRWAY INHALATION TREATMENT: CPT

## 2020-06-04 PROCEDURE — 87206 SMEAR FLUORESCENT/ACID STAI: CPT

## 2020-06-04 PROCEDURE — 87327 CRYPTOCOCCUS NEOFORM AG IA: CPT

## 2020-06-04 PROCEDURE — 2060000000 HC ICU INTERMEDIATE R&B

## 2020-06-04 PROCEDURE — 88112 CYTOPATH CELL ENHANCE TECH: CPT

## 2020-06-04 PROCEDURE — 85730 THROMBOPLASTIN TIME PARTIAL: CPT

## 2020-06-04 PROCEDURE — 97110 THERAPEUTIC EXERCISES: CPT

## 2020-06-04 PROCEDURE — 99232 SBSQ HOSP IP/OBS MODERATE 35: CPT | Performed by: INTERNAL MEDICINE

## 2020-06-04 PROCEDURE — 83735 ASSAY OF MAGNESIUM: CPT

## 2020-06-04 PROCEDURE — 02HV33Z INSERTION OF INFUSION DEVICE INTO SUPERIOR VENA CAVA, PERCUTANEOUS APPROACH: ICD-10-PCS | Performed by: INTERNAL MEDICINE

## 2020-06-04 PROCEDURE — 87102 FUNGUS ISOLATION CULTURE: CPT

## 2020-06-04 PROCEDURE — 87205 SMEAR GRAM STAIN: CPT

## 2020-06-04 PROCEDURE — 2580000003 HC RX 258: Performed by: STUDENT IN AN ORGANIZED HEALTH CARE EDUCATION/TRAINING PROGRAM

## 2020-06-04 PROCEDURE — C1729 CATH, DRAINAGE: HCPCS

## 2020-06-04 PROCEDURE — 87070 CULTURE OTHR SPECIMN AEROBIC: CPT

## 2020-06-04 PROCEDURE — 51701 INSERT BLADDER CATHETER: CPT

## 2020-06-04 PROCEDURE — 87116 MYCOBACTERIA CULTURE: CPT

## 2020-06-04 PROCEDURE — 32555 ASPIRATE PLEURA W/ IMAGING: CPT

## 2020-06-04 PROCEDURE — 82945 GLUCOSE OTHER FLUID: CPT

## 2020-06-04 PROCEDURE — 6370000000 HC RX 637 (ALT 250 FOR IP): Performed by: PSYCHIATRY & NEUROLOGY

## 2020-06-04 PROCEDURE — 94761 N-INVAS EAR/PLS OXIMETRY MLT: CPT

## 2020-06-04 PROCEDURE — 88312 SPECIAL STAINS GROUP 1: CPT

## 2020-06-04 PROCEDURE — 2709999900 HC NON-CHARGEABLE SUPPLY

## 2020-06-04 PROCEDURE — 87075 CULTR BACTERIA EXCEPT BLOOD: CPT

## 2020-06-04 PROCEDURE — C1751 CATH, INF, PER/CENT/MIDLINE: HCPCS

## 2020-06-04 PROCEDURE — 6370000000 HC RX 637 (ALT 250 FOR IP): Performed by: INTERNAL MEDICINE

## 2020-06-04 PROCEDURE — 87483 CNS DNA AMP PROBE TYPE 12-25: CPT

## 2020-06-04 PROCEDURE — 99233 SBSQ HOSP IP/OBS HIGH 50: CPT | Performed by: INTERNAL MEDICINE

## 2020-06-04 PROCEDURE — 6370000000 HC RX 637 (ALT 250 FOR IP): Performed by: NURSE PRACTITIONER

## 2020-06-04 PROCEDURE — 94669 MECHANICAL CHEST WALL OSCILL: CPT

## 2020-06-04 PROCEDURE — 88185 FLOWCYTOMETRY/TC ADD-ON: CPT

## 2020-06-04 PROCEDURE — 99233 SBSQ HOSP IP/OBS HIGH 50: CPT | Performed by: PSYCHIATRY & NEUROLOGY

## 2020-06-04 PROCEDURE — 84295 ASSAY OF SERUM SODIUM: CPT

## 2020-06-04 PROCEDURE — 88184 FLOWCYTOMETRY/ TC 1 MARKER: CPT

## 2020-06-04 PROCEDURE — 2700000000 HC OXYGEN THERAPY PER DAY

## 2020-06-04 PROCEDURE — 36569 INSJ PICC 5 YR+ W/O IMAGING: CPT

## 2020-06-04 PROCEDURE — 80053 COMPREHEN METABOLIC PANEL: CPT

## 2020-06-04 RX ORDER — FLUCYTOSINE 250 MG/1
25 CAPSULE ORAL EVERY 6 HOURS SCHEDULED
Status: DISCONTINUED | OUTPATIENT
Start: 2020-06-04 | End: 2020-06-05

## 2020-06-04 RX ORDER — METHYLPREDNISOLONE SODIUM SUCCINATE 125 MG/2ML
250 INJECTION, POWDER, LYOPHILIZED, FOR SOLUTION INTRAMUSCULAR; INTRAVENOUS EVERY 12 HOURS
Status: DISCONTINUED | OUTPATIENT
Start: 2020-06-05 | End: 2020-06-04

## 2020-06-04 RX ORDER — METHYLPREDNISOLONE SODIUM SUCCINATE 125 MG/2ML
250 INJECTION, POWDER, LYOPHILIZED, FOR SOLUTION INTRAMUSCULAR; INTRAVENOUS EVERY 6 HOURS
Status: DISCONTINUED | OUTPATIENT
Start: 2020-06-04 | End: 2020-06-04

## 2020-06-04 RX ORDER — METHYLPREDNISOLONE SODIUM SUCCINATE 125 MG/2ML
250 INJECTION, POWDER, LYOPHILIZED, FOR SOLUTION INTRAMUSCULAR; INTRAVENOUS ONCE
Status: DISCONTINUED | OUTPATIENT
Start: 2020-06-04 | End: 2020-06-04

## 2020-06-04 RX ADMIN — SODIUM CHLORIDE, PRESERVATIVE FREE 10 ML: 5 INJECTION INTRAVENOUS at 08:00

## 2020-06-04 RX ADMIN — FOLIC ACID 1 MG: 1 TABLET ORAL at 07:59

## 2020-06-04 RX ADMIN — FLUCYTOSINE 1750 MG: 250 CAPSULE ORAL at 08:00

## 2020-06-04 RX ADMIN — FLUCYTOSINE 1750 MG: 250 CAPSULE ORAL at 01:23

## 2020-06-04 RX ADMIN — FLUCYTOSINE 1750 MG: 250 CAPSULE ORAL at 20:20

## 2020-06-04 RX ADMIN — Medication 15 ML: at 08:00

## 2020-06-04 RX ADMIN — IPRATROPIUM BROMIDE AND ALBUTEROL SULFATE 1 AMPULE: .5; 3 SOLUTION RESPIRATORY (INHALATION) at 15:23

## 2020-06-04 RX ADMIN — ALBUTEROL SULFATE 2.5 MG: 2.5 SOLUTION RESPIRATORY (INHALATION) at 03:27

## 2020-06-04 RX ADMIN — FLUCYTOSINE 1750 MG: 250 CAPSULE ORAL at 13:49

## 2020-06-04 RX ADMIN — POTASSIUM BICARBONATE 40 MEQ: 782 TABLET, EFFERVESCENT ORAL at 07:59

## 2020-06-04 RX ADMIN — AMPHOTERICIN B 230 MG: 50 INJECTION, POWDER, LYOPHILIZED, FOR SOLUTION INTRAVENOUS at 15:54

## 2020-06-04 RX ADMIN — SODIUM CHLORIDE, PRESERVATIVE FREE 10 ML: 5 INJECTION INTRAVENOUS at 21:41

## 2020-06-04 RX ADMIN — SODIUM CHLORIDE 250 MG: 9 INJECTION, SOLUTION INTRAVENOUS at 13:50

## 2020-06-04 RX ADMIN — SODIUM CHLORIDE 250 MG: 9 INJECTION, SOLUTION INTRAVENOUS at 20:20

## 2020-06-04 RX ADMIN — HEPARIN SODIUM AND DEXTROSE 20 UNITS/KG/HR: 10000; 5 INJECTION INTRAVENOUS at 21:38

## 2020-06-04 RX ADMIN — IPRATROPIUM BROMIDE AND ALBUTEROL SULFATE 1 AMPULE: .5; 3 SOLUTION RESPIRATORY (INHALATION) at 11:38

## 2020-06-04 RX ADMIN — IPRATROPIUM BROMIDE AND ALBUTEROL SULFATE 1 AMPULE: .5; 3 SOLUTION RESPIRATORY (INHALATION) at 21:04

## 2020-06-04 RX ADMIN — HEPARIN SODIUM 2960 UNITS: 1000 INJECTION INTRAVENOUS; SUBCUTANEOUS at 21:38

## 2020-06-04 RX ADMIN — Medication 100 MG: at 07:59

## 2020-06-04 RX ADMIN — MIDODRINE HYDROCHLORIDE 5 MG: 5 TABLET ORAL at 07:59

## 2020-06-04 ASSESSMENT — PAIN SCALES - GENERAL
PAINLEVEL_OUTOF10: 0

## 2020-06-04 NOTE — PROGRESS NOTES
NEUROLOGY INPATIENT PROGRESS NOTE    6/4/2020         PATIENT DOWN IN IR FOR THORACOCENTESIS. Subjective:  Per RN -  Hemodynamically stable overnight. VSS, Tm 99.5. Pulm CC recommending holding Eliquis and starting heparin in anticipation of possible bronch. More awake today. Some verbal response. Oriented to place only. Briefly, this is a  62 y.o. female admitted on 5/14/2020 with new onset headache and generalized weakness has been treated for septic shock, transferred to the floor. Had worsening mental status since 5/16 and has been worked for possible encephalitis. CSF was negative for viral and bacterial meningitis. Was started on steroids initially for 2 days. EEG showed diffused encephalopathic picture. Was transferred back to MICU on 5/18 after rapid response was called and the patient was found to be in respiratory distress with hypoxia PO2 49, PCO2 45. Intubated in the MICU. She was extubated on 5/26. Transferred  Back to stepdown on 5/28. Continued to have same mental status without improvement until yesterday 5/30 prior to starting steroids. She was started on methylprednisolone 250 mg Q6H    No current facility-administered medications on file prior to encounter. No current outpatient medications on file prior to encounter. Allergies: Ami Dockery is allergic to sulfa antibiotics.     Past Medical History:   Diagnosis Date    COPD (chronic obstructive pulmonary disease) ([facility] [address]     ETOH abuse        Past Surgical History:   Procedure Laterality Date    CHOLECYSTECTOMY      THYROIDECTOMY      TONSILLECTOMY         Medications:     flucytosine  25 mg/kg Oral 4 times per day    albuterol  2.5 mg Nebulization BID    ipratropium-albuterol  1 ampule Inhalation 4x daily    thiamine  100 mg Oral Daily    CENTRUM/CERTA-ALYSSA with minerals oral  15 mL Oral Daily    potassium bicarb-citric acid  40 mEq Oral Daily    amphotericin B liposome (AMBISOME) IVPB  4 mg/kg Intravenous Q24H

## 2020-06-04 NOTE — PROGRESS NOTES
Physical Therapy  Facility/Department: [facility]  Daily Treatment Note  NAME: Pete Mcdowell  : 1960  MRN: [id number]    Date of Service: 2020    Discharge Recommendations:  Patient would benefit from continued therapy after discharge   PT Equipment Recommendations  Equipment Needed: No    Assessment   Body structures, Functions, Activity limitations: Decreased functional mobility ; Decreased endurance;Decreased ROM; Decreased strength;Decreased posture  Assessment: Treatment limited as pt leaving for procedure. Tolerated ex without adverse effects. Would benefit fom continued PT after d/c  Prognosis: Fair  REQUIRES PT FOLLOW UP: Yes  Activity Tolerance  Activity Tolerance: Patient limited by cognitive status; Patient limited by fatigue;Patient limited by endurance     Patient Diagnosis(es): There were no encounter diagnoses. has a past medical history of COPD (chronic obstructive pulmonary disease) (HCC) and ETOH abuse.   has a past surgical history that includes Cholecystectomy; Thyroidectomy; Tonsillectomy; and hc  picc powerpicc double (2020). Restrictions  Restrictions/Precautions  Restrictions/Precautions: Fall Risk, Up as Tolerated, Seizure  Required Braces or Orthoses?: No  Subjective   General  Response To Previous Treatment: Patient with no complaints from previous session.   Family / Caregiver Present: No  Subjective  Subjective: Pt alert in recliner upon arrival, improved orientation noted, more verbal  Pain Screening  Patient Currently in Pain: Denies  Vital Signs  Patient Currently in Pain: Denies     Exercise  B LE PROM in all planes x 10  Further treatment limited due to pt leaving for thoracentesis   Orientation  Orientation  Overall Orientation Status: Impaired  Orientation Level: Oriented to person;Oriented to place;Oriented to situation  Cognition      Objective   Bed mobility  Comment: JERMAIN, pt up to recliner up arrival  Transfers  Bed to Chair: Dependent/Total(chari lift to

## 2020-06-04 NOTE — PROCEDURES
History/labs/allergies reviewed  Placed by Sabina RN  Assisted by Elberta Castleman RN  Consent signed and obtained by physician  Time out performed using two identifiers  Catheter type double  lumen picc  Product type solo2 w 3cg  Lot # [id number]  Expiration date 4/30/21  Catheter size 5  Albanian  Trimmed at 37  Total length 38  External catheter length 0 cm  Location [facility]  Number of attempts 1  Estimated blood loss 2 ml  Pre procedure cardiac Rhythm NS per bedside telemetry and/or 3CG Tracing. Placement verified by- CXR and/or 3cg Max P wave noted by amplitude changes of the P wave, positive blood return, flushes easily  Special equipment used- ultrasound, micro-introducer technique and 3cg technology if indicated  Catheter secured with statlock  Dressing applied- Tegaderm CHG  Lidocaine administered intradermally conc. 1% 2 mL  PICC line education:   [x  ] Discussed with patient/Family or POA prior to signing Informed Procedural Consent. Risks and Benefits along with reason for procedure were discussed and teaching was reinforced with an education handout on PICC insertion. [facility] FAQ Catheter Associated Blood Stream Infections and [address] REV. 7/13 Nursing and Bard Booklet left at bedside or in chart. Patient (Family or POA) acknowledged understanding of information taught and agreed to procedure. [  ] Was not discussed with patient/family or POA due to pts medical status at time of procedure. pts family or POA not available to discuss PICC education.  CDC FAQ Catheter Associated Blood Stream Infections and [address] REV. 7/13 Nursing and [employer] Booklet left at bedside or in chart

## 2020-06-04 NOTE — PROGRESS NOTES
showing no infiltrates at all    The patient was transferred for possible COVID to [facility] and admitted to the [facility] floor ICU    I discussed with critical care, we can move him out of the [facility] ICU to a regular floor, get an MRI of the spine, consult neurosurgery, get a 2D echo,  The patient was started on Zosyn today. We will defer further antibiotic doses before we have the MRI finding    The patient denies any history of IV drug use    Interval changes  5/26/20  No fever  Still encephalopathic and very little response  L thoracenthesis done - CXR looks a little better as below  LP planned for the am  DC to [facility] might happen tomorrow  Coughs and bare;y clears her throat  Opens her eyes but not able to focus too long    Steroids stopped, from 5/30 until 6/2  CRP down to 4.3, again responding further to antifungal therapy, also occurred after the course of steroids      Summary of relevant labs:  Labs:  WBC 10.2 - 16 - 14 - 11 - 10.7  ALC 0.14    -43-23 - 10 -49 - 44 - 4.3  Ferritin 9357  Creat 0.48    Micro/ Infections:  Sp cx 5/20 neg  BC x2  5/20 neg   BC  x2  5/13 neg    HIV neg 5/15/20  Crypto serum AG + titer= 4 ( 5/21) / + titer = 2 ( 5/25)  Crypto CSF AG neg 5/22  Crypto PCR CSF neg 5/15, 5/22, 5/24    Cytology CSF 5/22 5/24 5/26 are  completely negative - no fungal elements    · 5/24 LP OP 16 - CSF:  RBC 1600, WBC 95, 63% lymphocytes, 33% PMNs, - Meningitis panel negative PCR. · 5/22 LP  CSF WBC 63, neutrophils 39%, 54 lymp, Protein 142, glucose 53 - PCR panel neg including the crypto PCR -cx neg  · 5/15 LP WBC 8- glucose 74- 48% PMN-meningitis PCR neg - lyme neg - EBV neg - VZV neg - cx neg    COVID neg 5/14  Legionella AG neg  Histoplasma titers and antigens negative  CSF lyme titers  Neg  Celiac panel neg  ANCA -cardiolipins -beta 2 microglobine- ceruleoplasmine: all neg  Kappa  over lambda ratio normal   low  CSF ACE neg, R/O neuro sarcoidosis.     Cardiology   Echo 5/17 no ipratropium-albuterol  1 ampule Inhalation 4x daily    thiamine  100 mg Oral Daily    CENTRUM/CERTA-ALYSSA with minerals oral  15 mL Oral Daily    potassium bicarb-citric acid  40 mEq Oral Daily    amphotericin B liposome (AMBISOME) IVPB  4 mg/kg Intravenous Q24H    sodium chloride flush  10 mL Intravenous 2 times per day    folic acid  1 mg Oral Daily       Social History:     Social History     Socioeconomic History    Marital status: Single     Spouse name: Not on file    Number of children: Not on file    Years of education: Not on file    Highest education level: Not on file   Occupational History    Not on file   Social Needs    Financial resource strain: Not on file    Food insecurity     Worry: Not on file     Inability: Not on file    Transportation needs     Medical: Not on file     Non-medical: Not on file   Tobacco Use    Smoking status: Current Every Day Smoker     Packs/day: 2.00     Types: Cigarettes    Smokeless tobacco: Never Used   Substance and Sexual Activity    Alcohol use: Yes     Frequency: 4 or more times a week     Drinks per session: 3 or 4     Binge frequency: Patient refused     Comment: hasn't drank in 12 days, usually daily drinker (vodka)    Drug use: Never    Sexual activity: Not on file   Lifestyle    Physical activity     Days per week: Patient refused     Minutes per session: Patient refused    Stress: Not on file   Relationships    Social connections     Talks on phone: Not on file     Gets together: Not on file     Attends Alevism service: Not on file     Active member of club or organization: Not on file     Attends meetings of clubs or organizations: Not on file     Relationship status: Not on file    Intimate partner violence     Fear of current or ex partner: No     Emotionally abused: No     Physically abused: No     Forced sexual activity: No   Other Topics Concern    Not on file   Social History Narrative    Not on file       Family History: Family History   Problem Relation Age of Onset    Stroke Mother     Cancer Father         Allergies:   Sulfa antibiotics     Review of Systems:     Review of Systems   Unable to perform ROS: Mental status change       Physical Examination :     Patient Vitals for the past 8 hrs:   BP Temp Temp src Pulse Resp SpO2   06/04/20 1510 121/80 98 °F (36.7 °C) Temporal 76 17 97 %   06/04/20 1138 -- -- -- -- 16 98 %   06/04/20 1130 131/78 96.5 °F (35.8 °C) Axillary 69 14 97 %   06/04/20 1100 -- -- -- 78 -- --   06/04/20 0915 -- -- -- -- 15 99 %   06/04/20 0900 -- -- -- -- 16 97 %   06/04/20 0800 121/78 98.3 °F (36.8 °C) Temporal 71 15 98 %   06/04/20 0730 121/78 98.3 °F (36.8 °C) Temporal 63 11 99 %       Physical Exam  Constitutional:       General: She is not in acute distress. Appearance: She is normal weight. HENT:      Head: Normocephalic and atraumatic. Nose: No congestion or rhinorrhea. Eyes:      General: No scleral icterus. Conjunctiva/sclera: Conjunctivae normal.   Neck:      Musculoskeletal: Neck supple. No neck rigidity or muscular tenderness. Cardiovascular:      Rate and Rhythm: Normal rate and regular rhythm. Heart sounds: Normal heart sounds. No gallop. Pulmonary:      Effort: Pulmonary effort is normal. No respiratory distress. Breath sounds: No stridor. No wheezing or rhonchi. Abdominal:      General: There is no distension. Tenderness: There is no abdominal tenderness. Genitourinary:     Comments: No redd  Musculoskeletal:         General: No swelling, tenderness, deformity or signs of injury. Skin:     General: Skin is warm. Coloration: Skin is not jaundiced or pale. Findings: No bruising or erythema. Neurological:      Mental Status: She is alert. Mental status is at baseline. Cranial Nerves: No cranial nerve deficit. Motor: No weakness.    Psychiatric:         Mood and Affect: Mood normal.           Medical Decision Making:   I have

## 2020-06-04 NOTE — PROGRESS NOTES
[facility] Hospitalist Progress Note-Internal Medicine. STVZ 4B Stepdown       Patient: Jani Ortiz  Unit/Bed: 2935/3843-10  YOB: 1960  MRN: [id number]  Acct: [de-identified]   Admitting Diagnosis:Pneumonia due to COVID-19 virus [U07.1, J12.89]  Admit Date:  5/14/2020  Hospital Day: 21    Subjective:    Patient unable to give meaningful history because of acute condition. No significant change in the clinical picture. Planned therapeutic lumbar puncture tomorrow. Patient Seen, Chart, Labs, Radiology studies, and Consults reviewed. Objective:   /80   Pulse 76   Temp 98 °F (36.7 °C) (Temporal)   Resp 13   Ht 5' 7\" (1.702 m)   Wt 160 lb 0.9 oz (72.6 kg)   SpO2 97%   BMI 25.07 kg/m²       Intake/Output Summary (Last 24 hours) at 6/4/2020 1649  Last data filed at 6/4/2020 1631  Gross per 24 hour   Intake 4143 ml   Output 4556 ml   Net -413 ml     Review of Systems   Unable to perform ROS: Mental status change   Neurological: Positive for weakness. Physical Exam  Constitutional:       Appearance: She is ill-appearing. She is not toxic-appearing or diaphoretic. HENT:      Head: Normocephalic and atraumatic. Right Ear: There is no impacted cerumen. Left Ear: There is no impacted cerumen. Nose: Nose normal. No congestion or rhinorrhea. Comments: NG tube with tube feeds. Mouth/Throat:      Mouth: Mucous membranes are moist.      Pharynx: Oropharynx is clear. No oropharyngeal exudate or posterior oropharyngeal erythema. Eyes:      General: No scleral icterus. Right eye: No discharge. Left eye: No discharge. Extraocular Movements: Extraocular movements intact. Conjunctiva/sclera: Conjunctivae normal.      Pupils: Pupils are equal, round, and reactive to light. Neck:      Musculoskeletal: Normal range of motion. Neck rigidity present. No muscular tenderness. Vascular: No carotid bruit. Cardiovascular:      Rate and Rhythm: Normal rate. Pulses: Normal pulses. Heart sounds: No murmur. No friction rub. No gallop. Pulmonary:      Effort: Pulmonary effort is normal. No respiratory distress. Breath sounds: No stridor. No wheezing, rhonchi or rales. Comments: High flow nasal oxygen. Chest:      Chest wall: No tenderness. Abdominal:      General: Abdomen is flat. Bowel sounds are normal. There is no distension. Palpations: There is no mass. Tenderness: There is no abdominal tenderness. There is no right CVA tenderness, left CVA tenderness, guarding or rebound. Hernia: No hernia is present. Genitourinary:     Comments: Tomlin catheter and rectal tube. Musculoskeletal: Normal range of motion. General: No swelling, tenderness, deformity or signs of injury. Right lower leg: No edema. Left lower leg: No edema. Lymphadenopathy:      Cervical: No cervical adenopathy. Skin:     General: Skin is warm and dry. Coloration: Skin is not jaundiced or pale. Findings: No bruising, erythema, lesion or rash. Neurological:      Mental Status: She is alert. Motor: Weakness present. Comments: Almost flaccid quadriparesis  Bilateral foot drop. Slightly improved right upper quadrant muscle strength 2/5. Psychiatric:      Comments: Flat affect  Difficult to assess wound status.      Medications:    flucytosine  25 mg/kg Oral 4 times per day    methylPREDNISolone (SOLU-MEDROL) IVPB  250 mg Intravenous Q6H    Followed by   Bette Show ON 6/5/2020] methylPREDNISolone (SOLU-MEDROL) IVPB  250 mg Intravenous Q12H    albuterol  2.5 mg Nebulization BID    ipratropium-albuterol  1 ampule Inhalation 4x daily    thiamine  100 mg Oral Daily    CENTRUM/CERTA-ALYSSA with minerals oral  15 mL Oral Daily    potassium bicarb-citric acid  40 mEq Oral Daily    amphotericin B liposome (AMBISOME) IVPB  4 mg/kg Intravenous Q24H 23. 5 (H) 10.5 (H)     Results for Anum Arcos (MRN [id number]) as of 6/3/2020 17:55   Ref. Range 5/24/2020 05:13 5/29/2020 07:42 6/2/2020 22:38   CRP Latest Ref Range: 0.0 - 5.0 mg/L 49.1 (H) 44.6 (H) 4.3     Results for Anum Arcos (MRN [id number]) as of 6/2/2020 19:50   Ref. Range 5/17/2020 17:15   Folate Latest Ref Range: >4.8 ng/mL 19.0   Vitamin B-12 Latest Ref Range: 232 - 1245 pg/mL 1358 (H)     Results for Anum Arcos (MRN [id number]) as of 6/3/2020 17:55   Ref. Range 5/20/2020 03:44 6/3/2020 02:57   Ammonia Latest Ref Range: 11 - 51 umol/L 27 33       Results for Anum Arcos (MRN [id number]) as of 6/3/2020 17:55   Ref. Range 5/23/2020 12:41   Ceruloplasmin Latest Ref Range: 16 - 45 mg/dL 17     Results for Anum Arcos (MRN [id number]) as of 6/3/2020 17:55   Ref. Range 5/14/2020 12:50   TSH Latest Ref Range: 0.30 - 5.00 mIU/L 0.70     ABG. Results for Anum Arcos (MRN [id number]) as of 6/3/2020 17:55   Ref. Range 5/26/2020 04:24 6/2/2020 21:23   POC pH Latest Ref Range: 7.350 - 7.450  7.521 (H) 7.530 (H)   POC pH Temp Unknown NOT REPORTED NOT REPORTED   POC pCO2 Latest Ref Range: 35.0 - 48.0 mm Hg 36.5 42.1   POC pCO2 Temp Latest Units: mm Hg NOT REPORTED NOT REPORTED   POC PO2 Latest Ref Range: 83.0 - 108.0 mm Hg 69.9 (L) 64.5 (L)   POC pO2 Temp Latest Units: mm Hg NOT REPORTED NOT REPORTED   POC HCO3 Latest Ref Range: 21.0 - 28.0 mmol/L 29.8 (H) 35.2 (H)   POC O2 SAT Latest Ref Range: 94.0 - 98.0 % 96 94       URINALYSIS. Results for Anum Arcos (MRN [id number]) as of 6/2/2020 19:50   Ref.  Range 6/2/2020 18:44   Color, UA Latest Ref Range: YELLOW  YELLOW   Turbidity UA Latest Ref Range: CLEAR  CLEAR   Glucose, UA Latest Ref Range: NEGATIVE  NEGATIVE   Bilirubin, Urine Latest Ref Range: NEGATIVE  NEGATIVE   Ketones, Urine Latest Ref Range: NEGATIVE  NEGATIVE   Specific Gravity, UA Latest Ref Range: 1.005 - 1.030  1.019   pH, UA Latest Ref Range: 5.0 - 8.0  7.0   Protein, UA Latest Ref Range: NEGATIVE  NEGATIVE Units: % 38   Monocyte Count, Fluid Latest Units: % NOT REPORTED   Neutrophil Count, Fluid Latest Units: % 19   pH, Fluid Unknown 8.4   Total Protein, Body Fluid Latest Units: g/dL 1.0   WBC, Fluid Latest Units: /mm3 161     Results for Jovan Marshall (MRN [id number]) as of 6/2/2020 19:50   Ref. Range 5/24/2020 13:57   CYTOMEGALOVIRUS (CMV) CSF FILM ARRAY Latest Ref Range: Not Detected  Not Detected   HAEMOPHILUS INFLUENZA CSF FILM ARRAY Latest Ref Range: Not Detected  Not Detected   HHV-6 (HERPESVIRUS 6) CSF FILM ARRAY Latest Ref Range: Not Detected  Not Detected   HSV-1 CSF FILM ARRAY Latest Ref Range: Not Detected  Not Detected   HSV-2 CSF FILM ARRAY Latest Ref Range: Not Detected  Not Detected   PARECHOVIRUS CSF FILM ARRAY Latest Ref Range: Not Detected  Not Detected   ESCHERICHIA COLI K1 CSF FILM ARRAY Latest Ref Range: Not Detected  Not Detected   LISTERIA MONOCYTOGENES CSF FILM ARRAY Latest Ref Range: Not Detected  Not Detected   NEISSERIA MENIGITIDIS CSF FILM ARRAY Latest Ref Range: Not Detected  Not Detected   STREPTOCOCCUS AGALACTIAE CSF FILM ARRAY Latest Ref Range: Not Detected  Not Detected   STREPTOCOCCUS PNEUMONIAE CSF FILM ARRAY Latest Ref Range: Not Detected  Not Detected   CRYPTOCOCCUS NEOFORMANS/ANKIT CSF FILM ARR. Latest Ref Range: Not Detected  Not Detected   B burgdorferi Ab,CSF Latest Ref Range: <=0.99 FRANKIE 0.29     Results for Jovan Marshall (MRN [id number]) as of 6/2/2020 19:50   Ref.  Range 5/24/2020 13:57   Appearance, CSF Unknown SLIGHTLY BLOODY   Eosinophils, CSF Latest Units: % NOT REPORTED   Glucose, CSF Latest Ref Range: 40 - 70 mg/dL 56   Lactate, CSF Latest Ref Range: 1.2 - 2.6 mmol/L 2.3   Protein, CSF Latest Ref Range: 15.0 - 45.0 mg/dL 95.7 (H)   Supernatant Color, CSF Unknown NOT REPORTED   Volume, CSF Unknown 11   RBC, CSF Latest Ref Range: 0 /mm3 1600 (H)   WBC, CSF Latest Ref Range: <5 /mm3 95 (H)   Neutrophils, CSF Latest Units: % 33   Bands, CSF Latest Units: % NOT REPORTED   Lymphs, Not Detected   Parainfluenza 2 PCR Latest Ref Range: Not Detected  Not Detected   Parainfluenza 3 PCR Latest Ref Range: Not Detected  Not Detected   Parainfluenza 4 PCR Latest Ref Range: Not Detected  Not Detected   Resp Syncytial Virus PCR Latest Ref Range: Not Detected  Not Detected   Mycoplasma pneumo by PCR Latest Ref Range: Not Detected  Not Detected     HISTOPATHOLOGY. None. TOXICOLOGY. None. ENDOSCOPE STUDIES. None. PROCEDURES. 1.Successful ultrasound guided thoracentesis and a total of 400 mL of clear yellow fluid was removed-06/04/2020. 2.Successful fluoroscopic-guided lumbar puncture-5/22/2020. 3.Successful fluoroscopic-guided lumbar puncture-5/15/2020. RADIOLOGY. CTA head and neck-0 6/1/2020. IMPRESSION:  1. No acute arterial abnormality or hemodynamically significant arterial stenosis      in the head or neck. 2. Partially imaged small to moderate layering bilateral pleural effusionswith left       upper lobe atelectasis. CT head without contrast-05/28/2020. IMPRESSION:  No acute intracranial abnormality. CT abdomen pelvis without contrast-05/21/2020. Ligia Ma IMPRESSION:  Left basilar effusion with adjacent consolidation representing atelectasis  versus pneumonia.     Small amount of free fluid within the pelvis.     Fluid within the distal small bowel loops that may relate to an enteritis.     Tomlin catheter and rectal tube are visualized. Brain MRI with and without contrast-05/31/2020. IMPRESSION:  1. Multifocal punctate subacute infarcts are noted in bilateral cerebral hemispheres,       likely related to embolic infarcts. These have increased in size and number since the 05/17/2020 exam.    2. Mild chronic microvascular white matter ischemic disease. Echocardiogram-05/16/2020. CONCLUSION:  Left ventricle is normal in size with normal systolic function globally. Calculated ejection fraction is 56%. Mildly dilated right atrium.      Mild buckling of the right atrial wall. Aortic sclerosis without stenosis. Mild tricuspid regurgitation. Estimated right ventricular systolic pressure is 25 mmHg. CT chest without contrast 05/13/2020. IMPRESSION:  Moderate pericardial effusion. Recommend clinical correlation.     No evidence of lung consolidation. Liver ultrasound 05/14/2020. IMPRESSION:  Hepatomegaly with coarse hepatic echotexture consistent with fatty infiltration   without focal mass. Normal hepatopetal flow in the portal vein. Small amount of ascites fluid about the right kidney. Status post cholecystectomy. No ductal dilatation. Chest x-ray-05/29/2020. IMPRESSION:  Worsening opacity/airspace disease in the left lower hemithorax. Some atelectatic component may be present as there appears be lower   mediastinal shift toward the left. Pulmonary vascular congestion remains. Interval removal of endotracheal tube.     Chest x-ray-05/25/2020. IMPRESSION:  Overall, no significant change in chest findings compared to the prior study from May 21st.      Vascular congestion with left lower lobe airspace disease and small pleural effusion persists. Chest x-ray-05/21/2020. IMPRESSION:  Improved left lung aeration with persistent basilar opacity, likely pleural  fluid with compressive atelectasis and possibly underlying infiltrate. Support tubes satisfactory. Chest x-ray-05/20/2020. IMPRESSION:  1. Stable volume loss of the left hemithorax. Worsening airspace disease projecting over the left upper and left mid lung zone. Stable retrocardiac airspace consolidation. Small to moderate left-sided pleural effusion,stable. Follow-up is recommended to document resolution. 2. Stable right to left mediastinal shift. 3. Stable mild cardiomegaly. 4. Tubes as above. Echocardiogram-05/16/2020. Left ventricle is normal in size with normal systolic function globally. Calculated ejection fraction is 56%. Mildly dilated right atrium. Mild buckling of the right atrial wall. Aortic sclerosis without stenosis. Mild tricuspid regurgitation. Estimated right ventricular systolic pressure is 25 mmHg.     ASSESSMENT AND  PLAN. 1.NEUROVASCULAR. 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 Multifocal punctate subacute infarcts on brain MRI. 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 ID is following. 2.PULMONARY. Acute hypoxic respiratory failure-not on home O2     COPD w/o exacerbation-prn nebs. Left lower lobe pna-suspected cryptococcus -on Amphotericin B . Possible postobstructive pneumonia-pending diagnostic bronchoscopy. S/p diagnostic left thoracentesis-400 mL  drained. Pulmonary is following. 3.CARDIOVASCULAR. Aortic valve sclerosis w/o stenosis on echo-5/16/2020.    4.GI. Alcoholic fatty liver. Ammonia level 27 on 5/20/2020. 5.RENAL AND ELECTROLYTES. Acute hypernatremia w/ serum Na 146. Mild hypokalemia-serum K 3.2 -replete. 6.ID. 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 Patient is on Amphotericin B infusion-day #14. ID is following. 7. ENDO.     TSH 0.70.      8.MUSCULOSKELETAL. General body weakness with flaccid paralysis. 9. LIFE STYLE.     H/o chronic tobacco use and alcohol abuse. 10.HEM-ONC. Normocytic anemia-Hb 9.3, MCV 96.7. Elevated sed rate 51. CRP trending down 4.3 from 196 - 05/13/2020. Y52 [id number], folic acid 19.    09.UUMOBZOQY. Protein energy malnutrition -on tube feeds. 12.DISPO. Planned d/c to rehab soon.     Electronically signed Yoel Pineda MD on 6/4/2020 at 4:49 PM    Rounding Hospitalist

## 2020-06-04 NOTE — PROGRESS NOTES
flucytosine  25 mg/kg Oral 4 times per day    albuterol  2.5 mg Nebulization BID    ipratropium-albuterol  1 ampule Inhalation 4x daily    thiamine  100 mg Oral Daily    CENTRUM/CERTA-ALYSSA with minerals oral  15 mL Oral Daily    potassium bicarb-citric acid  40 mEq Oral Daily    amphotericin B liposome (AMBISOME) IVPB  4 mg/kg Intravenous Q24H    sodium chloride flush  10 mL Intravenous 2 times per day    folic acid  1 mg Oral Daily      heparin (porcine) Stopped (06/04/20 0700)     heparin (porcine), heparin (porcine), sodium chloride flush, fentanNYL **OR** fentanNYL, sodium chloride flush, acetaminophen **OR** acetaminophen, polyethylene glycol, promethazine **OR** ondansetron, potassium chloride **OR** potassium alternative oral replacement **OR** potassium chloride, magnesium sulfate, midodrine    LABS  CBC   Recent Labs     06/04/20  0645   WBC 9.5   HGB 9.4*   HCT 29.6*   MCV 95.5        BMP:   Recent Labs     06/03/20  0257  06/04/20  0645   *   < > 146*   K 3.8  --  3.2*     --  105   CO2 29  --  33*   BUN 39*  --  25*   CREATININE 0.49*  --  0.40*   GLUCOSE 156*  --  103*   MG 2.0  --  1.9   PHOS 3.2  --   --     < > = values in this interval not displayed. No results found for: PH, PCO2, PO2, HCO3, O2SAT  Lab Results   Component Value Date    MODE NOT REPORTED 06/02/2020     LIVER PROFILE   Recent Labs     06/04/20  0645   AST 35*   ALT 36*   BILITOT 0.25*   ALKPHOS 81     INR No results for input(s): INR in the last 72 hours.   PTT   Lab Results   Component Value Date    APTT 65.9 (H) 06/04/2020     CBC:   Lab Results   Component Value Date    WBC 9.5 06/04/2020    RBC 3.10 06/04/2020    HGB 9.4 06/04/2020    HCT 29.6 06/04/2020    MCV 95.5 06/04/2020    MCH 30.3 06/04/2020    MCHC 31.8 06/04/2020    RDW 15.0 06/04/2020     06/04/2020    MPV 10.0 06/04/2020     BMP:    Lab Results   Component Value Date     06/04/2020    K 3.2 06/04/2020     06/04/2020

## 2020-06-05 ENCOUNTER — APPOINTMENT (OUTPATIENT)
Dept: INTERVENTIONAL RADIOLOGY/VASCULAR | Age: 60
DRG: 720 | End: 2020-06-05
Attending: INTERNAL MEDICINE
Payer: MEDICARE

## 2020-06-05 LAB
ABSOLUTE EOS #: <0.03 K/UL (ref 0–0.44)
ABSOLUTE EOS #: <0.03 K/UL (ref 0–0.44)
ABSOLUTE IMMATURE GRANULOCYTE: 0.1 K/UL (ref 0–0.3)
ABSOLUTE IMMATURE GRANULOCYTE: 0.12 K/UL (ref 0–0.3)
ABSOLUTE LYMPH #: 1.04 K/UL (ref 1.1–3.7)
ABSOLUTE LYMPH #: 1.16 K/UL (ref 1.1–3.7)
ABSOLUTE MONO #: 0.18 K/UL (ref 0.1–1.2)
ABSOLUTE MONO #: 0.23 K/UL (ref 0.1–1.2)
ALBUMIN SERPL-MCNC: 2.5 G/DL (ref 3.5–5.2)
ALBUMIN SERPL-MCNC: 2.5 G/DL (ref 3.5–5.2)
ALBUMIN/GLOBULIN RATIO: 1 (ref 1–2.5)
ALBUMIN/GLOBULIN RATIO: 1 (ref 1–2.5)
ALP BLD-CCNC: 73 U/L (ref 35–104)
ALP BLD-CCNC: 77 U/L (ref 35–104)
ALT SERPL-CCNC: 29 U/L (ref 5–33)
ALT SERPL-CCNC: 30 U/L (ref 5–33)
ANION GAP SERPL CALCULATED.3IONS-SCNC: 10 MMOL/L (ref 9–17)
ANION GAP SERPL CALCULATED.3IONS-SCNC: 13 MMOL/L (ref 9–17)
APPEARANCE CSF: CLEAR
APPEARANCE FLUID: NORMAL
AST SERPL-CCNC: 21 U/L
AST SERPL-CCNC: 23 U/L
BANDS, CSF: NORMAL %
BASO CSF: NORMAL %
BASO FLUID: NORMAL %
BASOPHILS # BLD: 0 % (ref 0–2)
BASOPHILS # BLD: 0 % (ref 0–2)
BASOPHILS ABSOLUTE: <0.03 K/UL (ref 0–0.2)
BASOPHILS ABSOLUTE: <0.03 K/UL (ref 0–0.2)
BILIRUB SERPL-MCNC: 0.24 MG/DL (ref 0.3–1.2)
BILIRUB SERPL-MCNC: 0.26 MG/DL (ref 0.3–1.2)
BLAST CSF: NORMAL %
BUN BLDV-MCNC: 26 MG/DL (ref 6–20)
BUN BLDV-MCNC: 29 MG/DL (ref 6–20)
BUN/CREAT BLD: ABNORMAL (ref 9–20)
BUN/CREAT BLD: ABNORMAL (ref 9–20)
CALCIUM SERPL-MCNC: 9 MG/DL (ref 8.6–10.4)
CALCIUM SERPL-MCNC: 9 MG/DL (ref 8.6–10.4)
CASE NUMBER:: NORMAL
CHLORIDE BLD-SCNC: 100 MMOL/L (ref 98–107)
CHLORIDE BLD-SCNC: 99 MMOL/L (ref 98–107)
CO2: 29 MMOL/L (ref 20–31)
CO2: 29 MMOL/L (ref 20–31)
COLOR FLUID: NORMAL
CREAT SERPL-MCNC: 0.36 MG/DL (ref 0.5–0.9)
CREAT SERPL-MCNC: 0.36 MG/DL (ref 0.5–0.9)
CRYPTOCOCCAL ANTIGEN: NEGATIVE
CRYPTOCOCCUS NEOFORMANS/GATTI CSF FILM ARR.: NOT DETECTED
CYTOMEGALOVIRUS (CMV) CSF FILM ARRAY: NOT DETECTED
DIFFERENTIAL TYPE: ABNORMAL
DIFFERENTIAL TYPE: ABNORMAL
ENTEROVIRUS CSF FILM ARRAY: NOT DETECTED
EOS CSF: NORMAL %
EOSINOPHIL FLUID: NORMAL %
EOSINOPHILS RELATIVE PERCENT: 0 % (ref 1–4)
EOSINOPHILS RELATIVE PERCENT: 0 % (ref 1–4)
ESCHERICHIA COLI K1 CSF FILM ARRAY: NOT DETECTED
FLOW CYTOMETRY SOURCE: NORMAL
FLOW CYTOMETRY, NODE/FLUID: NORMAL
FLUID DIFF COMMENT: NORMAL
FLUID DIFF COMMENT: NORMAL
GFR AFRICAN AMERICAN: >60 ML/MIN
GFR AFRICAN AMERICAN: >60 ML/MIN
GFR NON-AFRICAN AMERICAN: >60 ML/MIN
GFR NON-AFRICAN AMERICAN: >60 ML/MIN
GFR SERPL CREATININE-BSD FRML MDRD: ABNORMAL ML/MIN/{1.73_M2}
GLUCOSE BLD-MCNC: 150 MG/DL (ref 65–105)
GLUCOSE BLD-MCNC: 151 MG/DL (ref 65–105)
GLUCOSE BLD-MCNC: 158 MG/DL (ref 65–105)
GLUCOSE BLD-MCNC: 162 MG/DL (ref 70–99)
GLUCOSE BLD-MCNC: 171 MG/DL (ref 70–99)
GLUCOSE, CSF: 97 MG/DL (ref 40–70)
HAEMOPHILUS INFLUENZA CSF FILM ARRAY: NOT DETECTED
HCT VFR BLD CALC: 29.9 % (ref 36.3–47.1)
HCT VFR BLD CALC: 30 % (ref 36.3–47.1)
HEMOGLOBIN: 9.3 G/DL (ref 11.9–15.1)
HEMOGLOBIN: 9.6 G/DL (ref 11.9–15.1)
HHV-6 (HERPESVIRUS 6) CSF FILM ARRAY: NOT DETECTED
HSV-1 CSF FILM ARRAY: NOT DETECTED
HSV-2 CSF FILM ARRAY: NOT DETECTED
IMMATURE GRANULOCYTES: 1 %
IMMATURE GRANULOCYTES: 1 %
LISTERIA MONOCYTOGENES CSF FILM ARRAY: NOT DETECTED
LYMPHOCYTES # BLD: 8 % (ref 24–43)
LYMPHOCYTES # BLD: 9 % (ref 24–43)
LYMPHOCYTES, BODY FLUID: 38 %
LYMPHS CSF: 86 %
MCH RBC QN AUTO: 30.1 PG (ref 25.2–33.5)
MCH RBC QN AUTO: 30.6 PG (ref 25.2–33.5)
MCHC RBC AUTO-ENTMCNC: 31 G/DL (ref 28.4–34.8)
MCHC RBC AUTO-ENTMCNC: 32.1 G/DL (ref 28.4–34.8)
MCV RBC AUTO: 95.2 FL (ref 82.6–102.9)
MCV RBC AUTO: 97.1 FL (ref 82.6–102.9)
METAYELO CSF: NORMAL %
MONO/MACROPHAGE CSF (MANUAL): NORMAL %
MONOCYTE, FLUID: NORMAL %
MONOCYTES # BLD: 1 % (ref 3–12)
MONOCYTES # BLD: 2 % (ref 3–12)
MYELOCYTE CSF: NORMAL %
NEISSERIA MENIGITIDIS CSF FILM ARRAY: NOT DETECTED
NEUTROPHIL, FLUID: 19 %
NEUTROPHILS, CSF: 1 %
NRBC AUTOMATED: 0 PER 100 WBC
NRBC AUTOMATED: 0 PER 100 WBC
OTHER CELLS FLUID: NORMAL %
OTHER CELLS FLUID: NORMAL %
PARECHOVIRUS CSF FILM ARRAY: NOT DETECTED
PARTIAL THROMBOPLASTIN TIME: 25.7 SEC (ref 20.5–30.5)
PDW BLD-RTO: 14.6 % (ref 11.8–14.4)
PDW BLD-RTO: 14.6 % (ref 11.8–14.4)
PLATELET # BLD: 275 K/UL (ref 138–453)
PLATELET # BLD: 277 K/UL (ref 138–453)
PLATELET ESTIMATE: ABNORMAL
PLATELET ESTIMATE: ABNORMAL
PMV BLD AUTO: 10 FL (ref 8.1–13.5)
PMV BLD AUTO: 10.3 FL (ref 8.1–13.5)
POTASSIUM SERPL-SCNC: 3.1 MMOL/L (ref 3.7–5.3)
POTASSIUM SERPL-SCNC: 3.2 MMOL/L (ref 3.7–5.3)
RBC # BLD: 3.09 M/UL (ref 3.95–5.11)
RBC # BLD: 3.14 M/UL (ref 3.95–5.11)
RBC # BLD: ABNORMAL 10*6/UL
RBC # BLD: ABNORMAL 10*6/UL
RBC CSF: 0 /MM3
RBC FLUID: <3000 /MM3
SEG NEUTROPHILS: 88 % (ref 36–65)
SEG NEUTROPHILS: 90 % (ref 36–65)
SEGMENTED NEUTROPHILS ABSOLUTE COUNT: 11.27 K/UL (ref 1.5–8.1)
SEGMENTED NEUTROPHILS ABSOLUTE COUNT: 11.95 K/UL (ref 1.5–8.1)
SODIUM BLD-SCNC: 137 MMOL/L (ref 135–144)
SODIUM BLD-SCNC: 139 MMOL/L (ref 135–144)
SODIUM BLD-SCNC: 139 MMOL/L (ref 135–144)
SODIUM BLD-SCNC: 141 MMOL/L (ref 135–144)
SPECIMEN DESCRIPTION: NORMAL
SPECIMEN DESCRIPTION: NORMAL
SPECIMEN TYPE: NORMAL
STREPTOCOCCUS AGALACTIAE CSF FILM ARRAY: NOT DETECTED
STREPTOCOCCUS PNEUMONIAE CSF FILM ARRAY: NOT DETECTED
SUPERNAT COLOR CSF: ABNORMAL
SURGICAL PATHOLOGY REPORT: NORMAL
TOTAL PROTEIN: 5 G/DL (ref 6.4–8.3)
TOTAL PROTEIN: 5.1 G/DL (ref 6.4–8.3)
TUBE NUMBER CSF: 3
VARICELLA-ZOSTER CSF FILM ARRAY: NOT DETECTED
VOLUME CSF: 8
WBC # BLD: 12.8 K/UL (ref 3.5–11.3)
WBC # BLD: 13.3 K/UL (ref 3.5–11.3)
WBC # BLD: ABNORMAL 10*3/UL
WBC # BLD: ABNORMAL 10*3/UL
WBC CSF: 18 /MM3
WBC FLUID: 161 /MM3
XANTHOCHROMIA: ABNORMAL

## 2020-06-05 PROCEDURE — 97530 THERAPEUTIC ACTIVITIES: CPT

## 2020-06-05 PROCEDURE — 82947 ASSAY GLUCOSE BLOOD QUANT: CPT

## 2020-06-05 PROCEDURE — 87015 SPECIMEN INFECT AGNT CONCNTJ: CPT

## 2020-06-05 PROCEDURE — 62328 DX LMBR SPI PNXR W/FLUOR/CT: CPT

## 2020-06-05 PROCEDURE — 88112 CYTOPATH CELL ENHANCE TECH: CPT

## 2020-06-05 PROCEDURE — 87327 CRYPTOCOCCUS NEOFORM AG IA: CPT

## 2020-06-05 PROCEDURE — 84295 ASSAY OF SERUM SODIUM: CPT

## 2020-06-05 PROCEDURE — 6370000000 HC RX 637 (ALT 250 FOR IP): Performed by: STUDENT IN AN ORGANIZED HEALTH CARE EDUCATION/TRAINING PROGRAM

## 2020-06-05 PROCEDURE — 2580000003 HC RX 258: Performed by: INTERNAL MEDICINE

## 2020-06-05 PROCEDURE — 36415 COLL VENOUS BLD VENIPUNCTURE: CPT

## 2020-06-05 PROCEDURE — 87205 SMEAR GRAM STAIN: CPT

## 2020-06-05 PROCEDURE — 2700000000 HC OXYGEN THERAPY PER DAY

## 2020-06-05 PROCEDURE — 94640 AIRWAY INHALATION TREATMENT: CPT

## 2020-06-05 PROCEDURE — 6360000002 HC RX W HCPCS: Performed by: STUDENT IN AN ORGANIZED HEALTH CARE EDUCATION/TRAINING PROGRAM

## 2020-06-05 PROCEDURE — 6360000002 HC RX W HCPCS: Performed by: INTERNAL MEDICINE

## 2020-06-05 PROCEDURE — 80053 COMPREHEN METABOLIC PANEL: CPT

## 2020-06-05 PROCEDURE — 2709999900 HC NON-CHARGEABLE SUPPLY

## 2020-06-05 PROCEDURE — 51701 INSERT BLADDER CATHETER: CPT

## 2020-06-05 PROCEDURE — 94761 N-INVAS EAR/PLS OXIMETRY MLT: CPT

## 2020-06-05 PROCEDURE — 99233 SBSQ HOSP IP/OBS HIGH 50: CPT | Performed by: INTERNAL MEDICINE

## 2020-06-05 PROCEDURE — 51798 US URINE CAPACITY MEASURE: CPT

## 2020-06-05 PROCEDURE — 2580000003 HC RX 258: Performed by: STUDENT IN AN ORGANIZED HEALTH CARE EDUCATION/TRAINING PROGRAM

## 2020-06-05 PROCEDURE — 0W993ZZ DRAINAGE OF RIGHT PLEURAL CAVITY, PERCUTANEOUS APPROACH: ICD-10-PCS | Performed by: RADIOLOGY

## 2020-06-05 PROCEDURE — 97110 THERAPEUTIC EXERCISES: CPT

## 2020-06-05 PROCEDURE — 6370000000 HC RX 637 (ALT 250 FOR IP): Performed by: PSYCHIATRY & NEUROLOGY

## 2020-06-05 PROCEDURE — 89051 BODY FLUID CELL COUNT: CPT

## 2020-06-05 PROCEDURE — 83880 ASSAY OF NATRIURETIC PEPTIDE: CPT

## 2020-06-05 PROCEDURE — 009U3ZX DRAINAGE OF SPINAL CANAL, PERCUTANEOUS APPROACH, DIAGNOSTIC: ICD-10-PCS | Performed by: RADIOLOGY

## 2020-06-05 PROCEDURE — 85730 THROMBOPLASTIN TIME PARTIAL: CPT

## 2020-06-05 PROCEDURE — 94669 MECHANICAL CHEST WALL OSCILL: CPT

## 2020-06-05 PROCEDURE — 6370000000 HC RX 637 (ALT 250 FOR IP): Performed by: NURSE PRACTITIONER

## 2020-06-05 PROCEDURE — 2060000000 HC ICU INTERMEDIATE R&B

## 2020-06-05 PROCEDURE — 92523 SPEECH SOUND LANG COMPREHEN: CPT

## 2020-06-05 PROCEDURE — 85025 COMPLETE CBC W/AUTO DIFF WBC: CPT

## 2020-06-05 PROCEDURE — 6370000000 HC RX 637 (ALT 250 FOR IP): Performed by: INTERNAL MEDICINE

## 2020-06-05 PROCEDURE — 99232 SBSQ HOSP IP/OBS MODERATE 35: CPT | Performed by: PSYCHIATRY & NEUROLOGY

## 2020-06-05 PROCEDURE — 87070 CULTURE OTHR SPECIMN AEROBIC: CPT

## 2020-06-05 RX ORDER — FLUCYTOSINE 250 MG/1
25 CAPSULE ORAL EVERY 6 HOURS
Qty: 112 CAPSULE | Refills: 0 | Status: SHIPPED | OUTPATIENT
Start: 2020-06-05 | End: 2020-06-09

## 2020-06-05 RX ORDER — AMPHOTERICIN B 50 MG/1
4 INJECTABLE, LIPOSOMAL INTRAVENOUS DAILY
Qty: 6750 MG | Refills: 0 | Status: ON HOLD | OUTPATIENT
Start: 2020-06-05 | End: 2020-06-23 | Stop reason: HOSPADM

## 2020-06-05 RX ORDER — ACETAMINOPHEN 325 MG/1
650 TABLET ORAL EVERY 4 HOURS PRN
Status: DISCONTINUED | OUTPATIENT
Start: 2020-06-05 | End: 2020-06-08 | Stop reason: HOSPADM

## 2020-06-05 RX ADMIN — SODIUM CHLORIDE, PRESERVATIVE FREE 10 ML: 5 INJECTION INTRAVENOUS at 20:56

## 2020-06-05 RX ADMIN — FOLIC ACID 1 MG: 1 TABLET ORAL at 09:18

## 2020-06-05 RX ADMIN — Medication 15 ML: at 09:05

## 2020-06-05 RX ADMIN — SODIUM CHLORIDE, PRESERVATIVE FREE 10 ML: 5 INJECTION INTRAVENOUS at 09:18

## 2020-06-05 RX ADMIN — FLUCYTOSINE 1750 MG: 250 CAPSULE ORAL at 09:05

## 2020-06-05 RX ADMIN — FLUCYTOSINE 1750 MG: 250 CAPSULE ORAL at 13:49

## 2020-06-05 RX ADMIN — Medication 100 MG: at 09:18

## 2020-06-05 RX ADMIN — HEPARIN SODIUM AND DEXTROSE 20 UNITS/KG/HR: 10000; 5 INJECTION INTRAVENOUS at 09:06

## 2020-06-05 RX ADMIN — IPRATROPIUM BROMIDE AND ALBUTEROL SULFATE 1 AMPULE: .5; 3 SOLUTION RESPIRATORY (INHALATION) at 12:40

## 2020-06-05 RX ADMIN — IPRATROPIUM BROMIDE AND ALBUTEROL SULFATE 1 AMPULE: .5; 3 SOLUTION RESPIRATORY (INHALATION) at 15:50

## 2020-06-05 RX ADMIN — HEPARIN SODIUM AND DEXTROSE 24 UNITS/KG/HR: 10000; 5 INJECTION INTRAVENOUS at 10:58

## 2020-06-05 RX ADMIN — POTASSIUM BICARBONATE 40 MEQ: 782 TABLET, EFFERVESCENT ORAL at 09:05

## 2020-06-05 RX ADMIN — ALBUTEROL SULFATE 2.5 MG: 2.5 SOLUTION RESPIRATORY (INHALATION) at 04:40

## 2020-06-05 RX ADMIN — SODIUM CHLORIDE 250 MG: 9 INJECTION, SOLUTION INTRAVENOUS at 17:33

## 2020-06-05 RX ADMIN — FLUCYTOSINE 1750 MG: 250 CAPSULE ORAL at 20:15

## 2020-06-05 RX ADMIN — SODIUM CHLORIDE 250 MG: 9 INJECTION, SOLUTION INTRAVENOUS at 03:09

## 2020-06-05 RX ADMIN — ENOXAPARIN SODIUM 70 MG: 80 INJECTION SUBCUTANEOUS at 13:49

## 2020-06-05 RX ADMIN — ALBUTEROL SULFATE 2.5 MG: 2.5 SOLUTION RESPIRATORY (INHALATION) at 00:09

## 2020-06-05 RX ADMIN — SODIUM CHLORIDE 250 MG: 9 INJECTION, SOLUTION INTRAVENOUS at 10:47

## 2020-06-05 RX ADMIN — HEPARIN SODIUM 5930 UNITS: 1000 INJECTION, SOLUTION INTRAVENOUS; SUBCUTANEOUS at 10:58

## 2020-06-05 RX ADMIN — AMPHOTERICIN B 230 MG: 50 INJECTION, POWDER, LYOPHILIZED, FOR SOLUTION INTRAVENOUS at 16:00

## 2020-06-05 RX ADMIN — POTASSIUM BICARBONATE 40 MEQ: 782 TABLET, EFFERVESCENT ORAL at 14:39

## 2020-06-05 RX ADMIN — IPRATROPIUM BROMIDE AND ALBUTEROL SULFATE 1 AMPULE: .5; 3 SOLUTION RESPIRATORY (INHALATION) at 08:52

## 2020-06-05 RX ADMIN — ENOXAPARIN SODIUM 70 MG: 80 INJECTION SUBCUTANEOUS at 20:15

## 2020-06-05 ASSESSMENT — ENCOUNTER SYMPTOMS
COUGH: 0
COUGH: 1
SHORTNESS OF BREATH: 0
COLOR CHANGE: 0
ABDOMINAL DISTENTION: 0
CHEST TIGHTNESS: 0
EYE REDNESS: 0
APNEA: 0

## 2020-06-05 NOTE — PLAN OF CARE
Absence of psychomotor disturbance signs and symptoms  Outcome: Ongoing     Problem: Sensory Perception - Impaired:  Goal: Demonstrations of improved sensory functioning will increase  Description: Demonstrations of improved sensory functioning will increase  Outcome: Ongoing  Goal: Decrease in sensory misperception frequency  Description: Decrease in sensory misperception frequency  Outcome: Ongoing  Goal: Able to refrain from responding to false sensory perceptions  Description: Able to refrain from responding to false sensory perceptions  Outcome: Ongoing  Goal: Demonstrates accurate environmental perceptions  Description: Demonstrates accurate environmental perceptions  Outcome: Ongoing  Goal: Able to distinguish between reality-based and nonreality-based thinking  Description: Able to distinguish between reality-based and nonreality-based thinking  Outcome: Ongoing  Goal: Able to interrupt nonreality-based thinking  Description: Able to interrupt nonreality-based thinking  Outcome: Ongoing     Problem: Nutrition  Goal: Optimal nutrition therapy  Outcome: Ongoing

## 2020-06-05 NOTE — PROGRESS NOTES
was taken off oxygen and transferred out of ICU. At the time, she was oriented to herself, place and able to communicate and follow commands.      Repeat CXR on 5/17 showed new left lower lobe atelectasis, which was confirmed on CTA of the chest. She was on 4-6L oxygen at this time.      Overnight on 5/19/2020, she was obtunded. Blood gases showed pH 7.461, CO2 41.8, O2 49.4, HCO3 29.8. the decision was made to intubate her. After intubation, her vent settings were PRVC, 16/10/500/100%. Her O2 saturations were 92%.     She was hypernatremic at 152, potassium 3.6. replaced with 40 mEq oral and 20 mEq IV.        Pain:  Pain Assessment  Pain Assessment: 0-10  Pain Level: 0    Assessment:  Pt presents with moderate-severe expressive/recpetive language deficits characterized by impaired ability to follow 1-unit commands, answer basic yes/no questions, complete automatic speech tasks, and state biographical information. No dysarthria, no OM deficits observed. However, pt lethargic with mildly decreased intelligibility throughout evaluation. ST to follow up and provide treatment to address noted deficits. Verbal education provided. Recommendations:  Requires SLP Intervention: Yes  Duration/Frequency of Treatment: 3-5x per week  D/C Recommendations: Further therapy recommended at discharge. Plan:   Goals:  Short-term Goals  Goal 1: Pt will follow 1-unit commands with 90% accuracy  Goal 2: Pt will answer basic yes/no questions with 90% accuracy  Goal 3: Pt will state biographical information with 90% accuracy  Goal 4: Pt will complete automatic speech tasks with 90% accuracy  Patient/family involved in developing goals and treatment plan: yes    Subjective:   Previous level of function and limitations:   General  Chart Reviewed: Yes  Family / Caregiver Present: No     Vision  Vision: Within Functional Limits  Hearing  Hearing: Within functional limits    Objective:  Oral/Motor  Oral Motor:  Within functional

## 2020-06-05 NOTE — PROGRESS NOTES
NEUROLOGY INPATIENT PROGRESS NOTE    6/5/2020         Subjective: Pete Mcdowell is a  62 y.o. female admitted on 5/14/2020 with Pneumonia due to COVID-19 virus [U07.1, J12.89]  Chart reviewed and discussed with caregivers. Patient return back from spinal tap today. Patient seems to be much more alert and awake and conversant. She is following commands. Denies headaches. Briefly, this is a  62 y.o. female admitted on 5/14/2020 with new onset headache and generalized weakness has been treated for septic shock; then transferred to stepdown unit. She had worsening mental status since 5/16/2020 and has been worked up for possible encephalitis. CSF was negative for viral and bacterial meningitis. She was started on steroids initially for 2 days with some improvement. EEG demonstrated diffuse encephalopathy findings. She was transferred back to medical ICU on 5/18/2020 after rapid response was called due to respiratory distress and hypoxia with PO2 49 PCO2 45. Intubated in MICU and extubated on 5/26/2020. She was transferred back to stepdown unit on 5/28/2020. She continued to have slow mental status improvement since initiated on IV Solu-Medrol on 5/30/2020. No current facility-administered medications on file prior to encounter. No current outpatient medications on file prior to encounter. Allergies: Pete Mcdowell is allergic to sulfa antibiotics.     Past Medical History:   Diagnosis Date    COPD (chronic obstructive pulmonary disease) ([facility] [address]     ETOH abuse        Past Surgical History:   Procedure Laterality Date    CHOLECYSTECTOMY      [facility]  PICC [address] DOUBLE  6/4/2020         THYROIDECTOMY      TONSILLECTOMY             Medications:     enoxaparin  1 mg/kg Subcutaneous BID    flucytosine  25 mg/kg Oral 4 times per day    methylPREDNISolone (SOLU-MEDROL) IVPB  250 mg Intravenous Q12H    albuterol  2.5 mg Nebulization BID    ipratropium-albuterol  1 ampule Inhalation 4x daily    with good handgrip. She is able to wiggle the toes only in bilateral lower extremities. Muscle tone is normal and no atrophy noted. No abnormal involuntary movements noted. SENSORY FUNCTION:  Withdraws to pinprick bilaterally. CEREBELLAR FUNCTION:  Unable to do finger-nose-finger testing; no ataxia noted. REFLEX FUNCTION:  Symmetric DTRs; bilateral equivocal plantars    STATION and GAIT  Not tested         Data:    Lab Results:   CBC:   Recent Labs     06/03/20  0257 06/04/20  0645 06/05/20  0953   WBC 11.8* 9.5 12.8*   HGB 8.5* 9.4* 9.6*    282 275     BMP:    Recent Labs     06/03/20  0257 06/04/20  0645  06/04/20  1919 06/05/20  0953 06/05/20  1230   *   < > 146*   < > 141 141 137   K 3.8  --  3.2*  --   --  3.1*  --      --  105  --   --  99  --    CO2 29  --  33*  --   --  29  --    BUN 39*  --  25*  --   --  29*  --    CREATININE 0.49*  --  0.40*  --   --  0.36*  --    GLUCOSE 156*  --  103*  --   --  162*  --     < > = values in this interval not displayed. Lab Results   Component Value Date    ALT 29 06/05/2020    AST 21 06/05/2020    TSH 0.70 05/14/2020    INR 1.0 05/14/2020    [id number] 1358 (H) 05/17/2020       MRI brain 5/31/2020: Focal punctate subacute infarcts noted in bilateral cerebral hemispheres, likely related to embolic infarcts. These have increased in size and number since 5/7/2020 exam.     CTA head and neck 6/1/2020: No acute arterial abnormality or hemodynamically significant arterial stenosis in the head and neck.     Echocardiogram 5/16/2020: EF 56%. Mildly dilated right atrium. Impression and Plan: Ms. Elena Diaz is a 62 y.o. female with   Persistent encephalopathy; toxic metabolic encephalopathy; alcohol abuse  Initial spinal fluid negative for meningitis panel, Lyme, VDRL.   Patient had a repeat spinal tap today and results pending at this point of time.     Multi-infarct state: Abnormal MRI with punctate areas of possible diffusion restriction in watershed distribution; possible subacute punctate infarcts; CTA head and neck is negative as above. On Eliquis for Rt LE DVT.    Cryptococcal pneumonia and cryptococcal meningitis: On amphotericin B; ID on board. Lobar pneumonia with a parapneumonic effusion on antibiotic therapy as per ID. Hypernatremia with dehydration; alcoholism: On thiamine 250 mg IV q24hr     Steroid therapy: Patient initiated on IV Solu-Medrol 250 mg q6hr starting 5/30/2020; continue the same. Will follow with you.            Beth Ferreira MD 6/5/2020 3:33 PM

## 2020-06-05 NOTE — PROGRESS NOTES
< > 141 137   K 3.8  --  3.2*  --  3.1*  --      --  105  --  99  --    CO2 29  --  33*  --  29  --    BUN 39*  --  25*  --  29*  --    CREATININE 0.49*  --  0.40*  --  0.36*  --    MG 2.0  --  1.9  --   --   --     < > = values in this interval not displayed. Hepatic Function Panel:   Recent Labs     06/04/20  0645 06/05/20  0953   PROT 4.9* 5.1*   LABALBU 2.5* 2.5*   BILITOT 0.25* 0.24*   ALKPHOS 81 73   ALT 36* 29   AST 35* 21     No results for input(s): RPR in the last 72 hours. No results for input(s): HIV in the last 72 hours. No results for input(s): BC in the last 72 hours. Lab Results   Component Value Date    CREATININE 0.36 06/05/2020    GLUCOSE 162 06/05/2020       Detailed results: Thank you for allowing us to participate in the care of this patient. Please call with questions. This note is created with the assistance of a speech recognition program.  While intending to generate adocument that actually reflects the content of the visit, the document can still have some errors including those of syntax and sound a like substitutions which may escape proof reading. It such instances, actual meaningcan be extrapolated by contextual diversion.     Chace Galvez MD  Office: [phone]  Perfect serve / office [phone]

## 2020-06-05 NOTE — PROGRESS NOTES
Extraocular Movements: Extraocular movements intact. Conjunctiva/sclera: Conjunctivae normal.      Pupils: Pupils are equal, round, and reactive to light. Neck:      Musculoskeletal: Normal range of motion. Neck rigidity present. No muscular tenderness. Vascular: No carotid bruit. Cardiovascular:      Rate and Rhythm: Normal rate. Pulses: Normal pulses. Heart sounds: No murmur. No friction rub. No gallop. Pulmonary:      Effort: Pulmonary effort is normal. No respiratory distress. Breath sounds: No stridor. No wheezing, rhonchi or rales. Comments: High flow nasal oxygen. Chest:      Chest wall: No tenderness. Abdominal:      General: Abdomen is flat. Bowel sounds are normal. There is no distension. Palpations: There is no mass. Tenderness: There is no abdominal tenderness. There is no right CVA tenderness, left CVA tenderness, guarding or rebound. Hernia: No hernia is present. Genitourinary:     Comments: Tomlin catheter and rectal tube. Musculoskeletal: Normal range of motion. General: No swelling, tenderness, deformity or signs of injury. Right lower leg: No edema. Left lower leg: No edema. Lymphadenopathy:      Cervical: No cervical adenopathy. Skin:     General: Skin is warm and dry. Coloration: Skin is not jaundiced or pale. Findings: No bruising, erythema, lesion or rash. Neurological:      Mental Status: She is alert and oriented to person, place, and time. Motor: Weakness present. Comments: Almost flaccid quadriparesis  Bilateral foot drop. Slightly improved right upper quadrant muscle strength 2/5.      Medications:    enoxaparin  1 mg/kg Subcutaneous BID    flucytosine  25 mg/kg Oral 4 times per day    methylPREDNISolone (SOLU-MEDROL) IVPB  250 mg Intravenous Q12H    albuterol  2.5 mg Nebulization BID    ipratropium-albuterol  1 ampule Inhalation 4x daily    thiamine  100 mg Oral Daily    Protein, UA Latest Ref Range: NEGATIVE  NEGATIVE   Urobilinogen, Urine Latest Ref Range: Normal  Normal   Nitrite, Urine Latest Ref Range: NEGATIVE  NEGATIVE   Leukocyte Esterase, Urine Latest Ref Range: NEGATIVE  NEGATIVE   Casts UA Latest Units: /LPF PENDING   Mucus, UA Latest Ref Range: None  PENDING   WBC, UA Latest Units: /HPF PENDING   RBC, UA Latest Units: /HPF PENDING   Epithelial Cells, UA Latest Units: /HPF PENDING   Renal Epithelial, UA Latest Ref Range: 0 /HPF PENDING   Bacteria, UA Latest Ref Range: None  PENDING   Amorphous, UA Latest Ref Range: None  PENDING   Yeast, Urine Latest Ref Range: None  PENDING   Crystals, UA Latest Ref Range: None /HPF PENDING   Urine Hgb Latest Ref Range: NEGATIVE  NEGATIVE   Trichomonas, UA Latest Ref Range: None  PENDING   Other Observations UA Latest Ref Range: NOT REQ. PENDING   Chloride, Ur Latest Units: mmol/L 34   Creatinine, Ur Latest Ref Range: 28.0 - 217.0 mg/dL 33.9   Potassium, Ur Latest Units: mmol/L 58.5   Sodium, Ur Latest Units: mmol/L 27   Total Protein, Urine Latest Units: mg/dL 11   Urine Total Protein Creatinine Ratio Latest Ref Range: 0.00 - 0.20  0.32 (H)       SEROLOGY  Results for Adeel Hughes (MRN [id number]) as of 6/2/2020 19:50   Ref. Range 5/14/2020 16:21 5/15/2020 09:35   HIV Ag/Ab Latest Ref Range: NONREACTIVE   NONREACTIVE   Human Metapneumovirus PCR Latest Ref Range: Not Detected  Not Detected    Influenza A H1 PCR Latest Ref Range: Not Detected  NOT REPORTED        Results for Adeel Hughes (MRN [id number]) as of 6/2/2020 19:50   Ref.  Range 5/29/2020 20:24   IgA Latest Ref Range: 70 - 400 mg/dL 108   IgG 1 Latest Ref Range: 240 - 1118 mg/dL 282   IgG 2 Latest Ref Range: 124 - 549 mg/dL 142   IgG 3 Latest Ref Range: 21 - 134 mg/dL 34   IgG 4 Latest Ref Range: 1 - 123 mg/dL 7   Total IgG Latest Ref Range: 700 - 1600 mg/dL 567 (L)   IgM Latest Ref Range: 40 - 230 mg/dL 642 (H)   Kappa Free Light Chains QNT Latest Ref Range: 0.37 - 1.94 mg/dL 3.72 (H)   Lambda Free Light Chains QNT Latest Ref Range: 0.57 - 2.63 mg/dL 2.53   Free Kappa/Lambda Ratio Latest Ref Range: 0.26 - 1.65  1.47     Results for Natalie Hsieh (MRN [id number]) as of 6/2/2020 19:50   Ref. Range 5/23/2020 12:41   ANCA Myeloperoxidase Latest Ref Range: <100 AU/mL 47   ANCA Proteinase 3 Latest Ref Range: <100 AU/mL 8   Rheumatoid Factor Latest Ref Range: <14 IU/mL 13.9   TISSUE TRANSGLUTAMINASE IGA Latest Ref Range: <7.0 U/mL 0.3   IgA Latest Ref Range: 70 - 400 mg/dL 89   Total IgG Latest Ref Range: 700 - 1600 mg/dL 451 (L)   Alpha 1 % Latest Ref Range: 3 - 6 % 6   Alpha 2 % Latest Ref Range: 6 - 13 % 15 (H)   Alpha-1-Globulin Latest Ref Range: 0.1 - 0.4 g/dL 0.3   Alpha-2-Globulin Latest Ref Range: 0.5 - 0.9 g/dL 0.7   Beta Globulin Latest Ref Range: 0.5 - 1.1 g/dL 0.5   Beta Percent Latest Ref Range: 11 - 19 % 11   Gamma Globulin Latest Ref Range: 0.5 - 1.5 g/dL 0.8   Gamma Globulin % Latest Ref Range: 9 - 20 % 18   Beta-2 Microglobulin Latest Ref Range: 0.6 - 2.4 mg/L 1.9   Serum IFX Interp Unknown (NOTE)   IMMUNOFIXATION SERUM PROFILE Unknown Rpt     Results for Natalie Hsieh (MRN [id number]) as of 6/2/2020 19:50   Ref. Range 5/14/2020 20:52   QUYEN Latest Ref Range: NEGATIVE  NEGATIVE   Hep A IgM Latest Ref Range: NONREACTIVE  NONREACTIVE   Hepatitis B Surface Ag Latest Ref Range: NONREACTIVE  NONREACTIVE   Hepatitis C Ab Latest Ref Range: NONREACTIVE  NONREACTIVE   Hep B Core Ab, IgM Latest Ref Range: NONREACTIVE  NONREACTIVE     Results for Natalie Hsieh (MRN [id number]) as of 6/3/2020 17:55   Ref. Range 5/15/2020 09:35 5/15/2020 09:35   Oligo Bands Unknown Oligoclonal bands are performed at [facility] using isoelectric focusing and immunofixation. Negative     TUMOR MARKER. None. MICROBIOLOGY. Results for Natalie Hsieh (MRN [id number]) as of 6/5/2020 14:47   Ref. Range 6/5/2020 08:45   Specimen Description Unknown . CSF   CYTOMEGALOVIRUS (CMV) CSF FILM ARRAY Latest Ref Range: Not Detected  Not Detected   HAEMOPHILUS INFLUENZA CSF FILM ARRAY Latest Ref Range: Not Detected  Not Detected   HHV-6 (HERPESVIRUS 6) CSF FILM ARRAY Latest Ref Range: Not Detected  Not Detected   HSV-1 CSF FILM ARRAY Latest Ref Range: Not Detected  Not Detected   HSV-2 CSF FILM ARRAY Latest Ref Range: Not Detected  Not Detected   PARECHOVIRUS CSF FILM ARRAY Latest Ref Range: Not Detected  Not Detected   ESCHERICHIA COLI K1 CSF FILM ARRAY Latest Ref Range: Not Detected  Not Detected   LISTERIA MONOCYTOGENES CSF FILM ARRAY Latest Ref Range: Not Detected  Not Detected   NEISSERIA MENIGITIDIS CSF FILM ARRAY Latest Ref Range: Not Detected  Not Detected   STREPTOCOCCUS AGALACTIAE CSF FILM ARRAY Latest Ref Range: Not Detected  Not Detected   STREPTOCOCCUS PNEUMONIAE CSF FILM ARRAY Latest Ref Range: Not Detected  Not Detected   CRYPTOCOCCUS NEOFORMANS/ANKIT CSF FILM ARR. Latest Ref Range: Not Detected  Not Detected     Results for Natalie Hsieh (MRN [id number]) as of 6/4/2020 16:54   Ref. Range 6/4/2020 10:10   Glucose, Fluid Latest Units: mg/dL 105   LD, Fluid Latest Units: U/L 81   RBC, Fluid Latest Units: /mm3 <3,000   Lymphocytes, Body Fluid Latest Units: % 38   Monocyte Count, Fluid Latest Units: % NOT REPORTED   Neutrophil Count, Fluid Latest Units: % 19   pH, Fluid Unknown 8.4   Total Protein, Body Fluid Latest Units: g/dL 1.0   WBC, Fluid Latest Units: /mm3 161     Results for Natalie Hsieh (MRN [id number]) as of 6/2/2020 19:50   Ref.  Range 5/24/2020 13:57   CYTOMEGALOVIRUS (CMV) CSF FILM ARRAY Latest Ref Range: Not Detected  Not Detected   HAEMOPHILUS INFLUENZA CSF FILM ARRAY Latest Ref Range: Not Detected  Not Detected   HHV-6 (HERPESVIRUS 6) CSF FILM ARRAY Latest Ref Range: Not Detected  Not Detected   HSV-1 CSF FILM ARRAY Latest Ref Range: Not Detected  Not Detected   HSV-2 CSF FILM ARRAY Latest Ref Range: Not Detected  Not Detected   PARECHOVIRUS CSF FILM ARRAY Latest Ref Range: Not Detected  Not Detected   ESCHERICHIA COLI K1 CSF FILM ARRAY Latest Ref Range: Not Detected  Not Detected   LISTERIA MONOCYTOGENES CSF FILM ARRAY Latest Ref Range: Not Detected  Not Detected   NEISSERIA MENIGITIDIS CSF FILM ARRAY Latest Ref Range: Not Detected  Not Detected   STREPTOCOCCUS AGALACTIAE CSF FILM ARRAY Latest Ref Range: Not Detected  Not Detected   STREPTOCOCCUS PNEUMONIAE CSF FILM ARRAY Latest Ref Range: Not Detected  Not Detected   CRYPTOCOCCUS NEOFORMANS/ANKIT CSF FILM ARR. Latest Ref Range: Not Detected  Not Detected   B burgdorferi Ab,CSF Latest Ref Range: <=0.99 FRANKIE 0.29     Results for Noe Fang (MRN [id number]) as of 6/2/2020 19:50   Ref. Range 5/24/2020 13:57   Appearance, CSF Unknown SLIGHTLY BLOODY   Eosinophils, CSF Latest Units: % NOT REPORTED   Glucose, CSF Latest Ref Range: 40 - 70 mg/dL 56   Lactate, CSF Latest Ref Range: 1.2 - 2.6 mmol/L 2.3   Protein, CSF Latest Ref Range: 15.0 - 45.0 mg/dL 95.7 (H)   Supernatant Color, CSF Unknown NOT REPORTED   Volume, CSF Unknown 11   RBC, CSF Latest Ref Range: 0 /mm3 1600 (H)   WBC, CSF Latest Ref Range: <5 /mm3 95 (H)   Neutrophils, CSF Latest Units: % 33   Bands, CSF Latest Units: % NOT REPORTED   Lymphs, CSF Latest Units: % 63   Baso, CSF Latest Units: % NOT REPORTED   Blasts, CSF Latest Units: % NOT REPORTED   Mono/Macrophage, CSF Latest Units: % NOT REPORTED   Tube Number, CSF Unknown 3   Metamyelocyte, CSF Latest Units: % NOT REPORTED   Myelocyte, CSF Latest Units: % NOT REPORTED   Other Cells, Fluid Latest Units: % MONOCYTES     Results for Noe Fang (MRN [id number]) as of 6/2/2020 19:50   Ref. Range 5/18/2020 12:33   SARS-CoV-2, IgG Latest Ref Range: Negative  Negative     Results for Noe Whartonding (MRN [id number]) as of 6/2/2020 19:50   Ref. Range 5/21/2020 20:03 5/25/2020 22:38   Crypto Ag Latest Ref Range: NEGATIVE  POSITIVE (A) POSITIVE (A)     Results for Noe Fang (MRN [id number]) as of 6/2/2020 19:50   Ref.  Range 5/14/2020 17:00   Legionella Pneumophilia Ag, Urine Unknown NEGATIVE     Results for Bernardo Mckeon (MRN [id number]) as of 6/2/2020 19:50   Ref. Range 5/14/2020 16:21   Chlamydia pneumoniae By PCR Latest Ref Range: Not Detected  Not Detected   Rhino/Enterovirus PCR Latest Ref Range: Not Detected  Not Detected   Specimen Description Unknown . NASOPHARYNGEAL SWAB   Human Metapneumovirus PCR Latest Ref Range: Not Detected  Not Detected   Influenza A H1 PCR Latest Ref Range: Not Detected  NOT REPORTED   Adenovirus PCR Latest Ref Range: Not Detected  Not Detected   B Pertussis by PCR Latest Ref Range: Not Detected  Not Detected   Coronavirus 229E PCR Latest Ref Range: Not Detected  Not Detected   Coronavirus HKU1 PCR Latest Ref Range: Not Detected  Not Detected   Coronavirus NL63 PCR Latest Ref Range: Not Detected  Not Detected   Coronavirus OC Latest Ref Range: Not Detected  Not Detected   Influenza A by PCR Latest Ref Range: Not Detected  Not Detected   Influenza A H1 (2009) PCR Latest Ref Range: Not Detected  NOT REPORTED   Influenza A H3 PCR Latest Ref Range: Not Detected  NOT REPORTED   Influenza B by PCR Latest Ref Range: Not Detected  Not Detected   Parainfluenza 1 PCR Latest Ref Range: Not Detected  Not Detected   Parainfluenza 2 PCR Latest Ref Range: Not Detected  Not Detected   Parainfluenza 3 PCR Latest Ref Range: Not Detected  Not Detected   Parainfluenza 4 PCR Latest Ref Range: Not Detected  Not Detected   Resp Syncytial Virus PCR Latest Ref Range: Not Detected  Not Detected   Mycoplasma pneumo by PCR Latest Ref Range: Not Detected  Not Detected     HISTOPATHOLOGY. None. TOXICOLOGY. None. ENDOSCOPE STUDIES. None. PROCEDURES. 1.Successful ultrasound guided thoracentesis and a total of 400 mL of clear yellow fluid was removed-06/04/2020. 2.Successful fluoroscopic-guided lumbar puncture-06/05/2020. 3.Successful fluoroscopic-guided lumbar puncture-5/22/2020. 4.Successful fluoroscopic-guided lumbar puncture-5/15/2020. RADIOLOGY.   Chest 05/13/2020. IMPRESSION:  Moderate pericardial effusion. Recommend clinical correlation.     No evidence of lung consolidation. Liver ultrasound 05/14/2020. IMPRESSION:  Hepatomegaly with coarse hepatic echotexture consistent with fatty infiltration   without focal mass. Normal hepatopetal flow in the portal vein. Small amount of ascites fluid about the right kidney. Status post cholecystectomy. No ductal dilatation. Chest x-ray-05/29/2020. IMPRESSION:  Worsening opacity/airspace disease in the left lower hemithorax. Some atelectatic component may be present as there appears be lower   mediastinal shift toward the left. Pulmonary vascular congestion remains. Interval removal of endotracheal tube.     Chest x-ray-05/25/2020. IMPRESSION:  Overall, no significant change in chest findings compared to the prior study from May 21st.      Vascular congestion with left lower lobe airspace disease and small pleural effusion persists. Chest x-ray-05/21/2020. IMPRESSION:  Improved left lung aeration with persistent basilar opacity, likely pleural  fluid with compressive atelectasis and possibly underlying infiltrate. Support tubes satisfactory. Chest x-ray-05/20/2020. IMPRESSION:  1. Stable volume loss of the left hemithorax. Worsening airspace disease projecting over the left upper and left mid lung zone. Stable retrocardiac airspace consolidation. Small to moderate left-sided pleural effusion,stable. Follow-up is recommended to document resolution. 2. Stable right to left mediastinal shift. 3. Stable mild cardiomegaly. 4. Tubes as above. Echocardiogram-05/16/2020. Left ventricle is normal in size with normal systolic function globally. Calculated ejection fraction is 56%. Mildly dilated right atrium. Mild buckling of the right atrial wall. Aortic sclerosis without stenosis.     Mild tricuspid

## 2020-06-05 NOTE — PROGRESS NOTES
Occupational Therapy Not Seen Note    DATE: 2020  Name: Elsie Padilla  : 1960  MRN: [id number]    Patient not available for Occupational Therapy due to:    Patient Declined: Pt declined following PT session and lumbar puncture this AM.    Next Scheduled Treatment: Re-check 2020    Electronically signed by MADDIE aMckey on 2020 at 2:36 PM

## 2020-06-05 NOTE — PROGRESS NOTES
shift toward the left. Pulmonary vascular congestion remains. Interval   removal of endotracheal tube. CT HEAD WO CONTRAST   Final Result   No acute intracranial abnormality. XR CHEST PORTABLE   Final Result   Overall, no significant change in chest findings compared to the prior study   from May 21st.  Vascular congestion with left lower lobe airspace disease and   small pleural effusion persists. IR LUMBAR PUNCTURE FOR DIAGNOSIS   Final Result   Successful fluoroscopic-guided lumbar puncture. XR CHEST PORTABLE   Final Result   Improved left lung aeration with persistent basilar opacity, likely pleural   fluid with compressive atelectasis and possibly underlying infiltrate. Support tubes satisfactory. CT ABDOMEN PELVIS WO CONTRAST Additional Contrast? None   Final Result   Left basilar effusion with adjacent consolidation representing atelectasis   versus pneumonia. Small amount of free fluid within the pelvis. Fluid within the distal small bowel loops that may relate to an enteritis. Tomlin catheter and rectal tube are visualized. CT HEAD WO CONTRAST   Final Result   No acute intracranial abnormality is evident. Partially imaged NG tube. XR CHEST (SINGLE VIEW FRONTAL)   Final Result   1. Stable volume loss of the left hemithorax. Worsening airspace disease   projecting over the left upper and left mid lung zone. Stable retrocardiac   airspace consolidation. Small to moderate left-sided pleural effusion,   stable. Follow-up is recommended to document resolution. 2. Stable right to left mediastinal shift. 3. Stable mild cardiomegaly. 4. Tubes as above. XR CHEST (SINGLE VIEW FRONTAL)   Final Result   Moderate left lung base opacity which has increased. VL DUP LOWER EXTREMITY VENOUS BILATERAL   Final Result      MRV HEAD W WO CONTRAST   Final Result   Motion degraded exams.       Possible acute microinfarct within the splenium of the corpus callosum. Questionablefailed CSF suppression within the sulci of the parietal lobes. No associated abnormal enhancement. Correlate with LP findings. No evidence of dural venous sinus thrombosis. MRI Brain W WO Contrast   Final Result   Motion degraded exams. Possible acute microinfarct within the splenium of the corpus callosum. Questionablefailed CSF suppression within the sulci of the parietal lobes. No associated abnormal enhancement. Correlate with LP findings. No evidence of dural venous sinus thrombosis. MRA Head WO Contrast   Final Result   Motion limited exam.  No major branch occlusion. CTA would be more sensitive   in excluding vasculitis. CT CHEST PULMONARY EMBOLISM W CONTRAST   Final Result   No evidence of pulmonary embolism. Left lower lobar atelectasis. RECOMMENDATIONS:   Pulmonary consultation for potential bronchoscopy. XR CHEST PORTABLE   Final Result   Left lower lobe atelectasis. RECOMMENDATION:   Consider chest CT for further evaluation         MRI CERVICAL SPINE W WO CONTRAST   Final Result   No cord abnormality identified. No MRI evidence of discitis-osteomyelitis of the cervical or thoracic spine. Degenerative changes as detailed above. Incidentally noted left lower lobe atelectasis. MRI THORACIC SPINE W WO CONTRAST   Final Result   No cord abnormality identified. No MRI evidence of discitis-osteomyelitis of the cervical or thoracic spine. Degenerative changes as detailed above. Incidentally noted left lower lobe atelectasis. IR LUMBAR PUNCTURE FOR DIAGNOSIS   Final Result   Successful fluoroscopic-guided lumbar puncture. MRI BRAIN W WO CONTRAST   Final Result   No acute disease. No evidence of meningeal enhancement or empyema.          MRI LUMBAR SPINE W WO CONTRAST   Final Result   Lumbar spine degenerative changes most significant as tolerated    It was my pleasure to evaluate Heddie Kristopher today. We will continue to follow. I would like to thank you for allowing me to participate in the care of this patient. Please feel free to call with any further questions or concerns. Hazel Araiza M.D. Pulmonary and Critical Care Medicine           6/5/2020, 3:33 PM    Please note that this chart was generated using voice recognition Dragon dictation software. Although every effort was made to ensure the accuracy of this automated transcription, some errors in transcription may have occurred.

## 2020-06-06 ENCOUNTER — APPOINTMENT (OUTPATIENT)
Dept: GENERAL RADIOLOGY | Age: 60
DRG: 720 | End: 2020-06-06
Attending: INTERNAL MEDICINE
Payer: MEDICARE

## 2020-06-06 LAB
ABSOLUTE EOS #: <0.03 K/UL (ref 0–0.44)
ABSOLUTE IMMATURE GRANULOCYTE: 0.2 K/UL (ref 0–0.3)
ABSOLUTE LYMPH #: 1.41 K/UL (ref 1.1–3.7)
ABSOLUTE MONO #: 1.12 K/UL (ref 0.1–1.2)
ALBUMIN SERPL-MCNC: 2.5 G/DL (ref 3.5–5.2)
ALBUMIN/GLOBULIN RATIO: 1 (ref 1–2.5)
ALP BLD-CCNC: 72 U/L (ref 35–104)
ALT SERPL-CCNC: 23 U/L (ref 5–33)
ANION GAP SERPL CALCULATED.3IONS-SCNC: 9 MMOL/L (ref 9–17)
AST SERPL-CCNC: 18 U/L
BASOPHILS # BLD: 0 % (ref 0–2)
BASOPHILS ABSOLUTE: <0.03 K/UL (ref 0–0.2)
BILIRUB SERPL-MCNC: 0.25 MG/DL (ref 0.3–1.2)
BNP INTERPRETATION: ABNORMAL
BUN BLDV-MCNC: 32 MG/DL (ref 6–20)
BUN/CREAT BLD: ABNORMAL (ref 9–20)
CALCIUM SERPL-MCNC: 8.9 MG/DL (ref 8.6–10.4)
CHLORIDE BLD-SCNC: 101 MMOL/L (ref 98–107)
CO2: 31 MMOL/L (ref 20–31)
CREAT SERPL-MCNC: 0.41 MG/DL (ref 0.5–0.9)
CULTURE: NORMAL
CULTURE: NORMAL
DIFFERENTIAL TYPE: ABNORMAL
EOSINOPHILS RELATIVE PERCENT: 0 % (ref 1–4)
GFR AFRICAN AMERICAN: >60 ML/MIN
GFR NON-AFRICAN AMERICAN: >60 ML/MIN
GFR SERPL CREATININE-BSD FRML MDRD: ABNORMAL ML/MIN/{1.73_M2}
GFR SERPL CREATININE-BSD FRML MDRD: ABNORMAL ML/MIN/{1.73_M2}
GLUCOSE BLD-MCNC: 133 MG/DL (ref 65–105)
GLUCOSE BLD-MCNC: 155 MG/DL (ref 65–105)
GLUCOSE BLD-MCNC: 156 MG/DL (ref 70–99)
GLUCOSE BLD-MCNC: 168 MG/DL (ref 65–105)
HCT VFR BLD CALC: 29.3 % (ref 36.3–47.1)
HEMOGLOBIN: 9.2 G/DL (ref 11.9–15.1)
IMMATURE GRANULOCYTES: 1 %
LYMPHOCYTES # BLD: 8 % (ref 24–43)
Lab: NORMAL
Lab: NORMAL
MAGNESIUM: 1.9 MG/DL (ref 1.6–2.6)
MCH RBC QN AUTO: 29.9 PG (ref 25.2–33.5)
MCHC RBC AUTO-ENTMCNC: 31.4 G/DL (ref 28.4–34.8)
MCV RBC AUTO: 95.1 FL (ref 82.6–102.9)
MONOCYTES # BLD: 7 % (ref 3–12)
NRBC AUTOMATED: 0 PER 100 WBC
PDW BLD-RTO: 14.6 % (ref 11.8–14.4)
PHOSPHORUS: 3.1 MG/DL (ref 2.6–4.5)
PLATELET # BLD: 312 K/UL (ref 138–453)
PLATELET ESTIMATE: ABNORMAL
PMV BLD AUTO: 10.2 FL (ref 8.1–13.5)
POTASSIUM SERPL-SCNC: 3.1 MMOL/L (ref 3.7–5.3)
PRO-BNP: 2532 PG/ML
RBC # BLD: 3.08 M/UL (ref 3.95–5.11)
RBC # BLD: ABNORMAL 10*6/UL
SEG NEUTROPHILS: 84 % (ref 36–65)
SEGMENTED NEUTROPHILS ABSOLUTE COUNT: 14.1 K/UL (ref 1.5–8.1)
SODIUM BLD-SCNC: 136 MMOL/L (ref 135–144)
SODIUM BLD-SCNC: 138 MMOL/L (ref 135–144)
SODIUM BLD-SCNC: 141 MMOL/L (ref 135–144)
SPECIMEN DESCRIPTION: NORMAL
SPECIMEN DESCRIPTION: NORMAL
TOTAL PROTEIN: 5 G/DL (ref 6.4–8.3)
WBC # BLD: 16.9 K/UL (ref 3.5–11.3)
WBC # BLD: ABNORMAL 10*3/UL

## 2020-06-06 PROCEDURE — 51701 INSERT BLADDER CATHETER: CPT

## 2020-06-06 PROCEDURE — 2580000003 HC RX 258: Performed by: STUDENT IN AN ORGANIZED HEALTH CARE EDUCATION/TRAINING PROGRAM

## 2020-06-06 PROCEDURE — 80053 COMPREHEN METABOLIC PANEL: CPT

## 2020-06-06 PROCEDURE — 6370000000 HC RX 637 (ALT 250 FOR IP): Performed by: STUDENT IN AN ORGANIZED HEALTH CARE EDUCATION/TRAINING PROGRAM

## 2020-06-06 PROCEDURE — 83735 ASSAY OF MAGNESIUM: CPT

## 2020-06-06 PROCEDURE — 84295 ASSAY OF SERUM SODIUM: CPT

## 2020-06-06 PROCEDURE — 36415 COLL VENOUS BLD VENIPUNCTURE: CPT

## 2020-06-06 PROCEDURE — 99232 SBSQ HOSP IP/OBS MODERATE 35: CPT | Performed by: INTERNAL MEDICINE

## 2020-06-06 PROCEDURE — 94640 AIRWAY INHALATION TREATMENT: CPT

## 2020-06-06 PROCEDURE — 71045 X-RAY EXAM CHEST 1 VIEW: CPT

## 2020-06-06 PROCEDURE — 6370000000 HC RX 637 (ALT 250 FOR IP): Performed by: INTERNAL MEDICINE

## 2020-06-06 PROCEDURE — 2580000003 HC RX 258: Performed by: INTERNAL MEDICINE

## 2020-06-06 PROCEDURE — 84100 ASSAY OF PHOSPHORUS: CPT

## 2020-06-06 PROCEDURE — 99232 SBSQ HOSP IP/OBS MODERATE 35: CPT | Performed by: PSYCHIATRY & NEUROLOGY

## 2020-06-06 PROCEDURE — 6360000002 HC RX W HCPCS: Performed by: INTERNAL MEDICINE

## 2020-06-06 PROCEDURE — 2700000000 HC OXYGEN THERAPY PER DAY

## 2020-06-06 PROCEDURE — 99233 SBSQ HOSP IP/OBS HIGH 50: CPT | Performed by: INTERNAL MEDICINE

## 2020-06-06 PROCEDURE — 94668 MNPJ CHEST WALL SBSQ: CPT

## 2020-06-06 PROCEDURE — 85025 COMPLETE CBC W/AUTO DIFF WBC: CPT

## 2020-06-06 PROCEDURE — 82947 ASSAY GLUCOSE BLOOD QUANT: CPT

## 2020-06-06 PROCEDURE — 2060000000 HC ICU INTERMEDIATE R&B

## 2020-06-06 PROCEDURE — 6370000000 HC RX 637 (ALT 250 FOR IP): Performed by: PSYCHIATRY & NEUROLOGY

## 2020-06-06 PROCEDURE — 6360000002 HC RX W HCPCS: Performed by: STUDENT IN AN ORGANIZED HEALTH CARE EDUCATION/TRAINING PROGRAM

## 2020-06-06 PROCEDURE — 51798 US URINE CAPACITY MEASURE: CPT

## 2020-06-06 PROCEDURE — 94761 N-INVAS EAR/PLS OXIMETRY MLT: CPT

## 2020-06-06 RX ORDER — ALBUTEROL SULFATE 2.5 MG/3ML
2.5 SOLUTION RESPIRATORY (INHALATION) EVERY 6 HOURS PRN
Status: DISCONTINUED | OUTPATIENT
Start: 2020-06-06 | End: 2020-06-08 | Stop reason: HOSPADM

## 2020-06-06 RX ORDER — FUROSEMIDE 10 MG/ML
40 INJECTION INTRAMUSCULAR; INTRAVENOUS 2 TIMES DAILY
Status: DISCONTINUED | OUTPATIENT
Start: 2020-06-06 | End: 2020-06-07

## 2020-06-06 RX ORDER — IPRATROPIUM BROMIDE AND ALBUTEROL SULFATE 2.5; .5 MG/3ML; MG/3ML
1 SOLUTION RESPIRATORY (INHALATION) 3 TIMES DAILY
Status: DISCONTINUED | OUTPATIENT
Start: 2020-06-06 | End: 2020-06-08 | Stop reason: HOSPADM

## 2020-06-06 RX ADMIN — POTASSIUM BICARBONATE 40 MEQ: 782 TABLET, EFFERVESCENT ORAL at 08:10

## 2020-06-06 RX ADMIN — FUROSEMIDE 40 MG: 10 INJECTION, SOLUTION INTRAMUSCULAR; INTRAVENOUS at 10:59

## 2020-06-06 RX ADMIN — SODIUM CHLORIDE 250 MG: 9 INJECTION, SOLUTION INTRAVENOUS at 16:20

## 2020-06-06 RX ADMIN — Medication 15 ML: at 08:11

## 2020-06-06 RX ADMIN — SODIUM CHLORIDE 250 MG: 9 INJECTION, SOLUTION INTRAVENOUS at 06:39

## 2020-06-06 RX ADMIN — FUROSEMIDE 40 MG: 10 INJECTION, SOLUTION INTRAMUSCULAR; INTRAVENOUS at 16:54

## 2020-06-06 RX ADMIN — SODIUM CHLORIDE, PRESERVATIVE FREE 10 ML: 5 INJECTION INTRAVENOUS at 08:11

## 2020-06-06 RX ADMIN — ENOXAPARIN SODIUM 70 MG: 80 INJECTION SUBCUTANEOUS at 08:11

## 2020-06-06 RX ADMIN — FLUCYTOSINE 1750 MG: 500 CAPSULE ORAL at 16:20

## 2020-06-06 RX ADMIN — FOLIC ACID 1 MG: 1 TABLET ORAL at 08:10

## 2020-06-06 RX ADMIN — ENOXAPARIN SODIUM 70 MG: 80 INJECTION SUBCUTANEOUS at 19:49

## 2020-06-06 RX ADMIN — FLUCYTOSINE 1750 MG: 500 CAPSULE ORAL at 19:47

## 2020-06-06 RX ADMIN — IPRATROPIUM BROMIDE AND ALBUTEROL SULFATE 1 AMPULE: .5; 3 SOLUTION RESPIRATORY (INHALATION) at 09:10

## 2020-06-06 RX ADMIN — AMPHOTERICIN B 230 MG: 50 INJECTION, POWDER, LYOPHILIZED, FOR SOLUTION INTRAVENOUS at 16:35

## 2020-06-06 RX ADMIN — SODIUM CHLORIDE, PRESERVATIVE FREE 10 ML: 5 INJECTION INTRAVENOUS at 20:10

## 2020-06-06 RX ADMIN — FLUCYTOSINE 1750 MG: 500 CAPSULE ORAL at 04:37

## 2020-06-06 RX ADMIN — Medication 100 MG: at 08:10

## 2020-06-06 RX ADMIN — IPRATROPIUM BROMIDE AND ALBUTEROL SULFATE 1 AMPULE: .5; 3 SOLUTION RESPIRATORY (INHALATION) at 15:32

## 2020-06-06 RX ADMIN — FLUCYTOSINE 1750 MG: 500 CAPSULE ORAL at 08:11

## 2020-06-06 ASSESSMENT — ENCOUNTER SYMPTOMS
COUGH: 0
CHEST TIGHTNESS: 0

## 2020-06-06 NOTE — PROGRESS NOTES
for Natalie Hsieh (MRN [id number]) as of 6/2/2020 19:50   Ref. Range 5/23/2020 12:41   ANCA Myeloperoxidase Latest Ref Range: <100 AU/mL 47   ANCA Proteinase 3 Latest Ref Range: <100 AU/mL 8   Rheumatoid Factor Latest Ref Range: <14 IU/mL 13.9   TISSUE TRANSGLUTAMINASE IGA Latest Ref Range: <7.0 U/mL 0.3   IgA Latest Ref Range: 70 - 400 mg/dL 89   Total IgG Latest Ref Range: 700 - 1600 mg/dL 451 (L)   Alpha 1 % Latest Ref Range: 3 - 6 % 6   Alpha 2 % Latest Ref Range: 6 - 13 % 15 (H)   Alpha-1-Globulin Latest Ref Range: 0.1 - 0.4 g/dL 0.3   Alpha-2-Globulin Latest Ref Range: 0.5 - 0.9 g/dL 0.7   Beta Globulin Latest Ref Range: 0.5 - 1.1 g/dL 0.5   Beta Percent Latest Ref Range: 11 - 19 % 11   Gamma Globulin Latest Ref Range: 0.5 - 1.5 g/dL 0.8   Gamma Globulin % Latest Ref Range: 9 - 20 % 18   Beta-2 Microglobulin Latest Ref Range: 0.6 - 2.4 mg/L 1.9   Serum IFX Interp Unknown (NOTE)   IMMUNOFIXATION SERUM PROFILE Unknown Rpt     Results for Natalie Hsieh (MRN [id number]) as of 6/2/2020 19:50   Ref. Range 5/14/2020 20:52   QUYEN Latest Ref Range: NEGATIVE  NEGATIVE   Hep A IgM Latest Ref Range: NONREACTIVE  NONREACTIVE   Hepatitis B Surface Ag Latest Ref Range: NONREACTIVE  NONREACTIVE   Hepatitis C Ab Latest Ref Range: NONREACTIVE  NONREACTIVE   Hep B Core Ab, IgM Latest Ref Range: NONREACTIVE  NONREACTIVE     Results for Natalie Hsieh (MRN [id number]) as of 6/3/2020 17:55   Ref. Range 5/15/2020 09:35 5/15/2020 09:35   Oligo Bands Unknown Oligoclonal bands are performed at [facility] using isoelectric focusing and immunofixation. Negative     TUMOR MARKER. None. MICROBIOLOGY. CSF Cultures - 06/05/2020. NO GROWTH 21 HOURS . Results for Natalie Hsieh (MRN [id number]) as of 6/6/2020 10:15   Ref. Range 6/5/2020 16:07   Crypto Ag Latest Ref Range: NEGATIVE  NEGATIVE     Results for Natalie Glass (MRN [id number]) as of 6/5/2020 14:47   Ref. Range 6/5/2020 08:45   Specimen Description Unknown . CSF   CYTOMEGALOVIRUS (CMV) Legionella Pneumophilia Ag, Urine Unknown NEGATIVE     Results for Josselyn Baptiste (MRN [id number]) as of 6/2/2020 19:50   Ref. Range 5/14/2020 16:21   Chlamydia pneumoniae By PCR Latest Ref Range: Not Detected  Not Detected   Rhino/Enterovirus PCR Latest Ref Range: Not Detected  Not Detected   Specimen Description Unknown . NASOPHARYNGEAL SWAB   Human Metapneumovirus PCR Latest Ref Range: Not Detected  Not Detected   Influenza A H1 PCR Latest Ref Range: Not Detected  NOT REPORTED   Adenovirus PCR Latest Ref Range: Not Detected  Not Detected   B Pertussis by PCR Latest Ref Range: Not Detected  Not Detected   Coronavirus 229E PCR Latest Ref Range: Not Detected  Not Detected   Coronavirus HKU1 PCR Latest Ref Range: Not Detected  Not Detected   Coronavirus NL63 PCR Latest Ref Range: Not Detected  Not Detected   Coronavirus OC Latest Ref Range: Not Detected  Not Detected   Influenza A by PCR Latest Ref Range: Not Detected  Not Detected   Influenza A H1 (2009) PCR Latest Ref Range: Not Detected  NOT REPORTED   Influenza A H3 PCR Latest Ref Range: Not Detected  NOT REPORTED   Influenza B by PCR Latest Ref Range: Not Detected  Not Detected   Parainfluenza 1 PCR Latest Ref Range: Not Detected  Not Detected   Parainfluenza 2 PCR Latest Ref Range: Not Detected  Not Detected   Parainfluenza 3 PCR Latest Ref Range: Not Detected  Not Detected   Parainfluenza 4 PCR Latest Ref Range: Not Detected  Not Detected   Resp Syncytial Virus PCR Latest Ref Range: Not Detected  Not Detected   Mycoplasma pneumo by PCR Latest Ref Range: Not Detected  Not Detected     HISTOPATHOLOGY. None. TOXICOLOGY. None. ENDOSCOPE STUDIES. None. PROCEDURES. 1.Successful ultrasound guided thoracentesis and a total of 400 mL of clear yellow fluid was removed-06/04/2020. 2.Successful fluoroscopic-guided lumbar puncture-06/05/2020. 3.Successful fluoroscopic-guided lumbar puncture-5/22/2020.     4.Successful fluoroscopic-guided lumbar abuse. 10.HEM-ONC. Normocytic anemia-Hb 9.3, MCV 96.7. Elevated sed rate 51. CRP trending down 4.3 from 196 - 05/13/2020. S10 [id number], folic acid 19.    72.HIBMBCBOY. Protein energy malnutrition -on tube feeds. 12.DISPO. Planned d/c to rehab soon.     Electronically signed Joe Paul MD on 6/6/2020 at 10:15 AM    Rounding Hospitalist

## 2020-06-06 NOTE — PROGRESS NOTES
[facility]  Progress Note    Fallon Rhodes  MRN: [id number]  Date: 6/6/2020    Reason for F/U :   Cephalopathy, cryptococcus infection    Impression :   1. Acute encephalopathy with associated headache and photophobia concern for cryptococcal meningitis  · Work-up for primary CNS cryptococcus infection is not positive  · concern is whether this is truly secondary to the primary infection versus an inflammatory process  2. Left lower lobe cryptococcoma. 3. Low back pain with associated weakness  4. Respiratory failure on high flow O2    Recommendations:   · The patient continues on liposomal amphotericin B through 7/2/2020 and flucytosine through 6/9/2020  · Patient also continues on high-dose steroids. · There definitely has been clinical improvement over the last week about whether is due to antifungal therapy or steroid therapy is not clear  · CSF studies done 6/4 show improvement as well  · Plan is for an LTAC    Infection Control Recommendations:   Universal precautions    Discharge Planning:   Estimated Length of IV antimicrobials: To be determined  Patient will need Midline Catheter Insertion/ PICC line Insertion: No  Patient will need: Home IV , [facility],  [facility],  LTAC: Undetermined  Patient willneed outpatient wound care: No    MedicalDecision making / Summary of Stay:   Fallon Rhodes is a 62y.o.-year-old female who presented to the emergency room at [facility] with a 10 days history of a headache photophobia, a lower back pain with weakness. She has a smoker's cough that has improved recently. She has lower back pain and a poor appetite and has not eaten in 3 days, but does not think she has a taste bud problem  She was tested twice for COVID and came back negative  Unable to ambulate because of the weakness of her legs and the back pain.   White count is normal but CRP is elevated as well as ferritin  CT scan of the chest is showing no infiltrates at all     The patient was transferred for possible COVID to [place] [address] and admitted to the [facility] ICU     I discussed with critical care, we can move him out of the [facility] ICU to a regular floor, get an MRI of the spine, consult neurosurgery, get a 2D echo,  The patient was started on Zosyn today. We will defer further antibiotic doses before we have the MRI finding     The patient denies any history of IV drug use    · BC neg, echo neg . ·  MRI LB old osteoarthritis and no cord compression  ·  MRI brain neg - 5/15 LP 8 WBC and normal glucose  ·  CT chest LLL consolidation, ? Atelectasis. · Treated w steroids and  ceftriaxone  - 5/15 , fever improved, but fever relapsed off steroids since    · Increased oxygen requirement, increased lethargy  · Left lower lobe infiltrate worse on x-ray  · Failed Levaquin since  -legionella AG neg  ·   intubated - fever 104. - CXR worse LLL  · Generalized Body rash noticed at admission .   · CT chest  progression of the left lower lobe infiltrate, around pneumonia  · Cryptococcus CSF antigen -  · Opening pressure  of 16  · All CSF meningitis panel is negative for cryptococcus PCR  ·  cryptococcus serum antigen positive, 4  ·  lumbar puncture repeated - WBC 63, neutrophils 39%, 54 lymp, Protein 142, glucose 53 -  PCR neg  ·  abelcet started  ·  and  no fever  · The lack of cryptococcus CSF antigen positivity does not rule out meningoencephalitis to cryptococcus,   · The picture is not fully typical and hence will benefit from another LP in the next week to monitor response    Current evaluation:2020    /78   Pulse 63   Temp 97.5 °F (36.4 °C) (Oral)   Resp 10   Ht 5' 7\" (1.702 m)   Wt 159 lb 13.3 oz (72.5 kg)   SpO2 92%   BMI 25.03 kg/m²     Temperature Range: Temp: 97.5 °F (36.4 °C) Temp  Av.2 °F (36.8 °C)  Min: 97.2 °F (36.2 °C)  Max: 99.2 °F (37.3 °C)  The patient is seen and evaluated at bedside she is awake and Blood Updated: 05/23/20 0606    Specimen Description . BLOOD    Special Requests R HAND 9 ML    Culture NO GROWTH 6 DAYS   Meningitis Encephalitis Panel CSF, Molecular [[id number]] Collected: 05/22/20 1447   Order Status: Completed Specimen: CSF Updated: 05/22/20 1647    Specimen Description . CSF    ESCHERICHIA COLI K1 CSF FILM ARRAY Not Detected    HAEMOPHILUS INFLUENZA CSF FILM ARRAY Not Detected    LISTERIA MONOCYTOGENES CSF FILM ARRAY Not Detected    NEISSERIA MENIGITIDIS CSF FILM ARRAY Not Detected    STREPTOCOCCUS AGALACTIAE CSF FILM ARRAY Not Detected    STREPTOCOCCUS PNEUMONIAE CSF FILM ARRAY Not Detected    CYTOMEGALOVIRUS (CMV) CSF FILM ARRAY Not Detected    ENTEROVIRUS CSF FILM ARRAY Not Detected    HSV-1 CSF FILM ARRAY Not Detected    HSV-2 CSF FILM ARRAY Not Detected    HHV-6 (HERPESVIRUS 6) CSF FILM ARRAY Not Detected    PARECHOVIRUS CSF FILM ARRAY Not Detected    VARICELLA-ZOSTER CSF FILM ARRAY Not Detected    CRYPTOCOCCUS NEOFORMANS/ANKIT CSF FILM ARR. Not Detected    Comment: Performed by multiplexed nucleic acid assay. Culture, Blood 1 [[id number]] Collected: 05/16/20 0011   Order Status: Completed Specimen: Blood Updated: 05/22/20 0844    Specimen Description . BLOOD    Special Requests  RT HAND 20ML    Culture NO GROWTH 6 DAYS   Culture, Blood 1 [[id number]] Collected: 05/16/20 0003   Order Status: Completed Specimen: Blood Updated: 05/22/20 0844    Specimen Description . BLOOD    Special Requests RT AC 10ML EACH    Culture NO GROWTH 6 DAYS   Culture, Urine [[id number]] Collected: 05/20/20 1806   Order Status: Completed Specimen: Urine, straight catheter Updated: 05/21/20 2329    Specimen Description . URINE,STRAIGHT CATHETER    Special Requests NOT REPORTED    Culture NO GROWTH     Culture, CSF [[id number]] (Abnormal) Collected: 05/15/20 0936   Order Status: Completed Specimen: CSF Updated: 05/18/20 0648    Specimen Description . CSF    Special Requests NOT REPORTED    Direct Exam MANY NEUTROPHILSAbnormal      MANY MONONUCLEAR WHITE BLOOD CELLS SEENAbnormal      NO BACTERIA SEEN    Culture NO GROWTH 3 DAYS       Medications:      ipratropium-albuterol  1 ampule Inhalation TID    furosemide  40 mg Intravenous BID    enoxaparin  1 mg/kg Subcutaneous BID    flucytosine  25 mg/kg Oral 4 times per day    methylPREDNISolone (SOLU-MEDROL) IVPB  250 mg Intravenous Q12H    thiamine  100 mg Oral Daily    CENTRUM/CERTA-ALYSSA with minerals oral  15 mL Oral Daily    potassium bicarb-citric acid  40 mEq Oral Daily    amphotericin B liposome (AMBISOME) IVPB  4 mg/kg Intravenous Q24H    sodium chloride flush  10 mL Intravenous 2 times per day    folic acid  1 mg Oral Daily           [facility]  Silver Painting MD  Perfect Serve messaging  OFFICE: [phone]      Electronically signed by Silver Painting MD on 6/6/2020 at 2:59 PM  Thank you for allowing us to participate in the care of this patient. Please call with questions. Elsie Padilla is a 62 y.o. female being evaluated by a Virtual Visit (video visit) encounter to address concerns as mentioned above. A caregiver was present when appropriate. Due to this being a TeleHealth encounter ([facility] public health emergency), evaluation of the following organ systems was limited: Vitals/Constitutional/EENT/Resp/CV/GI//MS/Neuro/Skin/Heme-Lymph-Imm. Pursuant to the emergency declaration under the [address] [place] [address], [address] [place] [address] and the [employer] and [employer] Act, this Virtual Visit was conducted with patient's (and/or legal guardian's) consent, to reduce the patient's risk of exposure to COVID-19 and provide necessary medical care. Services were provided through a video synchronous discussion virtually to substitute for in-person inpatient visit. An electronic signature was used to authenticate this note.     This note

## 2020-06-06 NOTE — PLAN OF CARE
Problem: Falls - Risk of:  Goal: Will remain free from falls  Description: Will remain free from falls  6/6/2020 0454 by Isiah Weiner RN  Outcome: Ongoing     Problem: Falls - Risk of:  Goal: Absence of physical injury  Description: Absence of physical injury  Outcome: Ongoing     Problem: Skin Integrity - Impaired:  Goal: Will show no infection signs and symptoms  Description: Will show no infection signs and symptoms  6/6/2020 0454 by Isiah Weiner RN  Outcome: Ongoing     Problem: Skin Integrity - Impaired:  Goal: Absence of new skin breakdown  Description: Absence of new skin breakdown  Outcome: Ongoing   Full skin assessment completed this shift. No new skin breakdown at this time. Pt repositioned q2h and prn. Pt kept clean and dry. Will continue to monitor.

## 2020-06-06 NOTE — PROGRESS NOTES
Result   Left basilar effusion with adjacent consolidation representing atelectasis   versus pneumonia. Small amount of free fluid within the pelvis. Fluid within the distal small bowel loops that may relate to an enteritis. Tomlin catheter and rectal tube are visualized. CT HEAD WO CONTRAST   Final Result   No acute intracranial abnormality is evident. Partially imaged NG tube. XR CHEST (SINGLE VIEW FRONTAL)   Final Result   1. Stable volume loss of the left hemithorax. Worsening airspace disease   projecting over the left upper and left mid lung zone. Stable retrocardiac   airspace consolidation. Small to moderate left-sided pleural effusion,   stable. Follow-up is recommended to document resolution. 2. Stable right to left mediastinal shift. 3. Stable mild cardiomegaly. 4. Tubes as above. XR CHEST (SINGLE VIEW FRONTAL)   Final Result   Moderate left lung base opacity which has increased. VL DUP LOWER EXTREMITY VENOUS BILATERAL   Final Result      MRV HEAD W WO CONTRAST   Final Result   Motion degraded exams. Possible acute microinfarct within the splenium of the corpus callosum. Questionablefailed CSF suppression within the sulci of the parietal lobes. No associated abnormal enhancement. Correlate with LP findings. No evidence of dural venous sinus thrombosis. MRI Brain W WO Contrast   Final Result   Motion degraded exams. Possible acute microinfarct within the splenium of the corpus callosum. Questionablefailed CSF suppression within the sulci of the parietal lobes. No associated abnormal enhancement. Correlate with LP findings. No evidence of dural venous sinus thrombosis. MRA Head WO Contrast   Final Result   Motion limited exam.  No major branch occlusion. CTA would be more sensitive   in excluding vasculitis.          CT CHEST PULMONARY EMBOLISM W CONTRAST   Final Result   No evidence of pulmonary Cardiac Catheterization:   No results found for this or any previous visit. 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 We will continue to follow. I would like to thank you for allowing me to participate in the care of this patient. Please feel free to call with any further questions or concerns. Robert Nguyen M.D. Pulmonary and Critical Care Medicine           6/6/2020, 5:15 PM    Please note that this chart was generated using voice recognition Dragon dictation software. Although every effort was made to ensure the accuracy of this automated transcription, some errors in transcription may have occurred.

## 2020-06-06 NOTE — PROGRESS NOTES
NEUROLOGY INPATIENT PROGRESS NOTE    6/6/2020         Subjective: Aga Dang is a  62 y.o. female admitted on 5/14/2020 with Pneumonia due to COVID-19 virus [U07.1, J12.89]    Briefly, this is a  62 y.o. female admitted on 5/14/2020 with  new onset headache and generalized weakness has been treated for septic shock; then transferred to stepdown unit. She had worsening mental status since 5/16/2020 and has been worked up for possible encephalitis. CSF was negative for viral and bacterial meningitis. She was started on steroids initially for 2 days with some improvement. EEG demonstrated diffuse encephalopathy findings. She was transferred back to medical ICU on 5/18/2020 after rapid response was called due to respiratory distress and hypoxia with PO2 49 PCO2 45. Intubated in MICU and extubated on 5/26/2020. She was transferred back to stepdown unit on 5/28/2020. She continued to have slow mental status improvement since initiated on IV Solu-Medrol on 5/30/2020. Today she is in bed, responds by opening eyes and looking. However she is not tracking. She gives a blank stare. No current facility-administered medications on file prior to encounter. No current outpatient medications on file prior to encounter. Allergies: Aga Dang is allergic to sulfa antibiotics.     Past Medical History:   Diagnosis Date    COPD (chronic obstructive pulmonary disease) ([facility] [address]     ETOH abuse        Past Surgical History:   Procedure Laterality Date    CHOLECYSTECTOMY      [facility].  PICC [address] DOUBLE  6/4/2020         THYROIDECTOMY      TONSILLECTOMY         Medications:     ipratropium-albuterol  1 ampule Inhalation TID    furosemide  40 mg Intravenous BID    enoxaparin  1 mg/kg Subcutaneous BID    flucytosine  25 mg/kg Oral 4 times per day    methylPREDNISolone (SOLU-MEDROL) IVPB  250 mg Intravenous Q12H    thiamine  100 mg Oral Daily    CENTRUM/CERTA-ALYSSA with minerals oral  15 mL Oral Daily    potassium bicarb-citric acid  40 mEq Oral Daily    amphotericin B liposome (AMBISOME) IVPB  4 mg/kg Intravenous Q24H    sodium chloride flush  10 mL Intravenous 2 times per day    folic acid  1 mg Oral Daily     PRN Meds include: albuterol, acetaminophen, sodium chloride flush, sodium chloride flush, acetaminophen **OR** acetaminophen, polyethylene glycol, promethazine **OR** ondansetron, potassium chloride **OR** potassium alternative oral replacement **OR** potassium chloride, magnesium sulfate    Objective:   BP (!) 140/85   Pulse 78   Temp 98.5 °F (36.9 °C) (Temporal)   Resp 14   Ht 5' 7\" (1.702 m)   Wt 159 lb 13.3 oz (72.5 kg)   SpO2 95%   BMI 25.03 kg/m²     Blood pressure range: Systolic (44DMB), VZR:721 , Min:134 , TUI:673   ; Diastolic (71DWN), ODS:35, Min:78, Max:95      ROS:   not possible to obtain      NEUROLOGIC EXAMINATION  Physical Exam  HENT:      Head: Normocephalic. Nose: Nose normal.      Mouth/Throat:      Mouth: Mucous membranes are moist.   Eyes:      Pupils: Pupils are equal, round, and reactive to light. Cardiovascular:      Rate and Rhythm: Normal rate. Pulmonary:      Effort: Pulmonary effort is normal.   Abdominal:      General: Abdomen is flat. Skin:     General: Skin is warm. Neurological:      Mental Status: She is lethargic. Comments: Cranial nerve not able to assess    General Examination  level of consciousness: awake, alert  pattern of breathing - normal    Brain Stem Assessment  Normal pupillary response  Normal corneal Reflex  Normal blink Reflex  Eye movement:  -Spontaneous: present     -Cough reflex: present  -Gag reflex: present    Patient is not able to follow commands  Not even wiggling toes which she was able to do till yesterday. No withdrawal to pain.   Reflexes normal           Lab Results:   CBC:   Recent Labs     06/04/20  0645 06/05/20  0953 06/06/20  0326   WBC 9.5 12.8* 16.9*   HGB 9.4* 9.6* 9.2*    275 312     BMP:    Recent Labs     06/04/20  0645  06/05/20  0953  06/05/20  2110 06/06/20  0326 06/06/20  1104   *   < > 141   < > 139 141 136   K 3.2*  --  3.1*  --   --  3.1*  --      --  99  --   --  101  --    CO2 33*  --  29  --   --  31  --    BUN 25*  --  29*  --   --  32*  --    CREATININE 0.40*  --  0.36*  --   --  0.41*  --    GLUCOSE 103*  --  162*  --   --  156*  --     < > = values in this interval not displayed. Lab Results   Component Value Date    ALT 23 06/06/2020    AST 18 06/06/2020    TSH 0.70 05/14/2020    INR 1.0 05/14/2020    Tammi Mcnair 1358 (H) 05/17/2020       No results found for: PHENYTOIN, PHENYTOIN, VALPROATE, CBMZ    IMAGING  Radiology:   MRI brain 5/31/2020: Focal punctate subacute infarcts noted in bilateral cerebral hemispheres, likely related to embolic infarcts.  These have increased in size and number since 5/7/2020 exam.     CTA head and neck 6/1/2020: No acute arterial abnormality or hemodynamically significant arterial stenosis in the head and neck.     Echocardiogram 5/16/2020: EF 56%.  Mildly dilated right atrium.     ASSESSMENT   Ms. Neela Mitchell is a 62 y.o. female with   Persistent encephalopathy; toxic metabolic encephalopathy; alcohol abuse  Initial spinal fluid negative for meningitis panel, Lyme, VDRL. Patient had a repeat spinal tap 6/5 showed wbc of 18, glucose of 97,      Multi-infarct state: Abnormal MRI with punctate areas of possible diffusion restriction in watershed distribution; possible subacute punctate infarcts; CTA head and neck is negative as above. Patient has rt sided DVT - on lovenox     Cryptococcal pneumonia and cryptococcal meningitis: On amphotericin B, flucytosine  ID on board. Lobar pneumonia with a parapneumonic effusion on antibiotic therapy as per ID. Hypernatremia with dehydration; alcoholism: On thiamine 250 mg IV q24hr     Steroid therapy: Patient initiated on IV Solu-Medrol 500mg q 6 for 4 doses, from today change to 250mg q12.     Will follow with you.      Dorian Vargas MD  INTERNAL MEDICINE RESIDENT, PGY-2  [address] [place], [place]  6/6/2020,3:55 PM

## 2020-06-06 NOTE — PLAN OF CARE
Problem: Falls - Risk of:  Goal: Will remain free from falls  Description: Will remain free from falls  6/6/2020 1436 by Louis Roth RN  Outcome: Ongoing   Pt remains free of falls at this time. Bed locked in lowest position, siderails x2, call light in reach. Non-skid footwear applied. Bed alarm on. Encouraged pt to call for assistance as needed for safety. Falling star posted outside of room. Will continue to monitor.   Electronically signed by Louis Roth RN on 6/6/2020 at 2:36 PM

## 2020-06-06 NOTE — PROGRESS NOTES
able to wiggle the toes only in bilateral lower extremities. Muscle tone is normal and no atrophy noted. No abnormal involuntary movements noted. SENSORY FUNCTION:  Withdraws to pinprick bilaterally. CEREBELLAR FUNCTION:  Unable to do finger-nose-finger testing; no ataxia noted. REFLEX FUNCTION:  Symmetric DTRs; bilateral equivocal plantars    STATION and GAIT  Not tested         Data:    Lab Results:   CBC:   Recent Labs     06/04/20  0645 06/05/20  0953 06/06/20  0326   WBC 9.5 12.8* 16.9*   HGB 9.4* 9.6* 9.2*    275 312     BMP:    Recent Labs     06/04/20  0645  06/05/20  0953 06/05/20  1230 06/05/20  2110 06/06/20  0326   *   < > 141 137 139 141   K 3.2*  --  3.1*  --   --  3.1*     --  99  --   --  101   CO2 33*  --  29  --   --  31   BUN 25*  --  29*  --   --  32*   CREATININE 0.40*  --  0.36*  --   --  0.41*   GLUCOSE 103*  --  162*  --   --  156*    < > = values in this interval not displayed. Lab Results   Component Value Date    ALT 23 06/06/2020    AST 18 06/06/2020    TSH 0.70 05/14/2020    INR 1.0 05/14/2020    [id number] 1358 (H) 05/17/2020       MRI brain 5/31/2020: Focal punctate subacute infarcts noted in bilateral cerebral hemispheres, likely related to embolic infarcts. These have increased in size and number since 5/7/2020 exam.     CTA head and neck 6/1/2020: No acute arterial abnormality or hemodynamically significant arterial stenosis in the head and neck.     Echocardiogram 5/16/2020: EF 56%. Mildly dilated right atrium. Impression and Plan: Ms. Pete Mcdowell is a 62 y.o. female with   Persistent encephalopathy; toxic metabolic encephalopathy; alcohol abuse  Initial spinal fluid negative for meningitis panel, Lyme, VDRL.   Patient had a repeat spinal tap today and results pending at this point of time.     Multi-infarct state: Abnormal MRI with punctate areas of possible diffusion restriction in watershed distribution; possible subacute punctate infarcts; CTA head and neck is negative as above. On Eliquis for Rt LE DVT.    Cryptococcal pneumonia and cryptococcal meningitis: On amphotericin B; ID on board. Lobar pneumonia with a parapneumonic effusion on antibiotic therapy as per ID. Hypernatremia with dehydration; alcoholism: On thiamine 250 mg IV q24hr     Steroid therapy: Patient initiated on IV Solu-Medrol 250 mg q6hr starting 5/30/2020; continue the same. Will follow with you.

## 2020-06-07 ENCOUNTER — APPOINTMENT (OUTPATIENT)
Dept: GENERAL RADIOLOGY | Age: 60
DRG: 720 | End: 2020-06-07
Attending: INTERNAL MEDICINE
Payer: MEDICARE

## 2020-06-07 LAB
ABSOLUTE EOS #: <0.03 K/UL (ref 0–0.44)
ABSOLUTE IMMATURE GRANULOCYTE: 0.15 K/UL (ref 0–0.3)
ABSOLUTE LYMPH #: 1.6 K/UL (ref 1.1–3.7)
ABSOLUTE MONO #: 1.23 K/UL (ref 0.1–1.2)
ALBUMIN SERPL-MCNC: 2.8 G/DL (ref 3.5–5.2)
ALBUMIN/GLOBULIN RATIO: 1.1 (ref 1–2.5)
ALP BLD-CCNC: 80 U/L (ref 35–104)
ALT SERPL-CCNC: 22 U/L (ref 5–33)
ANION GAP SERPL CALCULATED.3IONS-SCNC: 11 MMOL/L (ref 9–17)
AST SERPL-CCNC: 17 U/L
BASOPHILS # BLD: 0 % (ref 0–2)
BASOPHILS ABSOLUTE: <0.03 K/UL (ref 0–0.2)
BILIRUB SERPL-MCNC: 0.24 MG/DL (ref 0.3–1.2)
BUN BLDV-MCNC: 31 MG/DL (ref 6–20)
BUN/CREAT BLD: ABNORMAL (ref 9–20)
CALCIUM SERPL-MCNC: 9.2 MG/DL (ref 8.6–10.4)
CHLORIDE BLD-SCNC: 94 MMOL/L (ref 98–107)
CO2: 36 MMOL/L (ref 20–31)
CREAT SERPL-MCNC: 0.35 MG/DL (ref 0.5–0.9)
DIFFERENTIAL TYPE: ABNORMAL
EOSINOPHILS RELATIVE PERCENT: 0 % (ref 1–4)
GFR AFRICAN AMERICAN: >60 ML/MIN
GFR NON-AFRICAN AMERICAN: >60 ML/MIN
GFR SERPL CREATININE-BSD FRML MDRD: ABNORMAL ML/MIN/{1.73_M2}
GFR SERPL CREATININE-BSD FRML MDRD: ABNORMAL ML/MIN/{1.73_M2}
GLUCOSE BLD-MCNC: 107 MG/DL (ref 65–105)
GLUCOSE BLD-MCNC: 136 MG/DL (ref 65–105)
GLUCOSE BLD-MCNC: 137 MG/DL (ref 65–105)
GLUCOSE BLD-MCNC: 144 MG/DL (ref 70–99)
GLUCOSE BLD-MCNC: 147 MG/DL (ref 65–105)
HCT VFR BLD CALC: 30.2 % (ref 36.3–47.1)
HEMOGLOBIN: 10 G/DL (ref 11.9–15.1)
IMMATURE GRANULOCYTES: 1 %
LYMPHOCYTES # BLD: 11 % (ref 24–43)
MAGNESIUM: 1.9 MG/DL (ref 1.6–2.6)
MCH RBC QN AUTO: 31.2 PG (ref 25.2–33.5)
MCHC RBC AUTO-ENTMCNC: 33.1 G/DL (ref 28.4–34.8)
MCV RBC AUTO: 94.1 FL (ref 82.6–102.9)
MONOCYTES # BLD: 8 % (ref 3–12)
NRBC AUTOMATED: 0 PER 100 WBC
PDW BLD-RTO: 14.6 % (ref 11.8–14.4)
PHOSPHORUS: 3 MG/DL (ref 2.6–4.5)
PLATELET # BLD: 349 K/UL (ref 138–453)
PLATELET ESTIMATE: ABNORMAL
PMV BLD AUTO: 10.2 FL (ref 8.1–13.5)
POTASSIUM SERPL-SCNC: 2.8 MMOL/L (ref 3.7–5.3)
POTASSIUM SERPL-SCNC: 3.5 MMOL/L (ref 3.7–5.3)
RBC # BLD: 3.21 M/UL (ref 3.95–5.11)
RBC # BLD: ABNORMAL 10*6/UL
SEG NEUTROPHILS: 80 % (ref 36–65)
SEGMENTED NEUTROPHILS ABSOLUTE COUNT: 11.96 K/UL (ref 1.5–8.1)
SODIUM BLD-SCNC: 140 MMOL/L (ref 135–144)
SODIUM BLD-SCNC: 141 MMOL/L (ref 135–144)
TOTAL PROTEIN: 5.3 G/DL (ref 6.4–8.3)
WBC # BLD: 15 K/UL (ref 3.5–11.3)
WBC # BLD: ABNORMAL 10*3/UL

## 2020-06-07 PROCEDURE — 83735 ASSAY OF MAGNESIUM: CPT

## 2020-06-07 PROCEDURE — 84100 ASSAY OF PHOSPHORUS: CPT

## 2020-06-07 PROCEDURE — APPNB30 APP NON BILLABLE TIME 0-30 MINS: Performed by: NURSE PRACTITIONER

## 2020-06-07 PROCEDURE — 94669 MECHANICAL CHEST WALL OSCILL: CPT

## 2020-06-07 PROCEDURE — 84295 ASSAY OF SERUM SODIUM: CPT

## 2020-06-07 PROCEDURE — 36415 COLL VENOUS BLD VENIPUNCTURE: CPT

## 2020-06-07 PROCEDURE — 2580000003 HC RX 258: Performed by: INTERNAL MEDICINE

## 2020-06-07 PROCEDURE — 94668 MNPJ CHEST WALL SBSQ: CPT

## 2020-06-07 PROCEDURE — 74018 RADEX ABDOMEN 1 VIEW: CPT

## 2020-06-07 PROCEDURE — 2060000000 HC ICU INTERMEDIATE R&B

## 2020-06-07 PROCEDURE — 94761 N-INVAS EAR/PLS OXIMETRY MLT: CPT

## 2020-06-07 PROCEDURE — 99222 1ST HOSP IP/OBS MODERATE 55: CPT | Performed by: INTERNAL MEDICINE

## 2020-06-07 PROCEDURE — 94640 AIRWAY INHALATION TREATMENT: CPT

## 2020-06-07 PROCEDURE — 80053 COMPREHEN METABOLIC PANEL: CPT

## 2020-06-07 PROCEDURE — 6370000000 HC RX 637 (ALT 250 FOR IP): Performed by: PSYCHIATRY & NEUROLOGY

## 2020-06-07 PROCEDURE — 99232 SBSQ HOSP IP/OBS MODERATE 35: CPT | Performed by: PSYCHIATRY & NEUROLOGY

## 2020-06-07 PROCEDURE — 2580000003 HC RX 258: Performed by: STUDENT IN AN ORGANIZED HEALTH CARE EDUCATION/TRAINING PROGRAM

## 2020-06-07 PROCEDURE — 85025 COMPLETE CBC W/AUTO DIFF WBC: CPT

## 2020-06-07 PROCEDURE — 6360000002 HC RX W HCPCS: Performed by: INTERNAL MEDICINE

## 2020-06-07 PROCEDURE — 82947 ASSAY GLUCOSE BLOOD QUANT: CPT

## 2020-06-07 PROCEDURE — 99232 SBSQ HOSP IP/OBS MODERATE 35: CPT | Performed by: INTERNAL MEDICINE

## 2020-06-07 PROCEDURE — 51701 INSERT BLADDER CATHETER: CPT

## 2020-06-07 PROCEDURE — 6370000000 HC RX 637 (ALT 250 FOR IP): Performed by: STUDENT IN AN ORGANIZED HEALTH CARE EDUCATION/TRAINING PROGRAM

## 2020-06-07 PROCEDURE — 51798 US URINE CAPACITY MEASURE: CPT

## 2020-06-07 PROCEDURE — 84132 ASSAY OF SERUM POTASSIUM: CPT

## 2020-06-07 PROCEDURE — 6370000000 HC RX 637 (ALT 250 FOR IP): Performed by: INTERNAL MEDICINE

## 2020-06-07 PROCEDURE — 6360000002 HC RX W HCPCS: Performed by: STUDENT IN AN ORGANIZED HEALTH CARE EDUCATION/TRAINING PROGRAM

## 2020-06-07 RX ORDER — FUROSEMIDE 10 MG/ML
40 INJECTION INTRAMUSCULAR; INTRAVENOUS DAILY
Status: DISCONTINUED | OUTPATIENT
Start: 2020-06-07 | End: 2020-06-08 | Stop reason: HOSPADM

## 2020-06-07 RX ADMIN — POTASSIUM CHLORIDE 10 MEQ: 7.46 INJECTION, SOLUTION INTRAVENOUS at 07:54

## 2020-06-07 RX ADMIN — ENOXAPARIN SODIUM 70 MG: 80 INJECTION SUBCUTANEOUS at 08:19

## 2020-06-07 RX ADMIN — SODIUM CHLORIDE, PRESERVATIVE FREE 10 ML: 5 INJECTION INTRAVENOUS at 09:44

## 2020-06-07 RX ADMIN — FOLIC ACID 1 MG: 1 TABLET ORAL at 09:43

## 2020-06-07 RX ADMIN — IPRATROPIUM BROMIDE AND ALBUTEROL SULFATE 1 AMPULE: .5; 3 SOLUTION RESPIRATORY (INHALATION) at 16:48

## 2020-06-07 RX ADMIN — POTASSIUM BICARBONATE 40 MEQ: 782 TABLET, EFFERVESCENT ORAL at 09:42

## 2020-06-07 RX ADMIN — FUROSEMIDE 40 MG: 10 INJECTION, SOLUTION INTRAMUSCULAR; INTRAVENOUS at 08:20

## 2020-06-07 RX ADMIN — POTASSIUM CHLORIDE 10 MEQ: 7.46 INJECTION, SOLUTION INTRAVENOUS at 13:44

## 2020-06-07 RX ADMIN — FLUCYTOSINE 1750 MG: 500 CAPSULE ORAL at 03:30

## 2020-06-07 RX ADMIN — IPRATROPIUM BROMIDE AND ALBUTEROL SULFATE 1 AMPULE: .5; 3 SOLUTION RESPIRATORY (INHALATION) at 21:23

## 2020-06-07 RX ADMIN — AMPHOTERICIN B 230 MG: 50 INJECTION, POWDER, LYOPHILIZED, FOR SOLUTION INTRAVENOUS at 15:53

## 2020-06-07 RX ADMIN — SODIUM CHLORIDE, PRESERVATIVE FREE 10 ML: 5 INJECTION INTRAVENOUS at 20:51

## 2020-06-07 RX ADMIN — Medication 15 ML: at 10:27

## 2020-06-07 RX ADMIN — FLUCYTOSINE 1750 MG: 500 CAPSULE ORAL at 15:45

## 2020-06-07 RX ADMIN — Medication 100 MG: at 09:42

## 2020-06-07 RX ADMIN — POTASSIUM CHLORIDE 10 MEQ: 7.46 INJECTION, SOLUTION INTRAVENOUS at 09:44

## 2020-06-07 RX ADMIN — SODIUM CHLORIDE 250 MG: 9 INJECTION, SOLUTION INTRAVENOUS at 05:29

## 2020-06-07 RX ADMIN — POTASSIUM CHLORIDE 10 MEQ: 7.46 INJECTION, SOLUTION INTRAVENOUS at 05:19

## 2020-06-07 RX ADMIN — FLUCYTOSINE 1750 MG: 500 CAPSULE ORAL at 20:22

## 2020-06-07 RX ADMIN — FLUCYTOSINE 1750 MG: 500 CAPSULE ORAL at 09:43

## 2020-06-07 RX ADMIN — IPRATROPIUM BROMIDE AND ALBUTEROL SULFATE 1 AMPULE: .5; 3 SOLUTION RESPIRATORY (INHALATION) at 09:24

## 2020-06-07 RX ADMIN — POTASSIUM CHLORIDE 10 MEQ: 7.46 INJECTION, SOLUTION INTRAVENOUS at 11:22

## 2020-06-07 RX ADMIN — ENOXAPARIN SODIUM 70 MG: 80 INJECTION SUBCUTANEOUS at 20:23

## 2020-06-07 RX ADMIN — POTASSIUM CHLORIDE 10 MEQ: 7.46 INJECTION, SOLUTION INTRAVENOUS at 06:26

## 2020-06-07 ASSESSMENT — PAIN SCALES - GENERAL
PAINLEVEL_OUTOF10: 0

## 2020-06-07 ASSESSMENT — ENCOUNTER SYMPTOMS
COUGH: 0
CHEST TIGHTNESS: 0

## 2020-06-07 NOTE — PROGRESS NOTES
[address] [facility]ist Progress Note-Internal Medicine. [facility] 4B Stepdown       Patient: Nikita Kidney  Unit/Bed: 2167/4564-24  YOB: 1960  MRN: [id number]  Acct: [de-identified]   Admitting Diagnosis:Pneumonia due to COVID-19 virus [U07.1, J12.89]  Admit Date:  5/14/2020  Hospital Day: 24    Subjective:    Patient is more awake today and talking. No new complaints the last 24 hours. I spoke to the patient about the possibility of placing a PEG tube. She is has an NG tube for almost 2 weeks now. Negative fluid balance of -4.9 L after IV Lasix yesterday. Patient Seen, Chart, Labs, Radiology studies, and Consults reviewed. Objective:   BP (!) 143/89   Pulse 87   Temp 97.3 °F (36.3 °C) (Temporal)   Resp 18   Ht 5' 7\" (1.702 m)   Wt 159 lb 13.3 oz (72.5 kg)   SpO2 91%   BMI 25.03 kg/m²       Intake/Output Summary (Last 24 hours) at 6/7/2020 1332  Last data filed at 6/7/2020 1146  Gross per 24 hour   Intake 2573 ml   Output 7475 ml   Net -4902 ml     Review of Systems   Constitutional: Positive for activity change. Negative for appetite change, fatigue, fever and unexpected weight change. Respiratory: Negative for cough and chest tightness. Cardiovascular: Negative for chest pain. Neurological: Positive for weakness. Physical Exam  Constitutional:       General: She is not in acute distress. Appearance: Normal appearance. She is normal weight. She is not ill-appearing, toxic-appearing or diaphoretic. HENT:      Head: Normocephalic and atraumatic. Right Ear: There is no impacted cerumen. Left Ear: There is no impacted cerumen. Nose: Nose normal. No congestion or rhinorrhea. Comments: NG tube with tube feeds. Mouth/Throat:      Mouth: Mucous membranes are moist.      Pharynx: Oropharynx is clear. No oropharyngeal exudate or posterior oropharyngeal erythema.    Eyes: NEGATIVE  NEGATIVE   Urobilinogen, Urine Latest Ref Range: Normal  Normal   Nitrite, Urine Latest Ref Range: NEGATIVE  NEGATIVE   Leukocyte Esterase, Urine Latest Ref Range: NEGATIVE  NEGATIVE   Casts UA Latest Units: /LPF PENDING   Mucus, UA Latest Ref Range: None  PENDING   WBC, UA Latest Units: /HPF PENDING   RBC, UA Latest Units: /HPF PENDING   Epithelial Cells, UA Latest Units: /HPF PENDING   Renal Epithelial, UA Latest Ref Range: 0 /HPF PENDING   Bacteria, UA Latest Ref Range: None  PENDING   Amorphous, UA Latest Ref Range: None  PENDING   Yeast, Urine Latest Ref Range: None  PENDING   Crystals, UA Latest Ref Range: None /HPF PENDING   Urine Hgb Latest Ref Range: NEGATIVE  NEGATIVE   Trichomonas, UA Latest Ref Range: None  PENDING   Other Observations UA Latest Ref Range: NOT REQ. PENDING   Chloride, Ur Latest Units: mmol/L 34   Creatinine, Ur Latest Ref Range: 28.0 - 217.0 mg/dL 33.9   Potassium, Ur Latest Units: mmol/L 58.5   Sodium, Ur Latest Units: mmol/L 27   Total Protein, Urine Latest Units: mg/dL 11   Urine Total Protein Creatinine Ratio Latest Ref Range: 0.00 - 0.20  0.32 (H)       SEROLOGY  Results for Monica Cali (MRN [id number]) as of 6/2/2020 19:50   Ref. Range 5/14/2020 16:21 5/15/2020 09:35   HIV Ag/Ab Latest Ref Range: NONREACTIVE   NONREACTIVE   Human Metapneumovirus PCR Latest Ref Range: Not Detected  Not Detected    Influenza A H1 PCR Latest Ref Range: Not Detected  NOT REPORTED        Results for Monica Cali (MRN [id number]) as of 6/2/2020 19:50   Ref.  Range 5/29/2020 20:24   IgA Latest Ref Range: 70 - 400 mg/dL 108   IgG 1 Latest Ref Range: 240 - 1118 mg/dL 282   IgG 2 Latest Ref Range: 124 - 549 mg/dL 142   IgG 3 Latest Ref Range: 21 - 134 mg/dL 34   IgG 4 Latest Ref Range: 1 - 123 mg/dL 7   Total IgG Latest Ref Range: 700 - 1600 mg/dL 567 (L)   IgM Latest Ref Range: 40 - 230 mg/dL 642 (H)   Kappa Free Light Chains QNT Latest Ref Range: 0.37 - 1.94 mg/dL 3.72 (H)   Lambda Free Light Chains QNT Latest Ref Range: 0.57 - 2.63 mg/dL 2.53   Free Kappa/Lambda Ratio Latest Ref Range: 0.26 - 1.65  1.47     Results for Jad Clear (MRN [id number]) as of 6/2/2020 19:50   Ref. Range 5/23/2020 12:41   ANCA Myeloperoxidase Latest Ref Range: <100 AU/mL 47   ANCA Proteinase 3 Latest Ref Range: <100 AU/mL 8   Rheumatoid Factor Latest Ref Range: <14 IU/mL 13.9   TISSUE TRANSGLUTAMINASE IGA Latest Ref Range: <7.0 U/mL 0.3   IgA Latest Ref Range: 70 - 400 mg/dL 89   Total IgG Latest Ref Range: 700 - 1600 mg/dL 451 (L)   Alpha 1 % Latest Ref Range: 3 - 6 % 6   Alpha 2 % Latest Ref Range: 6 - 13 % 15 (H)   Alpha-1-Globulin Latest Ref Range: 0.1 - 0.4 g/dL 0.3   Alpha-2-Globulin Latest Ref Range: 0.5 - 0.9 g/dL 0.7   Beta Globulin Latest Ref Range: 0.5 - 1.1 g/dL 0.5   Beta Percent Latest Ref Range: 11 - 19 % 11   Gamma Globulin Latest Ref Range: 0.5 - 1.5 g/dL 0.8   Gamma Globulin % Latest Ref Range: 9 - 20 % 18   Beta-2 Microglobulin Latest Ref Range: 0.6 - 2.4 mg/L 1.9   Serum IFX Interp Unknown (NOTE)   IMMUNOFIXATION SERUM PROFILE Unknown Rpt     Results for Jad Clear (MRN [id number]) as of 6/2/2020 19:50   Ref. Range 5/14/2020 20:52   QUYEN Latest Ref Range: NEGATIVE  NEGATIVE   Hep A IgM Latest Ref Range: NONREACTIVE  NONREACTIVE   Hepatitis B Surface Ag Latest Ref Range: NONREACTIVE  NONREACTIVE   Hepatitis C Ab Latest Ref Range: NONREACTIVE  NONREACTIVE   Hep B Core Ab, IgM Latest Ref Range: NONREACTIVE  NONREACTIVE     Results for Jad Clear (MRN [id number]) as of 6/3/2020 17:55   Ref. Range 5/15/2020 09:35 5/15/2020 09:35   Oligo Bands Unknown Oligoclonal bands are performed at [facility] using isoelectric focusing and immunofixation. Negative     TUMOR MARKER. None. MICROBIOLOGY. CSF Cultures - 06/05/2020. NO GROWTH 21 HOURS . Results for Jad Clear (MRN [id number]) as of 6/6/2020 10:15   Ref.  Range 6/5/2020 16:07   Crypto Ag Latest Ref Range: NEGATIVE  NEGATIVE     Results for Jad Clear (MRN HSV-2 CSF FILM ARRAY Latest Ref Range: Not Detected  Not Detected   PARECHOVIRUS CSF FILM ARRAY Latest Ref Range: Not Detected  Not Detected   ESCHERICHIA COLI K1 CSF FILM ARRAY Latest Ref Range: Not Detected  Not Detected   LISTERIA MONOCYTOGENES CSF FILM ARRAY Latest Ref Range: Not Detected  Not Detected   NEISSERIA MENIGITIDIS CSF FILM ARRAY Latest Ref Range: Not Detected  Not Detected   STREPTOCOCCUS AGALACTIAE CSF FILM ARRAY Latest Ref Range: Not Detected  Not Detected   STREPTOCOCCUS PNEUMONIAE CSF FILM ARRAY Latest Ref Range: Not Detected  Not Detected   CRYPTOCOCCUS NEOFORMANS/ANKIT CSF FILM ARR. Latest Ref Range: Not Detected  Not Detected   B burgdorferi Ab,CSF Latest Ref Range: <=0.99 FRANKIE 0.29     Results for Monica Cali (MRN [id number]) as of 6/2/2020 19:50   Ref. Range 5/24/2020 13:57   Appearance, CSF Unknown SLIGHTLY BLOODY   Eosinophils, CSF Latest Units: % NOT REPORTED   Glucose, CSF Latest Ref Range: 40 - 70 mg/dL 56   Lactate, CSF Latest Ref Range: 1.2 - 2.6 mmol/L 2.3   Protein, CSF Latest Ref Range: 15.0 - 45.0 mg/dL 95.7 (H)   Supernatant Color, CSF Unknown NOT REPORTED   Volume, CSF Unknown 11   RBC, CSF Latest Ref Range: 0 /mm3 1600 (H)   WBC, CSF Latest Ref Range: <5 /mm3 95 (H)   Neutrophils, CSF Latest Units: % 33   Bands, CSF Latest Units: % NOT REPORTED   Lymphs, CSF Latest Units: % 63   Baso, CSF Latest Units: % NOT REPORTED   Blasts, CSF Latest Units: % NOT REPORTED   Mono/Macrophage, CSF Latest Units: % NOT REPORTED   Tube Number, CSF Unknown 3   Metamyelocyte, CSF Latest Units: % NOT REPORTED   Myelocyte, CSF Latest Units: % NOT REPORTED   Other Cells, Fluid Latest Units: % MONOCYTES     Results for Monica Cali (MRN [id number]) as of 6/2/2020 19:50   Ref. Range 5/18/2020 12:33   SARS-CoV-2, IgG Latest Ref Range: Negative  Negative     Results for Monica Cali (MRN [id number]) as of 6/2/2020 19:50   Ref.  Range 5/21/2020 20:03 5/25/2020 22:38   Crypto Ag Latest Ref Range: NEGATIVE

## 2020-06-07 NOTE — PROGRESS NOTES
possibly underlying infiltrate. Support tubes satisfactory. CT ABDOMEN PELVIS WO CONTRAST Additional Contrast? None   Final Result   Left basilar effusion with adjacent consolidation representing atelectasis   versus pneumonia. Small amount of free fluid within the pelvis. Fluid within the distal small bowel loops that may relate to an enteritis. Tomlin catheter and rectal tube are visualized. CT HEAD WO CONTRAST   Final Result   No acute intracranial abnormality is evident. Partially imaged NG tube. XR CHEST (SINGLE VIEW FRONTAL)   Final Result   1. Stable volume loss of the left hemithorax. Worsening airspace disease   projecting over the left upper and left mid lung zone. Stable retrocardiac   airspace consolidation. Small to moderate left-sided pleural effusion,   stable. Follow-up is recommended to document resolution. 2. Stable right to left mediastinal shift. 3. Stable mild cardiomegaly. 4. Tubes as above. XR CHEST (SINGLE VIEW FRONTAL)   Final Result   Moderate left lung base opacity which has increased. VL DUP LOWER EXTREMITY VENOUS BILATERAL   Final Result      MRV HEAD W WO CONTRAST   Final Result   Motion degraded exams. Possible acute microinfarct within the splenium of the corpus callosum. Questionablefailed CSF suppression within the sulci of the parietal lobes. No associated abnormal enhancement. Correlate with LP findings. No evidence of dural venous sinus thrombosis. MRI Brain W WO Contrast   Final Result   Motion degraded exams. Possible acute microinfarct within the splenium of the corpus callosum. Questionablefailed CSF suppression within the sulci of the parietal lobes. No associated abnormal enhancement. Correlate with LP findings. No evidence of dural venous sinus thrombosis. MRA Head WO Contrast   Final Result   Motion limited exam.  No major branch occlusion.   CTA would

## 2020-06-08 ENCOUNTER — APPOINTMENT (OUTPATIENT)
Dept: GENERAL RADIOLOGY | Age: 60
DRG: 720 | End: 2020-06-08
Attending: INTERNAL MEDICINE
Payer: MEDICARE

## 2020-06-08 ENCOUNTER — HOSPITAL ENCOUNTER (OUTPATIENT)
Dept: ICU | Age: 60
Discharge: ANOTHER HOSPITAL | End: 2020-06-18
Attending: INTERNAL MEDICINE | Admitting: INTERNAL MEDICINE

## 2020-06-08 VITALS
HEART RATE: 84 BPM | BODY MASS INDEX: 25.09 KG/M2 | RESPIRATION RATE: 17 BRPM | SYSTOLIC BLOOD PRESSURE: 155 MMHG | WEIGHT: 159.83 LBS | TEMPERATURE: 97.3 F | DIASTOLIC BLOOD PRESSURE: 108 MMHG | OXYGEN SATURATION: 92 % | HEIGHT: 67 IN

## 2020-06-08 PROBLEM — F17.200 SMOKER: Status: RESOLVED | Noted: 2020-05-14 | Resolved: 2020-06-08

## 2020-06-08 PROBLEM — G89.29 CHRONIC ABDOMINAL PAIN: Status: RESOLVED | Noted: 2018-10-30 | Resolved: 2020-06-08

## 2020-06-08 PROBLEM — R50.9 FEVER: Status: RESOLVED | Noted: 2020-05-17 | Resolved: 2020-06-08

## 2020-06-08 PROBLEM — R10.9 CHRONIC ABDOMINAL PAIN: Status: RESOLVED | Noted: 2018-10-30 | Resolved: 2020-06-08

## 2020-06-08 PROBLEM — R51.9 ACUTE INTRACTABLE HEADACHE: Status: RESOLVED | Noted: 2020-05-11 | Resolved: 2020-06-08

## 2020-06-08 PROBLEM — F10.10 CHRONIC ALCOHOL ABUSE: Status: ACTIVE | Noted: 2020-06-08

## 2020-06-08 PROBLEM — E87.0 ACUTE HYPERNATREMIA: Status: RESOLVED | Noted: 2020-06-03 | Resolved: 2020-06-08

## 2020-06-08 PROBLEM — R63.4 WEIGHT LOSS: Status: RESOLVED | Noted: 2018-07-25 | Resolved: 2020-06-08

## 2020-06-08 PROBLEM — F10.20 ALCOHOLISM (HCC): Status: RESOLVED | Noted: 2020-05-14 | Resolved: 2020-06-08

## 2020-06-08 PROBLEM — R53.1 WEAKNESS GENERALIZED: Status: RESOLVED | Noted: 2020-06-03 | Resolved: 2020-06-08

## 2020-06-08 PROBLEM — R21 SKIN RASH: Status: RESOLVED | Noted: 2020-05-17 | Resolved: 2020-06-08

## 2020-06-08 LAB
ABSOLUTE EOS #: 0.07 K/UL (ref 0–0.44)
ABSOLUTE IMMATURE GRANULOCYTE: 0.17 K/UL (ref 0–0.3)
ABSOLUTE LYMPH #: 2.7 K/UL (ref 1.1–3.7)
ABSOLUTE MONO #: 1.39 K/UL (ref 0.1–1.2)
ALBUMIN SERPL-MCNC: 2.9 G/DL (ref 3.5–5.2)
ALBUMIN/GLOBULIN RATIO: 1.2 (ref 1–2.5)
ALP BLD-CCNC: 82 U/L (ref 35–104)
ALT SERPL-CCNC: 29 U/L (ref 5–33)
ANION GAP SERPL CALCULATED.3IONS-SCNC: 9 MMOL/L (ref 9–17)
AST SERPL-CCNC: 24 U/L
BASOPHILS # BLD: 0 % (ref 0–2)
BASOPHILS ABSOLUTE: <0.03 K/UL (ref 0–0.2)
BILIRUB SERPL-MCNC: 0.29 MG/DL (ref 0.3–1.2)
BUN BLDV-MCNC: 26 MG/DL (ref 6–20)
BUN/CREAT BLD: ABNORMAL (ref 9–20)
CALCIUM SERPL-MCNC: 9.3 MG/DL (ref 8.6–10.4)
CHLORIDE BLD-SCNC: 97 MMOL/L (ref 98–107)
CO2: 34 MMOL/L (ref 20–31)
CREAT SERPL-MCNC: 0.31 MG/DL (ref 0.5–0.9)
CULTURE: ABNORMAL
CULTURE: NORMAL
DIFFERENTIAL TYPE: ABNORMAL
DIRECT EXAM: ABNORMAL
EOSINOPHILS RELATIVE PERCENT: 1 % (ref 1–4)
GFR AFRICAN AMERICAN: >60 ML/MIN
GFR NON-AFRICAN AMERICAN: >60 ML/MIN
GFR SERPL CREATININE-BSD FRML MDRD: ABNORMAL ML/MIN/{1.73_M2}
GFR SERPL CREATININE-BSD FRML MDRD: ABNORMAL ML/MIN/{1.73_M2}
GLUCOSE BLD-MCNC: 107 MG/DL (ref 65–105)
GLUCOSE BLD-MCNC: 125 MG/DL (ref 65–105)
GLUCOSE BLD-MCNC: 95 MG/DL (ref 65–105)
GLUCOSE BLD-MCNC: 97 MG/DL (ref 70–99)
HCT VFR BLD CALC: 32.8 % (ref 36.3–47.1)
HEMOGLOBIN: 10.4 G/DL (ref 11.9–15.1)
IMMATURE GRANULOCYTES: 1 %
LYMPHOCYTES # BLD: 23 % (ref 24–43)
Lab: ABNORMAL
Lab: NORMAL
MAGNESIUM: 1.9 MG/DL (ref 1.6–2.6)
MCH RBC QN AUTO: 30 PG (ref 25.2–33.5)
MCHC RBC AUTO-ENTMCNC: 31.7 G/DL (ref 28.4–34.8)
MCV RBC AUTO: 94.5 FL (ref 82.6–102.9)
MONOCYTES # BLD: 12 % (ref 3–12)
NRBC AUTOMATED: 0 PER 100 WBC
PDW BLD-RTO: 14.9 % (ref 11.8–14.4)
PHOSPHORUS: 2.9 MG/DL (ref 2.6–4.5)
PLATELET # BLD: 360 K/UL (ref 138–453)
PLATELET ESTIMATE: ABNORMAL
PMV BLD AUTO: 10 FL (ref 8.1–13.5)
POTASSIUM SERPL-SCNC: 2.7 MMOL/L (ref 3.7–5.3)
POTASSIUM SERPL-SCNC: 3.4 MMOL/L (ref 3.7–5.3)
RBC # BLD: 3.47 M/UL (ref 3.95–5.11)
RBC # BLD: ABNORMAL 10*6/UL
SEG NEUTROPHILS: 63 % (ref 36–65)
SEGMENTED NEUTROPHILS ABSOLUTE COUNT: 7.57 K/UL (ref 1.5–8.1)
SODIUM BLD-SCNC: 140 MMOL/L (ref 135–144)
SPECIMEN DESCRIPTION: ABNORMAL
SPECIMEN DESCRIPTION: NORMAL
TOTAL PROTEIN: 5.3 G/DL (ref 6.4–8.3)
WBC # BLD: 11.9 K/UL (ref 3.5–11.3)
WBC # BLD: ABNORMAL 10*3/UL

## 2020-06-08 PROCEDURE — 97530 THERAPEUTIC ACTIVITIES: CPT

## 2020-06-08 PROCEDURE — 99232 SBSQ HOSP IP/OBS MODERATE 35: CPT | Performed by: INTERNAL MEDICINE

## 2020-06-08 PROCEDURE — 94640 AIRWAY INHALATION TREATMENT: CPT

## 2020-06-08 PROCEDURE — 6370000000 HC RX 637 (ALT 250 FOR IP): Performed by: STUDENT IN AN ORGANIZED HEALTH CARE EDUCATION/TRAINING PROGRAM

## 2020-06-08 PROCEDURE — 74230 X-RAY XM SWLNG FUNCJ C+: CPT

## 2020-06-08 PROCEDURE — 92611 MOTION FLUOROSCOPY/SWALLOW: CPT

## 2020-06-08 PROCEDURE — 83735 ASSAY OF MAGNESIUM: CPT

## 2020-06-08 PROCEDURE — 36415 COLL VENOUS BLD VENIPUNCTURE: CPT

## 2020-06-08 PROCEDURE — 80053 COMPREHEN METABOLIC PANEL: CPT

## 2020-06-08 PROCEDURE — 6370000000 HC RX 637 (ALT 250 FOR IP): Performed by: INTERNAL MEDICINE

## 2020-06-08 PROCEDURE — 99233 SBSQ HOSP IP/OBS HIGH 50: CPT | Performed by: INTERNAL MEDICINE

## 2020-06-08 PROCEDURE — 2580000003 HC RX 258: Performed by: INTERNAL MEDICINE

## 2020-06-08 PROCEDURE — 97110 THERAPEUTIC EXERCISES: CPT

## 2020-06-08 PROCEDURE — 6360000002 HC RX W HCPCS: Performed by: INTERNAL MEDICINE

## 2020-06-08 PROCEDURE — 85025 COMPLETE CBC W/AUTO DIFF WBC: CPT

## 2020-06-08 PROCEDURE — 51701 INSERT BLADDER CATHETER: CPT

## 2020-06-08 PROCEDURE — 97535 SELF CARE MNGMENT TRAINING: CPT

## 2020-06-08 PROCEDURE — 99232 SBSQ HOSP IP/OBS MODERATE 35: CPT | Performed by: NURSE PRACTITIONER

## 2020-06-08 PROCEDURE — 51798 US URINE CAPACITY MEASURE: CPT

## 2020-06-08 PROCEDURE — 84100 ASSAY OF PHOSPHORUS: CPT

## 2020-06-08 PROCEDURE — 6370000000 HC RX 637 (ALT 250 FOR IP): Performed by: PSYCHIATRY & NEUROLOGY

## 2020-06-08 PROCEDURE — 92507 TX SP LANG VOICE COMM INDIV: CPT

## 2020-06-08 PROCEDURE — 92610 EVALUATE SWALLOWING FUNCTION: CPT

## 2020-06-08 PROCEDURE — 84132 ASSAY OF SERUM POTASSIUM: CPT

## 2020-06-08 PROCEDURE — 94761 N-INVAS EAR/PLS OXIMETRY MLT: CPT

## 2020-06-08 PROCEDURE — 6360000002 HC RX W HCPCS: Performed by: STUDENT IN AN ORGANIZED HEALTH CARE EDUCATION/TRAINING PROGRAM

## 2020-06-08 PROCEDURE — 99239 HOSP IP/OBS DSCHRG MGMT >30: CPT | Performed by: INTERNAL MEDICINE

## 2020-06-08 PROCEDURE — 82947 ASSAY GLUCOSE BLOOD QUANT: CPT

## 2020-06-08 RX ORDER — FOLIC ACID 1 MG/1
1 TABLET ORAL DAILY
Qty: 30 TABLET | Refills: 3 | Status: SHIPPED | OUTPATIENT
Start: 2020-06-09 | End: 2020-07-16

## 2020-06-08 RX ORDER — ALBUTEROL SULFATE 2.5 MG/3ML
2.5 SOLUTION RESPIRATORY (INHALATION) EVERY 6 HOURS PRN
Qty: 120 EACH | Refills: 3 | Status: SHIPPED | OUTPATIENT
Start: 2020-06-08

## 2020-06-08 RX ORDER — MULTIVIT-MIN/FERROUS GLUCONATE 9 MG/15 ML
15 LIQUID (ML) ORAL DAILY
Qty: 450 ML | Refills: 0 | Status: SHIPPED | OUTPATIENT
Start: 2020-06-09 | End: 2020-07-16

## 2020-06-08 RX ORDER — LANOLIN ALCOHOL/MO/W.PET/CERES
100 CREAM (GRAM) TOPICAL DAILY
Qty: 30 TABLET | Refills: 3 | Status: SHIPPED | OUTPATIENT
Start: 2020-06-09 | End: 2020-07-16

## 2020-06-08 RX ORDER — IPRATROPIUM BROMIDE AND ALBUTEROL SULFATE 2.5; .5 MG/3ML; MG/3ML
3 SOLUTION RESPIRATORY (INHALATION) 3 TIMES DAILY
Qty: 360 ML | Refills: 1 | Status: SHIPPED | OUTPATIENT
Start: 2020-06-08 | End: 2020-07-16

## 2020-06-08 RX ORDER — POLYETHYLENE GLYCOL 3350 17 G/17G
17 POWDER, FOR SOLUTION ORAL DAILY PRN
Qty: 527 G | Refills: 1 | Status: SHIPPED | OUTPATIENT
Start: 2020-06-08 | End: 2020-07-08

## 2020-06-08 RX ORDER — ONDANSETRON 2 MG/ML
4 INJECTION INTRAMUSCULAR; INTRAVENOUS EVERY 6 HOURS PRN
Qty: 84 ML | Refills: 1 | Status: SHIPPED | OUTPATIENT
Start: 2020-06-08 | End: 2020-06-15

## 2020-06-08 RX ADMIN — Medication 100 MG: at 08:17

## 2020-06-08 RX ADMIN — SODIUM CHLORIDE, PRESERVATIVE FREE 10 ML: 5 INJECTION INTRAVENOUS at 08:19

## 2020-06-08 RX ADMIN — POTASSIUM CHLORIDE 10 MEQ: 7.46 INJECTION, SOLUTION INTRAVENOUS at 17:18

## 2020-06-08 RX ADMIN — FLUCYTOSINE 1750 MG: 500 CAPSULE ORAL at 08:18

## 2020-06-08 RX ADMIN — POTASSIUM CHLORIDE 10 MEQ: 7.46 INJECTION, SOLUTION INTRAVENOUS at 12:10

## 2020-06-08 RX ADMIN — POTASSIUM CHLORIDE 10 MEQ: 7.46 INJECTION, SOLUTION INTRAVENOUS at 15:43

## 2020-06-08 RX ADMIN — FLUCYTOSINE 1750 MG: 500 CAPSULE ORAL at 15:54

## 2020-06-08 RX ADMIN — FLUCYTOSINE 1750 MG: 500 CAPSULE ORAL at 02:46

## 2020-06-08 RX ADMIN — POTASSIUM BICARBONATE 40 MEQ: 782 TABLET, EFFERVESCENT ORAL at 08:18

## 2020-06-08 RX ADMIN — Medication 15 ML: at 08:18

## 2020-06-08 RX ADMIN — ENOXAPARIN SODIUM 70 MG: 80 INJECTION SUBCUTANEOUS at 08:18

## 2020-06-08 RX ADMIN — AMPHOTERICIN B 230 MG: 50 INJECTION, POWDER, LYOPHILIZED, FOR SOLUTION INTRAVENOUS at 15:54

## 2020-06-08 RX ADMIN — FOLIC ACID 1 MG: 1 TABLET ORAL at 08:17

## 2020-06-08 RX ADMIN — IPRATROPIUM BROMIDE AND ALBUTEROL SULFATE 1 AMPULE: .5; 3 SOLUTION RESPIRATORY (INHALATION) at 09:47

## 2020-06-08 RX ADMIN — POTASSIUM CHLORIDE 10 MEQ: 7.46 INJECTION, SOLUTION INTRAVENOUS at 10:05

## 2020-06-08 RX ADMIN — POTASSIUM CHLORIDE 10 MEQ: 7.46 INJECTION, SOLUTION INTRAVENOUS at 18:22

## 2020-06-08 RX ADMIN — FUROSEMIDE 40 MG: 10 INJECTION, SOLUTION INTRAMUSCULAR; INTRAVENOUS at 08:18

## 2020-06-08 RX ADMIN — POTASSIUM CHLORIDE 10 MEQ: 7.46 INJECTION, SOLUTION INTRAVENOUS at 08:19

## 2020-06-08 ASSESSMENT — ENCOUNTER SYMPTOMS
COLOR CHANGE: 0
EYE REDNESS: 0
COUGH: 0
ABDOMINAL DISTENTION: 0
APNEA: 0
SHORTNESS OF BREATH: 0

## 2020-06-08 NOTE — PROGRESS NOTES
Balance  Sitting Balance: Maximum assistance(+2 person assistance seated on EOB) tolerated sitting on EOB for ~ 10 min  Standing Balance: Unable to assess(poor sitting balance)  Bed mobility  Rolling to Left: Dependent/Total  Rolling to Right: Dependent/Total  Supine to Sit: 2 Person assistance;Dependent/Total  Sit to Supine: 2 Person assistance;Dependent/Total  Scootin Person assistance;Dependent/Total  Pt slow to process information and verbal/tactile cues given for initiating /sequencing simple grooming task.       Attendance  Participation: Active participation   Plan   Plan  Times per week: 3-4 x week   Goals  Short term goals  Time Frame for Short term goals: Pt will, by discharge:   Short term goal 1: Dem min a for a simple adl task  Short term goal 2: Dem tracking of any object 3/4 tx sessions  Short term goal 3: Be alert and oriented x 2 with verbal choices 2.4 sessions  Short term goal 4: Follow commands 75% of the time  Short term goal 5: Dem mod a for bed mobility  Short term goal 6: Sit on EOB 10 min with min a       Therapy Time   Individual Concurrent Group Co-treatment   Time In        11:40   Time Out        11:57   Minutes        17 min       Time code min: 8 min d/t co-tx w/ PTA    CARI CastañedaA/ANDREW

## 2020-06-08 NOTE — PROGRESS NOTES
name: 75% increased to 100% with min-moderate verbal cues   Picture ID, function: 100% independently   Automatic Associations: 50% increased to 75% with max verbal cues     Speech: Pt continues to present with mild-mod decreased intelligibility in unknown contexts and decreased vocal intensity. Additional Goal:   Goal 1: Pt will utilize compensatory communication strategies to facilitate speech clarity in 4/5 sentences per session. Plan:  [x] Continue ST services    [] Discharge from ST:      Discharge recommendations: Further therapy recommended at discharge.     Treatment completed by: Mercy Hanks M.S., CF-SLP

## 2020-06-08 NOTE — PROGRESS NOTES
NEUROLOGY INPATIENT PROGRESS NOTE    6/8/2020         Current Exam:     Chart reviewed. Discussed with RN. Nurse reports patient has been awake and alert today, has been able to follow some simple commands. Is not fully oriented but able to answer questions. Brief History:    Maria Isabel Nascimento is a  62 y.o. female who was admitted on 5/14/2020 with new onset headache and generalized weakness. She was treated for septic shock and transferred to stepdown. She had worsening mental status since 5/16/2020 and had been worked up for possible encephalitis. CSF was negative for viral and bacterial meningitis. 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 She was started on steroids initially for 2 days with some improvement. EEG with diffuse encephalopathy findings. She was transferred back to the medical ICU on 5/18/2020 after a rapid response was called due to respiratory distress and hypoxia. She was intubated in the MICU and later extubated on 5/26. She was transferred back to stepdown on 5/28 and has continued to have slow mental status improvement since initiated on IV Solu-Medrol on 5/30/2020. ID has been following on her case and she continues on amphotericin B and flucytosine. MRI brain done on 5/31/2020 with focal punctate subacute infarcts noted in bilateral cerebral hemispheres, likely related to embolic infarcts. These have increased in size and number since 5/7/2020 exam.  CTA head neck with no acute arterial abnormality or hemodynamically significant arterial stenosis in the head and neck. Echo with EF 56%, mildly dilated right atrium. No current facility-administered medications on file prior to encounter. No current outpatient medications on file prior to encounter. Allergies: Maria Isabel Nascimento is allergic to sulfa antibiotics.     Past Medical History:   Diagnosis Date    COPD (chronic 06/07/20  0429 06/07/20  1204 06/07/20  1357 06/08/20  0636      < > 141 140  --  140   K 3.1*  --  2.8*  --  3.5* 2.7*     --  94*  --   --  97*   CO2 31  --  36*  --   --  34*   BUN 32*  --  31*  --   --  26*   CREATININE 0.41*  --  0.35*  --   --  0.31*   GLUCOSE 156*  --  144*  --   --  97    < > = values in this interval not displayed. Lab Results   Component Value Date    ALT 29 06/08/2020    AST 24 06/08/2020    TSH 0.70 05/14/2020    INR 1.0 05/14/2020    [id number] 1358 (H) 05/17/2020           Diagnostic data reviewed:  MRI brain 5/31/2020: Focal punctate subacute infarcts noted in bilateral cerebral hemispheres, likely related to embolic infarcts. These have increased in size and number since 5/7/2020 exam.          CTA head and neck 6/1/2020: No acute arterial abnormality or hemodynamically significant arterial stenosis in the head and neck.     Echocardiogram 5/16/2020: EF 56%. Mildly dilated right atrium.     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 CTA head neck is negative as above.   Of note, vascular insults are not uncommon in patients with cryptococcal meningitis  -Initial spinal fluid negative for meningitis panel, Lyme, VDRL; repeated LP on 6/5/2020 with improvement in white cell count with lymphocytic pleocytosis  -Patient continues on Lovenox for DVT  -ID continues to follow; patient remains on amphotericin B and flucytosine  -Patient continues on thiamine 250 mg IV daily  -We will follow    Please note that this note

## 2020-06-08 NOTE — DISCHARGE SUMMARY
a tiny acute splenium DWI but no other acute findings. LP was negative for infectious, viral, autoimmune process. She was given a short course of steroids.      She was initially on nasal canula but was taken off oxygen and transferred out of ICU. At the time, she was oriented to herself, place and able to communicate and follow commands.      Repeat CXR on 5/17 showed new left lower lobe atelectasis, which was confirmed on CTA of the chest. She was on 4-6L oxygen at this time.      Overnight on 5/19/2020, she was obtunded. Blood gases showed pH 7.461, CO2 41.8, O2 49.4, HCO3 29.8. the decision was made to intubate her. After intubation, her vent settings were PRVC, 16/10/500/100%. Her O2 saturations were 92%.     She was hypernatremic at 152, potassium 3.6. replaced with 40 mEq oral and 20 mEq IV. Vitals:  Vitals:    06/08/20 0946 06/08/20 0947 06/08/20 1130 06/08/20 1600   BP:   137/83 (!) 155/108   Pulse:   85 84   Resp: 12 11 12 17   Temp:   97.2 °F (36.2 °C) 97.3 °F (36.3 °C)   TempSrc:   Temporal Oral   SpO2: 92% 92% 90% 92%   Weight:       Height:         Weight: Weight: 159 lb 13.3 oz (72.5 kg)     24 hour intake/output:    Intake/Output Summary (Last 24 hours) at 6/8/2020 1750  Last data filed at 6/8/2020 1600  Gross per 24 hour   Intake 3526 ml   Output 7165 ml   Net -3639 ml       Physical Exam  Vitals signs and nursing note reviewed. Constitutional:       General: She is not in acute distress. Appearance: Normal appearance. She is not ill-appearing, toxic-appearing or diaphoretic. HENT:      Head: Normocephalic and atraumatic. Nose: Nose normal. No congestion or rhinorrhea. Comments: NG tube. Mouth/Throat:      Mouth: Mucous membranes are moist.      Pharynx: Oropharynx is clear. No oropharyngeal exudate or posterior oropharyngeal erythema. Eyes:      General: No scleral icterus. Right eye: No discharge. Left eye: No discharge.       Extraocular Movements: Not Detected     Influenza A H1 PCR Latest Ref Range: Not Detected  NOT REPORTED           Results for Jad Abbott (MRN [id number]) as of 6/2/2020 19:50    Ref. Range 5/29/2020 20:24   IgA Latest Ref Range: 70 - 400 mg/dL 108   IgG 1 Latest Ref Range: 240 - 1118 mg/dL 282   IgG 2 Latest Ref Range: 124 - 549 mg/dL 142   IgG 3 Latest Ref Range: 21 - 134 mg/dL 34   IgG 4 Latest Ref Range: 1 - 123 mg/dL 7   Total IgG Latest Ref Range: 700 - 1600 mg/dL 567 (L)   IgM Latest Ref Range: 40 - 230 mg/dL 642 (H)   Kappa Free Light Chains QNT Latest Ref Range: 0.37 - 1.94 mg/dL 3.72 (H)   Lambda Free Light Chains QNT Latest Ref Range: 0.57 - 2.63 mg/dL 2.53   Free Kappa/Lambda Ratio Latest Ref Range: 0.26 - 1.65  1.47      Results for Jad Abbott (MRN [id number]) as of 6/2/2020 19:50    Ref. Range 5/23/2020 12:41   ANCA Myeloperoxidase Latest Ref Range: <100 AU/mL 47   ANCA Proteinase 3 Latest Ref Range: <100 AU/mL 8   Rheumatoid Factor Latest Ref Range: <14 IU/mL 13.9   TISSUE TRANSGLUTAMINASE IGA Latest Ref Range: <7.0 U/mL 0.3   IgA Latest Ref Range: 70 - 400 mg/dL 89   Total IgG Latest Ref Range: 700 - 1600 mg/dL 451 (L)   Alpha 1 % Latest Ref Range: 3 - 6 % 6   Alpha 2 % Latest Ref Range: 6 - 13 % 15 (H)   Alpha-1-Globulin Latest Ref Range: 0.1 - 0.4 g/dL 0.3   Alpha-2-Globulin Latest Ref Range: 0.5 - 0.9 g/dL 0.7   Beta Globulin Latest Ref Range: 0.5 - 1.1 g/dL 0.5   Beta Percent Latest Ref Range: 11 - 19 % 11   Gamma Globulin Latest Ref Range: 0.5 - 1.5 g/dL 0.8   Gamma Globulin % Latest Ref Range: 9 - 20 % 18   Beta-2 Microglobulin Latest Ref Range: 0.6 - 2.4 mg/L 1.9   Serum IFX Interp Unknown (NOTE)   IMMUNOFIXATION SERUM PROFILE Unknown Rpt      Results for Jad Abbott (MRN [id number]) as of 6/2/2020 19:50    Ref.  Range 5/14/2020 20:52   QUYEN Latest Ref Range: NEGATIVE  NEGATIVE   Hep A IgM Latest Ref Range: NONREACTIVE  NONREACTIVE   Hepatitis B Surface Ag Latest Ref Range: NONREACTIVE  NONREACTIVE   Hepatitis C Ab Monocyte Count, Fluid Latest Units: % NOT REPORTED   Neutrophil Count, Fluid Latest Units: % 19   pH, Fluid Unknown 8.4   Total Protein, Body Fluid Latest Units: g/dL 1.0   WBC, Fluid Latest Units: /mm3 161      Results for Juan Delgadillo (MRN [id number]) as of 6/2/2020 19:50    Ref. Range 5/24/2020 13:57   CYTOMEGALOVIRUS (CMV) CSF FILM ARRAY Latest Ref Range: Not Detected  Not Detected   HAEMOPHILUS INFLUENZA CSF FILM ARRAY Latest Ref Range: Not Detected  Not Detected   HHV-6 (HERPESVIRUS 6) CSF FILM ARRAY Latest Ref Range: Not Detected  Not Detected   HSV-1 CSF FILM ARRAY Latest Ref Range: Not Detected  Not Detected   HSV-2 CSF FILM ARRAY Latest Ref Range: Not Detected  Not Detected   PARECHOVIRUS CSF FILM ARRAY Latest Ref Range: Not Detected  Not Detected   ESCHERICHIA COLI K1 CSF FILM ARRAY Latest Ref Range: Not Detected  Not Detected   LISTERIA MONOCYTOGENES CSF FILM ARRAY Latest Ref Range: Not Detected  Not Detected   NEISSERIA MENIGITIDIS CSF FILM ARRAY Latest Ref Range: Not Detected  Not Detected   STREPTOCOCCUS AGALACTIAE CSF FILM ARRAY Latest Ref Range: Not Detected  Not Detected   STREPTOCOCCUS PNEUMONIAE CSF FILM ARRAY Latest Ref Range: Not Detected  Not Detected   CRYPTOCOCCUS NEOFORMANS/ANKIT CSF FILM ARR. Latest Ref Range: Not Detected  Not Detected   B burgdorferi Ab,CSF Latest Ref Range: <=0.99 FRANKIE 0.29      Results for Juan Delgadillo (MRN [id number]) as of 6/2/2020 19:50    Ref.  Range 5/24/2020 13:57   Appearance, CSF Unknown SLIGHTLY BLOODY   Eosinophils, CSF Latest Units: % NOT REPORTED   Glucose, CSF Latest Ref Range: 40 - 70 mg/dL 56   Lactate, CSF Latest Ref Range: 1.2 - 2.6 mmol/L 2.3   Protein, CSF Latest Ref Range: 15.0 - 45.0 mg/dL 95.7 (H)   Supernatant Color, CSF Unknown NOT REPORTED   Volume, CSF Unknown 11   RBC, CSF Latest Ref Range: 0 /mm3 1600 (H)   WBC, CSF Latest Ref Range: <5 /mm3 95 (H)   Neutrophils, CSF Latest Units: % 33   Bands, CSF Latest Units: % NOT REPORTED   Lymphs, CSF bilateral pleural effusions,  left greater than right.     Recommend advancement of the enteric tube by 6-7 cm.     CTA head and neck-0 6/1/2020. IMPRESSION:  1. No acute arterial abnormality or hemodynamically significant arterial stenosis      in the head or neck.     2. Partially imaged small to moderate layering bilateral pleural effusionswith left       upper lobe atelectasis.     CT head without contrast-05/28/2020. IMPRESSION:  No acute intracranial abnormality.     CT abdomen pelvis without contrast-05/21/2020. Jos Saeed IMPRESSION:  Left basilar effusion with adjacent consolidation representing atelectasis  versus pneumonia.     Small amount of free fluid within the pelvis.     Fluid within the distal small bowel loops that may relate to an enteritis.     Tomlin catheter and rectal tube are visualized.     Brain MRI with and without contrast-05/31/2020. IMPRESSION:  1. Multifocal punctate subacute infarcts are noted in bilateral cerebral hemispheres,       likely related to embolic infarcts.           These have increased in size and number since the 05/17/2020 exam.     2. Mild chronic microvascular white matter ischemic disease.     Echocardiogram-05/16/2020. CONCLUSION:  Left ventricle is normal in size with normal systolic function globally.     Calculated ejection fraction is 56%.     Mildly dilated right atrium.      Mild buckling of the right atrial wall.     Aortic sclerosis without stenosis.     Mild tricuspid regurgitation.     Estimated right ventricular systolic pressure is 25 mmHg.     CT chest without contrast 05/13/2020. IMPRESSION:  Moderate pericardial effusion.       Recommend clinical correlation.     No evidence of lung consolidation.     Liver ultrasound 05/14/2020. IMPRESSION:  Hepatomegaly with coarse hepatic echotexture consistent with fatty infiltration   without focal mass.       Normal hepatopetal flow in the portal vein.     Small amount of ascites fluid about the right kidney. Acute deep venous thrombosis identified in the gastrocnemius vein. Left:   No evidence of deep or superficial venous thrombosis. Consults:     IP CONSULT TO INFECTIOUS DISEASES  IP CONSULT TO NEUROSURGERY  IP CONSULT TO NEPHROLOGY  IP CONSULT TO NEUROLOGY  IP CONSULT TO SOCIAL WORK  IP CONSULT TO CARDIOLOGY  IP CONSULT TO VASCULAR SURGERY  IP CONSULT TO PHARMACY  IP CONSULT TO DIETITIAN  IP CONSULT TO NEUROCRITICAL CARE  IP CONSULT TO UROLOGY  IP CONSULT TO NEPHROLOGY    Disposition:    [] Home       [] TCU       [] Rehab       [] Psych       [] SNF       [x] [place]       [] Other-    Condition at Discharge: Stable    Code Status:  Full Code     Patient Instructions:    Discharge lab work:    Activity: activity as tolerated  Diet: DIET TUBE FEED CONTINUOUS/CYCLIC NPO; Semi-elemental; Nasogastric; 25; 50; 24      Follow-up visits:   [address] [address]  [phone]          [address]  [phone]    With urology as needed, please call for appointment     Fatou Dorsey MD  [address] [place] [address], [address] [place] [address] [phone]    Schedule an appointment as soon as possible for a visit in 1 month  infec diseases - pls call Dr Salma Wade to see at [facility]  [address] [place] [address]  [phone]           Discharge Medications:      Gilberto Mathur Medication Instructions [facility]:[phone]    Printed on:06/08/20 1750   Medication Information                      albuterol (PROVENTIL) (2.5 MG/3ML) 0.083% nebulizer solution  Take 3 mLs by nebulization every 6 hours as needed for Wheezing             amphotericin B liposome (AMBISOME) 50 MG injection  Infuse 250 mg intravenously daily for 27 days Stop after 7/2/20 and on 7/3/20  start fluconazole 400 mg po daily x 6 months - creat daily till 7/2 and LFT 2 x per week starting 7/3 enoxaparin (LOVENOX) 80 MG/0.8ML injection  Inject 0.7 mLs into the skin 2 times daily             flucytosine (ANCOBON) 250 MG capsule  Take 7 capsules by mouth every 6 hours for 4 days Stop after 1/5/57             folic acid (FOLVITE) 1 MG tablet  Take 1 tablet by mouth daily             ipratropium-albuterol (DUONEB) 0.5-2.5 (3) MG/3ML SOLN nebulizer solution  Inhale 3 mLs into the lungs three times daily             Multiple Vitamins-Minerals (CENTRUM/CERTA-ALYSSA WITH MINERALS ORAL) solution  Take 15 mLs by mouth daily             ondansetron (ZOFRAN) 4 MG/2ML injection  Infuse 2 mLs intravenously every 6 hours as needed for Nausea or Vomiting             polyethylene glycol (GLYCOLAX) 17 g packet  Take 17 g by mouth daily as needed for Constipation             vitamin B-1 100 MG tablet  Take 1 tablet by mouth daily                 Time Spent on discharge is more than 45 minutes in the examination, evaluation, counseling and review of medications and discharge plan. Signed: Thank you Katy Matos for the opportunity to be involved in this patient's care.     Electronically signed by Favian Cabrales MD on 6/8/2020 at 5:50 PM

## 2020-06-08 NOTE — PROGRESS NOTES
Speech Language Pathology  Facility/Department: [facility] 4B STEPDOWN   CLINICAL BEDSIDE SWALLOW EVALUATION    NAME: Natalie Hurley  : 1960  MRN: [id number]    ADMISSION DATE: 2020  ADMITTING DIAGNOSIS: has Pneumonia of left lower lobe due to infectious organism 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    Recent Chest Xray/CT of Chest: 2020  XR Chest  Impression   1. Right arm PICC is present, with distal tip near the cavoatrial junction. 2. Persistent left pleural effusion, with dense atelectasis/consolidation at   the left lung base.  Currently, no pneumothorax is visualized. 3. Mild pulmonary venous congestion. Date of Eval: 2020  Evaluating Therapist: Ellyn Melgoza    Current Diet level:  Current Diet : NPO  Current Liquid Diet : NPO    Primary Complaint  Xiomara Appiah a 62 y.o. with PMH of alcohol abuse      She was a transfer back to ICU from the floors.      She initially presented to Covid IC with severe headache, generalized weakness, fever, photophobia and macular rash (extremities and trunk). She was also hypotensive requiring Levophed for a short duration. When her Covid test was negative, she was transferred to MICU.  She had not required pressor support since Therapy Time  SLP Individual Minutes  Time In: 0845  Time Out: 8076  Minutes: [address], SLP  6/8/2020 9:45 AM

## 2020-06-08 NOTE — PROGRESS NOTES
PULMONARY PROGRESS NOTE      Patient:  Maria Isabel Nascimento  YOB: 1960    MRN: [id number]     Acct: [de-identified]     Admit date: 5/14/2020    REASON FOR INITIAL CONSULT:-   Altered mental status/encephalopathy  Acute respiratory failure  Pleural effusion. Pt seen and Chart reviewed. Overnight events reported. She is more alert today able to talk a little bit better difficult to understand. She has been off high flow and when I saw her this morning she was on room air saturating 93%. She is afebrile and T-max is 97.7, she is hemodynamically stable no hypotension was reported, respiratory rate is 12-18 and no distress and no hypoxic events were reported. She was transitioned from high flow nasal cannula in last 24 hours to nasal cannula and currently she is on room air. Pleural fluid cytology was transudative from left pleural effusion, negative for malignancy and flow cytometry negative. Subjective:   She is confused and disoriented  Denies shortness of breath at rest.  Denies cough, sputum production denies wheezing. Denies chest pain hemoptysis and night sweats. Denies nausea vomiting abdominal pain. Review of Systems -   Limited as patient has altered mental status, improving and also difficult to understand when she talk and most of the review of system per patient negative.     CONSTITUTIONAL:  negative for  fevers, chills, sweats, fatigue, anorexia, and weight loss  EYES:  negative for  double vision, blurred vision, dry eyes, eye discharge, visual disturbance, irritation, redness, and icterus  HEENT:  negative for  tinnitus, earaches, nasal congestion, epistaxis, sore mouth, sore throat, hoarseness, and voice change  RESPIRATORY:  negative for  dry cough, cough with sputum, dyspnea, wheezing, hemoptysis, chest pain, pleuritic pain, and cyanosis  CARDIOVASCULAR:  negative for  chest pain, dyspnea, palpitations, orthopnea, PND, exertional chest pressure/discomfort, fatigue, early saiety,

## 2020-06-08 NOTE — PLAN OF CARE
Problem: Falls - Risk of:  Goal: Will remain free from falls  Description: Will remain free from falls  Outcome: Met This Shift  Goal: Absence of physical injury  Description: Absence of physical injury  Outcome: Met This Shift     Problem: Skin Integrity - Impaired:  Goal: Will show no infection signs and symptoms  Description: Will show no infection signs and symptoms  6/8/2020 1631 by Anna Lockett RN  Outcome: Ongoing  6/8/2020 0357 by Annamarie Barrera RN  Outcome: Ongoing  Goal: Absence of new skin breakdown  Description: Absence of new skin breakdown  6/8/2020 1631 by Anna Lockett RN  Outcome: Ongoing  6/8/2020 0357 by Annamarie Barrera RN  Outcome: Ongoing

## 2020-06-08 NOTE — PROGRESS NOTES
[facility])  Cognition      Objective   Bed mobility  Rolling to Left: Dependent/Total  Rolling to Right: Dependent/Total  Supine to Sit: Dependent/Total;2 Person assistance  Sit to Supine: Dependent/Total;2 Person assistance  Scooting: Dependent/Total  Transfers  Sit to Stand: Unable to assess     Neuromuscular Education  NDT Treatment: Upper extremity; Sitting(BUEs)  Balance  Posture: Fair  Sitting - Static: Poor;+(pt sat EOB 11' with maxA; improved head control noted, pt attempting to assist with sitting balance by reaching for hand rail with LUE)       Exercises:  PROM x 10 reps BLEs  B gastroc stretch x 30''x2, good ROM noted  AAROM BUEs x 10 reps      Goals  Short term goals  Time Frame for Short term goals: 14 visits  Short term goal 1: Pt to perform Bed mobility min A  Short term goal 2: Pt to improve sitting balance to Good  Short term goal 3: Pt to transfer Min A  Short term goal 4: Pt to ambulate with appropriate device 100 ft Min A  Short term goal 5: Pt to tolerate 30 minutes of activity to improve strength and endurance. Patient Goals   Patient goals : Unable to state    Plan    Plan  Times per week: 5x/week  Current Treatment Recommendations: Strengthening, Functional Mobility Training, Transfer Training, Gait Training, Safety Education & Training, Balance Training, Endurance Training, Neuromuscular Re-education  Safety Devices  Type of devices:  All fall risk precautions in place, Bed alarm in place, Call light within reach, Left in bed, Patient at risk for falls, Nurse notified, Alfred Velazquez in use  Restraints  Initially in place: No     Therapy Time   Individual Concurrent Group Co-treatment   Time In 1124         Time Out 1158         Minutes 34         Timed Code Treatment Minutes: 23 Minutes(-partial co-treat with SANCHEZ due to level of assistance required for mobility)       Ari Hickman, PTA

## 2020-06-08 NOTE — PROGRESS NOTES
lack of cryptococcus CSF antigen positivity does not rule out meningoencephalitis to cryptococcus,   · The picture is not fully typical and hence will benefit from another LP in the next week to monitor response  · Steroids course given by neurology again 5/30 until 6/2  · 6/3 remains very encephalopathic, opens her eyes but does not communicate much, short answers and not detailed  · LP 6/4 OP 15mm and WBC less - PCR panel neg and clinically better, talking more purposefully but still very weak,lifts right arm only. · Serum cryptococcal antigen 6/5  Is finally negative  · 6/8 she is completely turned around, appropriate sitting in bed responsive but oriented x1. 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 High fever. All responded to Abelcet and flucytosine. 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   · Will need fluconazole high dose starting 7/3/20 long term x 1 year  · She will leave to [facility] and our service will follow there  · I can see her in the office after wards    Infection Control Recommendations   · [place] Precautions  · Contact Isolation   Antimicrobial Stewardship Recommendations   · Simplification of therapy  · Targeted therapy    Coordination ofOutpatient Care:   · Estimated Length of IV antimicrobials:  · Patient will need Midline / picc Catheter Insertion:   · Patient will need SNF:  · Patient will need outpatient wound care:     History of Present Illness:   Initial history:  Grecia Melchor

## 2020-06-09 LAB
CRYOGLOBULIN: 0 MG/DL (ref 0–10)
POTASSIUM SERPL-SCNC: 3.6 MMOL/L (ref 3.7–5.3)

## 2020-06-09 PROCEDURE — 84132 ASSAY OF SERUM POTASSIUM: CPT

## 2020-06-10 PROCEDURE — 85027 COMPLETE CBC AUTOMATED: CPT

## 2020-06-10 PROCEDURE — 87040 BLOOD CULTURE FOR BACTERIA: CPT

## 2020-06-10 PROCEDURE — 80048 BASIC METABOLIC PNL TOTAL CA: CPT

## 2020-06-11 LAB
ANION GAP SERPL CALCULATED.3IONS-SCNC: 11 MMOL/L (ref 9–17)
ANION GAP SERPL CALCULATED.3IONS-SCNC: 13 MMOL/L (ref 9–17)
BUN BLDV-MCNC: 20 MG/DL (ref 6–20)
BUN BLDV-MCNC: 24 MG/DL (ref 6–20)
BUN/CREAT BLD: 49 (ref 9–20)
BUN/CREAT BLD: ABNORMAL (ref 9–20)
CALCIUM SERPL-MCNC: 8.8 MG/DL (ref 8.6–10.4)
CALCIUM SERPL-MCNC: 9.9 MG/DL (ref 8.6–10.4)
CHLORIDE BLD-SCNC: 103 MMOL/L (ref 98–107)
CHLORIDE BLD-SCNC: 95 MMOL/L (ref 98–107)
CO2: 30 MMOL/L (ref 20–31)
CO2: 32 MMOL/L (ref 20–31)
CREAT SERPL-MCNC: 0.41 MG/DL (ref 0.5–0.9)
CREAT SERPL-MCNC: <0.4 MG/DL (ref 0.5–0.9)
CULTURE: ABNORMAL
D-DIMER QUANTITATIVE: 2.21 MG/L FEU (ref 0–0.59)
DIRECT EXAM: ABNORMAL
GFR AFRICAN AMERICAN: >60 ML/MIN
GFR AFRICAN AMERICAN: ABNORMAL ML/MIN
GFR NON-AFRICAN AMERICAN: >60 ML/MIN
GFR NON-AFRICAN AMERICAN: ABNORMAL ML/MIN
GFR SERPL CREATININE-BSD FRML MDRD: ABNORMAL ML/MIN/{1.73_M2}
GLUCOSE BLD-MCNC: 123 MG/DL (ref 70–99)
GLUCOSE BLD-MCNC: 150 MG/DL (ref 70–99)
HCT VFR BLD CALC: 29.6 % (ref 36.3–47.1)
HCT VFR BLD CALC: 34.7 % (ref 36.3–47.1)
HEMOGLOBIN: 11.2 G/DL (ref 11.9–15.1)
HEMOGLOBIN: 9.4 G/DL (ref 11.9–15.1)
LACTIC ACID, WHOLE BLOOD: NORMAL MMOL/L (ref 0.7–2.1)
LACTIC ACID: 0.8 MMOL/L (ref 0.5–2.2)
Lab: ABNORMAL
MAGNESIUM: 1.7 MG/DL (ref 1.6–2.6)
MCH RBC QN AUTO: 30.6 PG (ref 25.2–33.5)
MCH RBC QN AUTO: 30.9 PG (ref 25.2–33.5)
MCHC RBC AUTO-ENTMCNC: 31.8 G/DL (ref 28.4–34.8)
MCHC RBC AUTO-ENTMCNC: 32.3 G/DL (ref 28.4–34.8)
MCV RBC AUTO: 94.8 FL (ref 82.6–102.9)
MCV RBC AUTO: 97.4 FL (ref 82.6–102.9)
NRBC AUTOMATED: 0 PER 100 WBC
NRBC AUTOMATED: 0 PER 100 WBC
PDW BLD-RTO: 16.1 % (ref 11.8–14.4)
PDW BLD-RTO: 16.5 % (ref 11.8–14.4)
PLATELET # BLD: 219 K/UL (ref 138–453)
PLATELET # BLD: 322 K/UL (ref 138–453)
PMV BLD AUTO: 10.9 FL (ref 8.1–13.5)
PMV BLD AUTO: 11.6 FL (ref 8.1–13.5)
POTASSIUM SERPL-SCNC: 3.2 MMOL/L (ref 3.7–5.3)
POTASSIUM SERPL-SCNC: 4.1 MMOL/L (ref 3.7–5.3)
PROCALCITONIN: 1.25 NG/ML
RBC # BLD: 3.04 M/UL (ref 3.95–5.11)
RBC # BLD: 3.66 M/UL (ref 3.95–5.11)
SODIUM BLD-SCNC: 140 MMOL/L (ref 135–144)
SODIUM BLD-SCNC: 144 MMOL/L (ref 135–144)
SPECIMEN DESCRIPTION: ABNORMAL
TROPONIN INTERP: ABNORMAL
TROPONIN T: ABNORMAL NG/ML
TROPONIN, HIGH SENSITIVITY: 25 NG/L (ref 0–14)
WBC # BLD: 30.8 K/UL (ref 3.5–11.3)
WBC # BLD: 31.4 K/UL (ref 3.5–11.3)

## 2020-06-11 PROCEDURE — 86403 PARTICLE AGGLUT ANTBDY SCRN: CPT

## 2020-06-11 PROCEDURE — 83605 ASSAY OF LACTIC ACID: CPT

## 2020-06-11 PROCEDURE — 80048 BASIC METABOLIC PNL TOTAL CA: CPT

## 2020-06-11 PROCEDURE — 87070 CULTURE OTHR SPECIMN AEROBIC: CPT

## 2020-06-11 PROCEDURE — 87205 SMEAR GRAM STAIN: CPT

## 2020-06-11 PROCEDURE — 87086 URINE CULTURE/COLONY COUNT: CPT

## 2020-06-11 PROCEDURE — 83735 ASSAY OF MAGNESIUM: CPT

## 2020-06-11 PROCEDURE — 85379 FIBRIN DEGRADATION QUANT: CPT

## 2020-06-11 PROCEDURE — 87186 SC STD MICRODIL/AGAR DIL: CPT

## 2020-06-11 PROCEDURE — 84484 ASSAY OF TROPONIN QUANT: CPT

## 2020-06-11 PROCEDURE — 85027 COMPLETE CBC AUTOMATED: CPT

## 2020-06-11 PROCEDURE — 84145 PROCALCITONIN (PCT): CPT

## 2020-06-12 LAB
ABSOLUTE EOS #: 0.27 K/UL (ref 0–0.4)
ABSOLUTE IMMATURE GRANULOCYTE: 0.27 K/UL (ref 0–0.3)
ABSOLUTE LYMPH #: 2.15 K/UL (ref 1–4.8)
ABSOLUTE MONO #: 1.35 K/UL (ref 0.1–0.8)
BASOPHILS # BLD: 0 % (ref 0–2)
BASOPHILS ABSOLUTE: 0 K/UL (ref 0–0.2)
CULTURE: NORMAL
DIFFERENTIAL TYPE: ABNORMAL
EOSINOPHILS RELATIVE PERCENT: 1 % (ref 1–4)
HCT VFR BLD CALC: 27.5 % (ref 36.3–47.1)
HEMOGLOBIN: 8.5 G/DL (ref 11.9–15.1)
IMMATURE GRANULOCYTES: 1 %
LYMPHOCYTES # BLD: 8 % (ref 24–44)
Lab: NORMAL
MAGNESIUM: 1.8 MG/DL (ref 1.6–2.6)
MCH RBC QN AUTO: 30.7 PG (ref 25.2–33.5)
MCHC RBC AUTO-ENTMCNC: 30.9 G/DL (ref 28.4–34.8)
MCV RBC AUTO: 99.3 FL (ref 82.6–102.9)
MONOCYTES # BLD: 5 % (ref 1–7)
MORPHOLOGY: ABNORMAL
NRBC AUTOMATED: 0 PER 100 WBC
PDW BLD-RTO: 16.8 % (ref 11.8–14.4)
PLATELET # BLD: 230 K/UL (ref 138–453)
PLATELET ESTIMATE: ABNORMAL
PMV BLD AUTO: 11.6 FL (ref 8.1–13.5)
RBC # BLD: 2.77 M/UL (ref 3.95–5.11)
RBC # BLD: ABNORMAL 10*6/UL
SEG NEUTROPHILS: 85 % (ref 36–66)
SEGMENTED NEUTROPHILS ABSOLUTE COUNT: 22.86 K/UL (ref 1.8–7.7)
SPECIMEN DESCRIPTION: NORMAL
WBC # BLD: 26.9 K/UL (ref 3.5–11.3)
WBC # BLD: ABNORMAL 10*3/UL

## 2020-06-12 PROCEDURE — 85025 COMPLETE CBC W/AUTO DIFF WBC: CPT

## 2020-06-13 LAB
T3 FREE: 1.5 PG/ML (ref 2.02–4.43)
THYROXINE, FREE: 0.75 NG/DL (ref 0.93–1.7)
TSH SERPL DL<=0.05 MIU/L-ACNC: 5.46 MIU/L (ref 0.3–5)

## 2020-06-13 PROCEDURE — 84481 FREE ASSAY (FT-3): CPT

## 2020-06-13 PROCEDURE — 84439 ASSAY OF FREE THYROXINE: CPT

## 2020-06-13 PROCEDURE — 84443 ASSAY THYROID STIM HORMONE: CPT

## 2020-06-14 LAB
CULTURE: ABNORMAL
CULTURE: ABNORMAL
DIRECT EXAM: ABNORMAL
Lab: ABNORMAL
SPECIMEN DESCRIPTION: ABNORMAL

## 2020-06-17 LAB
CULTURE: NORMAL
CULTURE: NORMAL
Lab: NORMAL
Lab: NORMAL
SPECIMEN DESCRIPTION: NORMAL
SPECIMEN DESCRIPTION: NORMAL
VANCOMYCIN TROUGH DATE LAST DOSE: NORMAL
VANCOMYCIN TROUGH DOSE AMOUNT: NORMAL
VANCOMYCIN TROUGH TIME LAST DOSE: NORMAL
VANCOMYCIN TROUGH: 14.2 UG/ML (ref 10–20)

## 2020-06-17 PROCEDURE — 80202 ASSAY OF VANCOMYCIN: CPT

## 2020-06-18 ENCOUNTER — APPOINTMENT (OUTPATIENT)
Dept: GENERAL RADIOLOGY | Age: 60
DRG: 005 | End: 2020-06-18
Attending: INTERNAL MEDICINE
Payer: MEDICARE

## 2020-06-18 ENCOUNTER — HOSPITAL ENCOUNTER (INPATIENT)
Age: 60
LOS: 5 days | Discharge: NURSING, REHAB OR LONG-TERM CARE | DRG: 005 | End: 2020-06-23
Attending: INTERNAL MEDICINE | Admitting: INTERNAL MEDICINE
Payer: MEDICARE

## 2020-06-18 PROBLEM — J96.20 ACUTE ON CHRONIC RESPIRATORY FAILURE (HCC): Status: ACTIVE | Noted: 2020-06-18

## 2020-06-18 LAB
ALLEN TEST: ABNORMAL
FIO2: 100
MODE: ABNORMAL
NEGATIVE BASE EXCESS, ART: ABNORMAL (ref 0–2)
O2 DEVICE/FLOW/%: ABNORMAL
PATIENT TEMP: ABNORMAL
POC HCO3: 35.2 MMOL/L (ref 21–28)
POC LACTIC ACID: 0.35 MMOL/L (ref 0.56–1.39)
POC O2 SATURATION: 100 % (ref 94–98)
POC PCO2 TEMP: ABNORMAL MM HG
POC PCO2: 54.1 MM HG (ref 35–48)
POC PH TEMP: ABNORMAL
POC PH: 7.42 (ref 7.35–7.45)
POC PO2 TEMP: ABNORMAL MM HG
POC PO2: 218.5 MM HG (ref 83–108)
POSITIVE BASE EXCESS, ART: 8 (ref 0–3)
SAMPLE SITE: ABNORMAL
SARS-COV-2, PCR: NORMAL
SARS-COV-2, RAPID: NORMAL
SARS-COV-2: NOT DETECTED
SOURCE: NORMAL
TCO2 (CALC), ART: 37 MMOL/L (ref 22–29)

## 2020-06-18 PROCEDURE — 87186 SC STD MICRODIL/AGAR DIL: CPT

## 2020-06-18 PROCEDURE — 83605 ASSAY OF LACTIC ACID: CPT

## 2020-06-18 PROCEDURE — 99255 IP/OBS CONSLTJ NEW/EST HI 80: CPT | Performed by: INTERNAL MEDICINE

## 2020-06-18 PROCEDURE — 93005 ELECTROCARDIOGRAM TRACING: CPT | Performed by: STUDENT IN AN ORGANIZED HEALTH CARE EDUCATION/TRAINING PROGRAM

## 2020-06-18 PROCEDURE — 6360000002 HC RX W HCPCS: Performed by: INTERNAL MEDICINE

## 2020-06-18 PROCEDURE — 71045 X-RAY EXAM CHEST 1 VIEW: CPT

## 2020-06-18 PROCEDURE — U0003 INFECTIOUS AGENT DETECTION BY NUCLEIC ACID (DNA OR RNA); SEVERE ACUTE RESPIRATORY SYNDROME CORONAVIRUS 2 (SARS-COV-2) (CORONAVIRUS DISEASE [COVID-19]), AMPLIFIED PROBE TECHNIQUE, MAKING USE OF HIGH THROUGHPUT TECHNOLOGIES AS DESCRIBED BY CMS-2020-01-R: HCPCS

## 2020-06-18 PROCEDURE — 99291 CRITICAL CARE FIRST HOUR: CPT | Performed by: INTERNAL MEDICINE

## 2020-06-18 PROCEDURE — 2500000003 HC RX 250 WO HCPCS: Performed by: STUDENT IN AN ORGANIZED HEALTH CARE EDUCATION/TRAINING PROGRAM

## 2020-06-18 PROCEDURE — 86403 PARTICLE AGGLUT ANTBDY SCRN: CPT

## 2020-06-18 PROCEDURE — 80076 HEPATIC FUNCTION PANEL: CPT

## 2020-06-18 PROCEDURE — 2580000003 HC RX 258: Performed by: STUDENT IN AN ORGANIZED HEALTH CARE EDUCATION/TRAINING PROGRAM

## 2020-06-18 PROCEDURE — 85025 COMPLETE CBC W/AUTO DIFF WBC: CPT

## 2020-06-18 PROCEDURE — 99223 1ST HOSP IP/OBS HIGH 75: CPT | Performed by: PSYCHIATRY & NEUROLOGY

## 2020-06-18 PROCEDURE — 2000000000 HC ICU R&B

## 2020-06-18 PROCEDURE — 87040 BLOOD CULTURE FOR BACTERIA: CPT

## 2020-06-18 PROCEDURE — 87205 SMEAR GRAM STAIN: CPT

## 2020-06-18 PROCEDURE — 82803 BLOOD GASES ANY COMBINATION: CPT

## 2020-06-18 PROCEDURE — 2500000003 HC RX 250 WO HCPCS

## 2020-06-18 PROCEDURE — 6360000002 HC RX W HCPCS

## 2020-06-18 PROCEDURE — 80048 BASIC METABOLIC PNL TOTAL CA: CPT

## 2020-06-18 PROCEDURE — 87327 CRYPTOCOCCUS NEOFORM AG IA: CPT

## 2020-06-18 PROCEDURE — 87070 CULTURE OTHR SPECIMN AEROBIC: CPT

## 2020-06-18 PROCEDURE — 6360000002 HC RX W HCPCS: Performed by: STUDENT IN AN ORGANIZED HEALTH CARE EDUCATION/TRAINING PROGRAM

## 2020-06-18 RX ORDER — SODIUM CHLORIDE 0.9 % (FLUSH) 0.9 %
10 SYRINGE (ML) INJECTION EVERY 12 HOURS SCHEDULED
Status: DISCONTINUED | OUTPATIENT
Start: 2020-06-18 | End: 2020-06-23 | Stop reason: HOSPADM

## 2020-06-18 RX ORDER — SODIUM CHLORIDE 9 MG/ML
INJECTION, SOLUTION INTRAVENOUS CONTINUOUS
Status: DISCONTINUED | OUTPATIENT
Start: 2020-06-18 | End: 2020-06-23 | Stop reason: HOSPADM

## 2020-06-18 RX ORDER — FLUCONAZOLE 2 MG/ML
400 INJECTION, SOLUTION INTRAVENOUS EVERY 24 HOURS
Status: DISCONTINUED | OUTPATIENT
Start: 2020-06-18 | End: 2020-06-23 | Stop reason: HOSPADM

## 2020-06-18 RX ORDER — VANCOMYCIN HYDROCHLORIDE 1 G/200ML
1000 INJECTION, SOLUTION INTRAVENOUS EVERY 12 HOURS
Status: DISCONTINUED | OUTPATIENT
Start: 2020-06-18 | End: 2020-06-23 | Stop reason: HOSPADM

## 2020-06-18 RX ORDER — ONDANSETRON 2 MG/ML
4 INJECTION INTRAMUSCULAR; INTRAVENOUS EVERY 6 HOURS PRN
Status: DISCONTINUED | OUTPATIENT
Start: 2020-06-18 | End: 2020-06-18

## 2020-06-18 RX ORDER — ACETAMINOPHEN 650 MG/1
650 SUPPOSITORY RECTAL EVERY 6 HOURS PRN
Status: DISCONTINUED | OUTPATIENT
Start: 2020-06-18 | End: 2020-06-23 | Stop reason: HOSPADM

## 2020-06-18 RX ORDER — SODIUM CHLORIDE 0.9 % (FLUSH) 0.9 %
10 SYRINGE (ML) INJECTION PRN
Status: DISCONTINUED | OUTPATIENT
Start: 2020-06-18 | End: 2020-06-23 | Stop reason: HOSPADM

## 2020-06-18 RX ORDER — PROCHLORPERAZINE EDISYLATE 5 MG/ML
10 INJECTION INTRAMUSCULAR; INTRAVENOUS EVERY 6 HOURS PRN
Status: DISCONTINUED | OUTPATIENT
Start: 2020-06-18 | End: 2020-06-23 | Stop reason: HOSPADM

## 2020-06-18 RX ORDER — ACETAMINOPHEN 325 MG/1
650 TABLET ORAL EVERY 6 HOURS PRN
Status: DISCONTINUED | OUTPATIENT
Start: 2020-06-18 | End: 2020-06-23 | Stop reason: HOSPADM

## 2020-06-18 RX ORDER — POTASSIUM CHLORIDE 7.45 MG/ML
10 INJECTION INTRAVENOUS PRN
Status: DISCONTINUED | OUTPATIENT
Start: 2020-06-18 | End: 2020-06-23 | Stop reason: HOSPADM

## 2020-06-18 RX ORDER — 0.9 % SODIUM CHLORIDE 0.9 %
500 INTRAVENOUS SOLUTION INTRAVENOUS ONCE
Status: COMPLETED | OUTPATIENT
Start: 2020-06-18 | End: 2020-06-18

## 2020-06-18 RX ORDER — METOPROLOL TARTRATE 5 MG/5ML
5 INJECTION INTRAVENOUS EVERY 6 HOURS PRN
Status: DISCONTINUED | OUTPATIENT
Start: 2020-06-18 | End: 2020-06-23 | Stop reason: HOSPADM

## 2020-06-18 RX ORDER — PROMETHAZINE HYDROCHLORIDE 25 MG/1
12.5 TABLET ORAL EVERY 6 HOURS PRN
Status: DISCONTINUED | OUTPATIENT
Start: 2020-06-18 | End: 2020-06-18

## 2020-06-18 RX ADMIN — DILTIAZEM HYDROCHLORIDE 10 MG/HR: 5 INJECTION INTRAVENOUS at 13:30

## 2020-06-18 RX ADMIN — VANCOMYCIN HYDROCHLORIDE 1000 MG: 1 INJECTION, SOLUTION INTRAVENOUS at 22:05

## 2020-06-18 RX ADMIN — SODIUM CHLORIDE: 9 INJECTION, SOLUTION INTRAVENOUS at 22:12

## 2020-06-18 RX ADMIN — SODIUM CHLORIDE 500 ML: 0.9 INJECTION, SOLUTION INTRAVENOUS at 18:47

## 2020-06-18 RX ADMIN — SODIUM CHLORIDE, PRESERVATIVE FREE 10 ML: 5 INJECTION INTRAVENOUS at 22:05

## 2020-06-18 RX ADMIN — FAMOTIDINE 20 MG: 10 INJECTION, SOLUTION INTRAVENOUS at 22:04

## 2020-06-18 RX ADMIN — DILTIAZEM HYDROCHLORIDE 15 MG/HR: 5 INJECTION INTRAVENOUS at 23:22

## 2020-06-18 RX ADMIN — FLUCONAZOLE 400 MG: 2 INJECTION, SOLUTION INTRAVENOUS at 19:55

## 2020-06-18 ASSESSMENT — PAIN SCALES - GENERAL: PAINLEVEL_OUTOF10: 0

## 2020-06-18 NOTE — PROGRESS NOTES
Pharmacy Note  Vancomycin Consult    Carla Gonzalez is a 62 y.o. female started on Vancomycin for Pneumonia; consult received from Dr. James Alba to manage therapy. 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 No results for input(s): CREATININE in the last 72 hours. No results for input(s): WBC in the last 72 hours. No intake or output data in the 24 hours ending 06/18/20 1414    Culture Date      Source                       Results  6-            Blood                          sent  6-            Sputum                       sent    Ht Readings from Last 1 Encounters:   06/18/20 5' 7\" (1.702 m)        Wt Readings from Last 1 Encounters:   06/18/20 137 lb 2 oz (62.2 kg)         Body mass index is 21.48 kg/m². Estimated Creatinine Clearance: 144 mL/min (A) (based on SCr of 0.41 mg/dL (L)). Goal Trough Level: 15 - 20  mcg/mL    Assessment/Plan:  Will initiate Vancomycin at 1000 mg  IV q 12 hrs. Timing of trough level will be determined based on culture results, renal function, and clinical response.      Thank you for the consult. Will continue to follow.   Melina Mcadams, Pharm D.  6/18/2020  2:20 PM

## 2020-06-18 NOTE — PROGRESS NOTES
Assessment  BRONCHODILATOR ASSESSMENT SCORE  Score 0 1 2 3 4 5   Breath Sounds   []  Patient Baseline []  No Wheeze good aeration []  Faint, scattered wheezing, good aeration [x]  Expiratory Wheezing and or moderately diminished []  Insp/Exp wheeze and/or very diminished []  Insp/Exp and/ or marked distress   Respiratory Rate   []  Patient Baseline []  Less than 20 []  Less than 20 [x]  20-25 []  Greater than 25 []  Greater than 25   Peak flow % of Pred or PB [x]  NA   []  Greater than 90%  []  81-90% []  71-80% []  Less than or equal to 70%  or unable to perform []  Unable due to Respiratory Distress   Dyspnea re []  Patient Baseline []  No SOB []  No SOB [x]  SOB on exertion []  SOB min activity []  At rest/acute   e FEV% Predicted       [x]  NA []  Above 69%  []  Unable []  Above 60-69%  []  Unable []  Above 50-59%  []  Unable []  Above 35-49%  []  Unable []  Less than 35%  []  Unable                 []  Hyperinflation Assessment  Score 1 2 3   CXR and Breath Sounds   []  Clear []  No atelectasis  Basilar aeration []  Atelectasis or absent basilar breath sounds   Incentive Spirometry Volume  (Per IBW)   []  Greater than or equal to 15ml/Kg []  less than 15ml/Kg []  less than 15ml/Kg   Surgery within last 2 weeks []  None or general   []  Abdominal or thoracic surgery  []  Abdominal or thoracic   Chronic Pulmonary Historyre []  No []  Yes []  Yes     []  Secretion Management Assessment  Score 1 2 3   Bilateral Breath Sounds   []  Occasional Rhonchi []  Scattered Rhonchi []  Course Rhonchi and/or poor aeration   Sputum    []  Small amount of thin secretions []  Moderate amount of viscous secretions []  Copius, Viscious Yellow/ Secretions   CXR as reported by physician []  clear  []  Unavailable []  Infiltrates and/or consolidation  []  Unavailable []  Mucus Plugging and or lobar consolidation  []  Unavailable   Cough []  Strong, productive cough []  Weak productive cough []  No cough or weak non-productive cough MARCIA MORENO  2:51 PM                            FEMALE                                  MALE                            FEV1 Predicted Normal Values                        FEV1 Predicted Normal Values          Age                                     Height in Feet and Inches       Age                                     Height in Feet and Inches       4' 11\" 5' 1\" 5' 3\" 5' 5\" 5' 7\" 5' 9\" 5' 11\" 6' 1\"  4' 11\" 5' 1\" 5' 3\" 5' 5\" 5' 7\" 5' 9\" 5' 11\" 6' 1\"   43 - 45 2.49 2.66 2.84 3.03 3.22 3.42 3.62 3.83 42 - 45 2.82 3.03 3.26 3.49 3.72 3.96 4.22 4.47   46 - 49 2.40 2.57 2.76 2.94 3.14 3.33 3.54 3.75 46 - 49 2.70 2.92 3.14 3.37 3.61 3.85 4.10 4.36   50 - 53 2.31 2.48 2.66 2.85 3.04 3.24 3.45 3.66 50 - 53 2.58 2.80 3.02 3.25 3.49 3.73 3.98 4.24   54 - 57 2.21 2.38 2.57 2.75 2.95 3.14 3.35 3.56 54 - 57 2.46 2.67 2.89 3.12 3.36 3.60 3.85 4.11   58 - 61 2.10 2.28 2.46 2.65 2.84 3.04 3.24 3.45 58 - 61 2.32 2.54 2.76 2.99 3.23 3.47 3.72 3.98   62 - 65 1.99 2.17 2.35 2.54 2.73 2.93 3.13 3.34 62 - 65 2.19 2.40 2.62 2.85 3.09 3.33 3.58 3.84   66 - 69 1.88 2.05 2.23 2.42 2.61 2.81 3.02 3.23 66 - 69 2.04 2.26 2.48 2.71 2.95 3.19 3.44 3.70   70+ 1.82 1.99 2.17 2.36 2.55 2.75 2.95 3.16 70+ 1.97 2.19 2.41 2.64 2.87 3.12 3.37 3.62             Predicted Peak Expiratory Flow Rate                                       Height (in)  Female       Height (in) Male           Age 70 21 72 58 18 77 78 74 Age            20 202 051 097 734 283 552 926 564  06 68 57 85 68 70 72 74 76   25 337 352 366 381 396 411 426 441 25 447 476 505 533 562 591 [phone]   30 329 344 359 374 389 404 419 434 30 437 466 494 523 [phone] 667   35 322 337 351 366 381 396 411 426 35 426 455 484 512 541 [phone] 657   40 314 329 344 359 374 389 404 419 40 416 445 473 502 531 [phone] 647   45 307 322 336 351 366 381 396 411 45 405 434 463 491 520 549 [phone]   50 299 314 329 344 359 374 389 404 50 395 424 452 481 510 [phone] 625   55 292 307

## 2020-06-18 NOTE — H&P
bicarb 38.4  Multiple LP studies with infection panel negative. CT head with pontine infarcts. CXR: 6/18- increased left pleural effusion, increased left lower lobe atelectasis/airspace disease. Right PICC line in good position. 6/10 and 6/11: CXR showed complete left-sided opacification. Later on 6/18 improved with persistent left lower lobe consolidation and pleural effusion. Past Medical History:        Diagnosis Date    COPD (chronic obstructive pulmonary disease) ([address]     ETOH abuse        Past Surgical History:        Procedure Laterality Date    [facility].  PICC [facility] DOUBLE  6/4/2020         THYROIDECTOMY      TONSILLECTOMY         Allergies: Allergies   Allergen Reactions    Sulfa Antibiotics          Home Medications:   Prior to Admission medications    Medication Sig Start Date End Date Taking?  Authorizing Provider   enoxaparin (LOVENOX) 80 MG/0.8ML injection Inject 0.7 mLs into the skin 2 times daily 6/8/20 7/8/20  Samson Mosley MD   albuterol (PROVENTIL) (2.5 MG/3ML) 0.083% nebulizer solution Take 3 mLs by nebulization every 6 hours as needed for Wheezing 6/8/20   Samson Mosley MD   ipratropium-albuterol (DUONEB) 0.5-2.5 (3) MG/3ML SOLN nebulizer solution Inhale 3 mLs into the lungs three times daily 6/8/20 7/8/20  Samson Mosley MD   folic acid (FOLVITE) 1 MG tablet Take 1 tablet by mouth daily 6/9/20   Samson Mosley MD   polyethylene glycol (GLYCOLAX) 17 g packet Take 17 g by mouth daily as needed for Constipation 6/8/20 7/8/20  Samson Mosley MD   vitamin B-1 100 MG tablet Take 1 tablet by mouth daily 6/9/20   Samson Mosley MD   Multiple Vitamins-Minerals (CENTRUM/CERTA-ALSYSA WITH MINERALS ORAL) solution Take 15 mLs by mouth daily 6/9/20 7/9/20  Samson Mosley MD   amphotericin B liposome (AMBISOME) 50 MG injection Infuse 250 mg intravenously daily for 27 days Stop after 7/2/20 and on 7/3/20  start fluconazole 400 mg po daily x 6 months - creat input(s): WBC, HGB, PLT in the last 72 hours. BMP:  No results for input(s): NA, K, CL, CO2, BUN, CREATININE, GLUCOSE in the last 72 hours. S. Calcium:No results for input(s): CALCIUM in the last 72 hours. S. Ionized Calcium:No results for input(s): IONCA in the last 72 hours. S. Magnesium:No results for input(s): MG in the last 72 hours. S. Phosphorus:No results for input(s): PHOS in the last 72 hours. S. Glucose:No results for input(s): POCGLU in the last 72 hours. Glycosylated hemoglobin A1C: No results for input(s): LABA1C in the last 72 hours. INR: No results for input(s): INR in the last 72 hours. Hepatic functions: No results for input(s): ALKPHOS, ALT, AST, PROT, BILITOT, BILIDIR, LABALBU in the last 72 hours. Pancreatic functions:No results for input(s): LACTA, AMYLASE in the last 72 hours. S. Lactic Acid: No results for input(s): LACTA in the last 72 hours. Cardiac enzymes:No results for input(s): CKTOTAL, CKMB, CKMBINDEX, TROPONINI in the last 72 hours. BNP:No results for input(s): BNP in the last 72 hours. Lipid profile: No results for input(s): CHOL, TRIG, HDL, LDLCALC in the last 72 hours.     Invalid input(s): LDL  Blood Gases: No results found for: PH, PCO2, PO2, HCO3, O2SAT  Thyroid functions:   Lab Results   Component Value Date    TSH 5.46 06/13/2020        Urinalysis:     Microbiology:  Cultures during this admission:     Blood cultures:                 [] None drawn      [] Negative             []  Positive (Details:  )  Urine Culture:                   [] None drawn      [] Negative             []  Positive (Details:  )  Sputum Culture:               [] None drawn       [] Negative             []  Positive (Details:  )   Endotracheal aspirate:     [] None drawn       [] Negative             []  Positive (Details:  )         -----------------------------------------------------------------  Radiological reports:    CXR: Pending    Electrocardiogram: JIM mcdaniel with RVR    Last improvement in mentation, could be from antibiotics or simply an evaluation of clinical progression. New onset right gastrocnemius DVT: Currently on weight-based Lovenox 70 mg twice daily. Hold off Lovenox for bronchoscopy tomorrow. Atrial fibrillation with RVR: Currently on IV Cardizem 5 mg gtt. Cardiology consulted. IV metoprolol PRN. Subacute encephalopathy: Likely infectious. Multiple LP studies in the past.  Neurology consulted on this admission. Ordered MRI. Chronic lower back pain with b/l LE weakness. Septic shock secondary to aspiration pneumonia requiring pressors and intubation on 6/11. Resolved. H/o COPD: Continue breathing treatments. Keep saturation >92%. H./o dysphagia with NG tube. Patient will likely need PEG tube. H/o Alcohol abuse: Continue MVT, thiamine, folate. DVT ppx: Already on weight based Lovenox. Held due to procedure. EPC cuffs. GI ppx: Pepcid BID  PT/OT  Family: Sister apparently POA. Need to confirm with paperwork. Patito Dillard MD, PGY-1  Internal Medicine Residency Program  [facility]  6/18/2020 3:28 PM    Attending Physician Statement  I have discussed the care of Melody Mensah, including pertinent history and exam findings with the resident. I have reviewed the key elements of all parts of the encounter with the resident. I have seen and examined the patient with the resident. I agree with the assessment and plan and status of the problem list as documented.     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 Apparently history of smoking and possible COPD. Bilateral pleural effusion especially left pleural effusion. History of anasarca during last admission. History of atrial fibrillation with rapid ventricular rate. History of below-knee DVT on Lovenox. Treated for MRSA pneumonia currently on vancomycin and was treated with Unasyn. Dysphagia on tube feeding. We will get chest x-ray. She is currently on nonrebreather and will try to wean FiO2. Will use BiPAP/NIV as needed as it will increase the risk for aspiration. Strict aspiration precaution. Keep head of bed up all the time. On NG tube feeding. On Cardizem drip will continue. Neurology evaluation. With physical therapy. We will get echocardiogram with bubble study to rule out right-to-left shunt. Hold Lovenox for possible bronchoscopy tomorrow. We will keep n.p.o. post midnight. Infectious disease evaluation. Bronchoscopy to determine left lower lobe obstruction possible endobronchial mass/obstructing mucous plug. Patient had poor cough reflexes and poor airway clearance secondary to multiple factors, continue to be high risk for aspiration and continued atelectasis and as she is not able to clear airway and protect airways with prolonged course and continued overall weakness and encephalopathy without much improvement in overall respiratory failure and is status she will likely need intubation before procedure and will need continued intubation for airway clearance and airway protection and as there is no overall improvement likely will need tracheostomy and PEG placement if continued care to be continued. Also will try to contact family members as it has been difficult in the past apparently sister is a contact we will see if she is. And will try to get consent for procedure. Discussed with nursing staff, treatment plan discussed.   Discussed with respiratory

## 2020-06-18 NOTE — CONSULTS
[facility] Neurology   [address] [facility] [address] [facility]  Neurology Consult Note    Reason for Consult: Persistent encephalopathy  Requesting Physician:  Blaine Houser MD    CHIEF COMPLAINT:  AMS, MRSA sputum    History Obtained From:  electronic medical record       HISTORY OF PRESENT ILLNESS:              The patient is a 62 y.o. female with significant past medical history of COPD, Alcohol Abuse A. fib, thrombocytopenia, leg DVT who presents with continued altered mental status and new found MRSA infection of sputum. There are been discharged on 6/8/2022 LTAC patient finished her antifungal medication but on LTAC patient was still confused and was found with new sputum with MRSA that this patient was sent back for admission as well as A. fib with RVR with heart rate in the 130s. Neurology was consulted for evaluation due to previous evaluation by neurology on first admission for cryptococcus. Previous Admission Hx:   62 y.o. female admitted to [facility] on 5/14/2020 for generalized body weakness, altered mental status, headache, fever photophobia, rash and hypotension requiring Levophed infusion. 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 However, CSF cryptococcal antigen was negative. Patient has been on Amphotericin B for 18 days today and flucytosine. She has had repeat therapeutic lumbar punctures which have been unremarkable with normal opening pressures. Significantly improved mental status and she is able to talk. However, she still has flat flaccid quadriparesis except for right upper extremity which is significantly improved with muscle strength of 4/5 motor strength. Patient was reviewed by neurology during this admission. Abnormal MRI brain finding with multifocal punctate   subacute infarcts are noted in bilateral cerebral 2. CTA head and neck (6/1/2020)  Impression   1. No acute arterial abnormality or hemodynamically significant arterial   stenosis in the head or neck. 2. Partially imaged small to moderate layering bilateral pleural effusions   with left upper lobe atelectasis.         3. Routine EEG (5/19/2020)  IMPRESSION:  This EEG shows the presence of mild diffuse slowing. This  EEG is concordant with diffuse encephalopathy. No clear lateralized or  epileptiform disturbance is seen. 4. TTE (5/16/2020)  Summary  Left ventricle is normal in size with normal systolic function globally. Calculated ejection fraction is 56%. Mildly dilated right atrium. Mild buckling of the right atrial wall. Aortic sclerosis without stenosis. Mild tricuspid regurgitation. Estimated right ventricular systolic pressure is 25 mmHg. 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 Patient demanded discharge stable altered mental status, encephalopathy and admitted due to continuation of symptoms and new MRSA infection of sputum. Will evaluate with an MRI and will follow accordingly    // Infection //   She was found to have MRSA positive cultures of sputum and started on vancomycin. Patient finish her antifungal medication once discharge on 6/8/2020. ID following.    //A. Fib//   Patient with A. fib with aVR with heart rate in the 130s. Critical care as primary    RECOMMENDATIONS:   1. Critical care as primary team  2. I recommend brain MRI with and without contrast  3. Recommend ID evaluation  4. Neurochecks  5. I recommend PT, OT evaluation  6. We will follow    Thank you for the consultation. Will follow.  Case discussed with Dr. Nasir Torres MD [facility]  Adult General Neurology Resident

## 2020-06-18 NOTE — CONSULTS
and sound a like substitutions which may escape proof reading. It such instances, actual meaningcan be extrapolated by contextual diversion.     Randi Powers MD  Office: [phone]  Perfect serve / office [phone]

## 2020-06-19 ENCOUNTER — APPOINTMENT (OUTPATIENT)
Dept: GENERAL RADIOLOGY | Age: 60
DRG: 005 | End: 2020-06-19
Attending: INTERNAL MEDICINE
Payer: MEDICARE

## 2020-06-19 ENCOUNTER — APPOINTMENT (OUTPATIENT)
Dept: MRI IMAGING | Age: 60
DRG: 005 | End: 2020-06-19
Attending: INTERNAL MEDICINE
Payer: MEDICARE

## 2020-06-19 LAB
ABSOLUTE EOS #: 0.09 K/UL (ref 0–0.44)
ABSOLUTE EOS #: 0.12 K/UL (ref 0–0.44)
ABSOLUTE IMMATURE GRANULOCYTE: 0.29 K/UL (ref 0–0.3)
ABSOLUTE IMMATURE GRANULOCYTE: 0.32 K/UL (ref 0–0.3)
ABSOLUTE LYMPH #: 2.02 K/UL (ref 1.1–3.7)
ABSOLUTE LYMPH #: 2.36 K/UL (ref 1.1–3.7)
ABSOLUTE MONO #: 0.7 K/UL (ref 0.1–1.2)
ABSOLUTE MONO #: 1.14 K/UL (ref 0.1–1.2)
ACTION: NORMAL
ALBUMIN SERPL-MCNC: 2.3 G/DL (ref 3.5–5.2)
ALBUMIN/GLOBULIN RATIO: 0.8 (ref 1–2.5)
ALLEN TEST: ABNORMAL
ALLEN TEST: POSITIVE
ALP BLD-CCNC: 106 U/L (ref 35–104)
ALT SERPL-CCNC: 32 U/L (ref 5–33)
ANION GAP SERPL CALCULATED.3IONS-SCNC: 11 MMOL/L (ref 9–17)
ANION GAP SERPL CALCULATED.3IONS-SCNC: 14 MMOL/L (ref 9–17)
AST SERPL-CCNC: 25 U/L
BASOPHILS # BLD: 0 % (ref 0–2)
BASOPHILS # BLD: 1 % (ref 0–2)
BASOPHILS ABSOLUTE: 0.06 K/UL (ref 0–0.2)
BASOPHILS ABSOLUTE: 0.09 K/UL (ref 0–0.2)
BILIRUB SERPL-MCNC: 0.17 MG/DL (ref 0.3–1.2)
BILIRUBIN DIRECT: <0.08 MG/DL
BILIRUBIN, INDIRECT: ABNORMAL MG/DL (ref 0–1)
BUN BLDV-MCNC: 10 MG/DL (ref 6–20)
BUN BLDV-MCNC: 9 MG/DL (ref 6–20)
BUN/CREAT BLD: ABNORMAL (ref 9–20)
BUN/CREAT BLD: ABNORMAL (ref 9–20)
CALCIUM SERPL-MCNC: 8.2 MG/DL (ref 8.6–10.4)
CALCIUM SERPL-MCNC: 8.4 MG/DL (ref 8.6–10.4)
CHLORIDE BLD-SCNC: 100 MMOL/L (ref 98–107)
CHLORIDE BLD-SCNC: 99 MMOL/L (ref 98–107)
CO2: 27 MMOL/L (ref 20–31)
CO2: 28 MMOL/L (ref 20–31)
CREAT SERPL-MCNC: 0.22 MG/DL (ref 0.5–0.9)
CREAT SERPL-MCNC: 0.25 MG/DL (ref 0.5–0.9)
CULTURE: NORMAL
DATE AND TIME: NORMAL
DIFFERENTIAL TYPE: ABNORMAL
DIFFERENTIAL TYPE: ABNORMAL
EKG ATRIAL RATE: 394 BPM
EKG Q-T INTERVAL: 294 MS
EKG QRS DURATION: 68 MS
EKG QTC CALCULATION (BAZETT): 432 MS
EKG R AXIS: 130 DEGREES
EKG T AXIS: 161 DEGREES
EKG VENTRICULAR RATE: 130 BPM
EOSINOPHILS RELATIVE PERCENT: 1 % (ref 1–4)
EOSINOPHILS RELATIVE PERCENT: 1 % (ref 1–4)
FIO2: 100
FIO2: 50
GFR AFRICAN AMERICAN: >60 ML/MIN
GFR AFRICAN AMERICAN: >60 ML/MIN
GFR NON-AFRICAN AMERICAN: >60 ML/MIN
GFR NON-AFRICAN AMERICAN: >60 ML/MIN
GFR SERPL CREATININE-BSD FRML MDRD: ABNORMAL ML/MIN/{1.73_M2}
GLOBULIN: ABNORMAL G/DL (ref 1.5–3.8)
GLUCOSE BLD-MCNC: 109 MG/DL (ref 70–99)
GLUCOSE BLD-MCNC: 96 MG/DL (ref 70–99)
HCT VFR BLD CALC: 29 % (ref 36.3–47.1)
HCT VFR BLD CALC: 30.8 % (ref 36.3–47.1)
HEMOGLOBIN: 8.9 G/DL (ref 11.9–15.1)
HEMOGLOBIN: 9.4 G/DL (ref 11.9–15.1)
IMMATURE GRANULOCYTES: 2 %
IMMATURE GRANULOCYTES: 2 %
LYMPHOCYTES # BLD: 13 % (ref 24–43)
LYMPHOCYTES # BLD: 14 % (ref 24–43)
Lab: NORMAL
MCH RBC QN AUTO: 30.6 PG (ref 25.2–33.5)
MCH RBC QN AUTO: 31.1 PG (ref 25.2–33.5)
MCHC RBC AUTO-ENTMCNC: 30.5 G/DL (ref 28.4–34.8)
MCHC RBC AUTO-ENTMCNC: 30.7 G/DL (ref 28.4–34.8)
MCV RBC AUTO: 102 FL (ref 82.6–102.9)
MCV RBC AUTO: 99.7 FL (ref 82.6–102.9)
MODE: ABNORMAL
MODE: ABNORMAL
MONOCYTES # BLD: 5 % (ref 3–12)
MONOCYTES # BLD: 6 % (ref 3–12)
NEGATIVE BASE EXCESS, ART: ABNORMAL (ref 0–2)
NEGATIVE BASE EXCESS, ART: ABNORMAL (ref 0–2)
NOTIFY: NORMAL
NRBC AUTOMATED: 0 PER 100 WBC
NRBC AUTOMATED: 0 PER 100 WBC
O2 DEVICE/FLOW/%: ABNORMAL
O2 DEVICE/FLOW/%: ABNORMAL
PATIENT TEMP: ABNORMAL
PATIENT TEMP: ABNORMAL
PDW BLD-RTO: 16.1 % (ref 11.8–14.4)
PDW BLD-RTO: 16.1 % (ref 11.8–14.4)
PLATELET # BLD: 270 K/UL (ref 138–453)
PLATELET # BLD: 289 K/UL (ref 138–453)
PLATELET ESTIMATE: ABNORMAL
PLATELET ESTIMATE: ABNORMAL
PMV BLD AUTO: 10 FL (ref 8.1–13.5)
PMV BLD AUTO: 10.9 FL (ref 8.1–13.5)
POC HCO3: 32.8 MMOL/L (ref 21–28)
POC HCO3: 34.1 MMOL/L (ref 21–28)
POC LACTIC ACID: 0.52 MMOL/L (ref 0.56–1.39)
POC LACTIC ACID: <0.3 MMOL/L (ref 0.56–1.39)
POC O2 SATURATION: 84 % (ref 94–98)
POC O2 SATURATION: 98 % (ref 94–98)
POC PCO2 TEMP: ABNORMAL MM HG
POC PCO2 TEMP: ABNORMAL MM HG
POC PCO2: 41.5 MM HG (ref 35–48)
POC PCO2: 45.1 MM HG (ref 35–48)
POC PH TEMP: ABNORMAL
POC PH TEMP: ABNORMAL
POC PH: 7.47 (ref 7.35–7.45)
POC PH: 7.52 (ref 7.35–7.45)
POC PO2 TEMP: ABNORMAL MM HG
POC PO2 TEMP: ABNORMAL MM HG
POC PO2: 44.3 MM HG (ref 83–108)
POC PO2: 91.3 MM HG (ref 83–108)
POSITIVE BASE EXCESS, ART: 10 (ref 0–3)
POSITIVE BASE EXCESS, ART: 8 (ref 0–3)
POTASSIUM SERPL-SCNC: 3.8 MMOL/L (ref 3.7–5.3)
POTASSIUM SERPL-SCNC: 4 MMOL/L (ref 3.7–5.3)
RBC # BLD: 2.91 M/UL (ref 3.95–5.11)
RBC # BLD: 3.02 M/UL (ref 3.95–5.11)
RBC # BLD: ABNORMAL 10*6/UL
RBC # BLD: ABNORMAL 10*6/UL
READ BACK: YES
SAMPLE SITE: ABNORMAL
SAMPLE SITE: ABNORMAL
SEG NEUTROPHILS: 77 % (ref 36–65)
SEG NEUTROPHILS: 78 % (ref 36–65)
SEGMENTED NEUTROPHILS ABSOLUTE COUNT: 11.3 K/UL (ref 1.5–8.1)
SEGMENTED NEUTROPHILS ABSOLUTE COUNT: 14.27 K/UL (ref 1.5–8.1)
SODIUM BLD-SCNC: 138 MMOL/L (ref 135–144)
SODIUM BLD-SCNC: 141 MMOL/L (ref 135–144)
SPECIMEN DESCRIPTION: NORMAL
TCO2 (CALC), ART: 34 MMOL/L (ref 22–29)
TCO2 (CALC), ART: 35 MMOL/L (ref 22–29)
TOTAL PROTEIN: 5.1 G/DL (ref 6.4–8.3)
WBC # BLD: 14.5 K/UL (ref 3.5–11.3)
WBC # BLD: 18.3 K/UL (ref 3.5–11.3)
WBC # BLD: ABNORMAL 10*3/UL
WBC # BLD: ABNORMAL 10*3/UL

## 2020-06-19 PROCEDURE — 94770 HC ETCO2 MONITOR DAILY: CPT

## 2020-06-19 PROCEDURE — 31623 DX BRONCHOSCOPE/BRUSH: CPT | Performed by: INTERNAL MEDICINE

## 2020-06-19 PROCEDURE — 6360000002 HC RX W HCPCS: Performed by: STUDENT IN AN ORGANIZED HEALTH CARE EDUCATION/TRAINING PROGRAM

## 2020-06-19 PROCEDURE — 3609011100 HC BRONCHOSCOPY BRUSHINGS: Performed by: INTERNAL MEDICINE

## 2020-06-19 PROCEDURE — 0B9J8ZX DRAINAGE OF LEFT LOWER LUNG LOBE, VIA NATURAL OR ARTIFICIAL OPENING ENDOSCOPIC, DIAGNOSTIC: ICD-10-PCS | Performed by: INTERNAL MEDICINE

## 2020-06-19 PROCEDURE — 71045 X-RAY EXAM CHEST 1 VIEW: CPT

## 2020-06-19 PROCEDURE — 82803 BLOOD GASES ANY COMBINATION: CPT

## 2020-06-19 PROCEDURE — 0BC98ZZ EXTIRPATION OF MATTER FROM LINGULA BRONCHUS, VIA NATURAL OR ARTIFICIAL OPENING ENDOSCOPIC: ICD-10-PCS | Performed by: INTERNAL MEDICINE

## 2020-06-19 PROCEDURE — 5A1955Z RESPIRATORY VENTILATION, GREATER THAN 96 CONSECUTIVE HOURS: ICD-10-PCS | Performed by: INTERNAL MEDICINE

## 2020-06-19 PROCEDURE — 99233 SBSQ HOSP IP/OBS HIGH 50: CPT | Performed by: PSYCHIATRY & NEUROLOGY

## 2020-06-19 PROCEDURE — 86403 PARTICLE AGGLUT ANTBDY SCRN: CPT

## 2020-06-19 PROCEDURE — 80048 BASIC METABOLIC PNL TOTAL CA: CPT

## 2020-06-19 PROCEDURE — 87205 SMEAR GRAM STAIN: CPT

## 2020-06-19 PROCEDURE — 83605 ASSAY OF LACTIC ACID: CPT

## 2020-06-19 PROCEDURE — 94002 VENT MGMT INPAT INIT DAY: CPT

## 2020-06-19 PROCEDURE — 2500000003 HC RX 250 WO HCPCS: Performed by: INTERNAL MEDICINE

## 2020-06-19 PROCEDURE — 6360000002 HC RX W HCPCS: Performed by: INTERNAL MEDICINE

## 2020-06-19 PROCEDURE — 36415 COLL VENOUS BLD VENIPUNCTURE: CPT

## 2020-06-19 PROCEDURE — 2700000000 HC OXYGEN THERAPY PER DAY

## 2020-06-19 PROCEDURE — 0BH18EZ INSERTION OF ENDOTRACHEAL AIRWAY INTO TRACHEA, VIA NATURAL OR ARTIFICIAL OPENING ENDOSCOPIC: ICD-10-PCS | Performed by: INTERNAL MEDICINE

## 2020-06-19 PROCEDURE — 99291 CRITICAL CARE FIRST HOUR: CPT | Performed by: INTERNAL MEDICINE

## 2020-06-19 PROCEDURE — 87116 MYCOBACTERIA CULTURE: CPT

## 2020-06-19 PROCEDURE — 2580000003 HC RX 258: Performed by: STUDENT IN AN ORGANIZED HEALTH CARE EDUCATION/TRAINING PROGRAM

## 2020-06-19 PROCEDURE — 0BD98ZX EXTRACTION OF LINGULA BRONCHUS, VIA NATURAL OR ARTIFICIAL OPENING ENDOSCOPIC, DIAGNOSTIC: ICD-10-PCS | Performed by: INTERNAL MEDICINE

## 2020-06-19 PROCEDURE — 0B9H8ZX DRAINAGE OF LUNG LINGULA, VIA NATURAL OR ARTIFICIAL OPENING ENDOSCOPIC, DIAGNOSTIC: ICD-10-PCS | Performed by: INTERNAL MEDICINE

## 2020-06-19 PROCEDURE — 88112 CYTOPATH CELL ENHANCE TECH: CPT

## 2020-06-19 PROCEDURE — 0BCB8ZZ EXTIRPATION OF MATTER FROM LEFT LOWER LOBE BRONCHUS, VIA NATURAL OR ARTIFICIAL OPENING ENDOSCOPIC: ICD-10-PCS | Performed by: INTERNAL MEDICINE

## 2020-06-19 PROCEDURE — 87206 SMEAR FLUORESCENT/ACID STAI: CPT

## 2020-06-19 PROCEDURE — 93010 ELECTROCARDIOGRAM REPORT: CPT | Performed by: INTERNAL MEDICINE

## 2020-06-19 PROCEDURE — 6370000000 HC RX 637 (ALT 250 FOR IP): Performed by: STUDENT IN AN ORGANIZED HEALTH CARE EDUCATION/TRAINING PROGRAM

## 2020-06-19 PROCEDURE — 88305 TISSUE EXAM BY PATHOLOGIST: CPT

## 2020-06-19 PROCEDURE — 85025 COMPLETE CBC W/AUTO DIFF WBC: CPT

## 2020-06-19 PROCEDURE — 2720000010 HC SURG SUPPLY STERILE: Performed by: INTERNAL MEDICINE

## 2020-06-19 PROCEDURE — 87075 CULTR BACTERIA EXCEPT BLOOD: CPT

## 2020-06-19 PROCEDURE — 2709999900 HC NON-CHARGEABLE SUPPLY: Performed by: INTERNAL MEDICINE

## 2020-06-19 PROCEDURE — 0B9G8ZX DRAINAGE OF LEFT UPPER LUNG LOBE, VIA NATURAL OR ARTIFICIAL OPENING ENDOSCOPIC, DIAGNOSTIC: ICD-10-PCS | Performed by: INTERNAL MEDICINE

## 2020-06-19 PROCEDURE — 93005 ELECTROCARDIOGRAM TRACING: CPT | Performed by: STUDENT IN AN ORGANIZED HEALTH CARE EDUCATION/TRAINING PROGRAM

## 2020-06-19 PROCEDURE — 36600 WITHDRAWAL OF ARTERIAL BLOOD: CPT

## 2020-06-19 PROCEDURE — 87015 SPECIMEN INFECT AGNT CONCNTJ: CPT

## 2020-06-19 PROCEDURE — 0BC88ZZ EXTIRPATION OF MATTER FROM LEFT UPPER LOBE BRONCHUS, VIA NATURAL OR ARTIFICIAL OPENING ENDOSCOPIC: ICD-10-PCS | Performed by: INTERNAL MEDICINE

## 2020-06-19 PROCEDURE — 94640 AIRWAY INHALATION TREATMENT: CPT

## 2020-06-19 PROCEDURE — 2000000000 HC ICU R&B

## 2020-06-19 PROCEDURE — 94003 VENT MGMT INPAT SUBQ DAY: CPT

## 2020-06-19 PROCEDURE — 2500000003 HC RX 250 WO HCPCS: Performed by: STUDENT IN AN ORGANIZED HEALTH CARE EDUCATION/TRAINING PROGRAM

## 2020-06-19 PROCEDURE — 87070 CULTURE OTHR SPECIMN AEROBIC: CPT

## 2020-06-19 PROCEDURE — 6360000004 HC RX CONTRAST MEDICATION: Performed by: PSYCHIATRY & NEUROLOGY

## 2020-06-19 PROCEDURE — 94761 N-INVAS EAR/PLS OXIMETRY MLT: CPT

## 2020-06-19 PROCEDURE — A9576 INJ PROHANCE MULTIPACK: HCPCS | Performed by: PSYCHIATRY & NEUROLOGY

## 2020-06-19 PROCEDURE — 6360000002 HC RX W HCPCS

## 2020-06-19 PROCEDURE — 87186 SC STD MICRODIL/AGAR DIL: CPT

## 2020-06-19 PROCEDURE — 70553 MRI BRAIN STEM W/O & W/DYE: CPT

## 2020-06-19 PROCEDURE — 87102 FUNGUS ISOLATION CULTURE: CPT

## 2020-06-19 PROCEDURE — 99233 SBSQ HOSP IP/OBS HIGH 50: CPT | Performed by: INTERNAL MEDICINE

## 2020-06-19 RX ORDER — SODIUM CHLORIDE 0.9 % (FLUSH) 0.9 %
10 SYRINGE (ML) INJECTION PRN
Status: DISCONTINUED | OUTPATIENT
Start: 2020-06-19 | End: 2020-06-23 | Stop reason: HOSPADM

## 2020-06-19 RX ORDER — 0.9 % SODIUM CHLORIDE 0.9 %
500 INTRAVENOUS SOLUTION INTRAVENOUS ONCE
Status: COMPLETED | OUTPATIENT
Start: 2020-06-19 | End: 2020-06-19

## 2020-06-19 RX ORDER — LIDOCAINE HYDROCHLORIDE 10 MG/ML
INJECTION, SOLUTION EPIDURAL; INFILTRATION; INTRACAUDAL; PERINEURAL PRN
Status: DISCONTINUED | OUTPATIENT
Start: 2020-06-19 | End: 2020-06-19 | Stop reason: ALTCHOICE

## 2020-06-19 RX ORDER — PROPOFOL 10 MG/ML
10 INJECTION, EMULSION INTRAVENOUS
Status: DISCONTINUED | OUTPATIENT
Start: 2020-06-19 | End: 2020-06-20

## 2020-06-19 RX ORDER — PROPOFOL 10 MG/ML
INJECTION, EMULSION INTRAVENOUS
Status: COMPLETED
Start: 2020-06-19 | End: 2020-06-19

## 2020-06-19 RX ORDER — FENTANYL CITRATE 50 UG/ML
50 INJECTION, SOLUTION INTRAMUSCULAR; INTRAVENOUS
Status: DISCONTINUED | OUTPATIENT
Start: 2020-06-19 | End: 2020-06-23 | Stop reason: HOSPADM

## 2020-06-19 RX ORDER — ALBUTEROL SULFATE 2.5 MG/3ML
2.5 SOLUTION RESPIRATORY (INHALATION) 2 TIMES DAILY
Status: DISCONTINUED | OUTPATIENT
Start: 2020-06-20 | End: 2020-06-23 | Stop reason: HOSPADM

## 2020-06-19 RX ORDER — IPRATROPIUM BROMIDE AND ALBUTEROL SULFATE 2.5; .5 MG/3ML; MG/3ML
1 SOLUTION RESPIRATORY (INHALATION) 4 TIMES DAILY
Status: DISCONTINUED | OUTPATIENT
Start: 2020-06-19 | End: 2020-06-23 | Stop reason: HOSPADM

## 2020-06-19 RX ORDER — FENTANYL CITRATE 50 UG/ML
100 INJECTION, SOLUTION INTRAMUSCULAR; INTRAVENOUS ONCE
Status: COMPLETED | OUTPATIENT
Start: 2020-06-19 | End: 2020-06-19

## 2020-06-19 RX ADMIN — IPRATROPIUM BROMIDE AND ALBUTEROL SULFATE 1 AMPULE: .5; 3 SOLUTION RESPIRATORY (INHALATION) at 11:12

## 2020-06-19 RX ADMIN — GADOTERIDOL 11 ML: 279.3 INJECTION, SOLUTION INTRAVENOUS at 10:10

## 2020-06-19 RX ADMIN — SODIUM CHLORIDE, PRESERVATIVE FREE 10 ML: 5 INJECTION INTRAVENOUS at 21:34

## 2020-06-19 RX ADMIN — VANCOMYCIN HYDROCHLORIDE 1000 MG: 1 INJECTION, SOLUTION INTRAVENOUS at 21:40

## 2020-06-19 RX ADMIN — FENTANYL CITRATE 100 MCG: 50 INJECTION, SOLUTION INTRAMUSCULAR; INTRAVENOUS at 11:12

## 2020-06-19 RX ADMIN — FAMOTIDINE 20 MG: 10 INJECTION, SOLUTION INTRAVENOUS at 11:31

## 2020-06-19 RX ADMIN — FLUCONAZOLE 400 MG: 2 INJECTION, SOLUTION INTRAVENOUS at 18:05

## 2020-06-19 RX ADMIN — SODIUM CHLORIDE: 9 INJECTION, SOLUTION INTRAVENOUS at 21:36

## 2020-06-19 RX ADMIN — ENOXAPARIN SODIUM 60 MG: 60 INJECTION SUBCUTANEOUS at 13:56

## 2020-06-19 RX ADMIN — PROPOFOL INJECTABLE EMULSION 10 MCG/KG/MIN: 10 INJECTION, EMULSION INTRAVENOUS at 03:19

## 2020-06-19 RX ADMIN — VANCOMYCIN HYDROCHLORIDE 1000 MG: 1 INJECTION, SOLUTION INTRAVENOUS at 11:34

## 2020-06-19 RX ADMIN — FAMOTIDINE 20 MG: 10 INJECTION, SOLUTION INTRAVENOUS at 21:57

## 2020-06-19 RX ADMIN — SODIUM CHLORIDE: 9 INJECTION, SOLUTION INTRAVENOUS at 13:58

## 2020-06-19 RX ADMIN — IPRATROPIUM BROMIDE AND ALBUTEROL SULFATE 1 AMPULE: .5; 3 SOLUTION RESPIRATORY (INHALATION) at 19:50

## 2020-06-19 RX ADMIN — METOPROLOL TARTRATE 5 MG: 5 INJECTION, SOLUTION INTRAVENOUS at 16:15

## 2020-06-19 RX ADMIN — ALBUTEROL SULFATE 2.5 MG: 2.5 SOLUTION RESPIRATORY (INHALATION) at 23:09

## 2020-06-19 RX ADMIN — PROPOFOL INJECTABLE EMULSION 25 MCG/KG/MIN: 10 INJECTION, EMULSION INTRAVENOUS at 16:37

## 2020-06-19 RX ADMIN — SODIUM CHLORIDE, PRESERVATIVE FREE 10 ML: 5 INJECTION INTRAVENOUS at 11:08

## 2020-06-19 RX ADMIN — FENTANYL CITRATE 50 MCG: 50 INJECTION, SOLUTION INTRAMUSCULAR; INTRAVENOUS at 09:45

## 2020-06-19 RX ADMIN — PROPOFOL 10 MCG/KG/MIN: 10 INJECTION, EMULSION INTRAVENOUS at 03:19

## 2020-06-19 RX ADMIN — SODIUM CHLORIDE 500 ML: 9 INJECTION, SOLUTION INTRAVENOUS at 16:30

## 2020-06-19 RX ADMIN — IPRATROPIUM BROMIDE AND ALBUTEROL SULFATE 1 AMPULE: .5; 3 SOLUTION RESPIRATORY (INHALATION) at 15:40

## 2020-06-19 RX ADMIN — ENOXAPARIN SODIUM 60 MG: 60 INJECTION SUBCUTANEOUS at 21:33

## 2020-06-19 ASSESSMENT — PULMONARY FUNCTION TESTS
PIF_VALUE: 14
PIF_VALUE: 32
PIF_VALUE: 26
PIF_VALUE: 21
PIF_VALUE: 21
PIF_VALUE: 23
PIF_VALUE: 18
PIF_VALUE: 24
PIF_VALUE: 26
PIF_VALUE: 23
PIF_VALUE: 17
PIF_VALUE: 17

## 2020-06-19 NOTE — PROGRESS NOTES
Nutrition Assessment (Enteral Nutrition)    Type and Reason for Visit: Initial(Tube Feeding PTA, Vent)    Nutrition Recommendations: Restart Standard w/Fiber TF- suggest goal of 45 mL/hr while on propofol. Will monitor TF tolerance/adequacy- modify TF as needed. Nutrition Assessment: Pt admitted with acute on chronic resp failure. Pt was intubated overnight. Noted acute encephalopathy over past several weeks. Pt was on TF via bridled NG at [facility]. Pt was reportedly on Jevity at 60 mL/hr PTA. Order to restart TF (Standard w/Fiber; Jevity 1.2) today. Malnutrition Assessment:  · Malnutrition Status: Insufficient data  · Context: Chronic illness  · Findings of the 6 clinical characteristics of malnutrition (Minimum of 2 out of 6 clinical characteristics is required to make the diagnosis of moderate or severe Protein Calorie Malnutrition based on AND/ASPEN Guidelines):  1. Energy Intake-Greater than 75% of estimated energy requirement(with TF), Greater than or equal to 1 month    2. Weight Loss-Unable to assess, unable to assess  3. Fat Loss-Unable to assess,    4. Muscle Loss-Unable to assess,    5. Fluid Accumulation-(moderate fluid accumulation ), Extremities  6.   Strength-Not measured    Nutrition Risk Level: High    Nutrition Needs:  · Estimated Daily Total Kcal: 1500 kcal/day   · Estimated Daily Protein (g): 70 g pro/day     Nutrition Diagnosis:   · Problem: Inadequate oral intake  · Etiology: related to Cognitive or neurological impairment, Impaired respiratory function-inability to consume food     Signs and symptoms:  as evidenced by NPO status due to medical condition, Nutrition support - EN    Objective Information:  · Current Nutrition Therapies:  · Oral Diet Orders: NPO   · Tube Feeding (TF) Orders:   · Feeding Route: Nasogastric  · Formula: Standard w/Fiber  · Rate (ml/hr):to restart today- goal 60 mL/hr ordered     · Duration: Continuous  · Current TF & Flush Orders Provides:

## 2020-06-19 NOTE — PROGRESS NOTES
Neurology Daily Progress Note       Brief history: Bhargav Santos is a 62 y.o. old female admitted on 6/18/2020 with positive cryptococcal PNA on antifungals per ID for suspected meningoencephalitis suspected secondary to cryptococcus. Recent LP on alst admission 6/5 showing WBC rending down and negative CSF cryptococcus Ag. She has a h/o AF, ETOH abuse and cryptococcal PNA. Received amphotericin B for 18 days on last admission without significant improvement. D/c to LTAC. Presented again for confusion and MRSA in sputum. Subjective: No new neurological events overnight. Patient had an MRI Brain that appears stable and/or slightly improved over previous MRI Brain on last admission. Still not following commands. Patient was being prepped for a bronchoscopy during my examination. Objective: /65   Pulse 88   Temp 98.6 °F (37 °C) (Oral)   Resp 17   Ht 5' 7\" (1.702 m)   Wt 128 lb 4.9 oz (58.2 kg)   SpO2 98%   BMI 20.10 kg/m²       Medications:    sodium chloride flush  10 mL Intravenous 2 times per day    famotidine (PEPCID) injection  20 mg Intravenous BID    vancomycin  1,000 mg Intravenous Q12H    vancomycin (VANCOCIN) intermittent dosing (placeholder)   Other RX Placeholder    fluconazole  400 mg Intravenous Q24H                GENERAL EXAMINATION:    Physical Exam  Vitals signs and nursing note reviewed. Constitutional:       General: She is not in acute distress. Appearance: She is well-developed. She is not diaphoretic. Interventions: She is intubated and restrained. HENT:      Head: Normocephalic and atraumatic. Right Ear: External ear normal.      Left Ear: External ear normal.   Eyes:      General: No scleral icterus. Right eye: No discharge. Left eye: No discharge. Conjunctiva/sclera: Conjunctivae normal.      Pupils: Pupils are equal, round, and reactive to light. Neck:      Musculoskeletal: Normal range of motion.    Pulmonary:      Effort:

## 2020-06-19 NOTE — PROGRESS NOTES
more hypoxia secretions become more problem, she had atrial fibrillation with rapid ventricular rate Cardizem was increased from 5 mg to 50 mg and she had receive Lopressor, she needed intubation because of difficulty managing the secretions and for airway protection and continued to have very weak cough, she was intubated and according to available from she has continued elevated also edema present in posterior pharynx and upper airway #7 endotracheal tube was placed in appropriate position, she had bradycardia requiring atropine which recover. This morning she is in sinus rhythm she is on Cardizem drip. Ventilator setting PRVC/16/490/5/40 percent. ABG 7.4 7/45/91/33 this morning  Continue with Cardizem drip. Echocardiogram with bubble study as she had recent small infarct with history of DVT to rule out paradoxical thrombosis from PFO or right to left shunt. Bronchoscopy for endobronchial inspection and if there is any mass then biopsy. We will check blood sugar every 6 hours. MRI of the brain ordered by neurology. Will restart Lovenox after bronchoscopy is done. Will start tube feeding. She will need tracheostomy and PEG as she has more than a month of encephalopathy all the extremities weakness without significant improvement, inability to clear airways secretions recurrent atelectasis, aspiration and inability to protect airways. I have discussed with the sister explained to her current condition requirement of ventilatory support overnight, bronchoscopy discussed with her and risk of bleeding infection pneumothorax all discussed and she agreed for bronchoscopy, I have also discussed with her about likely need of tracheostomy and PEG depending upon the bronchoscopy finding.     Total critical care time caring for this patient with life threatening, unstable organ failure, including direct patient contact, management of life support systems, review of data including imaging and labs, discussions with other team members and physicians at least 27  Min so far today, excluding procedures. Please note that this chart was generated using voice recognition Dragon dictation software. Although every effort was made to ensure the accuracy of this automated transcription, some errors in transcription may have occurred.       Jaime Barnes MD  6/19/2020 12:10 PM

## 2020-06-19 NOTE — PROGRESS NOTES
Patient had an episode where she desated to [de-identified] and went bradycardic. Resident was notified. She is currently sating 90 on 100% FIO2 and 40L.     Electronically signed by Jaron Boykin RN on 6/19/2020 at 2:41 AM

## 2020-06-19 NOTE — PLAN OF CARE
Problem: OXYGENATION/RESPIRATORY FUNCTION  Goal: Patient will maintain patent airway  Outcome: Ongoing     Problem: OXYGENATION/RESPIRATORY FUNCTION  Goal: Patient will achieve/maintain normal respiratory rate/effort  Description: Respiratory rate and effort will be within normal limits for the patient  Outcome: Ongoing     Problem: MECHANICAL VENTILATION  Goal: Patient will maintain patent airway  Outcome: Ongoing     Problem: MECHANICAL VENTILATION  Goal: Oral health is maintained or improved  Outcome: Ongoing     Problem: MECHANICAL VENTILATION  Goal: ET tube will be managed safely  Outcome: Ongoing     Problem: MECHANICAL VENTILATION  Goal: Mobility/activity is maintained at optimum level for patient  Outcome: Ongoing

## 2020-06-19 NOTE — PLAN OF CARE
therapy  6/19/2020 1529 by Jewel Lujan RN  Outcome: Ongoing  6/19/2020 1318 by Hamilton Kayser, RD, LD  Outcome: Ongoing  Note: Nutrition Problem: Inadequate oral intake  Intervention: Food and/or Nutrient Delivery: Restart TF- suggest goal of 45 mL/hr while on propofol.    Nutritional Goals: meet % of estimated nutrition needs       Problem: Restraint Use - Nonviolent/Non-Self-Destructive Behavior:  Goal: Absence of restraint indications  Description: Absence of restraint indications  Outcome: Not Met This Shift

## 2020-06-19 NOTE — PLAN OF CARE
Problem: OXYGENATION/RESPIRATORY FUNCTION  Goal: Patient will maintain patent airway  6/19/2020 1955 by Sussy Ware RCP  Outcome: Ongoing     Problem: OXYGENATION/RESPIRATORY FUNCTION  Goal: Patient will achieve/maintain normal respiratory rate/effort  Description: Respiratory rate and effort will be within normal limits for the patient  6/19/2020 1955 by Sussy Ware RCP  Outcome: Ongoing     Problem: MECHANICAL VENTILATION  Goal: Patient will maintain patent airway  6/19/2020 1955 by Sussy Ware RCP  Outcome: Ongoing     Problem: MECHANICAL VENTILATION  Goal: Oral health is maintained or improved  6/19/2020 1955 by Sussy Ware RCP  Outcome: Ongoing     Problem: MECHANICAL VENTILATION  Goal: ET tube will be managed safely  6/19/2020 1955 by Sussy Ware RCP  Outcome: Ongoing     Problem: MECHANICAL VENTILATION  Goal: Mobility/activity is maintained at optimum level for patient  6/19/2020 1955 by Sussy Ware RCP  Outcome: Ongoing

## 2020-06-19 NOTE — PLAN OF CARE
Problem: Falls - Risk of:  Goal: Will remain free from falls  Description: Will remain free from falls  Outcome: Ongoing  Goal: Absence of physical injury  Description: Absence of physical injury  Outcome: Ongoing     Problem: Discharge Planning:  Goal: Participates in care planning  Description: Participates in care planning  Outcome: Ongoing  Goal: Discharged to appropriate level of care  Description: Discharged to appropriate level of care  Outcome: Ongoing     Problem: Airway Clearance - Ineffective:  Goal: Ability to maintain a clear airway will improve  Description: Ability to maintain a clear airway will improve  Outcome: Ongoing     Problem: Aspiration:  Goal: Absence of aspiration  Description: Absence of aspiration  Outcome: Ongoing     Problem: Gas Exchange - Impaired:  Goal: Levels of oxygenation will improve  Description: Levels of oxygenation will improve  Outcome: Ongoing     Problem: Mental Status - Impaired:  Goal: Mental status will be restored to baseline  Description: Mental status will be restored to baseline  Outcome: Ongoing     Problem: Nutrition Deficit:  Goal: Ability to achieve adequate nutritional intake will improve  Description: Ability to achieve adequate nutritional intake will improve  Outcome: Ongoing     Problem: Pain:  Goal: Pain level will decrease  Description: Pain level will decrease  Outcome: Ongoing  Goal: Recognizes and communicates pain  Description: Recognizes and communicates pain  Outcome: Ongoing  Goal: Control of acute pain  Description: Control of acute pain  Outcome: Ongoing  Goal: Control of chronic pain  Description: Control of chronic pain  Outcome: Ongoing     Problem: Skin Integrity - Impaired:  Goal: Will show no infection signs and symptoms  Description: Will show no infection signs and symptoms  Outcome: Ongoing  Goal: Absence of new skin breakdown  Description: Absence of new skin breakdown  Outcome: Ongoing     Problem: Sleep Pattern Disturbance:  Goal:

## 2020-06-20 LAB
ABSOLUTE EOS #: 0.09 K/UL (ref 0–0.44)
ABSOLUTE IMMATURE GRANULOCYTE: 0.11 K/UL (ref 0–0.3)
ABSOLUTE LYMPH #: 2.18 K/UL (ref 1.1–3.7)
ABSOLUTE MONO #: 0.7 K/UL (ref 0.1–1.2)
ALLEN TEST: POSITIVE
ANION GAP SERPL CALCULATED.3IONS-SCNC: 10 MMOL/L (ref 9–17)
BASOPHILS # BLD: 0 % (ref 0–2)
BASOPHILS ABSOLUTE: 0.04 K/UL (ref 0–0.2)
BUN BLDV-MCNC: 9 MG/DL (ref 6–20)
BUN/CREAT BLD: ABNORMAL (ref 9–20)
CALCIUM SERPL-MCNC: 7.8 MG/DL (ref 8.6–10.4)
CHLORIDE BLD-SCNC: 103 MMOL/L (ref 98–107)
CO2: 27 MMOL/L (ref 20–31)
CREAT SERPL-MCNC: 0.28 MG/DL (ref 0.5–0.9)
CULTURE: ABNORMAL
CULTURE: ABNORMAL
CULTURE: NORMAL
DIFFERENTIAL TYPE: ABNORMAL
DIRECT EXAM: ABNORMAL
DIRECT EXAM: NORMAL
EKG ATRIAL RATE: 87 BPM
EKG P AXIS: 11 DEGREES
EKG P-R INTERVAL: 152 MS
EKG Q-T INTERVAL: 374 MS
EKG QRS DURATION: 78 MS
EKG QTC CALCULATION (BAZETT): 450 MS
EKG R AXIS: 85 DEGREES
EKG T AXIS: 75 DEGREES
EKG VENTRICULAR RATE: 87 BPM
EOSINOPHILS RELATIVE PERCENT: 1 % (ref 1–4)
FIO2: 40
GFR AFRICAN AMERICAN: >60 ML/MIN
GFR NON-AFRICAN AMERICAN: >60 ML/MIN
GFR SERPL CREATININE-BSD FRML MDRD: ABNORMAL ML/MIN/{1.73_M2}
GFR SERPL CREATININE-BSD FRML MDRD: ABNORMAL ML/MIN/{1.73_M2}
GLUCOSE BLD-MCNC: 103 MG/DL (ref 65–105)
GLUCOSE BLD-MCNC: 116 MG/DL (ref 74–100)
GLUCOSE BLD-MCNC: 117 MG/DL (ref 65–105)
GLUCOSE BLD-MCNC: 119 MG/DL (ref 70–99)
HCT VFR BLD CALC: 24.3 % (ref 36.3–47.1)
HCT VFR BLD CALC: 25.3 % (ref 36.3–47.1)
HEMOGLOBIN: 7.7 G/DL (ref 11.9–15.1)
HEMOGLOBIN: 7.7 G/DL (ref 11.9–15.1)
IMMATURE GRANULOCYTES: 1 %
LV EF: 55 %
LVEF MODALITY: NORMAL
LYMPHOCYTES # BLD: 19 % (ref 24–43)
Lab: ABNORMAL
Lab: NORMAL
MAGNESIUM: 1.8 MG/DL (ref 1.6–2.6)
MCH RBC QN AUTO: 30.1 PG (ref 25.2–33.5)
MCHC RBC AUTO-ENTMCNC: 30.4 G/DL (ref 28.4–34.8)
MCV RBC AUTO: 98.8 FL (ref 82.6–102.9)
MODE: ABNORMAL
MONOCYTES # BLD: 6 % (ref 3–12)
NEGATIVE BASE EXCESS, ART: ABNORMAL (ref 0–2)
NRBC AUTOMATED: 0 PER 100 WBC
O2 DEVICE/FLOW/%: ABNORMAL
PARTIAL THROMBOPLASTIN TIME: 25.8 SEC (ref 20.5–30.5)
PATIENT TEMP: ABNORMAL
PDW BLD-RTO: 16.2 % (ref 11.8–14.4)
PLATELET # BLD: 288 K/UL (ref 138–453)
PLATELET ESTIMATE: ABNORMAL
PMV BLD AUTO: 9.8 FL (ref 8.1–13.5)
POC HCO3: 30.9 MMOL/L (ref 21–28)
POC LACTIC ACID: 0.41 MMOL/L (ref 0.56–1.39)
POC O2 SATURATION: 98 % (ref 94–98)
POC PCO2 TEMP: ABNORMAL MM HG
POC PCO2: 45.4 MM HG (ref 35–48)
POC PH TEMP: ABNORMAL
POC PH: 7.44 (ref 7.35–7.45)
POC PO2 TEMP: ABNORMAL MM HG
POC PO2: 109.2 MM HG (ref 83–108)
POSITIVE BASE EXCESS, ART: 6 (ref 0–3)
POTASSIUM SERPL-SCNC: 2.8 MMOL/L (ref 3.7–5.3)
POTASSIUM SERPL-SCNC: 3.9 MMOL/L (ref 3.7–5.3)
RBC # BLD: 2.56 M/UL (ref 3.95–5.11)
RBC # BLD: ABNORMAL 10*6/UL
SAMPLE SITE: ABNORMAL
SEG NEUTROPHILS: 73 % (ref 36–65)
SEGMENTED NEUTROPHILS ABSOLUTE COUNT: 8.43 K/UL (ref 1.5–8.1)
SODIUM BLD-SCNC: 140 MMOL/L (ref 135–144)
SPECIMEN DESCRIPTION: ABNORMAL
SPECIMEN DESCRIPTION: NORMAL
TCO2 (CALC), ART: 32 MMOL/L (ref 22–29)
THYROXINE, FREE: 1.07 NG/DL (ref 0.93–1.7)
TSH SERPL DL<=0.05 MIU/L-ACNC: 6.14 MIU/L (ref 0.3–5)
VANCOMYCIN TROUGH DATE LAST DOSE: NORMAL
VANCOMYCIN TROUGH DOSE AMOUNT: NORMAL
VANCOMYCIN TROUGH TIME LAST DOSE: NORMAL
VANCOMYCIN TROUGH: 16.1 UG/ML (ref 10–20)
WBC # BLD: 11.6 K/UL (ref 3.5–11.3)
WBC # BLD: ABNORMAL 10*3/UL

## 2020-06-20 PROCEDURE — 82947 ASSAY GLUCOSE BLOOD QUANT: CPT

## 2020-06-20 PROCEDURE — 6360000002 HC RX W HCPCS: Performed by: STUDENT IN AN ORGANIZED HEALTH CARE EDUCATION/TRAINING PROGRAM

## 2020-06-20 PROCEDURE — 83735 ASSAY OF MAGNESIUM: CPT

## 2020-06-20 PROCEDURE — 85018 HEMOGLOBIN: CPT

## 2020-06-20 PROCEDURE — 6370000000 HC RX 637 (ALT 250 FOR IP): Performed by: STUDENT IN AN ORGANIZED HEALTH CARE EDUCATION/TRAINING PROGRAM

## 2020-06-20 PROCEDURE — 94640 AIRWAY INHALATION TREATMENT: CPT

## 2020-06-20 PROCEDURE — 82803 BLOOD GASES ANY COMBINATION: CPT

## 2020-06-20 PROCEDURE — 84132 ASSAY OF SERUM POTASSIUM: CPT

## 2020-06-20 PROCEDURE — 84443 ASSAY THYROID STIM HORMONE: CPT

## 2020-06-20 PROCEDURE — 99232 SBSQ HOSP IP/OBS MODERATE 35: CPT | Performed by: PSYCHIATRY & NEUROLOGY

## 2020-06-20 PROCEDURE — 80202 ASSAY OF VANCOMYCIN: CPT

## 2020-06-20 PROCEDURE — 2700000000 HC OXYGEN THERAPY PER DAY

## 2020-06-20 PROCEDURE — 6360000002 HC RX W HCPCS: Performed by: INTERNAL MEDICINE

## 2020-06-20 PROCEDURE — 36415 COLL VENOUS BLD VENIPUNCTURE: CPT

## 2020-06-20 PROCEDURE — 94003 VENT MGMT INPAT SUBQ DAY: CPT

## 2020-06-20 PROCEDURE — 94770 HC ETCO2 MONITOR DAILY: CPT

## 2020-06-20 PROCEDURE — 83605 ASSAY OF LACTIC ACID: CPT

## 2020-06-20 PROCEDURE — 84439 ASSAY OF FREE THYROXINE: CPT

## 2020-06-20 PROCEDURE — 93010 ELECTROCARDIOGRAM REPORT: CPT | Performed by: INTERNAL MEDICINE

## 2020-06-20 PROCEDURE — 2580000003 HC RX 258: Performed by: STUDENT IN AN ORGANIZED HEALTH CARE EDUCATION/TRAINING PROGRAM

## 2020-06-20 PROCEDURE — 85014 HEMATOCRIT: CPT

## 2020-06-20 PROCEDURE — 80048 BASIC METABOLIC PNL TOTAL CA: CPT

## 2020-06-20 PROCEDURE — 2000000000 HC ICU R&B

## 2020-06-20 PROCEDURE — 99233 SBSQ HOSP IP/OBS HIGH 50: CPT | Performed by: INTERNAL MEDICINE

## 2020-06-20 PROCEDURE — 94761 N-INVAS EAR/PLS OXIMETRY MLT: CPT

## 2020-06-20 PROCEDURE — 99291 CRITICAL CARE FIRST HOUR: CPT | Performed by: INTERNAL MEDICINE

## 2020-06-20 PROCEDURE — 36600 WITHDRAWAL OF ARTERIAL BLOOD: CPT

## 2020-06-20 PROCEDURE — 93308 TTE F-UP OR LMTD: CPT

## 2020-06-20 PROCEDURE — 2500000003 HC RX 250 WO HCPCS: Performed by: STUDENT IN AN ORGANIZED HEALTH CARE EDUCATION/TRAINING PROGRAM

## 2020-06-20 PROCEDURE — 85730 THROMBOPLASTIN TIME PARTIAL: CPT

## 2020-06-20 PROCEDURE — 85025 COMPLETE CBC W/AUTO DIFF WBC: CPT

## 2020-06-20 RX ORDER — HEPARIN SODIUM 10000 [USP'U]/100ML
12 INJECTION, SOLUTION INTRAVENOUS CONTINUOUS
Status: DISCONTINUED | OUTPATIENT
Start: 2020-06-20 | End: 2020-06-21

## 2020-06-20 RX ORDER — PROPOFOL 10 MG/ML
10 INJECTION, EMULSION INTRAVENOUS
Status: DISCONTINUED | OUTPATIENT
Start: 2020-06-20 | End: 2020-06-23 | Stop reason: HOSPADM

## 2020-06-20 RX ORDER — POTASSIUM CHLORIDE 20MEQ/15ML
60 LIQUID (ML) ORAL 2 TIMES DAILY
Status: COMPLETED | OUTPATIENT
Start: 2020-06-20 | End: 2020-06-20

## 2020-06-20 RX ORDER — POTASSIUM CHLORIDE 20MEQ/15ML
60 LIQUID (ML) ORAL 2 TIMES DAILY
Status: DISCONTINUED | OUTPATIENT
Start: 2020-06-20 | End: 2020-06-20

## 2020-06-20 RX ADMIN — VANCOMYCIN HYDROCHLORIDE 1000 MG: 1 INJECTION, SOLUTION INTRAVENOUS at 22:06

## 2020-06-20 RX ADMIN — ALBUTEROL SULFATE 2.5 MG: 2.5 SOLUTION RESPIRATORY (INHALATION) at 23:17

## 2020-06-20 RX ADMIN — HEPARIN SODIUM AND DEXTROSE 12 UNITS/KG/HR: 10000; 5 INJECTION INTRAVENOUS at 21:46

## 2020-06-20 RX ADMIN — ENOXAPARIN SODIUM 60 MG: 60 INJECTION SUBCUTANEOUS at 09:15

## 2020-06-20 RX ADMIN — SODIUM CHLORIDE: 9 INJECTION, SOLUTION INTRAVENOUS at 08:50

## 2020-06-20 RX ADMIN — FLUCONAZOLE 400 MG: 2 INJECTION, SOLUTION INTRAVENOUS at 17:52

## 2020-06-20 RX ADMIN — VANCOMYCIN HYDROCHLORIDE 1000 MG: 1 INJECTION, SOLUTION INTRAVENOUS at 10:52

## 2020-06-20 RX ADMIN — POTASSIUM CHLORIDE 60 MEQ: 40 SOLUTION ORAL at 07:51

## 2020-06-20 RX ADMIN — POTASSIUM CHLORIDE 60 MEQ: 40 SOLUTION ORAL at 21:11

## 2020-06-20 RX ADMIN — IPRATROPIUM BROMIDE AND ALBUTEROL SULFATE 1 AMPULE: .5; 3 SOLUTION RESPIRATORY (INHALATION) at 12:54

## 2020-06-20 RX ADMIN — DILTIAZEM HYDROCHLORIDE 30 MG: 30 TABLET, FILM COATED ORAL at 13:31

## 2020-06-20 RX ADMIN — SODIUM CHLORIDE, PRESERVATIVE FREE 10 ML: 5 INJECTION INTRAVENOUS at 09:14

## 2020-06-20 RX ADMIN — FAMOTIDINE 20 MG: 10 INJECTION, SOLUTION INTRAVENOUS at 21:11

## 2020-06-20 RX ADMIN — ALBUTEROL SULFATE 2.5 MG: 2.5 SOLUTION RESPIRATORY (INHALATION) at 03:48

## 2020-06-20 RX ADMIN — FAMOTIDINE 20 MG: 10 INJECTION, SOLUTION INTRAVENOUS at 09:14

## 2020-06-20 RX ADMIN — DILTIAZEM HYDROCHLORIDE 30 MG: 30 TABLET, FILM COATED ORAL at 21:11

## 2020-06-20 RX ADMIN — PROPOFOL INJECTABLE EMULSION 10 MCG/KG/MIN: 10 INJECTION, EMULSION INTRAVENOUS at 14:39

## 2020-06-20 RX ADMIN — IPRATROPIUM BROMIDE AND ALBUTEROL SULFATE 1 AMPULE: .5; 3 SOLUTION RESPIRATORY (INHALATION) at 09:15

## 2020-06-20 RX ADMIN — SODIUM CHLORIDE, PRESERVATIVE FREE 10 ML: 5 INJECTION INTRAVENOUS at 21:11

## 2020-06-20 RX ADMIN — IPRATROPIUM BROMIDE AND ALBUTEROL SULFATE 1 AMPULE: .5; 3 SOLUTION RESPIRATORY (INHALATION) at 15:55

## 2020-06-20 RX ADMIN — IPRATROPIUM BROMIDE AND ALBUTEROL SULFATE 1 AMPULE: .5; 3 SOLUTION RESPIRATORY (INHALATION) at 20:29

## 2020-06-20 ASSESSMENT — PULMONARY FUNCTION TESTS
PIF_VALUE: 13
PIF_VALUE: 12
PIF_VALUE: 17
PIF_VALUE: 17
PIF_VALUE: 20
PIF_VALUE: 13
PIF_VALUE: 13
PIF_VALUE: 23
PIF_VALUE: 16
PIF_VALUE: 13
PIF_VALUE: 28
PIF_VALUE: 27
PIF_VALUE: 18

## 2020-06-20 ASSESSMENT — ENCOUNTER SYMPTOMS: ABDOMINAL PAIN: 0

## 2020-06-20 NOTE — PLAN OF CARE
Problem: Restraint Use - Nonviolent/Non-Self-Destructive Behavior:  Goal: Absence of restraint-related injury  Outcome: Met This Shift     Problem: Falls - Risk of:  Goal: Will remain free from falls  Outcome: Ongoing  Goal: Absence of physical injury  Outcome: Ongoing     Problem: Discharge Planning:  Goal: Participates in care planning  Outcome: Ongoing  Goal: Discharged to appropriate level of care  Outcome: Ongoing     Problem: Airway Clearance - Ineffective:  Goal: Ability to maintain a clear airway will improve  Outcome: Ongoing     Problem: Aspiration:  Goal: Absence of aspiration  Outcome: Ongoing     Problem: Gas Exchange - Impaired:  Goal: Levels of oxygenation will improve  Outcome: Ongoing     Problem: Mental Status - Impaired:  Goal: Mental status will be restored to baseline  Outcome: Ongoing     Problem: Nutrition Deficit:  Goal: Ability to achieve adequate nutritional intake will improve  Outcome: Ongoing     Problem: Pain:  Goal: Pain level will decrease  Outcome: Ongoing  Goal: Recognizes and communicates pain  Outcome: Ongoing  Goal: Control of acute pain  Outcome: Ongoing  Goal: Control of chronic pain  Outcome: Ongoing     Problem: Skin Integrity - Impaired:  Goal: Will show no infection signs and symptoms  Outcome: Ongoing  Goal: Absence of new skin breakdown  Outcome: Ongoing     Problem: Sleep Pattern Disturbance:  Goal: Appears well-rested  Outcome: Ongoing     Problem: OXYGENATION/RESPIRATORY FUNCTION  Goal: Patient will maintain patent airway  6/20/2020 0649 by Freida Perdomo RN  Outcome: Ongoing  6/19/2020 1955 by Lincoln Delacruz RCP  Outcome: Ongoing  Goal: Patient will achieve/maintain normal respiratory rate/effort  6/20/2020 0649 by Freida Perdomo RN  Outcome: Ongoing  6/19/2020 1955 by Lincoln Delacruz RCP  Outcome: Ongoing     Problem: MECHANICAL VENTILATION  Goal: Patient will maintain patent airway  6/20/2020 0649 by Freida Perdomo RN  Outcome: Ongoing  6/19/2020 1955 by Alex Contreras RCP  Outcome: Ongoing  Goal: Oral health is maintained or improved  6/20/2020 0649 by Drake Santos RN  Outcome: Ongoing  6/19/2020 1955 by Alex Contreras RCP  Outcome: Ongoing  Goal: ET tube will be managed safely  6/20/2020 0649 by Drake Santos RN  Outcome: Ongoing  6/19/2020 1955 by Alex Contreras RCP  Outcome: Ongoing  Goal: Ability to express needs and understand communication  6/20/2020 0649 by Drake Santos RN  Outcome: Ongoing  6/19/2020 1955 by Alex Contreras RCP  Outcome: Ongoing  Goal: Mobility/activity is maintained at optimum level for patient  6/20/2020 0649 by Drake Santos RN  Outcome: Ongoing  6/19/2020 1955 by Alex Contreras RCP  Outcome: Ongoing     Problem: Nutrition  Goal: Optimal nutrition therapy  Outcome: Ongoing

## 2020-06-20 NOTE — PROGRESS NOTES
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 · Diflucan 400 a day consider switching back to Abelcet if amiodarone is started for the A. Fib  · Get crypto serum  AG   · Disc w Rn - will follow - very sick patient    Infection Control Recommendations   · [place] Precautions  · Contact Isolation     Antimicrobial Stewardship Recommendations   · Simplification of therapy  · Targeted therapy  · Per Kg dosing  Coordination ofOutpatient Care:   · Estimated Length of IV antimicrobials:  · Patient will need Midline / picc Catheter Insertion:   · Patient will need SNF:  · Patient will need outpatient wound care:     History of Present Illness:   Initial history:  Bhargav Santos is a 62y.o.-year-old female 33 onto a service who came in from [place] because of atrial fibrillation paroxysmal as well as left lung collapse, sputum growing MRSA. She was started on vancomycin    I know her very well from May admission when she had a severe headache and a left lower lobe progressive pneumonia despite antibiotics ultimately diagnosed with cryptococcus pneumonia and encephalitis.   She had repeated LPs all showing response of the WBC on antifungal therapy  Her serum crypto antigen was positive at admission 1/4 low titer and ultimately became negative before discharge to [facility]  The CSF antigen and the CSF cryptococcus PCR were all negative repeatedly  She had a fever and sepsis that only responded to antifungal therapy  She was started on Abelcet plan to be tentatively 6 weeks until 7/2, and flucytosine for 2 weeks until 6/9    Before discharge she had a left pleural effusion and had a thoracentesis that showed no signs of active infection. The patient was discharged to [facility], she was starting to sit on the bedside, though very weak, able to answer questions and respond sharply. She was still doing physical therapy. In [facility] she collapsed her left lung, ultimately developed A. fib, so they sent her back to [facility] where sputum culture grew MRSA, she was started on vancomycin  Also Regen, the antifungals were switched on 6/11 to Diflucan    Since admission she is lethargic on the BiPAP, still in A fib, 120 heart rate. At this time chest x-ray at [facility] 6/18 is showing increased left pleural effusion with basilar lung volume loss. With persistent perihilar edema  She has been tested for COVID      Interval changes  6/19/2020   33 T-max, sedated on the ventilator, opening her eyes she tracks tries to answer very subtle nods of her head. FiO2 40% 5 of PEEP, she is on tube feed 25 cc/h. He tested negative for COVID, isolation removed. For A. fib  reverted after Cardizem. Continues on Diflucan,    The patient had today a bronchoscopy for the left lower lobe atelectasis or consolidation. Mucous plug of the lingula, left upper lobe and left lower lobe    6/19/20 Chest left basilar opacification and effusion improved aeration of the left upper lobe. Summary of relevant labs:  Labs:  WBC 26 -14  Micro:  Sp cx MRSA Sensitive to clindamycin  Imaging:    chest x-ray at [facility] 6/18 is showing increased left pleural effusion with basilar lung volume loss.   With persistent perihilar edema-\  When compared to the past, it is a mild effusion      I have personally reviewed the past medical history, past surgical history, medications, social history, and family history, and I haveupdated the database accordingly.   Past Medical History:     Past Medical History:   Diagnosis Date    COPD (chronic obstructive pulmonary disease) ([facility] [address]     ETOH abuse        Past Surgical  History:     Past Surgical History:   Procedure Laterality Date    CHOLECYSTECTOMY      Charlie  PICC [facility] DOUBLE  6/4/2020         THYROIDECTOMY      TONSILLECTOMY         Medications:      ipratropium-albuterol  1 ampule Inhalation 4x daily    [START ON 6/20/2020] albuterol  2.5 mg Nebulization BID    enoxaparin  1 mg/kg (Ideal) Subcutaneous BID    sodium chloride flush  10 mL Intravenous 2 times per day    famotidine (PEPCID) injection  20 mg Intravenous BID    vancomycin  1,000 mg Intravenous Q12H    vancomycin (VANCOCIN) intermittent dosing (placeholder)   Other RX Placeholder    fluconazole  400 mg Intravenous Q24H       Social History:     Social History     Socioeconomic History    Marital status: Single     Spouse name: Not on file    Number of children: Not on file    Years of education: Not on file    Highest education level: Not on file   Occupational History    Not on file   Social Needs    Financial resource strain: Not on file    Food insecurity     Worry: Not on file     Inability: Not on file    Transportation needs     Medical: Not on file     Non-medical: Not on file   Tobacco Use    Smoking status: Current Every Day Smoker     Packs/day: 2.00     Types: Cigarettes    Smokeless tobacco: Never Used   Substance and Sexual Activity    Alcohol use: Yes     Frequency: 4 or more times a week     Drinks per session: 3 or 4     Binge frequency: Patient refused     Comment: hasn't drank in 12 days, usually daily drinker (vodka)    Drug use: Never    Sexual activity: Not on file   Lifestyle    Physical activity     Days per 10 9   CREATININE 0.22* 0.25*     Hepatic Function Panel:   Recent Labs     06/19/20  0054   PROT 5.1*   LABALBU 2.3*   BILIDIR <0.08   IBILI CANNOT BE CALCULATED   BILITOT 0.17*   ALKPHOS 106*   ALT 32   AST 25     No results for input(s): RPR in the last 72 hours. No results for input(s): HIV in the last 72 hours. No results for input(s): BC in the last 72 hours. Lab Results   Component Value Date    CREATININE 0.25 06/19/2020    GLUCOSE 96 06/19/2020       Detailed results: Thank you for allowing us to participate in the care of this patient. Please call with questions. This note is created with the assistance of a speech recognition program.  While intending to generate adocument that actually reflects the content of the visit, the document can still have some errors including those of syntax and sound a like substitutions which may escape proof reading. It such instances, actual meaningcan be extrapolated by contextual diversion.     Victor Hugo Toussaint MD  Office: [phone]  Perfect serve / office [phone]

## 2020-06-20 NOTE — CONSULTS
2. 8* 3.9   CL 99 100 103  --    CO2 28 27 27  --    GLUCOSE 109* 96 119*  --    BUN 10 9 9  --    CREATININE 0.22* 0.25* 0.28*  --    MG  --   --  1.8  --    ANIONGAP 11 14 10  --    LABGLOM >60 >60 >60  --    GFRAA >60 >60 >60  --    CALCIUM 8.4* 8.2* 7.8*  --      Recent Labs     06/19/20  0054 06/20/20  0424 06/20/20  1004 06/20/20  1328   PROT 5.1*  --   --   --    LABALBU 2.3*  --   --   --    TSH  --   --  6.14*  --    AST 25  --   --   --    ALT 32  --   --   --    ALKPHOS 106*  --   --   --    BILITOT 0.17*  --   --   --    BILIDIR <0.08  --   --   --    POCGLU  --  116*  --  117*       Pertinent Radiology:   Xr Chest Portable    Result Date: 6/19/2020  EXAMINATION: ONE XRAY VIEW OF THE CHEST 6/19/2020 5:47 pm COMPARISON: 06/19/2020. HISTORY: ORDERING SYSTEM PROVIDED HISTORY: tachycardia, bronch today TECHNOLOGIST PROVIDED HISTORY: tachycardia, bronch today FINDINGS: The cardiomediastinal silhouette is stable. There is persistent left basilar opacification and effusion with an element of volume loss. There is interval improvement in left perihilar and upper lobe opacification. No pneumothorax is seen. The right lung is clear. Endotracheal tube tip 5.2 cm above the alondra. Enteric catheter proximal port at the GE junction. Right-sided PICC line. Continued left basilar opacification and effusion. Improved aeration in the left upper lobe. No pneumothorax. Satisfactory position of endotracheal tube. Enteric catheter proximal port at the GE junction. Suggest advancement several cm. Xr Chest Portable    Result Date: 6/19/2020  EXAMINATION: ONE XRAY VIEW OF THE CHEST 6/19/2020 3:31 am COMPARISON: June 18, 2020. HISTORY: ORDERING SYSTEM PROVIDED HISTORY: sob TECHNOLOGIST PROVIDED HISTORY: sob Reason for Exam: sob/ETT tube Acuity: Acute FINDINGS: Frontal portable view of the chest.  Stable life-support devices. Stable lung volume.   Progressed heterogeneous opacities and consolidations throughout

## 2020-06-20 NOTE — PROGRESS NOTES
06/19/20  0054 06/19/20  0625 06/20/20  0514 06/20/20  1004    141 140  --    K 3.8 4.0 2.8* 3.9   CL 99 100 103  --    CO2 28 27 27  --    BUN 10 9 9  --    CREATININE 0.22* 0.25* 0.28*  --    GLUCOSE 109* 96 119*  --          Lab Results   Component Value Date    ALT 32 06/19/2020    AST 25 06/19/2020    TSH 5.46 (H) 06/13/2020    INR 1.0 05/14/2020    [id number] 1358 (H) 05/17/2020         Impression:      1. Acute metabolic encephalopathy; improving   2. 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 Critical care myopathy/neuropathy  4. Acute bihemispheric punctate infarcts  5. Afib w RVR  6. LE DVT     Plan:      We will hold off LP at this time as patient's mentation continues to improve.  Awaiting serum cryptococcal antigen.  Continue infectious disease, critical care treatments   Patient may need trach/PEG.    We will follow      Electronically signed by Lulu Guzman DO on 6/20/2020 at 2:12 PM      Jorge Weems [facility]  Neurology

## 2020-06-20 NOTE — PROGRESS NOTES
Interval Update:    Spoke to patient's sister Yulissa Longo, who is her POA. Updated the clinical status and answered all her questions. Discussed about Trach and PEG supportive care due to prolonged intubation and dysphagia. Patient sister verbalized understanding and would like to think over it before proceeding. She will call back to update her decision. If agreeable, we will consult general surgery for Trach and PEG.      3:40 pm:  Sister Yulissa Longo called back. Again after detailed discussion, She is agreeable to proceed with Trach and PEG care. Raissa Pickett RN, confirmed over phone. Will consult general surgery.       Stoney Millan MD, PGY-1  Internal Medicine Residency Program  [facility]  6/20/2020 3:13 PM

## 2020-06-20 NOTE — PROGRESS NOTES
yes -     [] Right IJ     [] Left IJ [] Right Femoral [] Left Femoral                   [] Right Subclavian [] Left Subclavian       MEZA'S CATHETER:   [] No   [x] Yes  (Date of Insertion: Chronic  )     URINE OUTPUT:            [x] Good   [] Low              [] Anuric      OBJECTIVE:     VITAL SIGNS:  BP 99/64   Pulse 75   Temp 98.8 °F (37.1 °C) (Oral)   Resp 16   Ht 5' 7\" (1.702 m)   Wt 128 lb 4.9 oz (58.2 kg)   SpO2 99%   BMI 20.10 kg/m²     Tmax over 24 hours:  Temp (24hrs), Av.7 °F (37.6 °C), Min:98.8 °F (37.1 °C), Max:100.8 °F (38.2 °C)      Patient Vitals for the past 6 hrs:   BP Pulse Resp SpO2   20 0931 -- -- 16 99 %   20 0917 -- 75 17 100 %   20 0915 -- -- 16 100 %   20 0700 99/64 73 16 100 %   20 0600 103/62 77 15 98 %   20 0500 114/68 90 17 98 %         Intake/Output Summary (Last 24 hours) at 2020 1023  Last data filed at 2020 0600  Gross per 24 hour   Intake 3827.8 ml   Output 980 ml   Net 2847.8 ml     Wt Readings from Last 2 Encounters:   20 128 lb 4.9 oz (58.2 kg)   20 159 lb 13.3 oz (72.5 kg)     Body mass index is 20.1 kg/m². PHYSICAL EXAMINATION:  General appearance -awake but not alert, intermittently following commands by nodding head/shoulder shrugging. Eyes -pupils equal and reactive to light  Neck - supple, no significant adenopathy, symmetric, trachea midline  Chest -right-sided chest is clear to auscultation, left side of chest with rhonchi and decreased/absent breath sounds on lower lung zone. No wheezing.   Heart - normal rate, regular rhythm, normal S1, S2, no murmurs, rubs, clicks or gallops  Abdomen - soft, nontender, nondistended, no masses or organomegaly  Neurological -patient awake and intermittently following commands, was able to squeeze fingers with right hand but not left, patient did wiggle toes  Extremities - peripheral pulses normal, no pedal edema, no clubbing or cyanosis  Skin - normal coloration and turgor, no rashes, no suspicious skin lesions noted     Any additional physical findings:     MEDICATIONS:    Scheduled Meds:   potassium chloride  60 mEq Per NG tube BID    ipratropium-albuterol  1 ampule Inhalation 4x daily    albuterol  2.5 mg Nebulization BID    enoxaparin  1 mg/kg (Ideal) Subcutaneous BID    sodium chloride flush  10 mL Intravenous 2 times per day    famotidine (PEPCID) injection  20 mg Intravenous BID    vancomycin  1,000 mg Intravenous Q12H    vancomycin (VANCOCIN) intermittent dosing (placeholder)   Other RX Placeholder    fluconazole  400 mg Intravenous Q24H     Continuous Infusions:   propofol 5 mcg/kg/min (06/20/20 0824)    dilTIAZem (CARDIZEM) 125 mg in dextrose 5% 125 mL infusion Stopped (06/19/20 1845)    sodium chloride 100 mL/hr at 06/20/20 0850     PRN Meds:   fentanNYL, 50 mcg, Q1H PRN  sodium chloride flush, 10 mL, PRN  sodium chloride flush, 10 mL, PRN  acetaminophen, 650 mg, Q6H PRN    Or  acetaminophen, 650 mg, Q6H PRN  potassium chloride, 10 mEq, PRN  magnesium sulfate, 2 g, PRN  prochlorperazine, 10 mg, Q6H PRN  metoprolol, 5 mg, Q6H PRN          VENT SETTINGS (Comprehensive) (if applicable):  Vent Information  $Ventilation: $Subsequent Day  Skin Assessment: Clean, dry, & intact  Equipment Changed: HME  Vent Type: Servo i  Vent Mode: (S) CPAP  Vt Ordered: 490 mL  Rate Set: 16 bmp  Pressure Support: (S) 8 cmH20  FiO2 : 30 %  SpO2: 99 %  SpO2/FiO2 ratio: 330  Sensitivity: 5  PEEP/CPAP: (S) 5  I Time/ I Time %: 0.9 s  Humidification Source: HME  Additional Respiratory  Assessments  Pulse: 75  Resp: 16  SpO2: 99 %  End Tidal CO2: 43 (%)  Position: Semi-Sherman's  Humidification Source: E  Oral Care Completed?: Yes  Oral Care: Teeth brushed, Mouth swabbed, Mouth suctioned  Subglottic Suction Done?: Yes  Cuff Pressure (cm H2O): (MLT)    ABGs: 7.47/45/91/32    Laboratory findings:    Complete Blood Count:   Recent Labs     06/19/20  0054 06/19/20  4033 Patient Active Problem List    Diagnosis Date Noted    Atelectasis of left lung     [facility] with RVR [facility]     Acute on chronic respiratory failure ([facility] 06/18/2020    History of recent stroke     Chronic alcohol abuse 06/08/2020    S/P thoracentesis 06/04/2020    Quadriparesis ([address] 06/03/2020    Protein-energy malnutrition ([facility] [facility] 06/03/2020    Fatty liver, alcoholic 82/56/0984    CRP elevated 06/03/2020    ESR raised 06/03/2020    Acute respiratory failure with hypoxia and hypercapnia (HCC)     Pleural effusion on left     Meningoencephalitis     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[facility]     DVT (deep venous thrombosis) ([facility] [facility] 05/18/2020    Encephalopathy 32/57/2576    Metabolic encephalopathy     Chronic obstructive pulmonary disease (HCC)     Thrombocytopenia (HCC)     Hyponatremia 05/14/2020    DIXON (acute kidney injury) ([facility] [facility] 05/14/2020    Tobacco use disorder 05/14/2020    Pneumonia of left lower lobe due to infectious organism ([facility] [address] 05/13/2020    Mucopurulent chronic bronchitis ([facility] [address] 07/25/2018       Additional assessment:    · Left-sided mucous plugging        PLAN:     Neuro:  -Patient intubated and sedated on propofol. She is awake and intermittently following commands  -History of multiple focal punctate infarcts in the bilateral cerebral hemispheres  -Neurology consulted and recommending MRI brain with and without contrast to rule out new strokes and will consider lumbar puncture if neuro status does not improve. CV:  -A. fib spontaneously converted to NSR. Off Cardizem drip.   -Follow-up cardiology recommendations  -Currently normal sinus rhythm  -Right lower extremity DVT, currently on therapeutic Lovenox  -Pending echo with bubble study for possible paradoxical strokes    Respiratory:  -Acute on chronic respiratory failure s/p intubation on propofol.   -COPD-continue breathing treatments  -Chest x-ray showing left-sided opacity with pleural effusion.  - Bronchoscopy 6/19 only with current ventilator support. Start oral Cardizem. On Lovenox full dose. Monitor intake and output and renal function and electrolytes. We will discuss with sister and as I have discussed with sister before bronchoscopy bronchoscopy did not show significant mass or obstruction, she remained weak overall although encephalopathy is slightly better cough remains very weak and inability to clear airways with recurrent atelectasis, recurrent hypoxemic respiratory failure and dysphagia and will need tracheostomy and PEG. Will follow-up brushing for cytology and follow final cultures. Discussed with nursing staff, treatment plan discussed    Total critical care time caring for this patient with life threatening, unstable organ failure, including direct patient contact, management of life support systems, review of data including imaging and labs, discussions with other team members and physicians at least 27  Min so far today, excluding procedures. Please note that this chart was generated using voice recognition Dragon dictation software. Although every effort was made to ensure the accuracy of this automated transcription, some errors in transcription may have occurred.       Trevon Tiwari MD  6/20/2020 11:50 AM

## 2020-06-21 ENCOUNTER — ANESTHESIA EVENT (OUTPATIENT)
Dept: OPERATING ROOM | Age: 60
DRG: 005 | End: 2020-06-21
Payer: MEDICARE

## 2020-06-21 LAB
ABSOLUTE EOS #: 0.11 K/UL (ref 0–0.44)
ABSOLUTE IMMATURE GRANULOCYTE: 0.1 K/UL (ref 0–0.3)
ABSOLUTE LYMPH #: 2.05 K/UL (ref 1.1–3.7)
ABSOLUTE MONO #: 0.6 K/UL (ref 0.1–1.2)
ABSOLUTE RETIC #: 0.07 M/UL (ref 0.03–0.08)
ALLEN TEST: POSITIVE
ANION GAP SERPL CALCULATED.3IONS-SCNC: 11 MMOL/L (ref 9–17)
BASOPHILS # BLD: 1 % (ref 0–2)
BASOPHILS ABSOLUTE: 0.04 K/UL (ref 0–0.2)
BUN BLDV-MCNC: 8 MG/DL (ref 6–20)
BUN/CREAT BLD: ABNORMAL (ref 9–20)
CALCIUM SERPL-MCNC: 8 MG/DL (ref 8.6–10.4)
CHLORIDE BLD-SCNC: 105 MMOL/L (ref 98–107)
CO2: 24 MMOL/L (ref 20–31)
CREAT SERPL-MCNC: 0.28 MG/DL (ref 0.5–0.9)
CULTURE: ABNORMAL
DIFFERENTIAL TYPE: ABNORMAL
DIRECT EXAM: ABNORMAL
DIRECT EXAM: ABNORMAL
EOSINOPHILS RELATIVE PERCENT: 2 % (ref 1–4)
FERRITIN: 2672 UG/L (ref 13–150)
FIO2: 30
GFR AFRICAN AMERICAN: >60 ML/MIN
GFR NON-AFRICAN AMERICAN: >60 ML/MIN
GFR SERPL CREATININE-BSD FRML MDRD: ABNORMAL ML/MIN/{1.73_M2}
GFR SERPL CREATININE-BSD FRML MDRD: ABNORMAL ML/MIN/{1.73_M2}
GLUCOSE BLD-MCNC: 107 MG/DL (ref 65–105)
GLUCOSE BLD-MCNC: 111 MG/DL (ref 70–99)
GLUCOSE BLD-MCNC: 112 MG/DL (ref 65–105)
GLUCOSE BLD-MCNC: 116 MG/DL (ref 65–105)
GLUCOSE BLD-MCNC: 132 MG/DL (ref 65–105)
HCT VFR BLD CALC: 24.1 % (ref 36.3–47.1)
HEMOGLOBIN: 7.5 G/DL (ref 11.9–15.1)
IMMATURE GRANULOCYTES: 1 %
IMMATURE RETIC FRACT: 21.4 % (ref 2.7–18.3)
IRON SATURATION: 20 % (ref 20–55)
IRON: 34 UG/DL (ref 37–145)
LYMPHOCYTES # BLD: 27 % (ref 24–43)
Lab: ABNORMAL
MCH RBC QN AUTO: 30.7 PG (ref 25.2–33.5)
MCHC RBC AUTO-ENTMCNC: 31.1 G/DL (ref 28.4–34.8)
MCV RBC AUTO: 98.8 FL (ref 82.6–102.9)
MODE: ABNORMAL
MONOCYTES # BLD: 8 % (ref 3–12)
NEGATIVE BASE EXCESS, ART: ABNORMAL (ref 0–2)
NRBC AUTOMATED: 0 PER 100 WBC
O2 DEVICE/FLOW/%: ABNORMAL
PARTIAL THROMBOPLASTIN TIME: 26.8 SEC (ref 20.5–30.5)
PARTIAL THROMBOPLASTIN TIME: 33.9 SEC (ref 20.5–30.5)
PARTIAL THROMBOPLASTIN TIME: 36.8 SEC (ref 20.5–30.5)
PATIENT TEMP: ABNORMAL
PDW BLD-RTO: 16.4 % (ref 11.8–14.4)
PLATELET # BLD: 372 K/UL (ref 138–453)
PLATELET ESTIMATE: ABNORMAL
PMV BLD AUTO: 9.9 FL (ref 8.1–13.5)
POC HCO3: 30 MMOL/L (ref 21–28)
POC O2 SATURATION: 95 % (ref 94–98)
POC PCO2 TEMP: ABNORMAL MM HG
POC PCO2: 33.5 MM HG (ref 35–48)
POC PH TEMP: ABNORMAL
POC PH: 7.56 (ref 7.35–7.45)
POC PO2 TEMP: ABNORMAL MM HG
POC PO2: 64.8 MM HG (ref 83–108)
POSITIVE BASE EXCESS, ART: 7 (ref 0–3)
POTASSIUM SERPL-SCNC: 3.8 MMOL/L (ref 3.7–5.3)
RBC # BLD: 2.44 M/UL (ref 3.95–5.11)
RBC # BLD: ABNORMAL 10*6/UL
RETIC %: 2.7 % (ref 0.5–1.9)
RETIC HEMOGLOBIN: 29.6 PG (ref 28.2–35.7)
SAMPLE SITE: ABNORMAL
SEG NEUTROPHILS: 62 % (ref 36–65)
SEGMENTED NEUTROPHILS ABSOLUTE COUNT: 4.67 K/UL (ref 1.5–8.1)
SODIUM BLD-SCNC: 140 MMOL/L (ref 135–144)
SPECIMEN DESCRIPTION: ABNORMAL
TCO2 (CALC), ART: 31 MMOL/L (ref 22–29)
TOTAL IRON BINDING CAPACITY: 167 UG/DL (ref 250–450)
UNSATURATED IRON BINDING CAPACITY: 133 UG/DL (ref 112–347)
WBC # BLD: 7.6 K/UL (ref 3.5–11.3)
WBC # BLD: ABNORMAL 10*3/UL

## 2020-06-21 PROCEDURE — 82728 ASSAY OF FERRITIN: CPT

## 2020-06-21 PROCEDURE — 2700000000 HC OXYGEN THERAPY PER DAY

## 2020-06-21 PROCEDURE — 83540 ASSAY OF IRON: CPT

## 2020-06-21 PROCEDURE — 94640 AIRWAY INHALATION TREATMENT: CPT

## 2020-06-21 PROCEDURE — 94761 N-INVAS EAR/PLS OXIMETRY MLT: CPT

## 2020-06-21 PROCEDURE — 99233 SBSQ HOSP IP/OBS HIGH 50: CPT | Performed by: INTERNAL MEDICINE

## 2020-06-21 PROCEDURE — 6360000002 HC RX W HCPCS: Performed by: STUDENT IN AN ORGANIZED HEALTH CARE EDUCATION/TRAINING PROGRAM

## 2020-06-21 PROCEDURE — 94770 HC ETCO2 MONITOR DAILY: CPT

## 2020-06-21 PROCEDURE — 2580000003 HC RX 258: Performed by: STUDENT IN AN ORGANIZED HEALTH CARE EDUCATION/TRAINING PROGRAM

## 2020-06-21 PROCEDURE — 6370000000 HC RX 637 (ALT 250 FOR IP): Performed by: STUDENT IN AN ORGANIZED HEALTH CARE EDUCATION/TRAINING PROGRAM

## 2020-06-21 PROCEDURE — 80048 BASIC METABOLIC PNL TOTAL CA: CPT

## 2020-06-21 PROCEDURE — 85025 COMPLETE CBC W/AUTO DIFF WBC: CPT

## 2020-06-21 PROCEDURE — 82803 BLOOD GASES ANY COMBINATION: CPT

## 2020-06-21 PROCEDURE — 36415 COLL VENOUS BLD VENIPUNCTURE: CPT

## 2020-06-21 PROCEDURE — 99222 1ST HOSP IP/OBS MODERATE 55: CPT | Performed by: SURGERY

## 2020-06-21 PROCEDURE — 99232 SBSQ HOSP IP/OBS MODERATE 35: CPT | Performed by: NURSE PRACTITIONER

## 2020-06-21 PROCEDURE — 85730 THROMBOPLASTIN TIME PARTIAL: CPT

## 2020-06-21 PROCEDURE — 83550 IRON BINDING TEST: CPT

## 2020-06-21 PROCEDURE — 85045 AUTOMATED RETICULOCYTE COUNT: CPT

## 2020-06-21 PROCEDURE — 94003 VENT MGMT INPAT SUBQ DAY: CPT

## 2020-06-21 PROCEDURE — 6360000002 HC RX W HCPCS: Performed by: INTERNAL MEDICINE

## 2020-06-21 PROCEDURE — 82947 ASSAY GLUCOSE BLOOD QUANT: CPT

## 2020-06-21 PROCEDURE — 2000000000 HC ICU R&B

## 2020-06-21 PROCEDURE — 99291 CRITICAL CARE FIRST HOUR: CPT | Performed by: INTERNAL MEDICINE

## 2020-06-21 PROCEDURE — 2500000003 HC RX 250 WO HCPCS: Performed by: STUDENT IN AN ORGANIZED HEALTH CARE EDUCATION/TRAINING PROGRAM

## 2020-06-21 RX ORDER — HEPARIN SODIUM 1000 [USP'U]/ML
60 INJECTION, SOLUTION INTRAVENOUS; SUBCUTANEOUS PRN
Status: DISCONTINUED | OUTPATIENT
Start: 2020-06-21 | End: 2020-06-23

## 2020-06-21 RX ORDER — HEPARIN SODIUM 1000 [USP'U]/ML
30 INJECTION, SOLUTION INTRAVENOUS; SUBCUTANEOUS PRN
Status: DISCONTINUED | OUTPATIENT
Start: 2020-06-21 | End: 2020-06-23

## 2020-06-21 RX ORDER — PANTOPRAZOLE SODIUM 40 MG/1
40 TABLET, DELAYED RELEASE ORAL
Status: DISCONTINUED | OUTPATIENT
Start: 2020-06-21 | End: 2020-06-22

## 2020-06-21 RX ORDER — HEPARIN SODIUM 10000 [USP'U]/100ML
12 INJECTION, SOLUTION INTRAVENOUS CONTINUOUS
Status: DISCONTINUED | OUTPATIENT
Start: 2020-06-21 | End: 2020-06-23

## 2020-06-21 RX ORDER — HEPARIN SODIUM 1000 [USP'U]/ML
60 INJECTION, SOLUTION INTRAVENOUS; SUBCUTANEOUS ONCE
Status: COMPLETED | OUTPATIENT
Start: 2020-06-21 | End: 2020-06-21

## 2020-06-21 RX ORDER — LEVOTHYROXINE SODIUM 0.03 MG/1
25 TABLET ORAL DAILY
Status: DISCONTINUED | OUTPATIENT
Start: 2020-06-21 | End: 2020-06-23 | Stop reason: HOSPADM

## 2020-06-21 RX ADMIN — IPRATROPIUM BROMIDE AND ALBUTEROL SULFATE 1 AMPULE: .5; 3 SOLUTION RESPIRATORY (INHALATION) at 09:18

## 2020-06-21 RX ADMIN — HEPARIN SODIUM 1750 UNITS: 1000 INJECTION, SOLUTION INTRAVENOUS; SUBCUTANEOUS at 20:43

## 2020-06-21 RX ADMIN — FLUCONAZOLE 400 MG: 2 INJECTION, SOLUTION INTRAVENOUS at 18:19

## 2020-06-21 RX ADMIN — PROPOFOL INJECTABLE EMULSION 10 MCG/KG/MIN: 10 INJECTION, EMULSION INTRAVENOUS at 08:18

## 2020-06-21 RX ADMIN — IPRATROPIUM BROMIDE AND ALBUTEROL SULFATE 1 AMPULE: .5; 3 SOLUTION RESPIRATORY (INHALATION) at 12:25

## 2020-06-21 RX ADMIN — ALBUTEROL SULFATE 2.5 MG: 2.5 SOLUTION RESPIRATORY (INHALATION) at 03:13

## 2020-06-21 RX ADMIN — DILTIAZEM HYDROCHLORIDE 30 MG: 30 TABLET, FILM COATED ORAL at 05:40

## 2020-06-21 RX ADMIN — SODIUM CHLORIDE: 9 INJECTION, SOLUTION INTRAVENOUS at 05:53

## 2020-06-21 RX ADMIN — VANCOMYCIN HYDROCHLORIDE 1000 MG: 1 INJECTION, SOLUTION INTRAVENOUS at 10:55

## 2020-06-21 RX ADMIN — IPRATROPIUM BROMIDE AND ALBUTEROL SULFATE 1 AMPULE: .5; 3 SOLUTION RESPIRATORY (INHALATION) at 16:19

## 2020-06-21 RX ADMIN — LEVOTHYROXINE SODIUM 25 MCG: 25 TABLET ORAL at 15:43

## 2020-06-21 RX ADMIN — HEPARIN SODIUM 3490 UNITS: 1000 INJECTION, SOLUTION INTRAVENOUS; SUBCUTANEOUS at 06:38

## 2020-06-21 RX ADMIN — HEPARIN SODIUM 1750 UNITS: 1000 INJECTION, SOLUTION INTRAVENOUS; SUBCUTANEOUS at 15:33

## 2020-06-21 RX ADMIN — SODIUM CHLORIDE, PRESERVATIVE FREE 10 ML: 5 INJECTION INTRAVENOUS at 08:42

## 2020-06-21 RX ADMIN — FAMOTIDINE 20 MG: 10 INJECTION, SOLUTION INTRAVENOUS at 08:13

## 2020-06-21 RX ADMIN — IPRATROPIUM BROMIDE AND ALBUTEROL SULFATE 1 AMPULE: .5; 3 SOLUTION RESPIRATORY (INHALATION) at 19:42

## 2020-06-21 RX ADMIN — PANTOPRAZOLE SODIUM 40 MG: 40 TABLET, DELAYED RELEASE ORAL at 18:19

## 2020-06-21 RX ADMIN — SODIUM CHLORIDE, PRESERVATIVE FREE 10 ML: 5 INJECTION INTRAVENOUS at 20:36

## 2020-06-21 RX ADMIN — ALBUTEROL SULFATE 2.5 MG: 2.5 SOLUTION RESPIRATORY (INHALATION) at 23:04

## 2020-06-21 RX ADMIN — VANCOMYCIN HYDROCHLORIDE 1000 MG: 1 INJECTION, SOLUTION INTRAVENOUS at 22:02

## 2020-06-21 RX ADMIN — DILTIAZEM HYDROCHLORIDE 30 MG: 30 TABLET, FILM COATED ORAL at 22:02

## 2020-06-21 RX ADMIN — DILTIAZEM HYDROCHLORIDE 30 MG: 30 TABLET, FILM COATED ORAL at 15:43

## 2020-06-21 ASSESSMENT — PULMONARY FUNCTION TESTS
PIF_VALUE: 16
PIF_VALUE: 13
PIF_VALUE: 27
PIF_VALUE: 13
PIF_VALUE: 22
PIF_VALUE: 16
PIF_VALUE: 15
PIF_VALUE: 13
PIF_VALUE: 18
PIF_VALUE: 14
PIF_VALUE: 20
PIF_VALUE: 17
PIF_VALUE: 20
PIF_VALUE: 14
PIF_VALUE: 14
PIF_VALUE: 13
PIF_VALUE: 13
PIF_VALUE: 14

## 2020-06-21 ASSESSMENT — ENCOUNTER SYMPTOMS: ABDOMINAL PAIN: 0

## 2020-06-21 NOTE — CONSULTS
pericardial effusion, normal EF, positive PFO on bubble study. He was started on Cardizem drip for A. fib with RVR and subsequently converted to sinus rhythm. Past Medical History:   has a past medical history of COPD (chronic obstructive pulmonary disease) ([address] and ETOH abuse. Past Surgical History:   has a past surgical history that includes Cholecystectomy; Thyroidectomy; Tonsillectomy; and hc  picc powerpicc double (6/4/2020). Home Medications:    Prior to Admission medications    Medication Sig Start Date End Date Taking?  Authorizing Provider   enoxaparin (LOVENOX) 80 MG/0.8ML injection Inject 0.7 mLs into the skin 2 times daily 6/8/20 7/8/20  Alexis Rodríguez MD   albuterol (PROVENTIL) (2.5 MG/3ML) 0.083% nebulizer solution Take 3 mLs by nebulization every 6 hours as needed for Wheezing 6/8/20   Alexis Rodríguez MD   ipratropium-albuterol (DUONEB) 0.5-2.5 (3) MG/3ML SOLN nebulizer solution Inhale 3 mLs into the lungs three times daily 6/8/20 7/8/20  Alexis Rodríguez MD   folic acid (FOLVITE) 1 MG tablet Take 1 tablet by mouth daily 6/9/20   Alexis Rodríguez MD   polyethylene glycol (GLYCOLAX) 17 g packet Take 17 g by mouth daily as needed for Constipation 6/8/20 7/8/20  Alexis Rodríguez MD   vitamin B-1 100 MG tablet Take 1 tablet by mouth daily 6/9/20   Alexis Rodríguez MD   Multiple Vitamins-Minerals (CENTRUM/CERTA-ALYSSA WITH MINERALS ORAL) solution Take 15 mLs by mouth daily 6/9/20 7/9/20  Alexis Rodríguez MD   amphotericin B liposome (AMBISOME) 50 MG injection Infuse 250 mg intravenously daily for 27 days Stop after 7/2/20 and on 7/3/20  start fluconazole 400 mg po daily x 6 months - creat daily till 7/2 and LFT 2 x per week starting 7/3 6/5/20 7/2/20  Kanchan Pierre MD      Current Facility-Administered Medications: heparin (porcine) injection 3,490 Units, 60 Units/kg, Intravenous, PRN  heparin (porcine) injection 1,750 Units, 30 Units/kg, Intravenous, PRN  heparin 25,000 units in Normal sinus rhythm, nonspecific ST changes    ECHO: 6/20/20  Summary  Global left ventricular systolic function appears normal. Estimated ejection  fraction is 55 % . No shunt seen by color Doppler. Positive bubble study with bubbles seen crossing into the left side  indicating an interatrial septal shunt. Trivial pericardial effusion. Left pleural effusion. STRESS: n/a    CATH: n/a        IMPRESSION:    1. Acute hypoxic respiratory failure- status post multiple re-intubations, most recently 6/19/2020 -secondary to mucous plug/aspiration  2. Left-sided pleural effusion with possible pneumonia secondary to aspiration  3. A. fib with RVR-currently in sinus rhythm  4. PFO right to left shunting  5. 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 Hypothyroidism  8. COPD  9. Quadriplegia  10. CVA      RECOMMENDATIONS:  1. Patient is currently in sinus rhythm. Continue Cardizem 30 mg every 8 hours. Continue heparin drip for A. fib as well as DVT. 2. Obtain lower extremity duplex to rule out extending DVT  3. We will consider further work-up for PFO with right heart catheterization if family/patient agreeable for invasive testing. Given patient's bedridden status and morbidity will assess the need for further aggressive intervention. Thank you for allowing us to participate in [facility]. Will follow with you.       Electronically signed on 06/21/20 at 12:13 PM by:    Paul Smith MD   Fellow, [address]

## 2020-06-21 NOTE — PROGRESS NOTES
Sensitive to clindamycin  Imaging:    chest x-ray at [address] 6/18 is showing increased left pleural effusion with basilar lung volume loss. With persistent perihilar edema-\  When compared to the past, it is a mild effusion      I have personally reviewed the past medical history, past surgical history, medications, social history, and family history, and I haveupdated the database accordingly. Past Medical History:     Past Medical History:   Diagnosis Date    COPD (chronic obstructive pulmonary disease) ([address]     ETOH abuse        Past Surgical  History:     Past Surgical History:   Procedure Laterality Date    CHOLECYSTECTOMY      [facility]  PICC [facility] DOUBLE  6/4/2020         THYROIDECTOMY      TONSILLECTOMY         Medications:      potassium chloride  60 mEq Per NG tube BID    dilTIAZem  30 mg Oral 3 times per day    ipratropium-albuterol  1 ampule Inhalation 4x daily    albuterol  2.5 mg Nebulization BID    enoxaparin  1 mg/kg (Ideal) Subcutaneous BID    sodium chloride flush  10 mL Intravenous 2 times per day    famotidine (PEPCID) injection  20 mg Intravenous BID    vancomycin  1,000 mg Intravenous Q12H    vancomycin (VANCOCIN) intermittent dosing (placeholder)   Other RX Placeholder    fluconazole  400 mg Intravenous Q24H       Social History:     Social History     Socioeconomic History    Marital status: Single     Spouse name: Not on file    Number of children: Not on file    Years of education: Not on file    Highest education level: Not on file   Occupational History    Not on file   Social Needs    Financial resource strain: Not on file    Food insecurity     Worry: Not on file     Inability: Not on file    Transportation needs     Medical: Not on file     Non-medical: Not on file   Tobacco Use    Smoking status: Current Every Day Smoker     Packs/day: 2.00     Types: Cigarettes    Smokeless tobacco: Never Used   Substance and Sexual Activity    Alcohol use:  Yes Frequency: 4 or more times a week     Drinks per session: 3 or 4     Binge frequency: Patient refused     Comment: hasn't drank in 12 days, usually daily drinker (vodka)    Drug use: Never    Sexual activity: Not on file   Lifestyle    Physical activity     Days per week: Patient refused     Minutes per session: Patient refused    Stress: Not on file   Relationships    Social connections     Talks on phone: Not on file     Gets together: Not on file     Attends Confucianist service: Not on file     Active member of club or organization: Not on file     Attends meetings of clubs or organizations: Not on file     Relationship status: Not on file    Intimate partner violence     Fear of current or ex partner: No     Emotionally abused: No     Physically abused: No     Forced sexual activity: No   Other Topics Concern    Not on file   Social History Narrative    Not on file       Family History:     Family History   Problem Relation Age of Onset    Stroke Mother     Cancer Father         Allergies:   Sulfa antibiotics     Review of Systems:     Review of Systems   Unable to perform ROS: Intubated   Cardiovascular: Negative for leg swelling. Gastrointestinal: Negative for abdominal pain. Psychiatric/Behavioral: Negative for agitation. The patient is not nervous/anxious. Physical Examination :     Patient Vitals for the past 8 hrs:   BP Temp Temp src Pulse Resp SpO2   06/20/20 1900 111/60 -- -- 80 17 --   06/20/20 1824 -- -- -- 72 16 100 %   06/20/20 1800 117/73 -- -- 79 16 --   06/20/20 1700 113/61 -- -- 76 15 --   06/20/20 1600 120/70 99.1 °F (37.3 °C) Oral 84 18 96 %   06/20/20 1555 -- -- -- 71 16 96 %   06/20/20 1500 115/68 -- -- 82 22 --   06/20/20 1435 112/69 -- -- 81 25 --   06/20/20 1400 -- -- -- 81 24 95 %   06/20/20 1300 115/68 -- -- 90 18 99 %   06/20/20 1255 -- -- -- 85 16 99 %       Physical Exam  Constitutional:       General: She is not in acute distress. Appearance: Normal appearance.

## 2020-06-21 NOTE — PROGRESS NOTES
mouth daily 30 tablet 3    Multiple Vitamins-Minerals (CENTRUM/CERTA-ALYSSA WITH MINERALS ORAL) solution Take 15 mLs by mouth daily 450 mL 0    amphotericin B liposome (AMBISOME) 50 MG injection Infuse 250 mg intravenously daily for 27 days Stop after 7/2/20 and on 7/3/20  start fluconazole 400 mg po daily x 6 months - creat daily till 7/2 and LFT 2 x per week starting 7/3 6750 mg 0       Allergies: Carla Gonzalez is allergic to sulfa antibiotics. Past Medical History:   Diagnosis Date    COPD (chronic obstructive pulmonary disease) ([address]     ETOH abuse        Past Surgical History:   Procedure Laterality Date    [facility].  PICC [facility] DOUBLE  6/4/2020         THYROIDECTOMY      TONSILLECTOMY         Social History: Carla Gonzalez  reports that she has been smoking cigarettes. She has been smoking about 2.00 packs per day. She has never used smokeless tobacco. She reports current alcohol use. She reports that she does not use drugs.     Family History   Problem Relation Age of Onset    Stroke Mother     Cancer Father        Objective:   /61   Pulse 94   Temp 99.1 °F (37.3 °C) (Oral)   Resp 19   Ht 5' 7\" (1.702 m)   Wt 128 lb 4.9 oz (58.2 kg)   SpO2 98%   BMI 20.10 kg/m²     Blood pressure range: Systolic (73COV), GUD:467 , Min:98 , TWE:216   ; Diastolic (97RBI), LDC:60, Min:55, Max:73      Review of Systems:  Cannot complete due to patient condition                                            Limited Neuro Exam:  Patient remains intubated and sedated  Opens eyes with positive visual fixation; she does not track on command  +PERRL, + EOMs  She does not answer yes/no questions  No attempt at vocalization  She squeezes the right hand on command, does not squeeze the left to command  Nursing staff reports she wiggled toes to command a short time ago but does not do this for me  Toes mute      Data:    Lab Results:   CBC:   Recent Labs     06/19/20  0625 06/20/20  0514 06/20/20  1229

## 2020-06-21 NOTE — PLAN OF CARE
Problem: Falls - Risk of:  Goal: Will remain free from falls  Description: Will remain free from falls  6/20/2020 2317 by Alexandria Manriquez RN  Outcome: Ongoing  6/20/2020 1518 by Mouna Johnston RN  Outcome: Ongoing  Goal: Absence of physical injury  Description: Absence of physical injury  6/20/2020 2317 by Alexandria Manriquez RN  Outcome: Ongoing  6/20/2020 1518 by Mouna Johnston RN  Outcome: Ongoing     Problem: Discharge Planning:  Goal: Participates in care planning  Description: Participates in care planning  6/20/2020 2317 by Alexandria Manriquez RN  Outcome: Ongoing  6/20/2020 1518 by Mouna Johnston RN  Outcome: Ongoing  Goal: Discharged to appropriate level of care  Description: Discharged to appropriate level of care  6/20/2020 2317 by Alexandria Manriquez RN  Outcome: Ongoing  6/20/2020 1518 by Mouna Johnston RN  Outcome: Ongoing     Problem: Airway Clearance - Ineffective:  Goal: Ability to maintain a clear airway will improve  Description: Ability to maintain a clear airway will improve  6/20/2020 2317 by Alexandria Manriquez RN  Outcome: Ongoing  6/20/2020 1518 by Mouna Johnston RN  Outcome: Ongoing     Problem: Aspiration:  Goal: Absence of aspiration  Description: Absence of aspiration  6/20/2020 2317 by Alexandria Manriquez RN  Outcome: Ongoing  6/20/2020 1518 by Mouna Johnston RN  Outcome: Ongoing     Problem: Gas Exchange - Impaired:  Goal: Levels of oxygenation will improve  Description: Levels of oxygenation will improve  6/20/2020 2317 by Alexandria Manriquez RN  Outcome: Ongoing  6/20/2020 1518 by Mouna Johnston RN  Outcome: Ongoing     Problem: Mental Status - Impaired:  Goal: Mental status will be restored to baseline  Description: Mental status will be restored to baseline  6/20/2020 2317 by Alexandria Manriquez RN  Outcome: Ongoing  6/20/2020 1518 by Mouna Johnston RN  Outcome: Ongoing     Problem: Nutrition Deficit:  Goal: Ability to achieve adequate Sangita Esparza RN  Outcome: Ongoing  Goal: Patient will achieve/maintain normal respiratory rate/effort  Description: Respiratory rate and effort will be within normal limits for the patient  6/20/2020 2317 by Capo Billy RN  Outcome: Ongoing  6/20/2020 2241 by Denice Veronica RCP  Outcome: Ongoing  6/20/2020 1518 by Meghan Yuan RN  Outcome: Ongoing     Problem: MECHANICAL VENTILATION  Goal: Patient will maintain patent airway  6/20/2020 2317 by Capo Billy RN  Outcome: Ongoing  6/20/2020 2241 by Denice Veronica RCP  Outcome: Ongoing  6/20/2020 1518 by Meghan Yuan RN  Outcome: Ongoing  Goal: Oral health is maintained or improved  6/20/2020 2317 by Capo Billy RN  Outcome: Ongoing  6/20/2020 2241 by Denice Veronica RCP  Outcome: Ongoing  6/20/2020 1518 by Meghan Yuan RN  Outcome: Ongoing  Goal: ET tube will be managed safely  6/20/2020 2317 by Capo Bilyl RN  Outcome: Ongoing  6/20/2020 2241 by Dencie Veronica RCP  Outcome: Ongoing  6/20/2020 1518 by Meghan Yuan RN  Outcome: Ongoing  Goal: Ability to express needs and understand communication  6/20/2020 2317 by Capo Billy RN  Outcome: Ongoing  6/20/2020 2241 by Denice Veronica RCP  Outcome: Ongoing  6/20/2020 1518 by Meghan Yuan RN  Outcome: Ongoing  Goal: Mobility/activity is maintained at optimum level for patient  6/20/2020 2317 by Capo Billy RN  Outcome: Ongoing  6/20/2020 2241 by Denice Veronica RCP  Outcome: Ongoing  6/20/2020 1518 by Meghan Yuan RN  Outcome: Ongoing     Problem: Nutrition  Goal: Optimal nutrition therapy  6/20/2020 2317 by Capo Billy RN  Outcome: Ongoing  6/20/2020 1518 by Meghan Yuan RN  Outcome: Ongoing     Problem: Restraint Use - Nonviolent/Non-Self-Destructive Behavior:  Goal: Absence of restraint indications  Description: Absence of restraint indications  6/20/2020 2317 by Capo Billy RN  Outcome: Not Met This Shift  6/20/2020 1518 by Tan Vazquez, RN  Outcome: Not Met This Shift  Goal: Absence of restraint-related injury  Description: Absence of restraint-related injury  6/20/2020 2317 by Michael Guerra RN  Outcome: Not Met This Shift  6/20/2020 1518 by Tan Vazquez RN  Outcome: Not Met This Shift

## 2020-06-21 NOTE — ANESTHESIA PRE PROCEDURE
Department of Anesthesiology  Preprocedure Note       Name:  Keon Conte   Age:  62 y.o.  :  1960                                          MRN:  [id number]         Date:  2020      Surgeon: Samuel Sumner):  Minoo Garcia DO    Procedure: Procedure(s):  ADD-ON WANTS T/F SELF - GI NOTIFIED PEG AND TRACHEOTOMY WITH TUBE PLACEMENT    Medications prior to admission:   Prior to Admission medications    Medication Sig Start Date End Date Taking?  Authorizing Provider   enoxaparin (LOVENOX) 80 MG/0.8ML injection Inject 0.7 mLs into the skin 2 times daily 20  Shweta Cox MD   albuterol (PROVENTIL) (2.5 MG/3ML) 0.083% nebulizer solution Take 3 mLs by nebulization every 6 hours as needed for Wheezing 20   Shweta Cox MD   ipratropium-albuterol (DUONEB) 0.5-2.5 (3) MG/3ML SOLN nebulizer solution Inhale 3 mLs into the lungs three times daily 20  Shweta Cox MD   folic acid (FOLVITE) 1 MG tablet Take 1 tablet by mouth daily 20   Shweta Cox MD   polyethylene glycol (GLYCOLAX) 17 g packet Take 17 g by mouth daily as needed for Constipation 20  Shweta Cox MD   vitamin B-1 100 MG tablet Take 1 tablet by mouth daily 20   Shweta Cox MD   Multiple Vitamins-Minerals (CENTRUM/CERTA-ALYSSA WITH MINERALS ORAL) solution Take 15 mLs by mouth daily 20  Shweta Cox MD   amphotericin B liposome (AMBISOME) 50 MG injection Infuse 250 mg intravenously daily for 27 days Stop after 20 and on 7/3/20  start fluconazole 400 mg po daily x 6 months - creat daily till  and LFT 2 x per week starting 7/3 6/5/20 7/2/20  Elle Cox MD       Current medications:    Current Facility-Administered Medications   Medication Dose Route Frequency Provider Last Rate Last Dose    heparin (porcine) injection 3,490 Units  60 Units/kg Intravenous PRN Flori Vaughan MD        heparin (porcine) injection 1,750 Units  30 Units/kg Intravenous PRN Dilnoor Cathleen Hernandez MD   Stopped at 06/18/20 1651    metoprolol (LOPRESSOR) injection 5 mg  5 mg Intravenous Q6H PRN Abdias Guaman MD   5 mg at 06/19/20 1615    fluconazole (DIFLUCAN) in 0.9 % sodium chloride IVPB 400 mg  400 mg Intravenous Q24H Elidia Miller  mL/hr at 06/20/20 1752 400 mg at 06/20/20 1752    0.9 % sodium chloride infusion   Intravenous Continuous Flori Tabares MD 50 mL/hr at 06/21/20 0553         Allergies:     Allergies   Allergen Reactions    Sulfa Antibiotics        Problem List:    Patient Active Problem List   Diagnosis Code    Pneumonia of left lower lobe due to infectious organism ([facility] [address] [address] J18.1    Hyponatremia E87.1    Mucopurulent chronic bronchitis ([facility] [facility] J41.1    DIXON (acute kidney injury) ([facility] N17.9    Tobacco use disorder F17.200    Chronic obstructive pulmonary disease ([facility]) J44.9    Thrombocytopenia ([facility]) W63.1    Metabolic encephalopathy P56.92    DVT (deep venous thrombosis) ([facility]) I82.409    Encephalopathy G93.40    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    Meningoencephalitis G04.90    Acute respiratory failure with hypoxia and hypercapnia ([facility]) J96.01, J96.02    Pleural effusion on left J90    Quadriparesis ([facility]) G82.50    Protein-energy malnutrition ([facility]) E46    Fatty liver, alcoholic B99.9    CRP elevated R79.82    ESR raised R70.0    S/P thoracentesis Z98.890    Chronic alcohol abuse F10.10    Acute on chronic respiratory failure ([facility]) J96.20    History of recent stroke Z86.73    Atelectasis of left lung J98.11    Atrial fibrillation with RVR ([facility]) I48.91       Past Medical History:        Diagnosis Date    COPD (chronic obstructive pulmonary disease) ([facility] [address] [facility]     ETOH abuse        Past Surgical History:        Procedure Laterality Date    CHOLECYSTECTOMY      [facility]  PICC [address] DOUBLE  6/4/2020         THYROIDECTOMY      TONSILLECTOMY         Social History:    Social History     Tobacco Use    Smoking status: Current Every Day Smoker Packs/day: 2.00     Types: Cigarettes    Smokeless tobacco: Never Used   Substance Use Topics    Alcohol use: Yes     Frequency: 4 or more times a week     Drinks per session: 3 or 4     Binge frequency: Patient refused     Comment: hasn't drank in 12 days, usually daily drinker (vodka)                                Ready to quit: Not Answered  Counseling given: Not Answered      Vital Signs (Current):   Vitals:    06/21/20 0313 06/21/20 0400 06/21/20 0500 06/21/20 0600   BP:  (!) 103/55 (!) 98/57 108/61   Pulse: 107 88 90 94   Resp: 17 18 19 19   Temp:  37.3 °C (99.1 °F)     TempSrc:  Oral     SpO2:  96% 98% 98%   Weight:       Height:                                                  BP Readings from Last 3 Encounters:   06/21/20 108/61   06/08/20 (!) 155/108   05/14/20 107/76       NPO Status:                                                                                 BMI:   Wt Readings from Last 3 Encounters:   06/19/20 128 lb 4.9 oz (58.2 kg)   06/06/20 159 lb 13.3 oz (72.5 kg)   05/14/20 120 lb 9.6 oz (54.7 kg)     Body mass index is 20.1 kg/m².     CBC:   Lab Results   Component Value Date    WBC 7.6 06/21/2020    RBC 2.44 06/21/2020    HGB 7.5 06/21/2020    HCT 24.1 06/21/2020    MCV 98.8 06/21/2020    RDW 16.4 06/21/2020     06/21/2020       CMP:   Lab Results   Component Value Date     06/21/2020    K 3.8 06/21/2020     06/21/2020    CO2 24 06/21/2020    BUN 8 06/21/2020    CREATININE 0.28 06/21/2020    GFRAA >60 06/21/2020    LABGLOM >60 06/21/2020    GLUCOSE 111 06/21/2020    PROT 5.1 06/19/2020    CALCIUM 8.0 06/21/2020    BILITOT 0.17 06/19/2020    ALKPHOS 106 06/19/2020    AST 25 06/19/2020    ALT 32 06/19/2020       POC Tests:   Recent Labs     06/21/20  0539   POCGLU 107*       Coags:   Lab Results   Component Value Date    PROTIME 10.7 05/14/2020    INR 1.0 05/14/2020    APTT 26.8 06/21/2020       HCG (If Applicable): No results found for: PREGTESTUR, PREGSERUM, HCG, CRNA.              Kika Carter, JAVY - CRNA   6/21/2020

## 2020-06-21 NOTE — PLAN OF CARE
Problem: OXYGENATION/RESPIRATORY FUNCTION  Goal: Patient will maintain patent airway  6/20/2020 2241 by Ron Clemons RCP  Outcome: Ongoing     Problem: OXYGENATION/RESPIRATORY FUNCTION  Goal: Patient will achieve/maintain normal respiratory rate/effort  Description: Respiratory rate and effort will be within normal limits for the patient  6/20/2020 2241 by Ron Clemons RCP  Outcome: Ongoing     Problem: MECHANICAL VENTILATION  Goal: Patient will maintain patent airway  6/20/2020 2241 by Ron Clemons RCP  Outcome: Ongoing     Problem: MECHANICAL VENTILATION  Goal: Oral health is maintained or improved  6/20/2020 2241 by Ron Clemons RCP  Outcome: Ongoing     Problem: MECHANICAL VENTILATION  Goal: ET tube will be managed safely  6/20/2020 2241 by Ron Clemons RCP  Outcome: Ongoing     Problem: MECHANICAL VENTILATION  Goal: Ability to express needs and understand communication  6/20/2020 2241 by Ron Clemons RCP  Outcome: Ongoing     Problem: MECHANICAL VENTILATION  Goal: Mobility/activity is maintained at optimum level for patient  6/20/2020 2241 by Ron Clemons RCP  Outcome: Ongoing

## 2020-06-22 ENCOUNTER — ANESTHESIA (OUTPATIENT)
Dept: OPERATING ROOM | Age: 60
DRG: 005 | End: 2020-06-22
Payer: MEDICARE

## 2020-06-22 VITALS
OXYGEN SATURATION: 100 % | SYSTOLIC BLOOD PRESSURE: 108 MMHG | RESPIRATION RATE: 8 BRPM | DIASTOLIC BLOOD PRESSURE: 69 MMHG

## 2020-06-22 LAB
-: NORMAL
ABSOLUTE EOS #: 0.16 K/UL (ref 0–0.44)
ABSOLUTE IMMATURE GRANULOCYTE: 0.13 K/UL (ref 0–0.3)
ABSOLUTE LYMPH #: 2.62 K/UL (ref 1.1–3.7)
ABSOLUTE MONO #: 0.85 K/UL (ref 0.1–1.2)
ALLEN TEST: ABNORMAL
ANION GAP SERPL CALCULATED.3IONS-SCNC: 12 MMOL/L (ref 9–17)
BASOPHILS # BLD: 1 % (ref 0–2)
BASOPHILS ABSOLUTE: 0.07 K/UL (ref 0–0.2)
BUN BLDV-MCNC: 8 MG/DL (ref 6–20)
BUN/CREAT BLD: ABNORMAL (ref 9–20)
CALCIUM SERPL-MCNC: 8.3 MG/DL (ref 8.6–10.4)
CASE NUMBER:: NORMAL
CASE NUMBER:: NORMAL
CHLORIDE BLD-SCNC: 102 MMOL/L (ref 98–107)
CO2: 26 MMOL/L (ref 20–31)
CREAT SERPL-MCNC: 0.26 MG/DL (ref 0.5–0.9)
DIFFERENTIAL TYPE: ABNORMAL
EOSINOPHILS RELATIVE PERCENT: 2 % (ref 1–4)
FIO2: 30
GFR AFRICAN AMERICAN: >60 ML/MIN
GFR NON-AFRICAN AMERICAN: >60 ML/MIN
GFR SERPL CREATININE-BSD FRML MDRD: ABNORMAL ML/MIN/{1.73_M2}
GFR SERPL CREATININE-BSD FRML MDRD: ABNORMAL ML/MIN/{1.73_M2}
GLUCOSE BLD-MCNC: 108 MG/DL (ref 65–105)
GLUCOSE BLD-MCNC: 111 MG/DL (ref 65–105)
GLUCOSE BLD-MCNC: 119 MG/DL (ref 70–99)
HCT VFR BLD CALC: 26 % (ref 36.3–47.1)
HEMOGLOBIN: 8 G/DL (ref 11.9–15.1)
IMMATURE GRANULOCYTES: 1 %
LD, CSF: 32 U/L
LYMPHOCYTES # BLD: 27 % (ref 24–43)
MAGNESIUM: 1.8 MG/DL (ref 1.6–2.6)
MCH RBC QN AUTO: 30.5 PG (ref 25.2–33.5)
MCHC RBC AUTO-ENTMCNC: 30.8 G/DL (ref 28.4–34.8)
MCV RBC AUTO: 99.2 FL (ref 82.6–102.9)
MODE: ABNORMAL
MONOCYTES # BLD: 9 % (ref 3–12)
NEGATIVE BASE EXCESS, ART: ABNORMAL (ref 0–2)
NRBC AUTOMATED: 0 PER 100 WBC
O2 DEVICE/FLOW/%: ABNORMAL
PARTIAL THROMBOPLASTIN TIME: 23.6 SEC (ref 20.5–30.5)
PARTIAL THROMBOPLASTIN TIME: 39.7 SEC (ref 20.5–30.5)
PATIENT TEMP: ABNORMAL
PDW BLD-RTO: 16.5 % (ref 11.8–14.4)
PLATELET # BLD: 546 K/UL (ref 138–453)
PLATELET ESTIMATE: ABNORMAL
PMV BLD AUTO: 9.8 FL (ref 8.1–13.5)
POC HCO3: 30.2 MMOL/L (ref 21–28)
POC O2 SATURATION: 98 % (ref 94–98)
POC PCO2 TEMP: ABNORMAL MM HG
POC PCO2: 42.9 MM HG (ref 35–48)
POC PH TEMP: ABNORMAL
POC PH: 7.46 (ref 7.35–7.45)
POC PO2 TEMP: ABNORMAL MM HG
POC PO2: 92.6 MM HG (ref 83–108)
POSITIVE BASE EXCESS, ART: 6 (ref 0–3)
POTASSIUM SERPL-SCNC: 3.3 MMOL/L (ref 3.7–5.3)
POTASSIUM SERPL-SCNC: 3.3 MMOL/L (ref 3.7–5.3)
RBC # BLD: 2.62 M/UL (ref 3.95–5.11)
RBC # BLD: ABNORMAL 10*6/UL
REASON FOR REJECTION: NORMAL
SAMPLE SITE: ABNORMAL
SEG NEUTROPHILS: 60 % (ref 36–65)
SEGMENTED NEUTROPHILS ABSOLUTE COUNT: 5.92 K/UL (ref 1.5–8.1)
SODIUM BLD-SCNC: 140 MMOL/L (ref 135–144)
SOURCE: NORMAL
SPECIMEN DESCRIPTION: NORMAL
SPECIMEN DESCRIPTION: NORMAL
SURGICAL PATHOLOGY REPORT: NORMAL
TCO2 (CALC), ART: 32 MMOL/L (ref 22–29)
TRIGL SERPL-MCNC: 138 MG/DL
WBC # BLD: 9.8 K/UL (ref 3.5–11.3)
WBC # BLD: ABNORMAL 10*3/UL
ZZ NTE CLEAN UP: ORDERED TEST: NORMAL
ZZ NTE WITH NAME CLEAN UP: SPECIMEN SOURCE: NORMAL

## 2020-06-22 PROCEDURE — 82803 BLOOD GASES ANY COMBINATION: CPT

## 2020-06-22 PROCEDURE — 6370000000 HC RX 637 (ALT 250 FOR IP): Performed by: STUDENT IN AN ORGANIZED HEALTH CARE EDUCATION/TRAINING PROGRAM

## 2020-06-22 PROCEDURE — 6360000002 HC RX W HCPCS: Performed by: STUDENT IN AN ORGANIZED HEALTH CARE EDUCATION/TRAINING PROGRAM

## 2020-06-22 PROCEDURE — 84478 ASSAY OF TRIGLYCERIDES: CPT

## 2020-06-22 PROCEDURE — 6360000002 HC RX W HCPCS: Performed by: NURSE ANESTHETIST, CERTIFIED REGISTERED

## 2020-06-22 PROCEDURE — 2709999900 HC NON-CHARGEABLE SUPPLY: Performed by: SURGERY

## 2020-06-22 PROCEDURE — 43246 EGD PLACE GASTROSTOMY TUBE: CPT | Performed by: SURGERY

## 2020-06-22 PROCEDURE — 2580000003 HC RX 258: Performed by: STUDENT IN AN ORGANIZED HEALTH CARE EDUCATION/TRAINING PROGRAM

## 2020-06-22 PROCEDURE — 3700000000 HC ANESTHESIA ATTENDED CARE: Performed by: SURGERY

## 2020-06-22 PROCEDURE — 84132 ASSAY OF SERUM POTASSIUM: CPT

## 2020-06-22 PROCEDURE — 94003 VENT MGMT INPAT SUBQ DAY: CPT

## 2020-06-22 PROCEDURE — 93971 EXTREMITY STUDY: CPT

## 2020-06-22 PROCEDURE — 0DH63UZ INSERTION OF FEEDING DEVICE INTO STOMACH, PERCUTANEOUS APPROACH: ICD-10-PCS | Performed by: SURGERY

## 2020-06-22 PROCEDURE — 94770 HC ETCO2 MONITOR DAILY: CPT

## 2020-06-22 PROCEDURE — 83735 ASSAY OF MAGNESIUM: CPT

## 2020-06-22 PROCEDURE — 85025 COMPLETE CBC W/AUTO DIFF WBC: CPT

## 2020-06-22 PROCEDURE — 82947 ASSAY GLUCOSE BLOOD QUANT: CPT

## 2020-06-22 PROCEDURE — 85730 THROMBOPLASTIN TIME PARTIAL: CPT

## 2020-06-22 PROCEDURE — 99233 SBSQ HOSP IP/OBS HIGH 50: CPT | Performed by: PSYCHIATRY & NEUROLOGY

## 2020-06-22 PROCEDURE — 2000000000 HC ICU R&B

## 2020-06-22 PROCEDURE — 2700000000 HC OXYGEN THERAPY PER DAY

## 2020-06-22 PROCEDURE — 3600000004 HC SURGERY LEVEL 4 BASE: Performed by: SURGERY

## 2020-06-22 PROCEDURE — 3600000014 HC SURGERY LEVEL 4 ADDTL 15MIN: Performed by: SURGERY

## 2020-06-22 PROCEDURE — 36600 WITHDRAWAL OF ARTERIAL BLOOD: CPT

## 2020-06-22 PROCEDURE — 99213 OFFICE O/P EST LOW 20 MIN: CPT

## 2020-06-22 PROCEDURE — 80048 BASIC METABOLIC PNL TOTAL CA: CPT

## 2020-06-22 PROCEDURE — 3700000001 HC ADD 15 MINUTES (ANESTHESIA): Performed by: SURGERY

## 2020-06-22 PROCEDURE — 31600 PLANNED TRACHEOSTOMY: CPT | Performed by: SURGERY

## 2020-06-22 PROCEDURE — 94640 AIRWAY INHALATION TREATMENT: CPT

## 2020-06-22 PROCEDURE — 94761 N-INVAS EAR/PLS OXIMETRY MLT: CPT

## 2020-06-22 PROCEDURE — 99232 SBSQ HOSP IP/OBS MODERATE 35: CPT | Performed by: INTERNAL MEDICINE

## 2020-06-22 PROCEDURE — APPNB30 APP NON BILLABLE TIME 0-30 MINS: Performed by: NURSE PRACTITIONER

## 2020-06-22 PROCEDURE — 0B110F4 BYPASS TRACHEA TO CUTANEOUS WITH TRACHEOSTOMY DEVICE, OPEN APPROACH: ICD-10-PCS | Performed by: SURGERY

## 2020-06-22 PROCEDURE — 2500000003 HC RX 250 WO HCPCS: Performed by: NURSE ANESTHETIST, CERTIFIED REGISTERED

## 2020-06-22 PROCEDURE — 43762 RPLC GTUBE NO REVJ TRC: CPT

## 2020-06-22 PROCEDURE — 2580000003 HC RX 258: Performed by: NURSE ANESTHETIST, CERTIFIED REGISTERED

## 2020-06-22 PROCEDURE — 99291 CRITICAL CARE FIRST HOUR: CPT | Performed by: INTERNAL MEDICINE

## 2020-06-22 PROCEDURE — 36415 COLL VENOUS BLD VENIPUNCTURE: CPT

## 2020-06-22 RX ORDER — PANTOPRAZOLE SODIUM 40 MG/1
40 TABLET, DELAYED RELEASE ORAL
Status: DISCONTINUED | OUTPATIENT
Start: 2020-06-23 | End: 2020-06-23 | Stop reason: HOSPADM

## 2020-06-22 RX ORDER — CEFAZOLIN SODIUM 2 G/50ML
SOLUTION INTRAVENOUS PRN
Status: DISCONTINUED | OUTPATIENT
Start: 2020-06-22 | End: 2020-06-22 | Stop reason: SDUPTHER

## 2020-06-22 RX ORDER — PROPOFOL 10 MG/ML
INJECTION, EMULSION INTRAVENOUS CONTINUOUS PRN
Status: DISCONTINUED | OUTPATIENT
Start: 2020-06-22 | End: 2020-06-22 | Stop reason: SDUPTHER

## 2020-06-22 RX ORDER — POLYETHYLENE GLYCOL 3350 17 G/17G
17 POWDER, FOR SOLUTION ORAL DAILY
Status: DISCONTINUED | OUTPATIENT
Start: 2020-06-22 | End: 2020-06-23 | Stop reason: HOSPADM

## 2020-06-22 RX ORDER — MAGNESIUM SULFATE 1 G/100ML
1 INJECTION INTRAVENOUS
Status: COMPLETED | OUTPATIENT
Start: 2020-06-22 | End: 2020-06-22

## 2020-06-22 RX ORDER — ROCURONIUM BROMIDE 10 MG/ML
INJECTION, SOLUTION INTRAVENOUS PRN
Status: DISCONTINUED | OUTPATIENT
Start: 2020-06-22 | End: 2020-06-22 | Stop reason: SDUPTHER

## 2020-06-22 RX ORDER — SODIUM CHLORIDE 9 MG/ML
INJECTION, SOLUTION INTRAVENOUS CONTINUOUS PRN
Status: DISCONTINUED | OUTPATIENT
Start: 2020-06-22 | End: 2020-06-22 | Stop reason: SDUPTHER

## 2020-06-22 RX ADMIN — DILTIAZEM HYDROCHLORIDE 30 MG: 30 TABLET, FILM COATED ORAL at 22:17

## 2020-06-22 RX ADMIN — ROCURONIUM BROMIDE 50 MG: 10 INJECTION INTRAVENOUS at 14:57

## 2020-06-22 RX ADMIN — FLUCONAZOLE 400 MG: 2 INJECTION, SOLUTION INTRAVENOUS at 18:46

## 2020-06-22 RX ADMIN — DILTIAZEM HYDROCHLORIDE 30 MG: 30 TABLET, FILM COATED ORAL at 06:13

## 2020-06-22 RX ADMIN — VANCOMYCIN HYDROCHLORIDE 1000 MG: 1 INJECTION, SOLUTION INTRAVENOUS at 22:17

## 2020-06-22 RX ADMIN — PROPOFOL 10 MCG/KG/MIN: 10 INJECTION, EMULSION INTRAVENOUS at 14:56

## 2020-06-22 RX ADMIN — PANTOPRAZOLE SODIUM 40 MG: 40 TABLET, DELAYED RELEASE ORAL at 06:13

## 2020-06-22 RX ADMIN — VANCOMYCIN HYDROCHLORIDE 1000 MG: 1 INJECTION, SOLUTION INTRAVENOUS at 10:12

## 2020-06-22 RX ADMIN — LEVOTHYROXINE SODIUM 25 MCG: 25 TABLET ORAL at 06:13

## 2020-06-22 RX ADMIN — FENTANYL CITRATE 50 MCG: 50 INJECTION, SOLUTION INTRAMUSCULAR; INTRAVENOUS at 17:03

## 2020-06-22 RX ADMIN — MAGNESIUM SULFATE HEPTAHYDRATE 1 G: 1 INJECTION, SOLUTION INTRAVENOUS at 10:28

## 2020-06-22 RX ADMIN — SODIUM CHLORIDE: 9 INJECTION, SOLUTION INTRAVENOUS at 14:56

## 2020-06-22 RX ADMIN — MAGNESIUM SULFATE HEPTAHYDRATE 1 G: 1 INJECTION, SOLUTION INTRAVENOUS at 12:10

## 2020-06-22 RX ADMIN — PROPOFOL INJECTABLE EMULSION 10 MCG/KG/MIN: 10 INJECTION, EMULSION INTRAVENOUS at 13:30

## 2020-06-22 RX ADMIN — ROCURONIUM BROMIDE 25 MG: 10 INJECTION INTRAVENOUS at 15:49

## 2020-06-22 RX ADMIN — POTASSIUM CHLORIDE 10 MEQ: 10 INJECTION, SOLUTION INTRAVENOUS at 06:39

## 2020-06-22 RX ADMIN — ROCURONIUM BROMIDE 50 MG: 10 INJECTION INTRAVENOUS at 15:16

## 2020-06-22 RX ADMIN — POTASSIUM CHLORIDE 10 MEQ: 10 INJECTION, SOLUTION INTRAVENOUS at 06:36

## 2020-06-22 RX ADMIN — CEFAZOLIN SODIUM 2 G: 2 SOLUTION INTRAVENOUS at 15:14

## 2020-06-22 RX ADMIN — FENTANYL CITRATE 50 MCG: 50 INJECTION, SOLUTION INTRAMUSCULAR; INTRAVENOUS at 18:46

## 2020-06-22 RX ADMIN — IPRATROPIUM BROMIDE AND ALBUTEROL SULFATE 1 AMPULE: .5; 3 SOLUTION RESPIRATORY (INHALATION) at 11:46

## 2020-06-22 RX ADMIN — SODIUM CHLORIDE, PRESERVATIVE FREE 10 ML: 5 INJECTION INTRAVENOUS at 20:25

## 2020-06-22 RX ADMIN — HEPARIN SODIUM 1750 UNITS: 1000 INJECTION, SOLUTION INTRAVENOUS; SUBCUTANEOUS at 03:20

## 2020-06-22 RX ADMIN — HEPARIN SODIUM AND DEXTROSE 20 UNITS/KG/HR: 10000; 5 INJECTION INTRAVENOUS at 01:35

## 2020-06-22 RX ADMIN — ALBUTEROL SULFATE 2.5 MG: 2.5 SOLUTION RESPIRATORY (INHALATION) at 23:59

## 2020-06-22 RX ADMIN — SODIUM CHLORIDE: 9 INJECTION, SOLUTION INTRAVENOUS at 15:58

## 2020-06-22 RX ADMIN — IPRATROPIUM BROMIDE AND ALBUTEROL SULFATE 1 AMPULE: .5; 3 SOLUTION RESPIRATORY (INHALATION) at 08:29

## 2020-06-22 RX ADMIN — ALBUTEROL SULFATE 2.5 MG: 2.5 SOLUTION RESPIRATORY (INHALATION) at 03:08

## 2020-06-22 RX ADMIN — POTASSIUM CHLORIDE 10 MEQ: 10 INJECTION, SOLUTION INTRAVENOUS at 06:37

## 2020-06-22 RX ADMIN — POTASSIUM CHLORIDE 10 MEQ: 10 INJECTION, SOLUTION INTRAVENOUS at 06:35

## 2020-06-22 RX ADMIN — IPRATROPIUM BROMIDE AND ALBUTEROL SULFATE 1 AMPULE: .5; 3 SOLUTION RESPIRATORY (INHALATION) at 20:30

## 2020-06-22 RX ADMIN — PHENYLEPHRINE HYDROCHLORIDE 150 MCG: 10 INJECTION INTRAVENOUS at 15:57

## 2020-06-22 RX ADMIN — IPRATROPIUM BROMIDE AND ALBUTEROL SULFATE 1 AMPULE: .5; 3 SOLUTION RESPIRATORY (INHALATION) at 16:51

## 2020-06-22 RX ADMIN — SODIUM CHLORIDE, PRESERVATIVE FREE 10 ML: 5 INJECTION INTRAVENOUS at 10:12

## 2020-06-22 RX ADMIN — SODIUM CHLORIDE: 9 INJECTION, SOLUTION INTRAVENOUS at 05:36

## 2020-06-22 ASSESSMENT — PULMONARY FUNCTION TESTS
PIF_VALUE: 15
PIF_VALUE: 13
PIF_VALUE: 15
PIF_VALUE: 13
PIF_VALUE: 14
PIF_VALUE: 13
PIF_VALUE: 16
PIF_VALUE: 15
PIF_VALUE: 13
PIF_VALUE: 24
PIF_VALUE: 16
PIF_VALUE: 13
PIF_VALUE: 13
PIF_VALUE: 15
PIF_VALUE: 13
PIF_VALUE: 24
PIF_VALUE: 15
PIF_VALUE: 13
PIF_VALUE: 14
PIF_VALUE: 15
PIF_VALUE: 14
PIF_VALUE: 13
PIF_VALUE: 19
PIF_VALUE: 15
PIF_VALUE: 0
PIF_VALUE: 15
PIF_VALUE: 34
PIF_VALUE: 13
PIF_VALUE: 14
PIF_VALUE: 15
PIF_VALUE: 15
PIF_VALUE: 13
PIF_VALUE: 7
PIF_VALUE: 16
PIF_VALUE: 14
PIF_VALUE: 0
PIF_VALUE: 13
PIF_VALUE: 14
PIF_VALUE: 0
PIF_VALUE: 0
PIF_VALUE: 13
PIF_VALUE: 14
PIF_VALUE: 15
PIF_VALUE: 0
PIF_VALUE: 16
PIF_VALUE: 15
PIF_VALUE: 16
PIF_VALUE: 15
PIF_VALUE: 15
PIF_VALUE: 13
PIF_VALUE: 15
PIF_VALUE: 13
PIF_VALUE: 0
PIF_VALUE: 15
PIF_VALUE: 0
PIF_VALUE: 16
PIF_VALUE: 13
PIF_VALUE: 15
PIF_VALUE: 13
PIF_VALUE: 16
PIF_VALUE: 15
PIF_VALUE: 15
PIF_VALUE: 14
PIF_VALUE: 4
PIF_VALUE: 18
PIF_VALUE: 15
PIF_VALUE: 56
PIF_VALUE: 14
PIF_VALUE: 13
PIF_VALUE: 21
PIF_VALUE: 0
PIF_VALUE: 13
PIF_VALUE: 15
PIF_VALUE: 16
PIF_VALUE: 14
PIF_VALUE: 13
PIF_VALUE: 15
PIF_VALUE: 13
PIF_VALUE: 17
PIF_VALUE: 17
PIF_VALUE: 14
PIF_VALUE: 15
PIF_VALUE: 14
PIF_VALUE: 16
PIF_VALUE: 3
PIF_VALUE: 13
PIF_VALUE: 15
PIF_VALUE: 14
PIF_VALUE: 15
PIF_VALUE: 13

## 2020-06-22 ASSESSMENT — PAIN SCALES - GENERAL
PAINLEVEL_OUTOF10: 0
PAINLEVEL_OUTOF10: 0

## 2020-06-22 ASSESSMENT — ENCOUNTER SYMPTOMS: ABDOMINAL PAIN: 0

## 2020-06-22 NOTE — PROGRESS NOTES
NEUROLOGY INPATIENT PROGRESS NOTE    6/22/2020         Current Exam:     Chart reviewed. Discussed with RN. Plan for trach/PEG today. RN reports patient has been able to nod to some yes/no questions, follow commands to all 4 limbs with more movement in the arms vs the legs. Brief History:    Maria Isabel Nascimento is a  62 y.o. 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 The patient previously completed her course of amphotericin B and flucytosine as per ID during her recent hospitalization. She was also placed on vancomycin on 6/15 due to MRSA in her sputum culture. Neurology was consulted due to [address]. The patient was intubated and admitted to MICU. She went into new onset A. fib that has converted to NSR and remains off Cardizem drip. She was on [facility] with Lovenox but this was changed to a heparin drip pending her trach/PEG procedure. She had a recent echo done but did not include bubble study; echo with bubble was completed this admission showing positive PFO. The patient does have BLE venous Dopplers done on 5/18/2020 showing an acute right DVT. Cardiology is following and patient will remain anticoagulated for her A fib so no need for PFO closure. No current facility-administered medications on file prior to encounter.       Current Outpatient Medications on File Prior to Encounter   Medication Sig Dispense Refill    enoxaparin (LOVENOX) 80 MG/0.8ML injection Inject 0.7 mLs into the skin 2 times daily 42 mL 1    albuterol (PROVENTIL) (2.5 MG/3ML) 0.083% nebulizer solution Take 3 mLs by nebulization every 6 hours as needed for Wheezing 120 each 3    ipratropium-albuterol (DUONEB) 0.5-2.5 (3) MG/3ML SOLN nebulizer solution Inhale 3 mLs into the lungs three times daily 567 mL 1    folic acid (FOLVITE) 1 MG tablet Take 1 tablet by mouth daily 30 tablet 3    polyethylene glycol (GLYCOLAX) 17 g packet Take 17 g by mouth daily as needed for Constipation 527 g 1    vitamin B-1 100 MG tablet Take 1 tablet by mouth daily 30 tablet 3    Multiple Vitamins-Minerals (CENTRUM/CERTA-ALYSSA WITH MINERALS ORAL) solution Take 15 mLs by mouth daily 450 mL 0    amphotericin B liposome (AMBISOME) 50 MG injection Infuse 250 mg intravenously daily for 27 days Stop after 7/2/20 and on 7/3/20  start fluconazole 400 mg po daily x 6 months - creat daily till 7/2 and LFT 2 x per week starting 7/3 6750 mg 0       Allergies: Juan J Ramos is allergic to sulfa antibiotics. Past Medical History:   Diagnosis Date    COPD (chronic obstructive pulmonary disease) ([facility] [address]     ETOH abuse        Past Surgical History:   Procedure Laterality Date    [facility]  [facility]OUBLE  6/4/2020         THYROIDECTOMY      TONSILLECTOMY         Social History: Juan J Ramos  reports that she has been smoking cigarettes. She has been smoking about 2.00 packs per day. She has never used smokeless tobacco. She reports current alcohol use. She reports that she does not use drugs.     Family History   Problem Relation Age of Onset    Stroke Mother     Cancer Father        Objective:   /65   Pulse 73   Temp 99.3 °F (37.4 °C) (Oral)   Resp 16   Ht 5' 7\" (1.702 m)   Wt 139 lb 5.3 oz (63.2 kg)   SpO2 100%   BMI 21.82 kg/m²     Blood pressure range: Systolic (28ALP), KXB:229 , Min:93 , MSE:849   ; Diastolic (99QAB), NRI:07, Min:57, Max:84      Review of Systems:  Cannot complete due to patient condition                                            Limited Neuro Exam:  Patient remains intubated and sedated  Opens eyes with positive visual fixation; she does track writer   +PERRL, + EOMs  Nods yes to her name but does not answer any further yes/no

## 2020-06-22 NOTE — PROGRESS NOTES
Calories: Propofol currently at 3.5 ml/hr=92 kcal/day   · Anthropometric Measures:  · Ht: 5' 7\" (170.2 cm)   · Current Body Wt: 139 lb 5.3 oz (63.2 kg)  · Admission Body Wt: 137 lb 2 oz (62.2 kg)  · Usual Body Wt: unknown. Noted bed scale wts of 120 lb on 5/13/20 and 159 lb on 5/14/20, per EHR   · Ideal Body Wt: 135 lb (61.2 kg), % Ideal Body 101% (adm/ideal)   · BMI Classification: BMI 18.5 - 24.9 Normal Weight    Nutrition Interventions:   Restart TF as able- suggest goal rate of 55 mL/hr if propofol remains below 5 mL/hr.    Continued Inpatient Monitoring, Education Not Indicated    Nutrition Evaluation:   · Evaluation: Progressing toward goals   · Goals: meet % of estimated nutrition needs    · Monitoring: TF Intake, TF Tolerance, Weight, Pertinent Labs      Electronically signed by Chayo Martinez RD, LD on 6/22/20 at 12:47 PM EDT    Contact Number: [phone]

## 2020-06-22 NOTE — PROGRESS NOTES
antigen was positive at admission 1/4 low titer and ultimately became negative before discharge to [facility]  The CSF antigen and the CSF cryptococcus PCR were all negative repeatedly  She had a fever and sepsis that only responded to antifungal therapy  She was started on Abelcet plan to be tentatively 6 weeks until 7/2, and flucytosine for 2 weeks until 6/9    Before discharge she had a left pleural effusion and had a thoracentesis that showed no signs of active infection. The patient was discharged to [facility], she was starting to sit on the bedside, though very weak, able to answer questions and respond sharply. She was still doing physical therapy. In [facility] she collapsed her left lung, ultimately developed A. fib, so they sent her back to [facility] where sputum culture grew MRSA, she was started on vancomycin  Also [facility], the antifungals were switched on 6/11 to Diflucan    Since admission she is lethargic on the BiPAP, still in A fib, 120 heart rate. At this time chest x-ray at [facility] 6/18 is showing increased left pleural effusion with basilar lung volume loss. With persistent perihilar edema  She has been tested for COVID      Interval changes  6/21/2020   Remains on the ventilator watching TV, alert following with her eyes, trying to respond with her mouth, very weak on both upper and lower extremities. Bronchoscopy is positive for MRSA  Setting of vent 30% FiO2 and 5 of PEEP      The patient had 6/19 a bronchoscopy for the left lower lobe atelectasis or consolidation. Mucous plug of the lingula, left upper lobe and left lower lobe  Bronchoscopy showing MRSA    6/19/20 Chest left basilar opacification and effusion improved aeration of the left upper lobe.       Summary of relevant labs:  Labs:  WBC 26 -14 - 11 7.6  Micro:  Sp cx MRSA Sensitive to clindamycin  Blood culture 6/19 pending  Imaging:    chest x-ray at [facility] 6/18 is showing increased left pleural effusion with basilar lung volume loss. With persistent perihilar edema-\  When compared to the past, it is a mild effusion      I have personally reviewed the past medical history, past surgical history, medications, social history, and family history, and I haveupdated the database accordingly.   Past Medical History:     Past Medical History:   Diagnosis Date    COPD (chronic obstructive pulmonary disease) ([facility] [address]     ETOH abuse        Past Surgical  History:     Past Surgical History:   Procedure Laterality Date    CHOLECYSTECTOMY      [facility] DOUBLE  6/4/2020         THYROIDECTOMY      TONSILLECTOMY         Medications:      pantoprazole  40 mg Oral BID AC    levothyroxine  25 mcg Oral Daily    dilTIAZem  30 mg Oral 3 times per day    ipratropium-albuterol  1 ampule Inhalation 4x daily    albuterol  2.5 mg Nebulization BID    sodium chloride flush  10 mL Intravenous 2 times per day    vancomycin  1,000 mg Intravenous Q12H    vancomycin (VANCOCIN) intermittent dosing (placeholder)   Other RX Placeholder    fluconazole  400 mg Intravenous Q24H       Social History:     Social History     Socioeconomic History    Marital status: Single     Spouse name: Not on file    Number of children: Not on file    Years of education: Not on file    Highest education level: Not on file   Occupational History    Not on file   Social Needs    Financial resource strain: Not on file    Food insecurity     Worry: Not on file     Inability: Not on file    Transportation needs     Medical: Not on file     Non-medical: Not on file   Tobacco Use    Smoking status: Current Every Day Smoker     Packs/day: 2.00     Types: Cigarettes    Smokeless tobacco: Never Used   Substance and Sexual Activity    Alcohol use: Yes     Frequency: 4 or more times a week     Drinks per session: 3 or 4     Binge frequency: Patient refused     Comment: hasn't drank in 12 days, usually daily drinker (vodka)    Drug use: Never    Sexual activity: Not

## 2020-06-22 NOTE — ANESTHESIA POSTPROCEDURE EVALUATION
Department of Anesthesiology  Postprocedure Note    Patient: Petty Santiago  MRN: [id number]  [place]: 1960  Date of evaluation: 6/22/2020  Time:  4:28 PM     Procedure Summary     Date:  06/22/20 Room / Location:  [address]    Anesthesia Start:  5876 Anesthesia Stop:  7466    Procedure:  ADD-ON WANTS T/F SELF - GI NOTIFIED PEG AND TRACHEOTOMY WITH TUBE PLACEMENT (N/A Neck) Diagnosis:  (DYSPHAGIA, RESPIRATORY FAILURE)    Surgeon:  Shanti Jiménez DO Responsible Provider:  Nabeel Deleon MD    Anesthesia Type:  general ASA Status:  4          Anesthesia Type: general    Girish Phase I:      Girish Phase II:      Last vitals: Reviewed and per EMR flowsheets.        Anesthesia Post Evaluation    Patient location during evaluation: ICU  Patient participation: complete - patient cannot participate  Level of consciousness: sedated and ventilated  Pain score: 0  Airway patency: patent  Nausea & Vomiting: no vomiting and no nausea  Complications: no  Cardiovascular status: hemodynamically stable  Respiratory status: acceptable  Hydration status: stable

## 2020-06-22 NOTE — PLAN OF CARE
Problem: OXYGENATION/RESPIRATORY FUNCTION  Goal: Patient will maintain patent airway  Outcome: Ongoing  Goal: Patient will achieve/maintain normal respiratory rate/effort  Description: Respiratory rate and effort will be within normal limits for the patient  Outcome: Ongoing     Problem: MECHANICAL VENTILATION  Goal: Patient will maintain patent airway  Outcome: Ongoing  Goal: Oral health is maintained or improved  Outcome: Ongoing  Goal: ET tube will be managed safely  Outcome: Ongoing  Goal: Ability to express needs and understand communication  Outcome: Ongoing  Goal: Mobility/activity is maintained at optimum level for patient  Outcome: Ongoing

## 2020-06-22 NOTE — OP NOTE
Replaced 5/31/2020 12:00 PM   Skin Assessment No Injury 5/31/2020  4:00 PM       [REMOVED] External Urinary Catheter (Removed)   Catheter changed  No 6/4/2020  8:00 AM   Urine Color Yellow 6/4/2020 12:00 PM   Urine Appearance Clear 6/4/2020 12:00 PM   Suction- Female Only 40 mmgHg continuous 6/4/2020 12:00 PM   Skin Assessment No Injury 6/4/2020 12:00 PM       [REMOVED] External Urinary Catheter (Removed)   Catheter changed  Yes 6/8/2020 12:00 AM   Urine Color Yellow 6/8/2020 12:00 AM   Urine Appearance Clear 6/8/2020 12:00 AM   Output (mL) 800 mL 6/7/2020  6:20 PM   Suction- Female Only 40 mmgHg continuous 6/8/2020 12:00 AM   Placement Replaced 6/8/2020 12:00 AM   Skin Assessment No Injury 6/8/2020 12:00 AM       [REMOVED] Fecal Management System (Removed)   Stool Appearance Loose; Watery 6/8/2020  4:00 PM   Stool Color Brown 6/8/2020  4:00 PM   Stool Amount Medium 6/8/2020  4:00 PM   Fecal Management Tube Output 50 ml 6/7/2020  6:20 PM     Findings: Wound class 2. Indications: This is a 56-year-old female with history of acute hypoxia respiratory failure. Patient has a history of chronic encephalopathy for the past 6 weeks. Due to hypoxia, the decision was made to take the patient to the operating room for tracheostomy and PEG tube placement. Consent: The procedure was explained in detail along with risk versus benefits and alternatives. All questions concerns were addressed and informed consent was obtained by the patient's sister and POA. Detailed Description of Procedure:   Patient was brought to the operating room and placed on the OR table in supine position. General anesthesia was induced by the anesthesia team.  Formal timeout identifying the patient, procedure, allergies, and antibiotics was performed. The patient was given 2 g of Ancef prior to incision. Patient already had a rectal tube, Tomlin catheter, bilateral EPC cuffs on.   A shoulder roll was placed in the anterior neck was prepped margin and lateral to the midline. An adequate red reflex was identified and confirmed with finger indentation. As the abdominal was prepped the endoscope was passed into the duodenum and confirmed no outlet obstruction. The skin overlying the previously marked spot was anesthetized with 1% lidocaine. 11 blade was used to make 1 cm incision followed by insertion of a needle with catheter into the skin incision and into the stomach under direct visualization. A guidewire was placed within the needle and lassoed by a snare within the endoscope. The snare, endoscope and guidewire were removed out through the mouth and the guidewire was tied onto a 20 Citizen of the [place] PEG tube that was pulled back down through the mouth and out through the skin incision and stomach. An endoscope was inserted back down into the stomach and confirmed the bumper to be in adequate position. The PEG tube was at 2 cm at the bumper. The PEG tube was cut to adequate length. Concluded our procedure. The patient tolerated well without immediate complications. All sponge, needles, instrument counts were correct at the end of the case. The patient remained intubated and transferred to ICU in stable condition. Dr. Marie Hernandez DO, was present throughout the entirety of the case.      Electronically signed by Brant Guillaume DO on 6/22/2020 at 4:06 PM

## 2020-06-22 NOTE — PLAN OF CARE
Appears well-rested  Description: Appears well-rested  Outcome: Ongoing     Problem: OXYGENATION/RESPIRATORY FUNCTION  Goal: Patient will maintain patent airway  6/21/2020 2131 by Rajinder Brantley RN  Outcome: Ongoing  6/21/2020 2115 by Deepa Griffin RCP  Outcome: Ongoing  Goal: Patient will achieve/maintain normal respiratory rate/effort  Description: Respiratory rate and effort will be within normal limits for the patient  6/21/2020 2131 by Rajinder Brantley RN  Outcome: Ongoing  6/21/2020 2115 by Deepa Griffin RCP  Outcome: Ongoing     Problem: MECHANICAL VENTILATION  Goal: Patient will maintain patent airway  6/21/2020 2131 by Rajinder Brantley RN  Outcome: Ongoing  6/21/2020 2115 by Deepa Griffin RCP  Outcome: Ongoing  Goal: Oral health is maintained or improved  6/21/2020 2131 by Rajinder Brantley RN  Outcome: Ongoing  6/21/2020 2115 by Deepa Griffin RCP  Outcome: Ongoing  Goal: ET tube will be managed safely  6/21/2020 2131 by Rajinder Brantley RN  Outcome: Ongoing  6/21/2020 2115 by Deepa Griffin RCP  Outcome: Ongoing  Goal: Ability to express needs and understand communication  6/21/2020 2131 by Rajinder Brantley RN  Outcome: Ongoing  6/21/2020 2115 by Deepa Griffin RCP  Outcome: Ongoing  Goal: Mobility/activity is maintained at optimum level for patient  6/21/2020 2131 by Rajinder Brantley RN  Outcome: Ongoing  6/21/2020 2115 by Deepa Griffin RCP  Outcome: Ongoing     Problem: Nutrition  Goal: Optimal nutrition therapy  Outcome: Ongoing

## 2020-06-22 NOTE — PROGRESS NOTES
Critical Care Team - Daily Progress Note      Date and time: 6/22/2020 8:50 AM  Patient's name:  Diaz Vaughan  Medical Record Number: [id number]  Patient's account/billing number: [de-identified]  Patient's YOB: 1960  Age: 62 y.o. Date of Admission: 6/18/2020 12:42 PM  Length of stay during current admission: 4      Primary Care Physician: Jan Gonzales  ICU Attending Physician: Dr. Darnell Nip Status: Full Code    Reason for ICU admission: Acute hypoxic respiratory failure      SUBJECTIVE:     OVERNIGHT EVENTS:       No acute overnight issues. General surgery consulted after talking to her POA- Sister Sarkis Arroyo. Agreeable for trach and PEG placement. 7.23/77/50/18: Metabolic alkalosis. 40% X 5, 490 X 24. Heparin drip on hold since 0830 for Trach and PEG. No acute issues overnight. Intermittently responds to commands. Echo with bubble study: Preserved ejection fraction. Positive bubble study with intra-atrial septal shunt. Cardiology on board. Will probably need closure devise but further treatment per patient and family's wishes. WBC trended down. Responded to antibiotics.     F: Tube feeds  A: Fentanyl  S; Propofol  T: Heparin Full dose  Heads Up  Ulcer: PPI PO BID  Glycemic: None  SBT: NA  B: Glycolax  I Foleys: Y, from last admission  D; Yes    AWAKE & FOLLOWING COMMANDS:  [] No   [x] Yes -intermittently    CURRENT VENTILATION STATUS:     [x] Ventilator  [] BIPAP  [] Nasal Cannula [] Room Air      IF INTUBATED, ET TUBE MARKING AT LOWER LIP:       cms    SECRETIONS Amount:  [x] Small [] Moderate  [] Large  [] None  Color:     [x] White [] Colored  [] Bloody    SEDATION:  RAAS Score:  [x] Propofol gtt  [] Versed gtt  [] Ativan gtt   [] No Sedation    PARALYZED:  [x] No    [] Yes    DIARRHEA:                [x] No                [] Yes  (C. Difficile status: [] positive [] negative                                                                                                                     [] pending)    VASOPRESSORS:  [x] No    [] Yes    If yes -   [] Levophed       [] Dopamine     [] Vasopressin       [] Dobutamine  [] Phenylephrine         [] Epinephrine    CENTRAL LINES:     [x] No   [] Yes   (Date of Insertion:   )           If yes -     [] Right IJ     [] Left IJ [] Right Femoral [] Left Femoral                   [] Right Subclavian [] Left Subclavian       MEZA'S CATHETER:   [] No   [x] Yes  (Date of Insertion: Chronic  )     URINE OUTPUT:            [x] Good   [] Low              [] Anuric      OBJECTIVE:     VITAL SIGNS:  /65   Pulse 69   Temp 99.3 °F (37.4 °C) (Oral)   Resp 20   Ht 5' 7\" (1.702 m)   Wt 139 lb 5.3 oz (63.2 kg)   SpO2 92%   BMI 21.82 kg/m²     Tmax over 24 hours:  Temp (24hrs), Av.1 °F (37.3 °C), Min:98.8 °F (37.1 °C), Max:99.3 °F (37.4 °C)      Patient Vitals for the past 6 hrs:   BP Temp Temp src Pulse Resp SpO2   20 0841 -- -- -- 69 20 92 %   20 0450 -- -- -- -- 16 100 %   20 0400 120/65 99.3 °F (37.4 °C) Oral 73 16 100 %   20 0311 -- -- -- 79 24 98 %   20 0308 -- -- -- 86 24 100 %   20 0300 137/84 -- -- 82 23 97 %         Intake/Output Summary (Last 24 hours) at 2020 0850  Last data filed at 2020 0400  Gross per 24 hour   Intake 2995 ml   Output 2630 ml   Net 365 ml     Wt Readings from Last 2 Encounters:   20 139 lb 5.3 oz (63.2 kg)   20 159 lb 13.3 oz (72.5 kg)     Body mass index is 21.82 kg/m². PHYSICAL EXAMINATION:  General appearance -awake but not alert, intermittently following commands by nodding head/shoulder shrugging.   Eyes -pupils equal and reactive to light  Neck - supple, no significant adenopathy, symmetric, trachea midline  Chest -right-sided chest is clear to auscultation, left side of chest with rhonchi and decreased/absent breath sounds on Positive (Details:  )     Other pertinent Labs:       Radiology/Imaging:     Chest Xray (6/22/2020):       Impression   Stable life-support devices.       Progressed heterogeneous opacities and consolidation throughout the left lung   with loculated left pleural effusion.  Small right pleural effusion with   right basilar heterogeneous opacities. ASSESSMENT:     Patient Active Problem List    Diagnosis Date Noted    Atelectasis of left lung     Atrial fibrillation with RVR ([address]     Acute on chronic respiratory failure ([address] 06/18/2020    History of recent stroke     Chronic alcohol abuse 06/08/2020    S/P thoracentesis 06/04/2020    Quadriparesis ([address] 06/03/2020    Protein-energy malnutrition ([address] 06/03/2020    Fatty liver, alcoholic 70/78/7438    CRP elevated 06/03/2020    ESR raised 06/03/2020    Acute respiratory failure with hypoxia and hypercapnia (HCC)     Pleural effusion on left     Meningoencephalitis     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[address]     DVT (deep venous thrombosis) ([address] 05/18/2020    Encephalopathy 50/79/2622    Metabolic encephalopathy     Chronic obstructive pulmonary disease (HCC)     Thrombocytopenia (HCC)     Hyponatremia 05/14/2020    DIXON (acute kidney injury) ([address] 05/14/2020    Tobacco use disorder 05/14/2020    Pneumonia of left lower lobe due to infectious organism ([address] 05/13/2020    Mucopurulent chronic bronchitis ([address] 07/25/2018       Additional assessment:        PLAN:     Acute hypoxemic respiratory failure s/p intubated 6/19: Intubated tube protect airway and for bronchoscopic procedure. On propofol. Weaning per protocol. S/p multiple recent intubations- 5/19-5/26 and reintubated 6/11-6/16. General surgery consulted for trach and PEG placement. Procedure today. Worsening left lower lobe opacification with left pleural effusion:  Rapid COVID-19 testing 6/18 negative. S/p bronchoscopy 6/19 shown no mucous plugging/no endobronchial mass.   Bronchial brush and sputum cultures MRSA positive. Started on vancomycin by ID since 6/15. Responds to treatment. Anemia multifactorial likely anemia of chronic disease: Protonix PO daily. Suspected aspiration pneumonia: Completed 5 days of IV Unasyn.     Left lower lobe cryptococcoma on recent admission: ID following. Completed Induction course of amphotericin B and flucytosine. Continue fluconazole until 8/11/2020 as Consolidation phase. Repeat cryptococcal antigen pending.     Left-sided pleural effusion s/p left thoracentesis 6/4/2020 with parapneumonic effusion. Continue to clinically monitor for now.     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 Continue fluconazole.      New onset right gastrocnemius DVT: Was on weight-based Lovenox 70 mg twice daily. Switched to heparin drip for anticipated trach and PEG procedure.     Atrial fibrillation with RVR: Converted to NSR. Off Cardizem drip. Started on Cardizem PO 30 mg every 8 hr. Cardiology consulted. IV metoprolol PRN. Echo resulted with positive bubble study with Intra-atrial septal shunt, possibly explaining the multiple infarcts in the brain. Subacute encephalopathy:  Previous imaging with multiple focal punctate infarcts in the bilateral cerebral hemispheres. Multiple LP studies in the past, negative. Echo  With positive bubble study. Neurology following. Repeat MRI essentially shown old infarcts of possible embolic source. New onset Hypothyroidism: TSH on 6/15 shown 5.25, and on 6/20 shown 6.14 with normal free T4. Started on PO synthroid 25 mcg. Follow up TSH in 6 weeks as Outpatient. Chronic lower back pain with b/l LE weakness.     Septic shock secondary to aspiration pneumonia requiring pressors and intubation on 6/11. Resolved.     H/o COPD: Continue breathing treatments. Keep saturation >88%. H./o dysphagia with NG tube. Patient will likely need PEG tube.   H/o Alcohol abuse: Continue MVT, thiamine,

## 2020-06-22 NOTE — PLAN OF CARE
Problem: OXYGENATION/RESPIRATORY FUNCTION  Goal: Patient will maintain patent airway  Outcome: Ongoing  Goal: Patient will achieve/maintain normal respiratory rate/effort  Respiratory rate and effort will be within normal limits for the patient  Outcome: Ongoing    Problem: MECHANICAL VENTILATION  Goal: Patient will maintain patent airway  Outcome: Ongoing  Goal: Oral health is maintained or improved  Outcome: Ongoing  Goal: ET tube will be managed safely  Outcome: Ongoing  Goal: Ability to express needs and understand communication  Outcome: Ongoing  Goal: Mobility/activity is maintained at optimum level for patient  Outcome: Ongoing    Problem: ASPIRATION PRECAUTIONS  Goal: Patients risk of aspiration is minimized  Outcome: Ongoing    Problem: SKIN INTEGRITY  Goal: Skin integrity is maintained or improved  Outcome: Ongoing                   BRONCHOSPASM/BRONCHOCONSTRICTION     [x]         IMPROVE AERATION/BREATH SOUNDS  [x]   ADMINISTER BRONCHODILATOR THERAPY AS APPROPRIATE  [x]   ASSESS BREATH SOUNDS  [x]   IMPLEMENT AEROSOL/MDI PROTOCOL  [x]   PATIENT EDUCATION AS NEEDED   Ventilator Bronchodilator assessment    Breath sounds: diminished  Inspiratory Pressure: 22  Plateau Pressure: 15    Patient assessed at level 3          []    Bronchodilator Assessment    BRONCHODILATOR ASSESSMENT SCORE  Score 0 (Home) 1 2 3 4   Breath Sounds   []  Chronic Ventilator: Patient at baseline []  Mild Wheezes/ Clear []  Intermittent wheezes with good air entry [x]  Bilateral/unilateral wheezing with diminished air entry []  Insp/Exp wheeze and/or poor aeration   Ventilator Pressures   []  Chronic Ventilator [x]  Insp. Pressure less than 25 cm H20 [x]  Insp. Pressure less than 25 cm H20 []  Insp. Pressure exceeds 25 cm H20 []  Insp.  Pressure exceeds 30 cm H20   Plateau Pressure []  NA   []  Plateau Pressure less than 4  []  Plateau Pressure less than or equal to 5 [x]  Plateau Pressure greater than or equal to 6 []  [facility]

## 2020-06-22 NOTE — PROGRESS NOTES
[address] [id number] Continence Nurse  Consult Note       NAME:  Diaz Vaughan  MEDICAL RECORD NUMBER:  [id number]  AGE: 62 y.o. GENDER: female  : 1960  TODAY'S DATE:  2020    Subjective:     Reason for WOCN Evaluation and Assessment: sacrococcygeal wound      Diaz Vaughan is a 62 y.o. female referred by:   [x] Physician  [] Nursing  [] Other:     Wound Identification:  Wound Type: pressure and incontinence associated dermatitis  Contributing Factors: edema, chronic pressure, decreased mobility, shear force, smoking, decreased tissue oxygenation, immunosuppression, malnutrition, incontinence of stool, incontinence of urine and substance abuse       Pre-existing area of incontinence associated dermatitis improved from admission one month ago. New area of deep purple tissue consistent with a new deep tissue injury. Deep tissue injuries are purple or maroon localized areas of discolored, intact skin or blood filled blisters due to ischemic damage of underlying soft tissue from pressure and/or shear. The wound may further evolve and become covered by thin eschar. Evolution may be rapid exposing additional layers of tissue even with optimal treatment as the damage is at the bone-muscle interface.        PAST MEDICAL HISTORY        Diagnosis Date    [facility] disease) [facility]     ETOH abuse        PAST SURGICAL HISTORY    Past Surgical History:   Procedure Laterality Date    BRONCHOSCOPY  2020    BRONCHOSCOPY BRUSHINGS performed by Layne Kenny MD at [address]  2020         THYROIDECTOMY      TONSILLECTOMY         FAMILY HISTORY    Family History   Problem Relation Age of Onset    Stroke Mother     Cancer Father        SOCIAL HISTORY    Social History     Tobacco Use    Smoking status: Current Every Day Smoker     Packs/day: 2.00     Types: Cigarettes    Smokeless tobacco: Never Used   Substance Use Topics    Alcohol use: Yes     Frequency: 4 or more times a week     Drinks per session: 3 or 4     Binge frequency: Patient refused     Comment: hasn't drank in 12 days, usually daily drinker (vodka)    Drug use: Never       ALLERGIES    Allergies   Allergen Reactions    Sulfa Antibiotics            Objective:      /66   Pulse 69   Temp 99 °F (37.2 °C)   Resp 16   Ht 5' 7\" (1.702 m)   Wt 139 lb 5.3 oz (63.2 kg)   SpO2 92%   BMI 21.82 kg/m²   Salvador Risk Score: Salvador Scale Score: 12    LABS    CBC:   Lab Results   Component Value Date    WBC 9.8 06/22/2020    RBC 2.62 06/22/2020    HGB 8.0 06/22/2020     CMP:  Albumin:    Lab Results   Component Value Date    LABALBU 2.3 06/19/2020     PT/INR:    Lab Results   Component Value Date    PROTIME 10.7 05/14/2020    INR 1.0 05/14/2020     HgBA1c:  No results found for: LABA1C  PTT: No components found for: LABPTT      Assessment:     Patient Active Problem List   Diagnosis    Pneumonia of left lower lobe due to infectious organism ([address]    Hyponatremia    Mucopurulent chronic bronchitis ([address]    [facility]) [facility]    Tobacco use disorder    Chronic obstructive pulmonary disease (HCC)    Thrombocytopenia (HCC)    Metabolic encephalopathy    DVT (deep venous thrombosis) (HCC)    Encephalopathy    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[address]    Meningoencephalitis    Acute respiratory failure with hypoxia and hypercapnia (HCC)    Pleural effusion on left    Quadriparesis (HCC)    Protein-energy malnutrition ([address]    Fatty liver, alcoholic    CRP elevated    ESR raised    S/P thoracentesis    Chronic alcohol abuse    Acute on chronic respiratory failure (HCC)    History of recent stroke    Atelectasis of left lung    Atrial fibrillation with RVR ([address]       Measurements:     06/22/20 1110   Wound 06/18/20 Coccyx   Date First Assessed/Time First Assessed: 06/18/20 1200   Present on Hospital Admission: Yes  Primary Wound Type: Pressure Injury  Location: Coccyx   Wound Image    Wound Deep tissue/Injury   Dressing/Treatment Foam   Wound Cleansed Soap and water   Dressing Change Due 06/25/20   Wound Length (cm) 6.5 cm   Wound Width (cm) 9.5 cm   Wound Depth (cm) 0.1 cm   Wound Surface Area (cm^2) 61.75 cm^2   Wound Volume (cm^3) 6.18 cm^3   Wound Assessment Fragile;Red;Purple   Drainage Amount Small   Drainage Description Serosanguinous   Odor None   Leda-wound Assessment Intact;Dry   Red%Wound Bed 80   Purple%Wound Bed 20            Response to treatment:  Well tolerated by patient. Plan:     Plan of Care: Turn every 2 hours. Side to side only. Keep off of back. Float heels off of bed with pillows under calves    Use lift sling to reposition patient to minimize potential for shear injury. Foam sacrum dressing to sacrococcygeal area. Peel back dressing, inspect skin beneath, re-secure. Change every 72 hours and prn wrinkles, soilage. Discontinue Sacral dressing if repeatedly soiled by incontinence and use the zinc oxide paste. Routine incontinence care with incontinence barrier cloths and zinc oxide cream. Apply zinc oxide cream BID and prn incontinence to the perianal skin. No zinc paste beneath the foam dressings, please.     Moisture wicking under pads vs cloth backed briefs      Specialty Bed Required : Yes   [] Low Air Loss   [x] Pressure Redistribution  [] Fluid Immersion  [] Bariatric  [] Total Pressure Relief  [] Other:     Discharge Plan:  TBD    Patient/Caregiver Teaching:    [] Indicates understanding       [] Needs reinforcement  [] Unsuccessful      [] Verbal Understanding  [] Demonstrated understanding       [x] No evidence of learning  [] Refused teaching         [] N/A       Electronically signed by Catracho Rushing RN, CWON on 6/22/2020 at 11:46 AM

## 2020-06-22 NOTE — CARE COORDINATION
TRANSITIONAL CARE PLANNING/ 2 Rehab Yang Day: 4    Reason for Admission: Acute on chronic respiratory failure ([facility] [J96.20]     Treatment Plan of Care: Surgery - trach and PEG today. Cardio - cath needed for PFO? Tests/Procedures still needed:     Barriers to Discharge: needs trach/PEG, card cath for PFO ? closure    Readmission Risk              Risk of Unplanned Readmission:        26         Patient goals/Treatment Preferences/Transitional Plan: return to [facility] - will need precert to return - liaison updated.     Referrals Made: Olive     Follow Up needed:

## 2020-06-23 ENCOUNTER — APPOINTMENT (OUTPATIENT)
Dept: GENERAL RADIOLOGY | Age: 60
DRG: 005 | End: 2020-06-23
Attending: INTERNAL MEDICINE
Payer: MEDICARE

## 2020-06-23 ENCOUNTER — HOSPITAL ENCOUNTER (OUTPATIENT)
Dept: ICU | Age: 60
Discharge: HOME | End: 2020-06-23
Attending: INTERNAL MEDICINE | Admitting: INTERNAL MEDICINE

## 2020-06-23 VITALS
SYSTOLIC BLOOD PRESSURE: 119 MMHG | RESPIRATION RATE: 19 BRPM | WEIGHT: 139.33 LBS | HEART RATE: 93 BPM | HEIGHT: 67 IN | TEMPERATURE: 99.1 F | OXYGEN SATURATION: 97 % | BODY MASS INDEX: 21.87 KG/M2 | DIASTOLIC BLOOD PRESSURE: 77 MMHG

## 2020-06-23 LAB
ABSOLUTE EOS #: 0.23 K/UL (ref 0–0.44)
ABSOLUTE IMMATURE GRANULOCYTE: 0.09 K/UL (ref 0–0.3)
ABSOLUTE LYMPH #: 2.26 K/UL (ref 1.1–3.7)
ABSOLUTE MONO #: 0.87 K/UL (ref 0.1–1.2)
ALLEN TEST: POSITIVE
ANION GAP SERPL CALCULATED.3IONS-SCNC: 10 MMOL/L (ref 9–17)
BASOPHILS # BLD: 1 % (ref 0–2)
BASOPHILS ABSOLUTE: 0.05 K/UL (ref 0–0.2)
BUN BLDV-MCNC: 5 MG/DL (ref 6–20)
BUN/CREAT BLD: ABNORMAL (ref 9–20)
CALCIUM SERPL-MCNC: 8.1 MG/DL (ref 8.6–10.4)
CHLORIDE BLD-SCNC: 106 MMOL/L (ref 98–107)
CO2: 26 MMOL/L (ref 20–31)
CREAT SERPL-MCNC: 0.25 MG/DL (ref 0.5–0.9)
DIFFERENTIAL TYPE: ABNORMAL
EOSINOPHILS RELATIVE PERCENT: 2 % (ref 1–4)
FIO2: 40
GFR AFRICAN AMERICAN: >60 ML/MIN
GFR NON-AFRICAN AMERICAN: >60 ML/MIN
GFR SERPL CREATININE-BSD FRML MDRD: ABNORMAL ML/MIN/{1.73_M2}
GFR SERPL CREATININE-BSD FRML MDRD: ABNORMAL ML/MIN/{1.73_M2}
GLUCOSE BLD-MCNC: 88 MG/DL (ref 65–105)
GLUCOSE BLD-MCNC: 89 MG/DL (ref 74–100)
GLUCOSE BLD-MCNC: 90 MG/DL (ref 70–99)
HCT VFR BLD CALC: 24.5 % (ref 36.3–47.1)
HEMOGLOBIN: 7.6 G/DL (ref 11.9–15.1)
IMMATURE GRANULOCYTES: 1 %
LYMPHOCYTES # BLD: 24 % (ref 24–43)
MAGNESIUM: 1.8 MG/DL (ref 1.6–2.6)
MCH RBC QN AUTO: 30.8 PG (ref 25.2–33.5)
MCHC RBC AUTO-ENTMCNC: 31 G/DL (ref 28.4–34.8)
MCV RBC AUTO: 99.2 FL (ref 82.6–102.9)
MODE: ABNORMAL
MONOCYTES # BLD: 9 % (ref 3–12)
NEGATIVE BASE EXCESS, ART: ABNORMAL (ref 0–2)
NRBC AUTOMATED: 0 PER 100 WBC
O2 DEVICE/FLOW/%: ABNORMAL
PARTIAL THROMBOPLASTIN TIME: 27.8 SEC (ref 20.5–30.5)
PATIENT TEMP: ABNORMAL
PDW BLD-RTO: 16 % (ref 11.8–14.4)
PLATELET # BLD: 536 K/UL (ref 138–453)
PLATELET ESTIMATE: ABNORMAL
PMV BLD AUTO: 9.2 FL (ref 8.1–13.5)
POC HCO3: 30 MMOL/L (ref 21–28)
POC O2 SATURATION: 96 % (ref 94–98)
POC PCO2 TEMP: ABNORMAL MM HG
POC PCO2: 39.3 MM HG (ref 35–48)
POC PH TEMP: ABNORMAL
POC PH: 7.49 (ref 7.35–7.45)
POC PO2 TEMP: ABNORMAL MM HG
POC PO2: 72.5 MM HG (ref 83–108)
POSITIVE BASE EXCESS, ART: 6 (ref 0–3)
POTASSIUM SERPL-SCNC: 3.4 MMOL/L (ref 3.7–5.3)
RBC # BLD: 2.47 M/UL (ref 3.95–5.11)
RBC # BLD: ABNORMAL 10*6/UL
SAMPLE SITE: ABNORMAL
SEG NEUTROPHILS: 63 % (ref 36–65)
SEGMENTED NEUTROPHILS ABSOLUTE COUNT: 5.93 K/UL (ref 1.5–8.1)
SODIUM BLD-SCNC: 142 MMOL/L (ref 135–144)
TCO2 (CALC), ART: 31 MMOL/L (ref 22–29)
WBC # BLD: 9.4 K/UL (ref 3.5–11.3)
WBC # BLD: ABNORMAL 10*3/UL

## 2020-06-23 PROCEDURE — 85730 THROMBOPLASTIN TIME PARTIAL: CPT

## 2020-06-23 PROCEDURE — 6360000002 HC RX W HCPCS: Performed by: STUDENT IN AN ORGANIZED HEALTH CARE EDUCATION/TRAINING PROGRAM

## 2020-06-23 PROCEDURE — 36415 COLL VENOUS BLD VENIPUNCTURE: CPT

## 2020-06-23 PROCEDURE — 94003 VENT MGMT INPAT SUBQ DAY: CPT

## 2020-06-23 PROCEDURE — 82803 BLOOD GASES ANY COMBINATION: CPT

## 2020-06-23 PROCEDURE — 87230 TOXIN/ANTITOXIN ASY TISS CUL: CPT | Performed by: INTERNAL MEDICINE

## 2020-06-23 PROCEDURE — 6370000000 HC RX 637 (ALT 250 FOR IP): Performed by: STUDENT IN AN ORGANIZED HEALTH CARE EDUCATION/TRAINING PROGRAM

## 2020-06-23 PROCEDURE — 71045 X-RAY EXAM CHEST 1 VIEW: CPT

## 2020-06-23 PROCEDURE — 2700000000 HC OXYGEN THERAPY PER DAY

## 2020-06-23 PROCEDURE — 2580000003 HC RX 258: Performed by: STUDENT IN AN ORGANIZED HEALTH CARE EDUCATION/TRAINING PROGRAM

## 2020-06-23 PROCEDURE — 94761 N-INVAS EAR/PLS OXIMETRY MLT: CPT

## 2020-06-23 PROCEDURE — 94770 HC ETCO2 MONITOR DAILY: CPT

## 2020-06-23 PROCEDURE — 99291 CRITICAL CARE FIRST HOUR: CPT | Performed by: INTERNAL MEDICINE

## 2020-06-23 PROCEDURE — 85025 COMPLETE CBC W/AUTO DIFF WBC: CPT

## 2020-06-23 PROCEDURE — 99232 SBSQ HOSP IP/OBS MODERATE 35: CPT | Performed by: NURSE PRACTITIONER

## 2020-06-23 PROCEDURE — 82947 ASSAY GLUCOSE BLOOD QUANT: CPT

## 2020-06-23 PROCEDURE — 94640 AIRWAY INHALATION TREATMENT: CPT

## 2020-06-23 PROCEDURE — 80048 BASIC METABOLIC PNL TOTAL CA: CPT

## 2020-06-23 PROCEDURE — 36600 WITHDRAWAL OF ARTERIAL BLOOD: CPT

## 2020-06-23 PROCEDURE — 83735 ASSAY OF MAGNESIUM: CPT

## 2020-06-23 PROCEDURE — 87493 C DIFF AMPLIFIED PROBE: CPT

## 2020-06-23 RX ORDER — PANTOPRAZOLE SODIUM 40 MG/1
40 TABLET, DELAYED RELEASE ORAL
Qty: 30 TABLET | Refills: 3 | Status: SHIPPED | OUTPATIENT
Start: 2020-06-24

## 2020-06-23 RX ORDER — LEVOTHYROXINE SODIUM 0.03 MG/1
25 TABLET ORAL DAILY
Qty: 30 TABLET | Refills: 3 | Status: SHIPPED | OUTPATIENT
Start: 2020-06-24

## 2020-06-23 RX ORDER — VANCOMYCIN HYDROCHLORIDE 1 G/200ML
1000 INJECTION, SOLUTION INTRAVENOUS EVERY 12 HOURS
Qty: 400 ML | Refills: 0 | Status: SHIPPED | OUTPATIENT
Start: 2020-06-23 | End: 2020-06-24

## 2020-06-23 RX ORDER — FLUCONAZOLE 2 MG/ML
400 INJECTION, SOLUTION INTRAVENOUS EVERY 24 HOURS
Qty: 12000 ML | Refills: 0 | Status: SHIPPED | OUTPATIENT
Start: 2020-06-23

## 2020-06-23 RX ADMIN — LEVOTHYROXINE SODIUM 25 MCG: 25 TABLET ORAL at 06:01

## 2020-06-23 RX ADMIN — POTASSIUM CHLORIDE 10 MEQ: 10 INJECTION, SOLUTION INTRAVENOUS at 08:54

## 2020-06-23 RX ADMIN — DILTIAZEM HYDROCHLORIDE 30 MG: 30 TABLET, FILM COATED ORAL at 14:34

## 2020-06-23 RX ADMIN — POTASSIUM CHLORIDE 10 MEQ: 10 INJECTION, SOLUTION INTRAVENOUS at 06:54

## 2020-06-23 RX ADMIN — FENTANYL CITRATE: 50 INJECTION, SOLUTION INTRAMUSCULAR; INTRAVENOUS at 07:33

## 2020-06-23 RX ADMIN — PANTOPRAZOLE SODIUM 40 MG: 40 TABLET, DELAYED RELEASE ORAL at 06:02

## 2020-06-23 RX ADMIN — IPRATROPIUM BROMIDE AND ALBUTEROL SULFATE 1 AMPULE: .5; 3 SOLUTION RESPIRATORY (INHALATION) at 12:39

## 2020-06-23 RX ADMIN — IPRATROPIUM BROMIDE AND ALBUTEROL SULFATE 1 AMPULE: .5; 3 SOLUTION RESPIRATORY (INHALATION) at 08:10

## 2020-06-23 RX ADMIN — SODIUM CHLORIDE, PRESERVATIVE FREE 10 ML: 5 INJECTION INTRAVENOUS at 09:47

## 2020-06-23 RX ADMIN — APIXABAN 10 MG: 5 TABLET, FILM COATED ORAL at 11:25

## 2020-06-23 RX ADMIN — VANCOMYCIN HYDROCHLORIDE 1000 MG: 1 INJECTION, SOLUTION INTRAVENOUS at 09:46

## 2020-06-23 RX ADMIN — ALBUTEROL SULFATE 2.5 MG: 2.5 SOLUTION RESPIRATORY (INHALATION) at 04:37

## 2020-06-23 RX ADMIN — DILTIAZEM HYDROCHLORIDE 30 MG: 30 TABLET, FILM COATED ORAL at 06:01

## 2020-06-23 RX ADMIN — FENTANYL CITRATE 50 MCG: 50 INJECTION, SOLUTION INTRAMUSCULAR; INTRAVENOUS at 14:37

## 2020-06-23 RX ADMIN — POTASSIUM CHLORIDE 10 MEQ: 10 INJECTION, SOLUTION INTRAVENOUS at 07:56

## 2020-06-23 ASSESSMENT — PULMONARY FUNCTION TESTS
PIF_VALUE: 14
PIF_VALUE: 11

## 2020-06-23 NOTE — PROGRESS NOTES
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 plan till 6/24/20 MRSA pneumonia  · Diflucan 400 a day consider switching back to Abelcet if amiodarone is started for the A. Fib  · pend crypto serum  AG   · Disc w Rn - will follow - very sick patient    Infection Control Recommendations   · [place]aton Precautions  · Contact Isolation     Antimicrobial Stewardship Recommendations   · Simplification of therapy  · Targeted therapy  · Per Kg dosing  Coordination ofOutpatient Care:   · Estimated Length of IV antimicrobials:  · Patient will need Midline / picc Catheter Insertion:   · Patient will need SNF:  · Patient will need outpatient wound care:     History of Present Illness:   Initial history:  Alejandra Guerrier is a 62y.o.-year-old female 33 onto a service who came in from [place] because of atrial fibrillation paroxysmal as well as left lung collapse, sputum growing MRSA. She was started on vancomycin    I know her very well from May admission when she had a severe headache and a left lower lobe progressive pneumonia despite antibiotics ultimately diagnosed with cryptococcus pneumonia and encephalitis.   She had repeated LPs all showing response of the WBC on antifungal therapy  Her serum crypto antigen was positive at admission 1/4 low titer and ultimately became negative before discharge to [facility]  The CSF antigen and the CSF cryptococcus PCR were all negative repeatedly  She had a fever and sepsis that only responded to antifungal therapy  She was started on Abelcet plan to be tentatively 6 weeks until 7/2, and flucytosine for 2 weeks until 6/9    Before discharge she had a left pleural effusion and had a thoracentesis that showed no signs of active infection. The patient was discharged to [facility], she was starting to sit on the bedside, though very weak, able to answer questions and respond sharply. She was still doing physical therapy. In [facility] she collapsed her left lung, ultimately developed A. fib, so they sent her back to [facility] where sputum culture grew MRSA, she was started on vancomycin  Also [facility], the antifungals were switched on 6/11 to Diflucan    Since admission she is lethargic on the BiPAP, still in A fib, 120 heart rate. At this time chest x-ray at [facility] 6/18 is showing increased left pleural effusion with basilar lung volume loss. With persistent perihilar edema  She has been tested for COVID      Interval changes  6/22/2020   On the ventilator today not arousable, she is post trach and PEG. According to the nurse she was very appropriate giving thumbs up earlier this morning before the procedure  FiO2 40% 5 of PEEP,  Sputum is thin, urine is clear, no fever      The patient had 6/19 a bronchoscopy for the left lower lobe atelectasis or consolidation. Mucous plug of the lingula, left upper lobe and left lower lobe  Bronchoscopy showing MRSA    6/19/20 Chest left basilar opacification and effusion improved aeration of the left upper lobe.       Summary of relevant labs:  Labs:  WBC 26 -14 - 11 7.6  Micro:  Sp cx MRSA Sensitive to clindamycin  Blood culture 6/19 pending  Imaging:    chest x-ray at [facility] 6/18 is showing increased left pleural effusion with basilar lung volume loss. With persistent perihilar edema-\  When compared to the past, it is a mild effusion      I have personally reviewed the past medical history, past surgical history, medications, social history, and family history, and I haveupdated the database accordingly.   Past Medical History:     Past Medical History:   Diagnosis Date    COPD (chronic obstructive pulmonary disease) ([address]     ETOH abuse        Past Surgical  History:     Past Surgical History:   Procedure Laterality Date    BRONCHOSCOPY  6/19/2020    BRONCHOSCOPY BRUSHINGS performed by Julee Gitelman, MD at [address] [facility]  6/4/2020         THYROIDECTOMY      TONSILLECTOMY         Medications:      [START ON 6/23/2020] pantoprazole  40 mg Oral QAM AC    polyethylene glycol  17 g Oral Daily    levothyroxine  25 mcg Oral Daily    dilTIAZem  30 mg Oral 3 times per day    ipratropium-albuterol  1 ampule Inhalation 4x daily    albuterol  2.5 mg Nebulization BID    sodium chloride flush  10 mL Intravenous 2 times per day    vancomycin  1,000 mg Intravenous Q12H    vancomycin (VANCOCIN) intermittent dosing (placeholder)   Other RX Placeholder    fluconazole  400 mg Intravenous Q24H       Social History:     Social History     Socioeconomic History    Marital status: Single     Spouse name: Not on file    Number of children: Not on file    Years of education: Not on file    Highest education level: Not on file   Occupational History    Not on file   Social Needs    Financial resource strain: Not on file    Food insecurity     Worry: Not on file     Inability: Not on file    Transportation needs     Medical: Not on file     Non-medical: Not on file   Tobacco Use    Smoking status: Current Every Day Smoker     Packs/day: 2.00     Types: Cigarettes    Smokeless tobacco: Never Used   Substance and Sexual Activity    Alcohol use: Yes     Frequency: 4 or more

## 2020-06-23 NOTE — PROGRESS NOTES
ratio: 250  Sensitivity: 3  PEEP/CPAP: 5  I Time/ I Time %: 0.9 s  Humidification Source: HME  Additional Respiratory  Assessments  Pulse: 98  Resp: 19  SpO2: 100 %  End Tidal CO2: 45 (%)  Position: Semi-Sherman's  Humidification Source: HME  Oral Care Completed?: Yes  Oral Care: Mouthwash, Mouth swabbed, Mouth suctioned  Subglottic Suction Done?: No  Cuff Pressure (cm H2O): (mov)    Laboratory findings:    Complete Blood Count:   Recent Labs     06/21/20 0317 06/22/20 0257 06/23/20  0442   WBC 7.6 9.8 9.4   HGB 7.5* 8.0* 7.6*   HCT 24.1* 26.0* 24.5*    546* 536*        Last 3 Blood Glucose:   Recent Labs     06/21/20 0317 06/22/20 0257 06/23/20  0442   GLUCOSE 111* 119* 90        PT/INR:    Lab Results   Component Value Date    PROTIME 10.7 05/14/2020    INR 1.0 05/14/2020     PTT:    Lab Results   Component Value Date    APTT 23.6 06/22/2020       Comprehensive Metabolic Profile:   Recent Labs     06/21/20 0317 06/22/20 0257 06/22/20  1920 06/23/20  0442    140  --  142   K 3.8 3.3* 3.3* 3.4*    102  --  106   CO2 24 26  --  26   BUN 8 8  --  5*   CREATININE 0.28* 0.26*  --  0.25*   GLUCOSE 111* 119*  --  90   CALCIUM 8.0* 8.3*  --  8.1*      Magnesium:   Lab Results   Component Value Date    MG 1.8 06/23/2020     Phosphorus:   Lab Results   Component Value Date    PHOS 2.9 06/08/2020     Ionized Calcium:   Lab Results   Component Value Date    CAION 1.25 05/25/2020        Urinalysis:     Troponin: No results for input(s): TROPONINI in the last 72 hours.     Microbiology:    Cultures during this admission:     Blood cultures:                 [] None drawn      [x] Negative             []  Positive (Details:  )  Urine Culture:                   [x] None drawn      [] Negative             []  Positive (Details:  )  Sputum Culture:               [] None drawn       [] Negative             [x]  Positive (Details: MRSA positive on 6/18)   Endotracheal aspirate:     [x] None drawn       []

## 2020-06-23 NOTE — PLAN OF CARE
Problem: Falls - Risk of:  Goal: Will remain free from falls  Description: Will remain free from falls  Outcome: Ongoing  Goal: Absence of physical injury  Description: Absence of physical injury  Outcome: Ongoing     Problem: Discharge Planning:  Goal: Participates in care planning  Description: Participates in care planning  Outcome: Ongoing  Goal: Discharged to appropriate level of care  Description: Discharged to appropriate level of care  Outcome: Ongoing     Problem: Airway Clearance - Ineffective:  Goal: Ability to maintain a clear airway will improve  Description: Ability to maintain a clear airway will improve  Outcome: Ongoing     Problem: Aspiration:  Goal: Absence of aspiration  Description: Absence of aspiration  Outcome: Ongoing     Problem: Gas Exchange - Impaired:  Goal: Levels of oxygenation will improve  Description: Levels of oxygenation will improve  Outcome: Ongoing     Problem: Mental Status - Impaired:  Goal: Mental status will be restored to baseline  Description: Mental status will be restored to baseline  Outcome: Ongoing     Problem: Nutrition Deficit:  Goal: Ability to achieve adequate nutritional intake will improve  Description: Ability to achieve adequate nutritional intake will improve  Outcome: Ongoing     Problem: Pain:  Goal: Pain level will decrease  Description: Pain level will decrease  Outcome: Ongoing  Goal: Recognizes and communicates pain  Description: Recognizes and communicates pain  Outcome: Ongoing  Goal: Control of acute pain  Description: Control of acute pain  Outcome: Ongoing  Goal: Control of chronic pain  Description: Control of chronic pain  Outcome: Ongoing     Problem: Skin Integrity - Impaired:  Goal: Will show no infection signs and symptoms  Description: Will show no infection signs and symptoms  Outcome: Ongoing  Goal: Absence of new skin breakdown  Description: Absence of new skin breakdown  Outcome: Ongoing     Problem: Sleep Pattern Disturbance:  Goal: Appears well-rested  Description: Appears well-rested  Outcome: Ongoing     Problem: OXYGENATION/RESPIRATORY FUNCTION  Goal: Patient will maintain patent airway  Outcome: Ongoing  Goal: Patient will achieve/maintain normal respiratory rate/effort  Description: Respiratory rate and effort will be within normal limits for the patient  6/23/2020 0253 by Gabriela Fernando RN  Outcome: Ongoing  6/22/2020 2032 by Gabino Payton RCP  Outcome: Ongoing     Problem: MECHANICAL VENTILATION  Goal: Patient will maintain patent airway  6/23/2020 0253 by Gabriela Fernando RN  Outcome: Ongoing  6/22/2020 2032 by Gabino Payton RCP  Outcome: Ongoing  Goal: Oral health is maintained or improved  6/23/2020 0253 by Gabriela Fernando RN  Outcome: Ongoing  6/22/2020 2032 by Gabino Payton RCP  Outcome: Ongoing  Goal: ET tube will be managed safely  6/23/2020 0253 by Gabriela Fernando RN  Outcome: Ongoing  6/22/2020 2032 by Gabino Payton RCP  Outcome: Ongoing  Goal: Ability to express needs and understand communication  6/23/2020 0253 by Gabriela Fernando RN  Outcome: Ongoing  6/22/2020 2032 by Gabino Payton RCP  Outcome: Ongoing  Goal: Mobility/activity is maintained at optimum level for patient  6/23/2020 0253 by Gabriela Fernando RN  Outcome: Ongoing  6/22/2020 2032 by Gabino Payton RCP  Outcome: Ongoing     Problem: Nutrition  Goal: Optimal nutrition therapy  Outcome: Ongoing

## 2020-06-23 NOTE — DISCHARGE INSTR - COC
Continuity of Care Form    Patient Name: Darinel Fabian   :  1960  MRN:  [id number]    Admit date:  2020  Discharge date:  2020    Code Status Order: Full Code   Advance Directives:   [facility] Documentation     Date/Time Healthcare Directive Type of Healthcare Directive Copy in [facility] [address] Agent's Name Healthcare Agent's Phone Number    20 1426  No, patient does not have an advance directive for healthcare treatment -- -- -- -- --          Admitting Physician:  Renetta Michaels MD  PCP: Sebastian Butcher    Discharging Nurse: [address] Unit/Room#: 2644/6438-36  Discharging Unit Phone Number: [phone]    Emergency Contact:   Extended Emergency Contact Information  Primary Emergency Contact: [facility] Phone: [phone]  Mobile Phone: [phone]  Relation: Brother/Sister    Past Surgical History:  Past Surgical History:   Procedure Laterality Date    BRONCHOSCOPY  2020    BRONCHOSCOPY BRUSHINGS performed by Lesly Serrano MD at [address]  2020         THYROIDECTOMY      TONSILLECTOMY         Immunization History: There is no immunization history on file for this patient.     Active Problems:  Patient Active Problem List   Diagnosis Code    Pneumonia of left lower lobe due to infectious organism ([facility] J18.1    Hyponatremia E87.1    Mucopurulent chronic bronchitis ([facility]) J41.1    DIXON (acute kidney injury) ([facility] N17.9    Tobacco use disorder F17.200    Chronic obstructive pulmonary disease ([facility]) J44.9    Thrombocytopenia ([facility]) Q50.3    Metabolic encephalopathy L43.43    DVT (deep venous thrombosis) ([facility]) I82.409    Encephalopathy acute G93.40    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[facility]) B45.9    Meningoencephalitis G04.90    Acute respiratory failure with hypoxia and hypercapnia ([facility]) J96.01, J96.02    Pleural effusion on left J90    Quadriparesis ([facility]) Respiratory Treatments: 2XD  Oxygen Therapy:  ventilator  Ventilator:    - Ventilator Settings:    Vt Ordered: 490 mL  Rate Set: 16 bmp  FiO2 : 40 %    PEEP/CPAP: 5  Pressure Support: 6 cmH20    Rehab Therapies: Physical Therapy, Speech therapy    Weight Bearing Status/Restrictions: No weight bearing restirctions  Other Medical Equipment (for information only, NOT a DME order):  hospital bed  Other Treatments: na    Patient's personal belongings (please select all that are sent with patient):  Jacket, bra, t shirt, jeans, blanket    RN SIGNATURE:  Electronically signed by Donald Gordillo RN on 6/23/2020 at 12:12 PM      CASE MANAGEMENT/SOCIAL WORK SECTION    Inpatient Status Date: 06/18/2020    Readmission Risk Assessment Score:  Readmission Risk              Risk of Unplanned Readmission:        26           Discharging to Facility/ Agency   · Name: [facility]   · Address:  · Phone: [phone]  · Fax:    Dialysis Facility (if applicable)   · Name:  · Address:  · Dialysis Schedule:  · Phone:  · Fax:    / signature: Electronically signed by Sandra Cabrera RN on 6/23/2020 at 10:58 AM      PHYSICIAN SECTION    Prognosis: Fair    Condition at Discharge: Stable    Rehab Potential (if transferring to Rehab): Good    Recommended Labs or Other Treatments After Discharge: NA      Physician Certification: I certify the above information and transfer of Nicholas Chase  is necessary for the continuing treatment of the diagnosis listed and that she requires LTAC for greater 30 days.      Update Admission H&P: Changes in H&P as follows -     PHYSICIAN SIGNATURE:  Electronically signed by Grupo Diaz MD on 6/23/20 at 11:55 AM EDT

## 2020-06-23 NOTE — PLAN OF CARE
1510 by Terri Riley RN  Outcome: Ongoing     Problem: MECHANICAL VENTILATION  Goal: ET tube will be managed safely  6/23/2020 1510 by Terri Riley RN  Outcome: Ongoing     Problem: MECHANICAL VENTILATION  Goal: Patient will maintain patent airway  6/23/2020 1510 by Terri Riley RN  Outcome: Ongoing     Problem: MECHANICAL VENTILATION  Goal: Oral health is maintained or improved  6/23/2020 1510 by Terri Riley RN  Outcome: Ongoing     Problem: MECHANICAL VENTILATION  Goal: ET tube will be managed safely  6/23/2020 1510 by Terri Riley RN  Outcome: Ongoing     Problem: MECHANICAL VENTILATION  Goal: Ability to express needs and understand communication  6/23/2020 1510 by Terri Riley RN  Outcome: Ongoing     Problem: MECHANICAL VENTILATION  Goal: Mobility/activity is maintained at optimum level for patient  6/23/2020 1510 by Terri Riley RN  Outcome: Ongoing     Problem: Nutrition  Goal: Optimal nutrition therapy  6/23/2020 1510 by Terri Riley RN  Outcome: Ongoing

## 2020-06-23 NOTE — DISCHARGE SUMMARY
[facility]     Department of Internal Medicine - Critical Care Service    INPATIENT DISCHARGE SUMMARY      PATIENT IDENTIFICATION:  NAME:  Fallon Rhodes   :   1960  MRN:    [id number]     Acct:    [de-identified]   Admit Date:  2020  Discharge date:  2020  Attending Provider: Nga Howard MD                                     Active Problems:    Encephalopathy acute    Meningoencephalitis    Pleural effusion on left    Acute on chronic respiratory failure ([address]    History of recent stroke    Atelectasis of left lung    Atrial fibrillation with RVR (HCC)    PFO (patent foramen ovale)  Resolved Problems:    * No resolved hospital problems. *    Sepsis    REASON FOR HOSPITALIZATION:   AMS, Sepsis    Hospital Course    Patient is a 52-year-old female with history of COPD, atrial fibrillation, alcoholic liver disease, thrombocytopenia, left popliteal DVT, chronic encephalopathy for the past 6 weeks, multiple strokes who was initially admitted to the ICU due to a mental status change. Patient recently treated for cryptococcus pneumonia and meningitis however has had a recent negative cryptococcal antigens, infectious disease has been following. During ICU stay patient developed severe hypoxia and respiratory failure and required intubation and was found to have mucous plugging in the lungs as well as MRSA pneumonia. Patient unable to be weaned from from ventilator and received trach and PEG on . Patient also being treated for atrial fibrillation with heparin drip during hospital stay as well as Cardizem drip however she was transitioned to Eliquis for anticoagulation for both DVT and atrial fibrillation. Neurology was also consulted and obtained an MRI showing acute and subacute strokes, echo study was done and had a positive bubble study. Patient continue on anticoagulation. Patient is continued on tube feeds.   Patient will receive vancomycin until  and continue evaluation, counseling and review of medications and discharge plan.

## 2020-06-23 NOTE — PROGRESS NOTES
[facility]   Progress Note                 Date:   6/23/2020  Patient name:  Jani Ortiz  Date of admission:  6/18/2020 12:42 PM  MRN:   [id number]  YOB: 1960  PCP:    Jennifer Atwood    Reason for Admission: Acute on chronic respiratory failure ([facility] [address] [J96.20]      Subjective:       Clinical Changes / Abnormalities:     Patient seen and examined. No acute events overnight. Remained hemodynamically stable and afebrile. S/p Trach/PEG yesterday  Sinus rhythm. I/O last 3 completed shifts: In: 3571 [I.V.:3571]  Out: 56 [Urine:2950; Stool:75; Blood:2]  I/O this shift:  In: 270 [I.V.:270]  Out: 300 [Urine:300]        Medications:   Scheduled Meds:    pantoprazole  40 mg Oral QAM AC    polyethylene glycol  17 g Oral Daily    levothyroxine  25 mcg Oral Daily    dilTIAZem  30 mg Oral 3 times per day    ipratropium-albuterol  1 ampule Inhalation 4x daily    albuterol  2.5 mg Nebulization BID    sodium chloride flush  10 mL Intravenous 2 times per day    vancomycin  1,000 mg Intravenous Q12H    vancomycin (VANCOCIN) intermittent dosing (placeholder)   Other RX Placeholder    fluconazole  400 mg Intravenous Q24H     Continuous Infusions:    heparin (porcine) 12 Units/kg/hr (06/23/20 0602)    propofol 10 mcg/kg/min (06/22/20 2110)    sodium chloride 50 mL/hr at 06/22/20 0536     CBC:   Recent Labs     06/21/20 0317 06/22/20  0257 06/23/20  0442   WBC 7.6 9.8 9.4   HGB 7.5* 8.0* 7.6*    546* 536*     BMP:    Recent Labs     06/21/20 0317 06/22/20  0257 06/22/20  1920 06/23/20  0442    140  --  142   K 3.8 3.3* 3.3* 3.4*    102  --  106   CO2 24 26  --  26   BUN 8 8  --  5*   CREATININE 0.28* 0.26*  --  0.25*   GLUCOSE 111* 119*  --  90     Hepatic: No results for input(s): AST, ALT, ALB, BILITOT, ALKPHOS in the last 72 hours. Troponin: No results for input(s): TROPHS in the last 72 hours. BNP: No results for input(s): BNP in the last 72 hours.   Lipids:

## 2020-06-24 LAB
C DIFFICILE TOXINS, PCR: NORMAL
SPECIMEN DESCRIPTION: NORMAL

## 2020-06-25 LAB
CULTURE: NORMAL
Lab: NORMAL
SPECIMEN DESCRIPTION: NORMAL

## 2020-06-26 LAB
MAGNESIUM: 1.8 MG/DL (ref 1.6–2.6)
POTASSIUM SERPL-SCNC: 4.6 MMOL/L (ref 3.7–5.3)

## 2020-06-26 PROCEDURE — 84132 ASSAY OF SERUM POTASSIUM: CPT

## 2020-06-26 PROCEDURE — 83735 ASSAY OF MAGNESIUM: CPT

## 2020-07-01 LAB
ANION GAP SERPL CALCULATED.3IONS-SCNC: 10 MMOL/L (ref 9–17)
BUN BLDV-MCNC: 13 MG/DL (ref 6–20)
BUN/CREAT BLD: ABNORMAL (ref 9–20)
CALCIUM SERPL-MCNC: 9.5 MG/DL (ref 8.6–10.4)
CHLORIDE BLD-SCNC: 95 MMOL/L (ref 98–107)
CO2: 33 MMOL/L (ref 20–31)
CREAT SERPL-MCNC: <0.4 MG/DL (ref 0.5–0.9)
GFR AFRICAN AMERICAN: ABNORMAL ML/MIN
GFR NON-AFRICAN AMERICAN: ABNORMAL ML/MIN
GFR SERPL CREATININE-BSD FRML MDRD: ABNORMAL ML/MIN/{1.73_M2}
GFR SERPL CREATININE-BSD FRML MDRD: ABNORMAL ML/MIN/{1.73_M2}
GLUCOSE BLD-MCNC: 113 MG/DL (ref 70–99)
MAGNESIUM: 1.9 MG/DL (ref 1.6–2.6)
POTASSIUM SERPL-SCNC: 4.6 MMOL/L (ref 3.7–5.3)
SODIUM BLD-SCNC: 138 MMOL/L (ref 135–144)

## 2020-07-01 PROCEDURE — 80048 BASIC METABOLIC PNL TOTAL CA: CPT

## 2020-07-01 PROCEDURE — 83735 ASSAY OF MAGNESIUM: CPT

## 2020-07-02 PROCEDURE — 87070 CULTURE OTHR SPECIMN AEROBIC: CPT

## 2020-07-02 PROCEDURE — 87205 SMEAR GRAM STAIN: CPT

## 2020-07-02 PROCEDURE — 87086 URINE CULTURE/COLONY COUNT: CPT

## 2020-07-02 PROCEDURE — 81001 URINALYSIS AUTO W/SCOPE: CPT

## 2020-07-02 PROCEDURE — 86403 PARTICLE AGGLUT ANTBDY SCRN: CPT

## 2020-07-02 PROCEDURE — 87186 SC STD MICRODIL/AGAR DIL: CPT

## 2020-07-03 LAB
-: NORMAL
ALBUMIN SERPL-MCNC: 2.8 G/DL (ref 3.5–5.2)
ALBUMIN/GLOBULIN RATIO: ABNORMAL (ref 1–2.5)
ALP BLD-CCNC: 143 U/L (ref 35–104)
ALT SERPL-CCNC: 30 U/L (ref 5–33)
AMORPHOUS: NORMAL
ANION GAP SERPL CALCULATED.3IONS-SCNC: 11 MMOL/L (ref 9–17)
AST SERPL-CCNC: 39 U/L
BACTERIA: NORMAL
BILIRUB SERPL-MCNC: 0.14 MG/DL (ref 0.3–1.2)
BILIRUBIN URINE: NEGATIVE
BUN BLDV-MCNC: 13 MG/DL (ref 6–20)
BUN/CREAT BLD: ABNORMAL (ref 9–20)
CALCIUM SERPL-MCNC: 9.4 MG/DL (ref 8.6–10.4)
CASTS UA: NORMAL /LPF (ref 0–2)
CHLORIDE BLD-SCNC: 99 MMOL/L (ref 98–107)
CO2: 26 MMOL/L (ref 20–31)
COLOR: YELLOW
COMMENT UA: ABNORMAL
CREAT SERPL-MCNC: <0.4 MG/DL (ref 0.5–0.9)
CRYSTALS, UA: NORMAL /HPF
CULTURE: NO GROWTH
EPITHELIAL CELLS UA: NORMAL /HPF (ref 0–5)
GFR AFRICAN AMERICAN: ABNORMAL ML/MIN
GFR NON-AFRICAN AMERICAN: ABNORMAL ML/MIN
GFR SERPL CREATININE-BSD FRML MDRD: ABNORMAL ML/MIN/{1.73_M2}
GFR SERPL CREATININE-BSD FRML MDRD: ABNORMAL ML/MIN/{1.73_M2}
GLUCOSE BLD-MCNC: 115 MG/DL (ref 70–99)
GLUCOSE URINE: NEGATIVE
HCT VFR BLD CALC: 26.5 % (ref 36.3–47.1)
HEMOGLOBIN: 8.1 G/DL (ref 11.9–15.1)
KETONES, URINE: NEGATIVE
LEUKOCYTE ESTERASE, URINE: ABNORMAL
Lab: NORMAL
MAGNESIUM: 1.8 MG/DL (ref 1.6–2.6)
MCH RBC QN AUTO: 29.1 PG (ref 25.2–33.5)
MCHC RBC AUTO-ENTMCNC: 30.6 G/DL (ref 28.4–34.8)
MCV RBC AUTO: 95.3 FL (ref 82.6–102.9)
MUCUS: NORMAL
NITRITE, URINE: NEGATIVE
NRBC AUTOMATED: 0 PER 100 WBC
OTHER OBSERVATIONS UA: NORMAL
PDW BLD-RTO: 15.7 % (ref 11.8–14.4)
PH UA: 8.5 (ref 5–8)
PLATELET # BLD: 510 K/UL (ref 138–453)
PMV BLD AUTO: 10.4 FL (ref 8.1–13.5)
POTASSIUM SERPL-SCNC: 4.5 MMOL/L (ref 3.7–5.3)
PROTEIN UA: NEGATIVE
RBC # BLD: 2.78 M/UL (ref 3.95–5.11)
RBC UA: NORMAL /HPF (ref 0–2)
RENAL EPITHELIAL, UA: NORMAL /HPF
SODIUM BLD-SCNC: 136 MMOL/L (ref 135–144)
SPECIFIC GRAVITY UA: 1.01 (ref 1–1.03)
SPECIMEN DESCRIPTION: NORMAL
TOTAL PROTEIN: 6.3 G/DL (ref 6.4–8.3)
TRICHOMONAS: NORMAL
TURBIDITY: CLEAR
URINE HGB: ABNORMAL
UROBILINOGEN, URINE: NORMAL
WBC # BLD: 17.9 K/UL (ref 3.5–11.3)
WBC UA: NORMAL /HPF (ref 0–5)
YEAST: NORMAL

## 2020-07-03 PROCEDURE — 80053 COMPREHEN METABOLIC PANEL: CPT

## 2020-07-03 PROCEDURE — 85027 COMPLETE CBC AUTOMATED: CPT

## 2020-07-03 PROCEDURE — 83735 ASSAY OF MAGNESIUM: CPT

## 2020-07-04 LAB
ABSOLUTE EOS #: 0.14 K/UL (ref 0–0.4)
ABSOLUTE IMMATURE GRANULOCYTE: 0.29 K/UL (ref 0–0.3)
ABSOLUTE LYMPH #: 3.15 K/UL (ref 1–4.8)
ABSOLUTE MONO #: 2 K/UL (ref 0.2–0.8)
BASOPHILS # BLD: 1 %
BASOPHILS ABSOLUTE: 0.14 K/UL (ref 0–0.2)
DIFFERENTIAL TYPE: ABNORMAL
EOSINOPHILS RELATIVE PERCENT: 1 % (ref 1–4)
HCT VFR BLD CALC: 27.4 % (ref 36.3–47.1)
HEMOGLOBIN: 8.4 G/DL (ref 11.9–15.1)
IMMATURE GRANULOCYTES: 2 %
LYMPHOCYTES # BLD: 22 % (ref 24–44)
MCH RBC QN AUTO: 29.5 PG (ref 25.2–33.5)
MCHC RBC AUTO-ENTMCNC: 30.7 G/DL (ref 28.4–34.8)
MCV RBC AUTO: 96.1 FL (ref 82.6–102.9)
MONOCYTES # BLD: 14 % (ref 1–7)
NRBC AUTOMATED: 0 PER 100 WBC
PDW BLD-RTO: 15.9 % (ref 11.8–14.4)
PLATELET # BLD: 508 K/UL (ref 138–453)
PLATELET ESTIMATE: ABNORMAL
PMV BLD AUTO: 10.4 FL (ref 8.1–13.5)
RBC # BLD: 2.85 M/UL (ref 3.95–5.11)
RBC # BLD: ABNORMAL 10*6/UL
SEG NEUTROPHILS: 60 % (ref 36–66)
SEGMENTED NEUTROPHILS ABSOLUTE COUNT: 8.58 K/UL (ref 1.8–7.7)
WBC # BLD: 14.3 K/UL (ref 3.5–11.3)
WBC # BLD: ABNORMAL 10*3/UL

## 2020-07-04 PROCEDURE — 85025 COMPLETE CBC W/AUTO DIFF WBC: CPT

## 2020-07-06 LAB
CULTURE: NORMAL
FOLATE: 12.3 NG/ML
HEMOGLOBIN: 6.6 G/DL (ref 11.9–15.1)
IRON SATURATION: 13 % (ref 20–55)
IRON: 23 UG/DL (ref 37–145)
Lab: NORMAL
SPECIMEN DESCRIPTION: NORMAL
TOTAL IRON BINDING CAPACITY: 179 UG/DL (ref 250–450)
TRANSFERRIN: 161 MG/DL (ref 200–360)
UNSATURATED IRON BINDING CAPACITY: 156 UG/DL (ref 112–347)
VITAMIN B-12: 570 PG/ML (ref 232–1245)

## 2020-07-06 PROCEDURE — 82607 VITAMIN B-12: CPT

## 2020-07-06 PROCEDURE — 85018 HEMOGLOBIN: CPT

## 2020-07-06 PROCEDURE — 83550 IRON BINDING TEST: CPT

## 2020-07-06 PROCEDURE — 86901 BLOOD TYPING SEROLOGIC RH(D): CPT

## 2020-07-06 PROCEDURE — P9040 RBC LEUKOREDUCED IRRADIATED: HCPCS

## 2020-07-06 PROCEDURE — 86900 BLOOD TYPING SEROLOGIC ABO: CPT

## 2020-07-06 PROCEDURE — 86920 COMPATIBILITY TEST SPIN: CPT

## 2020-07-06 PROCEDURE — 82746 ASSAY OF FOLIC ACID SERUM: CPT

## 2020-07-06 PROCEDURE — 83540 ASSAY OF IRON: CPT

## 2020-07-06 PROCEDURE — 84466 ASSAY OF TRANSFERRIN: CPT

## 2020-07-06 PROCEDURE — 86850 RBC ANTIBODY SCREEN: CPT

## 2020-07-07 LAB
VANCOMYCIN TROUGH DATE LAST DOSE: NORMAL
VANCOMYCIN TROUGH DOSE AMOUNT: NORMAL
VANCOMYCIN TROUGH TIME LAST DOSE: NORMAL
VANCOMYCIN TROUGH: 14.1 UG/ML (ref 10–20)

## 2020-07-07 PROCEDURE — 80202 ASSAY OF VANCOMYCIN: CPT

## 2020-07-07 PROCEDURE — 86900 BLOOD TYPING SEROLOGIC ABO: CPT

## 2020-07-07 PROCEDURE — P9040 RBC LEUKOREDUCED IRRADIATED: HCPCS

## 2020-07-08 LAB
ABO/RH: NORMAL
ANTIBODY SCREEN: NEGATIVE
ARM BAND NUMBER: NORMAL
BLD PROD TYP BPU: NORMAL
BLD PROD TYP BPU: NORMAL
CROSSMATCH RESULT: NORMAL
CROSSMATCH RESULT: NORMAL
DISPENSE STATUS BLOOD BANK: NORMAL
DISPENSE STATUS BLOOD BANK: NORMAL
EXPIRATION DATE: NORMAL
TRANSFUSION STATUS: NORMAL
TRANSFUSION STATUS: NORMAL
UNIT DIVISION: 0
UNIT DIVISION: 0
UNIT NUMBER: NORMAL
UNIT NUMBER: NORMAL

## 2020-07-12 LAB
LACTIC ACID, WHOLE BLOOD: NORMAL MMOL/L (ref 0.7–2.1)
LACTIC ACID: 1 MMOL/L (ref 0.5–2.2)

## 2020-07-12 PROCEDURE — 87040 BLOOD CULTURE FOR BACTERIA: CPT

## 2020-07-12 PROCEDURE — 87205 SMEAR GRAM STAIN: CPT

## 2020-07-12 PROCEDURE — 83605 ASSAY OF LACTIC ACID: CPT

## 2020-07-12 PROCEDURE — 87086 URINE CULTURE/COLONY COUNT: CPT

## 2020-07-12 PROCEDURE — 87070 CULTURE OTHR SPECIMN AEROBIC: CPT

## 2020-07-12 PROCEDURE — 86403 PARTICLE AGGLUT ANTBDY SCRN: CPT

## 2020-07-12 PROCEDURE — 87186 SC STD MICRODIL/AGAR DIL: CPT

## 2020-07-13 LAB
CULTURE: NO GROWTH
Lab: NORMAL
SPECIMEN DESCRIPTION: NORMAL

## 2020-07-14 LAB
VANCOMYCIN TROUGH DATE LAST DOSE: NORMAL
VANCOMYCIN TROUGH DOSE AMOUNT: NORMAL
VANCOMYCIN TROUGH TIME LAST DOSE: NORMAL
VANCOMYCIN TROUGH: 13.5 UG/ML (ref 10–20)

## 2020-07-14 PROCEDURE — 80202 ASSAY OF VANCOMYCIN: CPT

## 2020-07-15 LAB
CULTURE: ABNORMAL
CULTURE: ABNORMAL
DIRECT EXAM: ABNORMAL
Lab: ABNORMAL
SPECIMEN DESCRIPTION: ABNORMAL
VANCOMYCIN TROUGH DATE LAST DOSE: NORMAL
VANCOMYCIN TROUGH DOSE AMOUNT: NORMAL
VANCOMYCIN TROUGH TIME LAST DOSE: NORMAL
VANCOMYCIN TROUGH: 13.4 UG/ML (ref 10–20)

## 2020-07-15 PROCEDURE — 88112 CYTOPATH CELL ENHANCE TECH: CPT

## 2020-07-15 PROCEDURE — 88305 TISSUE EXAM BY PATHOLOGIST: CPT

## 2020-07-15 PROCEDURE — 85730 THROMBOPLASTIN TIME PARTIAL: CPT

## 2020-07-15 PROCEDURE — U0003 INFECTIOUS AGENT DETECTION BY NUCLEIC ACID (DNA OR RNA); SEVERE ACUTE RESPIRATORY SYNDROME CORONAVIRUS 2 (SARS-COV-2) (CORONAVIRUS DISEASE [COVID-19]), AMPLIFIED PROBE TECHNIQUE, MAKING USE OF HIGH THROUGHPUT TECHNOLOGIES AS DESCRIBED BY CMS-2020-01-R: HCPCS

## 2020-07-16 ENCOUNTER — PREP FOR PROCEDURE (OUTPATIENT)
Dept: GENERAL RADIOLOGY | Age: 60
End: 2020-07-16

## 2020-07-16 LAB
HCT VFR BLD CALC: 27.2 % (ref 36.3–47.1)
HEMOGLOBIN: 8.5 G/DL (ref 11.9–15.1)
MCH RBC QN AUTO: 28.9 PG (ref 25.2–33.5)
MCHC RBC AUTO-ENTMCNC: 31.3 G/DL (ref 28.4–34.8)
MCV RBC AUTO: 92.5 FL (ref 82.6–102.9)
NRBC AUTOMATED: 0 PER 100 WBC
PARTIAL THROMBOPLASTIN TIME: 31.2 SEC (ref 23.9–33.8)
PARTIAL THROMBOPLASTIN TIME: 36.4 SEC (ref 23.9–33.8)
PARTIAL THROMBOPLASTIN TIME: 36.6 SEC (ref 23.9–33.8)
PDW BLD-RTO: 16 % (ref 11.8–14.4)
PLATELET # BLD: 601 K/UL (ref 138–453)
PMV BLD AUTO: 10.1 FL (ref 8.1–13.5)
RBC # BLD: 2.94 M/UL (ref 3.95–5.11)
WBC # BLD: 13.7 K/UL (ref 3.5–11.3)

## 2020-07-16 PROCEDURE — 85027 COMPLETE CBC AUTOMATED: CPT

## 2020-07-16 PROCEDURE — 85730 THROMBOPLASTIN TIME PARTIAL: CPT

## 2020-07-16 RX ORDER — ACETAMINOPHEN 160 MG/5ML
650 SOLUTION ORAL EVERY 6 HOURS PRN
COMMUNITY

## 2020-07-16 RX ORDER — ALPRAZOLAM 0.25 MG/1
0.25 TABLET ORAL 2 TIMES DAILY PRN
COMMUNITY

## 2020-07-16 RX ORDER — FLUCONAZOLE 200 MG/1
400 TABLET ORAL DAILY
COMMUNITY

## 2020-07-16 RX ORDER — GABAPENTIN 100 MG/1
100 CAPSULE ORAL NIGHTLY
COMMUNITY

## 2020-07-16 RX ORDER — POTASSIUM BICARBONATE 782 MG/1
TABLET, EFFERVESCENT ORAL DAILY
COMMUNITY

## 2020-07-16 RX ORDER — METOPROLOL TARTRATE 5 MG/5ML
5 INJECTION INTRAVENOUS EVERY 6 HOURS PRN
COMMUNITY

## 2020-07-16 RX ORDER — BUSPIRONE HYDROCHLORIDE 5 MG/1
5 TABLET ORAL 3 TIMES DAILY
COMMUNITY

## 2020-07-16 RX ORDER — HYDROCODONE BITARTRATE AND ACETAMINOPHEN 5; 325 MG/1; MG/1
1-2 TABLET ORAL EVERY 6 HOURS PRN
COMMUNITY

## 2020-07-16 RX ORDER — CYCLOBENZAPRINE HCL 5 MG
5 TABLET ORAL 3 TIMES DAILY PRN
COMMUNITY

## 2020-07-16 RX ORDER — ONDANSETRON 2 MG/ML
4 INJECTION INTRAMUSCULAR; INTRAVENOUS EVERY 6 HOURS PRN
COMMUNITY

## 2020-07-16 RX ORDER — LEVALBUTEROL INHALATION SOLUTION 1.25 MG/3ML
1 SOLUTION RESPIRATORY (INHALATION) 3 TIMES DAILY
COMMUNITY

## 2020-07-16 RX ORDER — ONDANSETRON 4 MG/1
4 TABLET, FILM COATED ORAL EVERY 6 HOURS PRN
COMMUNITY

## 2020-07-16 RX ORDER — MAGNESIUM OXIDE 400 MG/1
400 TABLET ORAL 2 TIMES DAILY
COMMUNITY

## 2020-07-17 ENCOUNTER — HOSPITAL ENCOUNTER (OUTPATIENT)
Dept: INTERVENTIONAL RADIOLOGY/VASCULAR | Age: 60
Discharge: HOME | End: 2020-07-19
Payer: MEDICARE

## 2020-07-17 VITALS
TEMPERATURE: 98.6 F | BODY MASS INDEX: 22.49 KG/M2 | HEIGHT: 67 IN | DIASTOLIC BLOOD PRESSURE: 81 MMHG | WEIGHT: 143.3 LBS | SYSTOLIC BLOOD PRESSURE: 106 MMHG | HEART RATE: 104 BPM | RESPIRATION RATE: 27 BRPM | OXYGEN SATURATION: 100 %

## 2020-07-17 VITALS — RESPIRATION RATE: 20 BRPM | HEART RATE: 114 BPM | OXYGEN SATURATION: 95 %

## 2020-07-17 LAB
GLUCOSE, FLUID: 103 MG/DL
LACTATE DEHYDROGENASE, FLUID: 146 U/L
PH FLUID: 8
POC INR: 1
PROTHROMBIN TIME, POC: 11.5 SEC (ref 10.4–14.2)
SPECIMEN TYPE: NORMAL
TOTAL PROTEIN, BODY FLUID: 2.6 G/DL

## 2020-07-17 PROCEDURE — 82945 GLUCOSE OTHER FLUID: CPT

## 2020-07-17 PROCEDURE — 83986 ASSAY PH BODY FLUID NOS: CPT

## 2020-07-17 PROCEDURE — 82150 ASSAY OF AMYLASE: CPT

## 2020-07-17 PROCEDURE — 87075 CULTR BACTERIA EXCEPT BLOOD: CPT

## 2020-07-17 PROCEDURE — 87205 SMEAR GRAM STAIN: CPT

## 2020-07-17 PROCEDURE — 32555 ASPIRATE PLEURA W/ IMAGING: CPT

## 2020-07-17 PROCEDURE — 7100000010 HC PHASE II RECOVERY - FIRST 15 MIN

## 2020-07-17 PROCEDURE — 32557 INSERT CATH PLEURA W/ IMAGE: CPT

## 2020-07-17 PROCEDURE — 83735 ASSAY OF MAGNESIUM: CPT

## 2020-07-17 PROCEDURE — 7100000011 HC PHASE II RECOVERY - ADDTL 15 MIN

## 2020-07-17 PROCEDURE — 83615 LACTATE (LD) (LDH) ENZYME: CPT

## 2020-07-17 PROCEDURE — C1894 INTRO/SHEATH, NON-LASER: HCPCS

## 2020-07-17 PROCEDURE — 85610 PROTHROMBIN TIME: CPT

## 2020-07-17 PROCEDURE — 84132 ASSAY OF SERUM POTASSIUM: CPT

## 2020-07-17 PROCEDURE — 87070 CULTURE OTHR SPECIMN AEROBIC: CPT

## 2020-07-17 PROCEDURE — 84157 ASSAY OF PROTEIN OTHER: CPT

## 2020-07-17 PROCEDURE — C1769 GUIDE WIRE: HCPCS

## 2020-07-17 PROCEDURE — C1729 CATH, DRAINAGE: HCPCS

## 2020-07-17 PROCEDURE — 89051 BODY FLUID CELL COUNT: CPT

## 2020-07-17 PROCEDURE — 84478 ASSAY OF TRIGLYCERIDES: CPT

## 2020-07-17 PROCEDURE — 2709999900 HC NON-CHARGEABLE SUPPLY

## 2020-07-17 PROCEDURE — 85730 THROMBOPLASTIN TIME PARTIAL: CPT

## 2020-07-17 ASSESSMENT — PAIN - FUNCTIONAL ASSESSMENT: PAIN_FUNCTIONAL_ASSESSMENT: FACES

## 2020-07-17 ASSESSMENT — PULMONARY FUNCTION TESTS
PIF_VALUE: 17
PIF_VALUE: 17

## 2020-07-17 NOTE — PROGRESS NOTES
Medical transport team arrived to return patient to [facility], report given, patient D/C by medical transport

## 2020-07-17 NOTE — PROGRESS NOTES
\"Ondina\" at [facility], updated on patient's condition, including VS and assessment, cardiac rhythm given outputs on chest tube and redd catheter verbalized understanding

## 2020-07-17 NOTE — SEDATION DOCUMENTATION
Left chest tube with dressing and placed to atrium. Serosang drainage continues. Right 2x2 et tegaderm dressing dry and intact. Report called to zuleyka. Specimen from right thora sent to lab. Patient remains on monitor. Patient to recovery. Transport not available yet.

## 2020-07-17 NOTE — H&P
nebulization 3 times daily      levalbuterol (XOPENEX) 1.25 MG/3ML nebulizer solution Take 1 ampule by nebulization 3 times daily      magnesium oxide (MAG-OX) 400 MG tablet 400 mg by Per G Tube route 2 times daily       metoprolol (LOPRESSOR) 5 MG/5ML SOLN injection Infuse 5 mg intravenously every 6 hours as needed HEART RATE >130      metoprolol tartrate (LOPRESSOR) 12.5 mg TABS 12.5 mg by Per G Tube route once HEART RATE >130      PANTOPRAZOLE SODIUM PO 40 mg by Per G Tube route 2 times daily 2 MG/ML ORAL LIQUID       potassium bicarb-citric acid (EFFER-K) 20 MEQ TBEF effervescent tablet by Per G Tube route daily      sodium chloride 0.9 % SOLN 250 mL with vancomycin 1 g SOLR 1,000 mg Infuse 1,000 mg intravenously every 12 hours      dextrose 5 % SOLN 100 mL with dilTIAZem 125 MG/25ML SOLN 1 mg/mL Infuse 5 mg/hr intravenously continuous 100 MG/100 ML      Heparin, Porcine, in NaCl 100-0.45 UT/100ML-% SOLN Infuse intravenously continuous TO BE STOPPED ON 07/17/2020 PRIOR TO LEAVING [facility].  acetaminophen (TYLENOL) 160 MG/5ML solution 650 mg by Per G Tube route every 6 hours as needed for Fever      ALPRAZolam (XANAX) 0.25 MG tablet 0.25 mg by Per G Tube route 2 times daily as needed for Anxiety.  Dextrose, Diabetic Use, (DEXTROSE PO) Take 40 % by mouth as needed      glucagon 1 MG injection Inject 1 kit into the muscle once      HYDROcodone-acetaminophen (NORCO) 5-325 MG per tablet Take 1-2 tablets by mouth every 6 hours as needed for Pain (PER G TUBE).        ondansetron (ZOFRAN) 4 MG/2ML injection Infuse 4 mg intravenously every 6 hours as needed for Nausea or Vomiting      ondansetron (ZOFRAN) 4 MG tablet 4 mg by Per G Tube route every 6 hours as needed for Nausea or Vomiting      levothyroxine (SYNTHROID) 25 MCG tablet Take 1 tablet by mouth Daily 30 tablet 3    albuterol (PROVENTIL) (2.5 MG/3ML) 0.083% nebulizer solution Take 3 mLs by nebulization every 6 hours as needed for Wheezing (Patient taking differently: Take 2.5 mg by nebulization 2 times daily ) 120 each 3    apixaban (ELIQUIS) 5 MG TABS tablet Take 1 tablet by mouth 2 times daily Start taking this dosage after 1 week of 10 mg Eliquis. Medication to start point 2020 (Patient taking differently: 5 mg by Per G Tube route 2 times daily Start taking this dosage after 1 week of 10 mg Eliquis. STOPPED 07/15/2020) 60 tablet 0    fluconazole (DIFLUCAN) 400MG/200ML in 0.9 % sodium chloride IVPB Infuse 200 mLs intravenously every 24 hours (Patient taking differently: Infuse 400 mg intravenously every 24 hours 2020 NOT TAKING) 44720 mL 0    dilTIAZem (CARDIZEM) 30 MG tablet Take 1 tablet by mouth every 8 hours (Patient taking differently: Take 30 mg by mouth every 8 hours 2020 PATIENT NOT TAKING.) 120 tablet 3    pantoprazole (PROTONIX) 40 MG tablet Take 1 tablet by mouth every morning (before breakfast) (Patient taking differently: Take 40 mg by mouth every morning (before breakfast) 2020 NOT TAKING) 30 tablet 3     Continuous Infusions:  PRN Meds:. Allergies  is allergic to sulfa antibiotics. Family History    family history includes Cancer in her father; Stroke in her mother. Family Status   Relation Name Status    Mother  Alive    Father           Social History  Social History     Socioeconomic History    Marital status: Single     Spouse name: Not on file    Number of children: Not on file    Years of education: Not on file    Highest education level: Not on file   Occupational History    Not on file   Social Needs    Financial resource strain: Not on file    Food insecurity     Worry: Not on file     Inability: Not on file   [employer] needs     Medical: Not on file     Non-medical: Not on file   Tobacco Use    Smoking status: Current Every Day Smoker     Packs/day: 2.00     Types: Cigarettes    Smokeless tobacco: Never Used   Substance and Sexual Activity    Alcohol use:  Yes Frequency: 4 or more times a week     Drinks per session: 3 or 4     Binge frequency: Patient refused     Comment: hasn't drank in 12 days, usually daily drinker (vodka)    Drug use: Never    Sexual activity: Not on file   Lifestyle    Physical activity     Days per week: Patient refused     Minutes per session: Patient refused    Stress: Not on file   Relationships    Social connections     Talks on phone: Not on file     Gets together: Not on file     Attends Congregation service: Not on file     Active member of club or organization: Not on file     Attends meetings of clubs or organizations: Not on file     Relationship status: Not on file    Intimate partner violence     Fear of current or ex partner: No     Emotionally abused: No     Physically abused: No     Forced sexual activity: No   Other Topics Concern    Not on file   Social History Narrative    Not on file                      OBJECTIVE:   VITALS:  height is 5' 7\" (1.702 m) and weight is 143 lb 4.8 oz (65 kg). CONSTITUTIONAL: Alert and oriented times 3, no acute distress and cooperative to examination. friendly and pleasant     SKIN: rash No    HEENT: Head is normocephalic, atraumatic. EOMI, PERRLA    Oral air way :slightly narrow Yes, s/p trach     NECK: neck supple, no lymphadenopathy noted, trachea midline and straight       2+ carotid, no bruit    LUNGS: Chest expands equally bilaterally upon respiration, no accessory muscle used. Ausculation reveals  + adventitious breath sounds in anterior fields     CARDIOVASCULAR: \"Heart sounds are normal.  Regular rate and rhythm without murmur,    ABDOMEN: Bowel sounds are present in all four quadrants      GENATALIA:Deferred.     NEUROLOGIC: \"CN II-XII   Cannot be accurately       EXTREMITIES: Pitting edema:  No,  Varicose veins: No     Dorsal pedal/posterior tibial pulses palpable: Yes         Strength:  Decreased       Patient Active Problem List   Diagnosis    Pneumonia of left lower lobe due to infectious organism ([address]    Hyponatremia    Mucopurulent chronic bronchitis ([address]    DIXON (acute kidney injury) ([address]    Tobacco use disorder    Chronic obstructive pulmonary disease (HCC)    Thrombocytopenia (HCC)    Metabolic encephalopathy    DVT (deep venous thrombosis) (HCC)    Encephalopathy acute    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[address]    Meningoencephalitis    Acute respiratory failure with hypoxia and hypercapnia (HCC)    Pleural effusion on left    Quadriparesis (HCC)    Protein-energy malnutrition (HCC)    Fatty liver, alcoholic    CRP elevated    ESR raised    S/P thoracentesis    Chronic alcohol abuse    Acute on chronic respiratory failure (HCC)    History of recent stroke    Atelectasis of left lung    Atrial fibrillation with RVR (HCC)    PFO (patent foramen ovale)    Oropharyngeal dysphagia               IMPRESSIONS:   1. Respiratory failure , pleural effusion. 2. does not have any pertinent problems on file.     [facility]  Electronically signed 7/17/2020 at 12:39 PM       Scheduled for:   Guided pleural  RIGHT  thoracentesis

## 2020-07-17 NOTE — PROGRESS NOTES
[facility] to [facility] status no answer

## 2020-07-17 NOTE — PROGRESS NOTES
Patient admitted to phase II per cart from IR. Patient has left sided chest tube, draining large amount of yellowish colored drainage. Patient repositioned, re-oriented.

## 2020-07-17 NOTE — SEDATION DOCUMENTATION
Drained 700 ML of yellow fluid from right thoracentesis. Left chest is prepped and draped for chest tube. Left chest numbed with lidocaine. Access obtained. 10 fr x 25 cm M-drain Lot [id number]. Sutured in place and placed to atrium. Left chest tube with serosang drainage. Vasaline dressing applied. Specimen sent to lab for right thoracentesis. Right back where thoracentesis was done has lump. Assessed with ultrasound. Hand held pressure applied. And dressing on. Lump gone.

## 2020-07-18 LAB
CULTURE: NORMAL
CULTURE: NORMAL
Lab: NORMAL
Lab: NORMAL
MAGNESIUM: 1.6 MG/DL (ref 1.6–2.6)
POTASSIUM SERPL-SCNC: 3.7 MMOL/L (ref 3.7–5.3)
SPECIMEN DESCRIPTION: NORMAL
SPECIMEN DESCRIPTION: NORMAL

## 2020-07-20 LAB
CASE NUMBER:: NORMAL
CULTURE: NORMAL
CULTURE: NORMAL
DIRECT EXAM: NORMAL
Lab: NORMAL
Lab: NORMAL
SPECIMEN DESCRIPTION: NORMAL

## 2020-07-20 NOTE — BRIEF OP NOTE
Brief Postoperative Note for Thoracentesis and chest tube     Jenaro Marilyn  YOB: 1960  [id number]     Pre-operative Diagnosis: Bilateral Pleural effusion                     Post-operative Diagnosis: Same     Procedure: Ultrasound guided Thoracentesis and chest tube placement          Anesthesia: 1% Lidocaine           Surgeons/Assistants: Moraima Schmid PA-C     Complications: none     EBL: Minimal     Specimens: were obtained     Ultrasound guided right thoracentesis performed. 700 ml hieu fluid obtained. Dressing applied.       Left sided 10 fr chest tube placed using US guidance. Pt tolerated well, chest tube sutured in place and occlusive dressings applied.   Keep chest tube attached to atrium connected to wall suction at -20.       Electronically signed by SHARRON Maher on 7/17/2020 at 2:45 PM

## 2020-07-21 LAB
APPEARANCE FLUID: NORMAL
BASO FLUID: NORMAL %
COLOR FLUID: NORMAL
EOSINOPHIL FLUID: NORMAL %
FLUID DIFF COMMENT: NORMAL
LYMPHOCYTES, BODY FLUID: 32 %
MONOCYTE, FLUID: NORMAL %
NEUTROPHIL, FLUID: 25 %
OTHER CELLS FLUID: NORMAL %
RBC FLUID: <3000 /MM3
SPECIMEN TYPE: NORMAL
SURGICAL PATHOLOGY REPORT: NORMAL
WBC FLUID: 525 /MM3

## 2020-07-22 LAB
SOURCE: NORMAL
TRIGLYCERIDES FLUID: 30 MG/DL

## 2020-07-23 LAB
ALBUMIN SERPL-MCNC: 2.5 G/DL (ref 3.5–5.2)
ALBUMIN/GLOBULIN RATIO: ABNORMAL (ref 1–2.5)
ALP BLD-CCNC: 135 U/L (ref 35–104)
ALT SERPL-CCNC: 26 U/L (ref 5–33)
ANION GAP SERPL CALCULATED.3IONS-SCNC: 12 MMOL/L (ref 9–17)
AST SERPL-CCNC: 29 U/L
BILIRUB SERPL-MCNC: 0.12 MG/DL (ref 0.3–1.2)
BUN BLDV-MCNC: 15 MG/DL (ref 6–20)
BUN/CREAT BLD: ABNORMAL (ref 9–20)
CALCIUM SERPL-MCNC: 9.3 MG/DL (ref 8.6–10.4)
CHLORIDE BLD-SCNC: 99 MMOL/L (ref 98–107)
CO2: 28 MMOL/L (ref 20–31)
CREAT SERPL-MCNC: <0.4 MG/DL (ref 0.5–0.9)
CULTURE: ABNORMAL
DIRECT EXAM: ABNORMAL
GFR AFRICAN AMERICAN: ABNORMAL ML/MIN
GFR NON-AFRICAN AMERICAN: ABNORMAL ML/MIN
GFR SERPL CREATININE-BSD FRML MDRD: ABNORMAL ML/MIN/{1.73_M2}
GFR SERPL CREATININE-BSD FRML MDRD: ABNORMAL ML/MIN/{1.73_M2}
GLUCOSE BLD-MCNC: 104 MG/DL (ref 70–99)
Lab: ABNORMAL
MAGNESIUM: 1.7 MG/DL (ref 1.6–2.6)
PHOSPHORUS: 4.1 MG/DL (ref 2.6–4.5)
POTASSIUM SERPL-SCNC: 4.1 MMOL/L (ref 3.7–5.3)
SODIUM BLD-SCNC: 139 MMOL/L (ref 135–144)
SPECIMEN DESCRIPTION: ABNORMAL
TOTAL PROTEIN: 5.9 G/DL (ref 6.4–8.3)

## 2020-07-23 PROCEDURE — 84100 ASSAY OF PHOSPHORUS: CPT

## 2020-07-23 PROCEDURE — 83735 ASSAY OF MAGNESIUM: CPT

## 2020-07-23 PROCEDURE — 87449 NOS EACH ORGANISM AG IA: CPT

## 2020-07-23 PROCEDURE — 87324 CLOSTRIDIUM AG IA: CPT

## 2020-07-23 PROCEDURE — 80053 COMPREHEN METABOLIC PANEL: CPT

## 2020-07-24 LAB
C DIFF AG + TOXIN: NEGATIVE
SPECIMEN DESCRIPTION: NORMAL

## 2020-07-29 ENCOUNTER — TELEPHONE (OUTPATIENT)
Dept: NEUROLOGY | Age: 60
End: 2020-07-29

## 2020-07-30 LAB
ABSOLUTE EOS #: 0.45 K/UL (ref 0–0.44)
ABSOLUTE IMMATURE GRANULOCYTE: 0.12 K/UL (ref 0–0.3)
ABSOLUTE LYMPH #: 2.17 K/UL (ref 1.1–3.7)
ABSOLUTE MONO #: 1.09 K/UL (ref 0.1–1.2)
BASOPHILS # BLD: 1 % (ref 0–2)
BASOPHILS ABSOLUTE: 0.08 K/UL (ref 0–0.2)
DIFFERENTIAL TYPE: ABNORMAL
EOSINOPHILS RELATIVE PERCENT: 4 % (ref 1–4)
HCT VFR BLD CALC: 25.3 % (ref 36.3–47.1)
HEMOGLOBIN: 7.5 G/DL (ref 11.9–15.1)
IMMATURE GRANULOCYTES: 1 %
LYMPHOCYTES # BLD: 18 % (ref 24–43)
MCH RBC QN AUTO: 28.1 PG (ref 25.2–33.5)
MCHC RBC AUTO-ENTMCNC: 29.6 G/DL (ref 28.4–34.8)
MCV RBC AUTO: 94.8 FL (ref 82.6–102.9)
MONOCYTES # BLD: 9 % (ref 3–12)
NRBC AUTOMATED: 0 PER 100 WBC
PDW BLD-RTO: 16.9 % (ref 11.8–14.4)
PLATELET # BLD: 768 K/UL (ref 138–453)
PLATELET ESTIMATE: ABNORMAL
PMV BLD AUTO: 9.5 FL (ref 8.1–13.5)
RBC # BLD: 2.67 M/UL (ref 3.95–5.11)
RBC # BLD: ABNORMAL 10*6/UL
SEG NEUTROPHILS: 67 % (ref 36–65)
SEGMENTED NEUTROPHILS ABSOLUTE COUNT: 8.02 K/UL (ref 1.5–8.1)
WBC # BLD: 11.9 K/UL (ref 3.5–11.3)
WBC # BLD: ABNORMAL 10*3/UL

## 2020-07-30 PROCEDURE — 85025 COMPLETE CBC W/AUTO DIFF WBC: CPT

## 2020-07-31 PROCEDURE — 87329 GIARDIA AG IA: CPT

## 2020-07-31 PROCEDURE — 87493 C DIFF AMPLIFIED PROBE: CPT

## 2020-07-31 PROCEDURE — 87328 CRYPTOSPORIDIUM AG IA: CPT

## 2020-07-31 PROCEDURE — 87506 IADNA-DNA/RNA PROBE TQ 6-11: CPT

## 2020-08-01 LAB
C DIFFICILE TOXINS, PCR: ABNORMAL
CAMPYLOBACTER PCR: NORMAL
E COLI ENTEROTOXIGENIC PCR: NORMAL
PLESIOMONAS SHIGELLOIDES PCR: NORMAL
SALMONELLA PCR: NORMAL
SHIGATOXIN GENE PCR: NORMAL
SHIGELLA SP PCR: NORMAL
SPECIMEN DESCRIPTION: ABNORMAL
SPECIMEN DESCRIPTION: NORMAL
VIBRIO PCR: NORMAL
YERSINIA ENTEROCOLITICA PCR: NORMAL

## 2020-08-03 LAB
ABSOLUTE EOS #: 0.28 K/UL (ref 0–0.44)
ABSOLUTE IMMATURE GRANULOCYTE: 0.13 K/UL (ref 0–0.3)
ABSOLUTE LYMPH #: 2.24 K/UL (ref 1.1–3.7)
ABSOLUTE MONO #: 1.08 K/UL (ref 0.1–1.2)
BASOPHILS # BLD: 1 % (ref 0–2)
BASOPHILS ABSOLUTE: 0.08 K/UL (ref 0–0.2)
CULTURE: NORMAL
DIFFERENTIAL TYPE: ABNORMAL
DIRECT EXAM: NORMAL
EOSINOPHILS RELATIVE PERCENT: 2 % (ref 1–4)
HCT VFR BLD CALC: 27.1 % (ref 36.3–47.1)
HEMOGLOBIN: 8.2 G/DL (ref 11.9–15.1)
IMMATURE GRANULOCYTES: 1 %
LYMPHOCYTES # BLD: 16 % (ref 24–43)
Lab: NORMAL
Lab: NORMAL
MCH RBC QN AUTO: 27.8 PG (ref 25.2–33.5)
MCHC RBC AUTO-ENTMCNC: 30.3 G/DL (ref 28.4–34.8)
MCV RBC AUTO: 91.9 FL (ref 82.6–102.9)
MONOCYTES # BLD: 8 % (ref 3–12)
NRBC AUTOMATED: 0 PER 100 WBC
PDW BLD-RTO: 16.4 % (ref 11.8–14.4)
PLATELET # BLD: 723 K/UL (ref 138–453)
PLATELET ESTIMATE: ABNORMAL
PMV BLD AUTO: 9.6 FL (ref 8.1–13.5)
RBC # BLD: 2.95 M/UL (ref 3.95–5.11)
RBC # BLD: ABNORMAL 10*6/UL
SEG NEUTROPHILS: 72 % (ref 36–65)
SEGMENTED NEUTROPHILS ABSOLUTE COUNT: 10.5 K/UL (ref 1.5–8.1)
SPECIMEN DESCRIPTION: NORMAL
SPECIMEN DESCRIPTION: NORMAL
WBC # BLD: 14.3 K/UL (ref 3.5–11.3)
WBC # BLD: ABNORMAL 10*3/UL

## 2020-08-03 PROCEDURE — 85025 COMPLETE CBC W/AUTO DIFF WBC: CPT

## 2020-08-04 PROCEDURE — 87186 SC STD MICRODIL/AGAR DIL: CPT

## 2020-08-04 PROCEDURE — 87077 CULTURE AEROBIC IDENTIFY: CPT

## 2020-08-04 PROCEDURE — 87086 URINE CULTURE/COLONY COUNT: CPT

## 2020-08-06 LAB
ABSOLUTE EOS #: 0.06 K/UL (ref 0–0.44)
ABSOLUTE IMMATURE GRANULOCYTE: 0.13 K/UL (ref 0–0.3)
ABSOLUTE LYMPH #: 2.63 K/UL (ref 1.1–3.7)
ABSOLUTE MONO #: 1.46 K/UL (ref 0.1–1.2)
BASOPHILS # BLD: 0 % (ref 0–2)
BASOPHILS ABSOLUTE: 0.07 K/UL (ref 0–0.2)
CULTURE: ABNORMAL
DIFFERENTIAL TYPE: ABNORMAL
EOSINOPHILS RELATIVE PERCENT: 0 % (ref 1–4)
HCT VFR BLD CALC: 26.9 % (ref 36.3–47.1)
HEMOGLOBIN: 8.3 G/DL (ref 11.9–15.1)
IMMATURE GRANULOCYTES: 1 %
LYMPHOCYTES # BLD: 15 % (ref 24–43)
Lab: ABNORMAL
MCH RBC QN AUTO: 27.8 PG (ref 25.2–33.5)
MCHC RBC AUTO-ENTMCNC: 30.9 G/DL (ref 28.4–34.8)
MCV RBC AUTO: 90 FL (ref 82.6–102.9)
MONOCYTES # BLD: 8 % (ref 3–12)
NRBC AUTOMATED: 0 PER 100 WBC
PDW BLD-RTO: 16.4 % (ref 11.8–14.4)
PLATELET # BLD: 555 K/UL (ref 138–453)
PLATELET ESTIMATE: ABNORMAL
PMV BLD AUTO: 10.2 FL (ref 8.1–13.5)
RBC # BLD: 2.99 M/UL (ref 3.95–5.11)
RBC # BLD: ABNORMAL 10*6/UL
SEG NEUTROPHILS: 76 % (ref 36–65)
SEGMENTED NEUTROPHILS ABSOLUTE COUNT: 13.2 K/UL (ref 1.5–8.1)
SPECIMEN DESCRIPTION: ABNORMAL
WBC # BLD: 17.6 K/UL (ref 3.5–11.3)
WBC # BLD: ABNORMAL 10*3/UL

## 2020-08-06 PROCEDURE — 85025 COMPLETE CBC W/AUTO DIFF WBC: CPT

## 2020-08-09 LAB
ABSOLUTE EOS #: 0.33 K/UL (ref 0–0.44)
ABSOLUTE IMMATURE GRANULOCYTE: 0.1 K/UL (ref 0–0.3)
ABSOLUTE LYMPH #: 3.26 K/UL (ref 1.1–3.7)
ABSOLUTE MONO #: 1.29 K/UL (ref 0.1–1.2)
BASOPHILS # BLD: 1 % (ref 0–2)
BASOPHILS ABSOLUTE: 0.06 K/UL (ref 0–0.2)
DIFFERENTIAL TYPE: ABNORMAL
EOSINOPHILS RELATIVE PERCENT: 3 % (ref 1–4)
HCT VFR BLD CALC: 26.1 % (ref 36.3–47.1)
HEMOGLOBIN: 7.9 G/DL (ref 11.9–15.1)
IMMATURE GRANULOCYTES: 1 %
LYMPHOCYTES # BLD: 27 % (ref 24–43)
MCH RBC QN AUTO: 27.1 PG (ref 25.2–33.5)
MCHC RBC AUTO-ENTMCNC: 30.3 G/DL (ref 28.4–34.8)
MCV RBC AUTO: 89.4 FL (ref 82.6–102.9)
MONOCYTES # BLD: 11 % (ref 3–12)
NRBC AUTOMATED: 0 PER 100 WBC
PDW BLD-RTO: 16.5 % (ref 11.8–14.4)
PLATELET # BLD: 638 K/UL (ref 138–453)
PLATELET ESTIMATE: ABNORMAL
PMV BLD AUTO: 9.7 FL (ref 8.1–13.5)
RBC # BLD: 2.92 M/UL (ref 3.95–5.11)
RBC # BLD: ABNORMAL 10*6/UL
SEG NEUTROPHILS: 57 % (ref 36–65)
SEGMENTED NEUTROPHILS ABSOLUTE COUNT: 7.23 K/UL (ref 1.5–8.1)
WBC # BLD: 12.3 K/UL (ref 3.5–11.3)
WBC # BLD: ABNORMAL 10*3/UL

## 2020-08-09 PROCEDURE — 85025 COMPLETE CBC W/AUTO DIFF WBC: CPT

## 2020-08-10 LAB
ABSOLUTE EOS #: 0.28 K/UL (ref 0–0.44)
ABSOLUTE IMMATURE GRANULOCYTE: 0.13 K/UL (ref 0–0.3)
ABSOLUTE LYMPH #: 2.27 K/UL (ref 1.1–3.7)
ABSOLUTE MONO #: 1.15 K/UL (ref 0.1–1.2)
BASOPHILS # BLD: 0 % (ref 0–2)
BASOPHILS ABSOLUTE: 0.07 K/UL (ref 0–0.2)
DIFFERENTIAL TYPE: ABNORMAL
EOSINOPHILS RELATIVE PERCENT: 2 % (ref 1–4)
HCT VFR BLD CALC: 27.5 % (ref 36.3–47.1)
HEMOGLOBIN: 8.2 G/DL (ref 11.9–15.1)
IMMATURE GRANULOCYTES: 1 %
LYMPHOCYTES # BLD: 14 % (ref 24–43)
MCH RBC QN AUTO: 27.1 PG (ref 25.2–33.5)
MCHC RBC AUTO-ENTMCNC: 29.8 G/DL (ref 28.4–34.8)
MCV RBC AUTO: 90.8 FL (ref 82.6–102.9)
MONOCYTES # BLD: 7 % (ref 3–12)
NRBC AUTOMATED: 0 PER 100 WBC
PDW BLD-RTO: 16.3 % (ref 11.8–14.4)
PLATELET # BLD: 647 K/UL (ref 138–453)
PLATELET ESTIMATE: ABNORMAL
PMV BLD AUTO: 9.8 FL (ref 8.1–13.5)
RBC # BLD: 3.03 M/UL (ref 3.95–5.11)
RBC # BLD: ABNORMAL 10*6/UL
SEG NEUTROPHILS: 76 % (ref 36–65)
SEGMENTED NEUTROPHILS ABSOLUTE COUNT: 11.81 K/UL (ref 1.5–8.1)
WBC # BLD: 15.7 K/UL (ref 3.5–11.3)
WBC # BLD: ABNORMAL 10*3/UL

## 2020-08-10 PROCEDURE — 85025 COMPLETE CBC W/AUTO DIFF WBC: CPT

## 2020-08-13 PROCEDURE — 87040 BLOOD CULTURE FOR BACTERIA: CPT

## 2020-08-13 PROCEDURE — 84443 ASSAY THYROID STIM HORMONE: CPT

## 2020-08-19 LAB
CULTURE: NORMAL
CULTURE: NORMAL
Lab: NORMAL
Lab: NORMAL
SPECIMEN DESCRIPTION: NORMAL
SPECIMEN DESCRIPTION: NORMAL

## 2020-08-20 LAB — TSH SERPL DL<=0.05 MIU/L-ACNC: 8.93 MIU/L (ref 0.3–5)

## 2020-08-20 PROCEDURE — 84443 ASSAY THYROID STIM HORMONE: CPT

## 2020-08-24 LAB
ABSOLUTE EOS #: 0.33 K/UL (ref 0–0.44)
ABSOLUTE IMMATURE GRANULOCYTE: 0.11 K/UL (ref 0–0.3)
ABSOLUTE LYMPH #: 3.77 K/UL (ref 1.1–3.7)
ABSOLUTE MONO #: 1.25 K/UL (ref 0.1–1.2)
BASOPHILS # BLD: 1 % (ref 0–2)
BASOPHILS ABSOLUTE: 0.09 K/UL (ref 0–0.2)
DIFFERENTIAL TYPE: ABNORMAL
EOSINOPHILS RELATIVE PERCENT: 2 % (ref 1–4)
HCT VFR BLD CALC: 28.9 % (ref 36.3–47.1)
HEMOGLOBIN: 8.4 G/DL (ref 11.9–15.1)
IMMATURE GRANULOCYTES: 1 %
LYMPHOCYTES # BLD: 22 % (ref 24–43)
MCH RBC QN AUTO: 25.9 PG (ref 25.2–33.5)
MCHC RBC AUTO-ENTMCNC: 29.1 G/DL (ref 28.4–34.8)
MCV RBC AUTO: 89.2 FL (ref 82.6–102.9)
MONOCYTES # BLD: 7 % (ref 3–12)
NRBC AUTOMATED: 0 PER 100 WBC
PDW BLD-RTO: 17.2 % (ref 11.8–14.4)
PLATELET # BLD: 663 K/UL (ref 138–453)
PLATELET ESTIMATE: ABNORMAL
PMV BLD AUTO: 9.6 FL (ref 8.1–13.5)
RBC # BLD: 3.24 M/UL (ref 3.95–5.11)
RBC # BLD: ABNORMAL 10*6/UL
SEG NEUTROPHILS: 67 % (ref 36–65)
SEGMENTED NEUTROPHILS ABSOLUTE COUNT: 11.55 K/UL (ref 1.5–8.1)
WBC # BLD: 17.1 K/UL (ref 3.5–11.3)
WBC # BLD: ABNORMAL 10*3/UL

## 2020-08-24 PROCEDURE — 87186 SC STD MICRODIL/AGAR DIL: CPT

## 2020-08-24 PROCEDURE — 87070 CULTURE OTHR SPECIMN AEROBIC: CPT

## 2020-08-24 PROCEDURE — 87077 CULTURE AEROBIC IDENTIFY: CPT

## 2020-08-24 PROCEDURE — 85025 COMPLETE CBC W/AUTO DIFF WBC: CPT

## 2020-08-24 PROCEDURE — 87205 SMEAR GRAM STAIN: CPT

## 2020-08-31 LAB
ABSOLUTE EOS #: 0.21 K/UL (ref 0–0.44)
ABSOLUTE IMMATURE GRANULOCYTE: 0.04 K/UL (ref 0–0.3)
ABSOLUTE LYMPH #: 2.59 K/UL (ref 1.1–3.7)
ABSOLUTE MONO #: 0.86 K/UL (ref 0.1–1.2)
BASOPHILS # BLD: 1 % (ref 0–2)
BASOPHILS ABSOLUTE: 0.06 K/UL (ref 0–0.2)
DIFFERENTIAL TYPE: ABNORMAL
EOSINOPHILS RELATIVE PERCENT: 2 % (ref 1–4)
HCT VFR BLD CALC: 26.9 % (ref 36.3–47.1)
HEMOGLOBIN: 8 G/DL (ref 11.9–15.1)
IMMATURE GRANULOCYTES: 0 %
LYMPHOCYTES # BLD: 23 % (ref 24–43)
MCH RBC QN AUTO: 25.5 PG (ref 25.2–33.5)
MCHC RBC AUTO-ENTMCNC: 29.7 G/DL (ref 28.4–34.8)
MCV RBC AUTO: 85.7 FL (ref 82.6–102.9)
MONOCYTES # BLD: 8 % (ref 3–12)
NRBC AUTOMATED: 0 PER 100 WBC
PDW BLD-RTO: 17.2 % (ref 11.8–14.4)
PLATELET # BLD: 695 K/UL (ref 138–453)
PLATELET ESTIMATE: ABNORMAL
PMV BLD AUTO: 9.5 FL (ref 8.1–13.5)
RBC # BLD: 3.14 M/UL (ref 3.95–5.11)
RBC # BLD: ABNORMAL 10*6/UL
SEG NEUTROPHILS: 66 % (ref 36–65)
SEGMENTED NEUTROPHILS ABSOLUTE COUNT: 7.4 K/UL (ref 1.5–8.1)
WBC # BLD: 11.2 K/UL (ref 3.5–11.3)
WBC # BLD: ABNORMAL 10*3/UL

## 2020-08-31 PROCEDURE — 85025 COMPLETE CBC W/AUTO DIFF WBC: CPT

## 2020-09-03 LAB
ABSOLUTE EOS #: 0.28 K/UL (ref 0–0.44)
ABSOLUTE IMMATURE GRANULOCYTE: 0.03 K/UL (ref 0–0.3)
ABSOLUTE LYMPH #: 2.2 K/UL (ref 1.1–3.7)
ABSOLUTE MONO #: 0.7 K/UL (ref 0.1–1.2)
BASOPHILS # BLD: 1 % (ref 0–2)
BASOPHILS ABSOLUTE: 0.08 K/UL (ref 0–0.2)
DIFFERENTIAL TYPE: ABNORMAL
EOSINOPHILS RELATIVE PERCENT: 3 % (ref 1–4)
HCT VFR BLD CALC: 28.9 % (ref 36.3–47.1)
HEMOGLOBIN: 8.4 G/DL (ref 11.9–15.1)
IMMATURE GRANULOCYTES: 0 %
LYMPHOCYTES # BLD: 26 % (ref 24–43)
MCH RBC QN AUTO: 25.4 PG (ref 25.2–33.5)
MCHC RBC AUTO-ENTMCNC: 29.1 G/DL (ref 28.4–34.8)
MCV RBC AUTO: 87.3 FL (ref 82.6–102.9)
MONOCYTES # BLD: 8 % (ref 3–12)
NRBC AUTOMATED: 0 PER 100 WBC
PDW BLD-RTO: 17.2 % (ref 11.8–14.4)
PLATELET # BLD: 674 K/UL (ref 138–453)
PLATELET ESTIMATE: ABNORMAL
PMV BLD AUTO: 9.2 FL (ref 8.1–13.5)
RBC # BLD: 3.31 M/UL (ref 3.95–5.11)
RBC # BLD: ABNORMAL 10*6/UL
SEG NEUTROPHILS: 62 % (ref 36–65)
SEGMENTED NEUTROPHILS ABSOLUTE COUNT: 5.29 K/UL (ref 1.5–8.1)
WBC # BLD: 8.6 K/UL (ref 3.5–11.3)
WBC # BLD: ABNORMAL 10*3/UL

## 2020-09-03 PROCEDURE — 85025 COMPLETE CBC W/AUTO DIFF WBC: CPT

## 2020-09-08 PROCEDURE — U0003 INFECTIOUS AGENT DETECTION BY NUCLEIC ACID (DNA OR RNA); SEVERE ACUTE RESPIRATORY SYNDROME CORONAVIRUS 2 (SARS-COV-2) (CORONAVIRUS DISEASE [COVID-19]), AMPLIFIED PROBE TECHNIQUE, MAKING USE OF HIGH THROUGHPUT TECHNOLOGIES AS DESCRIBED BY CMS-2020-01-R: HCPCS

## 2020-09-10 LAB — SARS-COV-2, NAA: NOT DETECTED

## 2020-09-11 ENCOUNTER — TELEPHONE (OUTPATIENT)
Dept: PRIMARY CARE CLINIC | Age: 60
End: 2020-09-11

## 2020-09-12 PROCEDURE — 87449 NOS EACH ORGANISM AG IA: CPT

## 2020-09-12 PROCEDURE — 87324 CLOSTRIDIUM AG IA: CPT

## 2020-09-13 LAB
C DIFF AG + TOXIN: ABNORMAL
SPECIMEN DESCRIPTION: ABNORMAL
THYROXINE, FREE: 1.24 NG/DL (ref 0.93–1.7)
TSH SERPL DL<=0.05 MIU/L-ACNC: 2.51 MIU/L (ref 0.3–5)

## 2020-09-13 PROCEDURE — 84439 ASSAY OF FREE THYROXINE: CPT

## 2020-09-13 PROCEDURE — 84443 ASSAY THYROID STIM HORMONE: CPT

## 2020-09-14 PROCEDURE — U0003 INFECTIOUS AGENT DETECTION BY NUCLEIC ACID (DNA OR RNA); SEVERE ACUTE RESPIRATORY SYNDROME CORONAVIRUS 2 (SARS-COV-2) (CORONAVIRUS DISEASE [COVID-19]), AMPLIFIED PROBE TECHNIQUE, MAKING USE OF HIGH THROUGHPUT TECHNOLOGIES AS DESCRIBED BY CMS-2020-01-R: HCPCS

## 2020-09-15 PROCEDURE — 87186 SC STD MICRODIL/AGAR DIL: CPT

## 2020-09-15 PROCEDURE — 86403 PARTICLE AGGLUT ANTBDY SCRN: CPT

## 2020-09-15 PROCEDURE — 87205 SMEAR GRAM STAIN: CPT

## 2020-09-15 PROCEDURE — 87070 CULTURE OTHR SPECIMN AEROBIC: CPT

## 2020-09-17 LAB — SARS-COV-2, NAA: NOT DETECTED

## 2020-09-18 LAB
CULTURE: ABNORMAL
CULTURE: ABNORMAL
DIRECT EXAM: ABNORMAL
DIRECT EXAM: ABNORMAL
Lab: ABNORMAL
SPECIMEN DESCRIPTION: ABNORMAL

## 2020-10-01 LAB
ABSOLUTE EOS #: 0.12 K/UL (ref 0–0.44)
ABSOLUTE IMMATURE GRANULOCYTE: 0.07 K/UL (ref 0–0.3)
ABSOLUTE LYMPH #: 3.52 K/UL (ref 1.1–3.7)
ABSOLUTE MONO #: 0.88 K/UL (ref 0.1–1.2)
BASOPHILS # BLD: 1 % (ref 0–2)
BASOPHILS ABSOLUTE: 0.07 K/UL (ref 0–0.2)
DIFFERENTIAL TYPE: ABNORMAL
EOSINOPHILS RELATIVE PERCENT: 1 % (ref 1–4)
HCT VFR BLD CALC: 31.5 % (ref 36.3–47.1)
HEMOGLOBIN: 9.4 G/DL (ref 11.9–15.1)
IMMATURE GRANULOCYTES: 1 %
LYMPHOCYTES # BLD: 25 % (ref 24–43)
MCH RBC QN AUTO: 24.3 PG (ref 25.2–33.5)
MCHC RBC AUTO-ENTMCNC: 29.8 G/DL (ref 28.4–34.8)
MCV RBC AUTO: 81.4 FL (ref 82.6–102.9)
MONOCYTES # BLD: 6 % (ref 3–12)
NRBC AUTOMATED: 0 PER 100 WBC
PDW BLD-RTO: 17.2 % (ref 11.8–14.4)
PLATELET # BLD: 449 K/UL (ref 138–453)
PLATELET ESTIMATE: ABNORMAL
PMV BLD AUTO: 9.9 FL (ref 8.1–13.5)
RBC # BLD: 3.87 M/UL (ref 3.95–5.11)
RBC # BLD: ABNORMAL 10*6/UL
SEG NEUTROPHILS: 66 % (ref 36–65)
SEGMENTED NEUTROPHILS ABSOLUTE COUNT: 9.18 K/UL (ref 1.5–8.1)
WBC # BLD: 13.8 K/UL (ref 3.5–11.3)
WBC # BLD: ABNORMAL 10*3/UL

## 2020-10-01 PROCEDURE — 85025 COMPLETE CBC W/AUTO DIFF WBC: CPT

## 2020-10-02 PROCEDURE — 87493 C DIFF AMPLIFIED PROBE: CPT

## 2020-10-02 PROCEDURE — 87324 CLOSTRIDIUM AG IA: CPT

## 2020-10-02 PROCEDURE — 87449 NOS EACH ORGANISM AG IA: CPT

## 2020-10-04 LAB
C DIFFICILE TOXINS, PCR: NORMAL
SPECIMEN DESCRIPTION: NORMAL

## 2020-10-24 LAB — GLUCOSE BLD-MCNC: 81 MG/DL (ref 70–99)

## 2020-10-24 PROCEDURE — 82947 ASSAY GLUCOSE BLOOD QUANT: CPT

## 2020-10-25 LAB
ANION GAP SERPL CALCULATED.3IONS-SCNC: 11 MMOL/L (ref 9–17)
BUN BLDV-MCNC: 16 MG/DL (ref 6–20)
BUN/CREAT BLD: 39 (ref 9–20)
CALCIUM SERPL-MCNC: 9.4 MG/DL (ref 8.6–10.4)
CHLORIDE BLD-SCNC: 102 MMOL/L (ref 98–107)
CO2: 27 MMOL/L (ref 20–31)
CREAT SERPL-MCNC: 0.41 MG/DL (ref 0.5–0.9)
GFR AFRICAN AMERICAN: >60 ML/MIN
GFR NON-AFRICAN AMERICAN: >60 ML/MIN
GFR SERPL CREATININE-BSD FRML MDRD: ABNORMAL ML/MIN/{1.73_M2}
GFR SERPL CREATININE-BSD FRML MDRD: ABNORMAL ML/MIN/{1.73_M2}
GLUCOSE BLD-MCNC: 115 MG/DL (ref 70–99)
MAGNESIUM: 1.8 MG/DL (ref 1.6–2.6)
POTASSIUM SERPL-SCNC: 3.8 MMOL/L (ref 3.7–5.3)
SODIUM BLD-SCNC: 140 MMOL/L (ref 135–144)

## 2020-10-25 PROCEDURE — 83735 ASSAY OF MAGNESIUM: CPT

## 2020-10-25 PROCEDURE — 80048 BASIC METABOLIC PNL TOTAL CA: CPT

## 2020-10-31 PROCEDURE — 87493 C DIFF AMPLIFIED PROBE: CPT

## 2020-11-01 LAB
C DIFFICILE TOXINS, PCR: ABNORMAL
SPECIMEN DESCRIPTION: ABNORMAL

## 2020-11-15 PROCEDURE — U0003 INFECTIOUS AGENT DETECTION BY NUCLEIC ACID (DNA OR RNA); SEVERE ACUTE RESPIRATORY SYNDROME CORONAVIRUS 2 (SARS-COV-2) (CORONAVIRUS DISEASE [COVID-19]), AMPLIFIED PROBE TECHNIQUE, MAKING USE OF HIGH THROUGHPUT TECHNOLOGIES AS DESCRIBED BY CMS-2020-01-R: HCPCS

## 2020-11-18 LAB — SARS-COV-2, NAA: NOT DETECTED

## 2022-12-29 NOTE — SEDATION DOCUMENTATION
ACCESS OBTAINED  DRAINING CLEAR FLUID   TOTAL OF 9 ML COLLECTED AND SENT TO LAB COVID-19 Screening:    • Does the patient OR patient’s household members have any of the following symptoms?  o Temperature: Fever ?100.0°F or ?37.8°C?  No  o Respiratory symptoms: New or worsening cough, shortness of breath, difficulty breathing, or sore throat? No  o GI symptoms: New onset of nausea, vomiting or diarrhea?  No  o Miscellaneous: New onset of loss of taste or smell, chills, repeated shaking with chills, muscle pain, headache, congestion or runny nose?  No  • Has the patient or a household member tested positive for COVID-19 in the last 14 days?  No  • Has the patient or a household member been tested for COVID-19 and are waiting for the results?  No

## 2024-11-13 NOTE — CARE COORDINATION
"Recommendations from today's MTM visit:                                                         Start Ozempic 0.25 mg once weekly for four weeks, then increase to 0.5 mg weekly    We applied for the assistance program for this medication. They will start sending it to [facility]    Bring all your medications to our next visit at [facility]     Follow-up: Return in 29 days (on 12/12/2024) for Follow up, in person @ 1:00 PM.    It was great speaking with you today.  I value your experience and would be very thankful for your time in providing feedback in our clinic survey. In the next few days, you may receive an email or text message from [employer] with a link to a survey related to your  clinical pharmacist.\"     To schedule another MTM appointment, please call the clinic directly or you may call the MTM scheduling line at [phone].    My Clinical Pharmacist's contact information:                                                      Please feel free to contact me with any questions or concerns you have.      Erasmo Duckworth, Darrius, NURIS, BCACP  Medication Therapy Management Pharmacist     "
Intubated - plan is for SNF - [facility]. Follow for LTACH need - call to patient sister to discuss LTACH choice. No answer.
Intubated, not on sedation - non-responsive. LP to be done today.  Plan - [facility], [address] [place] [address] choice to be addressed
Message left for [facility] to check status of precert    6713 Patient up in chair, NGT in place, tube feeding, extubated 5/26, swallow test pending, plan is [facility], awaiting return call     [phone] Call into [employer] left for [facility] to call back with status of precert.
Spoke to sister MARJORIE HERNANDEZ [facility] patient sister she is stating the she is concerned about her sis ter going to [place]. She states that they are not allowing visitors and she is worried about her sister getting depressed and Covid. We discussed the need for an LTACH and that if not an LTACH a SNF or home. I told her I will call [facility] and ask. [facility] and spoke to Vandana she informs me that they have the ability to video chat with family.
Spoke with Madison Luciano from [employer], she is requesting H&P, PT/OT notes and recent clinical notes be faxed for referral.   Requested clinicals and notes faxed as requested above to Madison Luciano [phone]
Spoke with patient's sister, Andriy Mccracken. Choice of SNF is [facility] on [address]. Referral made.
Transition planning  Spoke with patient's sister Troy Shea regarding referral to [facility], [facility] as patient still has FMS, NG with tube feeding, IV antibiotics. Freedom of choice given, she chooses [facility]. Referral faxed and called to Madhav at [facility].
Transitional Planning  Referral sent yesterday to [facility] for LTACH. Call to liadouglas Solares, confirmed receipt of referral and they will be able to accept. They will get pre-cert started today. Patient still on HFNC, getting chest physiotherapy, on heparin gtt. ID plan - IV Abelcet for - 6 weeks till 7/2/20 and IV flucytosine for 2 weeks till 6/9/20. Possible repeat LP next week. Alt plan: Referral to [facility].
Urology:    Please call for final recommendations prior to discharge if pt not voiding on own with PVRs < 250 cc. Urology will sign off at this time.     Meño Le PA-C  Urology Service   6/4/2020 1:23 PM
Writer called to bedside    Patient now tachycardic with complaints of shortness of breath and desaturations down into the 80s with need for increased oxygen. Patient denies any CP currently, continues to report HA, she is afebrile but feels very warm to the touch. She is alert and oriented and following commands, no neuro deficits. CXR, EKG, and RT consult ordered prior to my arrival    Documentation and labs reviewed.   Patient has been off AC since     ABG, trop, BNP ordered, CT chest to rule out PE given tachycardia and increased oxygen demands    AB.51/32/70/26    CXR with LLL atelectasis    CT chest negative PE, significant atelectasis LLL recommending follow up with bronchoscopy    Discussed with RT    Pro-bnp 2,740-- will give small dose of lasix and small dose of IV morphine     Will discuss with attending
 Consult to critical care to evaluate for transfer to higher level of care at the request of RT secondary to the amount of o2 needed    Ask nursing to reach out to Neurology to update on mental status change- Resident did not return message/call  When I followed up    Per nurse, the Critical care resident came down and evaluated patient and recommended to decrease the fio2 on the vent and evaluate and monitor on current level of care. Vitals are stable at this time.          0410- ABG resulted and po2 considerable decreased, re-consulted critical care, for transfer to ICU, agreement to transfer to ICU       BRIEF HISTORY/SUMMARY OF STAY PER LAST PROGRESS NOTE/NURSING           VITALS:     Patient Vitals for the past 8 hrs:   BP Temp Temp src Pulse Resp SpO2   05/19/20 2334 (!) 129/90 98.7 °F (37.1 °C) Axillary 114 27 97 %   05/19/20 2241 -- -- -- -- 23 --   05/19/20 2216 -- -- -- -- 29 --   05/19/20 2206 -- 99.5 °F (37.5 °C) Axillary -- -- --   05/19/20 2056 -- 102.9 °F (39.4 °C) Axillary 112 27 95 %   05/19/20 2019 -- -- -- -- 28 96 %   05/19/20 1915 (!) 141/84 101.5 °F (38.6 °C) Axillary 97 25 95 %         Intake/Output Summary (Last 24 hours) at 5/20/2020 0050  Last data filed at 5/19/2020 6908  Gross per 24 hour   Intake 120 ml   Output 100 ml   Net 20 ml       Wt Readings from Last 3 Encounters:   05/19/20 111 lb 5.3 oz (50.5 kg)   05/14/20 120 lb 9.6 oz (54.7 kg)               RECENT LABS/ IMAGING:    Hematology:  Recent Labs     05/17/20  1234 05/18/20  0531 05/19/20  0559 05/19/20  1956   WBC  --  10.0 13.5* 10.1   RBC  --  4.33 4.63 4.30   HGB  --  13.1 13.8 13.2   HCT  --  40.3 44.1 40.8   MCV  --  93.1 95.2 94.9   MCH  --  30.3 29.8 30.7   MCHC  --  32.5 31.3 32.4   RDW  --  14.0 14.1 14.0   PLT  --  127* 184 191   MPV  --  11.1 11.0 10.5   CRP 23.5*  --   --   --      PT/INR:    Lab Results   Component Value Date    PROTIME 10.7 05/14/2020    INR 1.0 05/14/2020     PTT:    Lab Results   Component

## 2024-12-25 NOTE — FLOWSHEET NOTE
05/17/20 1030   Encounter Summary   Services provided to: Patient   Support System Family members   Continue Visiting   (05/17/2020)   Complexity of Encounter Moderate   Length of Encounter 15 minutes   Spiritual Assessment Completed Yes   Routine   Type Initial   Spiritual/Congregation   Type Spiritual support   Assessment Calm; Approachable; Hopeful   Intervention Active listening;Nurtured hope;Prayer;Empowerment   Outcome Expressed gratitude
Patient NPO after midnight for testing on Friday
Patient reported that she thinks her bed is wet, states \"I farted and I think there is something more. \"  Patient noted to have moderate amount of green, liquid stool. Leda care, redd care provided. Complete linen change done. Patient repositioned herself for comfort. Stool sample obtained and sent to lab. No acute distress noted. Will continue to monitor.
Orientation to room/Bed in low position, brakes on/Side rails x 2 or 4 up, assess large gaps, such that a patient could get extremity or other body part entrapped, use additional safety procedures/Use of non-skid footwear for ambulating patients, use of appropriate size clothing to prevent risk of tripping/Assess eliminations need, assist as needed/Call light is within reach, educate patient/family on its functionality/Environment clear of unused equipment, furniture's in place, clear of hazards/Assess for adequate lighting, leave nightlight on/Patient and family education available to parents and patient/Document fall prevention teaching and include in plan of care

## 2025-01-31 NOTE — PROGRESS NOTES
Spoke to pt and communicated results as summarized below. Pt voiced understanding. Reminded pt of next OV on 12/26/25 with Dr. Angelo and pt confirmed.    Vascular US Duplex Lower Extremity Venous Bilateral    Bilateral CFV are patent.    Bilateral SSV and GSV are too small to assess for reflux.    No evidence of DVT or SVT in bilateral lower extremity venous system.   (6/18/2020): EF 55%. No shunt seen by color Doppler. Positive bubble study with bubbles crossing to the left side, indicating intra-or atrial septal shunt. Trivial pericardial effusion                            IMPRESSION: 62 y.o.  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 Patient underwent trach and PEG tube placement on 6/22; patient was wide awake and alert, nodding head appropriately, following basic commands intermittently    Critical illness myopathy/neuropathy    A. fib    Recent RLE DVT    Echo - PFO    Comorbid conditions - HTN, HLD, DM, CAD, CKD          PLAN:  LP was held as patient's mentation continued to improve    Patient is on Diflucan and vancomycin    Continue Eliquis    Continue PT/OT    Patient neurologically stable for discharge    Please note that this note was generated using a voice recognition dictation software. Although every effort was made to ensure the accuracy of this automated transcription, some errors in transcription may have occurred.

## (undated) DEVICE — SPONGE DRN W4XL4IN RAYON/POLYESTER 6 PLY NONWOVEN PRECUT

## (undated) DEVICE — ELECTRODE PT RET AD L9FT HI MOIST COND ADH HYDRGEL CORDED

## (undated) DEVICE — AGENT HEMSTAT W2XL14IN OXIDIZED REGENERATED CELOS ABSRB FOR

## (undated) DEVICE — STRIP SKIN CLSR W0.25XL4IN WHT SPUNBOUND FBR NYL HI ADH

## (undated) DEVICE — GLOVE ORANGE PI 8 1/2   MSG9085

## (undated) DEVICE — GLOVE ORANGE PI 7   MSG9070

## (undated) DEVICE — 10FR FRAZIER SUCTION HANDLE: Brand: CARDINAL HEALTH

## (undated) DEVICE — SUTURE VCRL + SZ 3-0 L27IN ABSRB UD L26MM SH 1/2 CIR VCP416H

## (undated) DEVICE — SUTURE PERMAHAND SZ 3-0 L18IN NONABSORBABLE BLK SILK BRAID A184H

## (undated) DEVICE — SPONGE,PEANUT,XRAY,ST,SM,3/8",5/CARD: Brand: MEDLINE INDUSTRIES, INC.

## (undated) DEVICE — SUTURE PROL SZ 3-0 L30IN NONABSORBABLE BLU L26MM SH 1/2 CIR 8832H

## (undated) DEVICE — POSITIONER,HEAD,MULTIRING,36CS: Brand: MEDLINE

## (undated) DEVICE — E-Z CLEAN, NON-STICK, PTFE COATED, ELECTROSURGICAL BLADE ELECTRODE, MODIFIED EXTENDED INSULATION, 2.5 INCH (6.35 CM): Brand: MEGADYNE

## (undated) DEVICE — BRUSH CYTO GI W/ 3 RNG HNDL 1.8MMX120MM

## (undated) DEVICE — GLOVE SURG SZ 6 THK91MIL LTX FREE SYN POLYISOPRENE ANTI

## (undated) DEVICE — TRAP SPEC 40CC MUCUS OPN SUCT

## (undated) DEVICE — Z DISCONTINUED BY MEDLINE USE 2280062 TUBE TRACH SZ 8 L79MM OD12.2MM ID7.6MM CUF DISP INNR CANN

## (undated) DEVICE — BINDER ABD UNISX 9IN 62IN L AND XL UNIV

## (undated) DEVICE — TUBING, SUCTION, 9/32" X 20', STRAIGHT: Brand: MEDLINE INDUSTRIES, INC.

## (undated) DEVICE — SUTURE VCRL SZ 3-0 L27IN ABSRB UD L26MM SH 1/2 CIR J416H

## (undated) DEVICE — TOWEL,OR,DSP,ST,NATURAL,DLX,4/PK,20PK/CS: Brand: MEDLINE

## (undated) DEVICE — GOWN,AURORA,NONRNF,XL,30/CS: Brand: MEDLINE

## (undated) DEVICE — SUTURE PROL SZ 2-0 L30IN NONABSORBABLE BLU L26MM SH 1/2 CIR 8833H

## (undated) DEVICE — BLADE ES ELASTOMERIC COAT INSUL DURABLE BEND UPTO 90DEG

## (undated) DEVICE — SUTURE NONABSORBABLE MONOFILAMENT 2-0 FS 18 IN ETHILON 664H

## (undated) DEVICE — GLOVE ORANGE PI 7 1/2   MSG9075

## (undated) DEVICE — 3M™ STERI-STRIP™ COMPOUND BENZOIN TINCTURE 40 BAGS/CARTON 4 CARTONS/CASE C1544: Brand: 3M™ STERI-STRIP™

## (undated) DEVICE — ZINACTIVE USE 2539609 APPLICATOR MEDICATED 10.5 CC SOLUTION HI LT ORNG CHLORAPREP

## (undated) DEVICE — SVMMC HD AND NK PK